# Patient Record
Sex: FEMALE | Race: WHITE | NOT HISPANIC OR LATINO | Employment: OTHER | URBAN - NONMETROPOLITAN AREA
[De-identification: names, ages, dates, MRNs, and addresses within clinical notes are randomized per-mention and may not be internally consistent; named-entity substitution may affect disease eponyms.]

---

## 2017-02-15 ENCOUNTER — HISTORY (OUTPATIENT)
Dept: FAMILY MEDICINE | Facility: OTHER | Age: 67
End: 2017-02-15

## 2017-02-15 ENCOUNTER — HOSPITAL ENCOUNTER (OUTPATIENT)
Dept: RADIOLOGY | Facility: OTHER | Age: 67
End: 2017-02-15
Attending: NURSE PRACTITIONER

## 2017-02-15 ENCOUNTER — OFFICE VISIT - GICH (OUTPATIENT)
Dept: FAMILY MEDICINE | Facility: OTHER | Age: 67
End: 2017-02-15

## 2017-02-15 DIAGNOSIS — R51.9 HEADACHE: ICD-10-CM

## 2017-02-15 DIAGNOSIS — M54.2 CERVICALGIA: ICD-10-CM

## 2017-02-15 DIAGNOSIS — G50.0 TRIGEMINAL NEURALGIA: ICD-10-CM

## 2017-02-15 LAB
A/G RATIO - HISTORICAL: 1.1 (ref 1–2)
ABSOLUTE BASOPHILS - HISTORICAL: 0.1 THOU/CU MM
ABSOLUTE EOSINOPHILS - HISTORICAL: 0.3 THOU/CU MM
ABSOLUTE LYMPHOCYTES - HISTORICAL: 1.8 THOU/CU MM (ref 0.9–2.9)
ABSOLUTE MONOCYTES - HISTORICAL: 0.6 THOU/CU MM
ABSOLUTE NEUTROPHILS - HISTORICAL: 6.7 THOU/CU MM (ref 1.7–7)
ALBUMIN SERPL-MCNC: 4.1 G/DL (ref 3.5–5.7)
ALP SERPL-CCNC: 89 IU/L (ref 34–104)
ALT (SGPT) - HISTORICAL: 17 IU/L (ref 7–52)
ANION GAP - HISTORICAL: 7 (ref 5–18)
AST SERPL-CCNC: 15 IU/L (ref 13–39)
BASOPHILS # BLD AUTO: 0.6 %
BILIRUB SERPL-MCNC: 0.3 MG/DL (ref 0.3–1)
BUN SERPL-MCNC: 9 MG/DL (ref 7–25)
BUN/CREAT RATIO - HISTORICAL: 13
CALCIUM SERPL-MCNC: 10.1 MG/DL (ref 8.6–10.3)
CHLORIDE SERPLBLD-SCNC: 101 MMOL/L (ref 98–107)
CO2 SERPL-SCNC: 31 MMOL/L (ref 21–31)
CREAT SERPL-MCNC: 0.69 MG/DL (ref 0.7–1.3)
EOSINOPHIL NFR BLD AUTO: 3.4 %
ERYTHROCYTE [DISTWIDTH] IN BLOOD BY AUTOMATED COUNT: 12.5 % (ref 11.5–15.5)
ERYTHROCYTE [SEDIMENTATION RATE] IN BLOOD: 25 MM/HR
GFR IF NOT AFRICAN AMERICAN - HISTORICAL: >60 ML/MIN/1.73M2
GLOBULIN - HISTORICAL: 3.9 G/DL (ref 2–3.7)
GLUCOSE SERPL-MCNC: 128 MG/DL (ref 70–105)
HCT VFR BLD AUTO: 47.4 % (ref 33–51)
HEMOGLOBIN: 14.9 G/DL (ref 12–16)
LYMPHOCYTES NFR BLD AUTO: 18.7 % (ref 20–44)
MCH RBC QN AUTO: 26.1 PG (ref 26–34)
MCHC RBC AUTO-ENTMCNC: 31.5 G/DL (ref 32–36)
MCV RBC AUTO: 83 FL (ref 80–100)
MONOCYTES NFR BLD AUTO: 6.1 %
NEUTROPHILS NFR BLD AUTO: 71.2 % (ref 42–72)
PLATELET # BLD AUTO: 353 THOU/CU MM (ref 140–440)
PMV BLD: 5.7 FL (ref 6.5–11)
POTASSIUM SERPL-SCNC: 3.9 MMOL/L (ref 3.5–5.1)
PROT SERPL-MCNC: 8 G/DL (ref 6.4–8.9)
RED BLOOD COUNT - HISTORICAL: 5.72 MIL/CU MM (ref 4–5.2)
SODIUM SERPL-SCNC: 139 MMOL/L (ref 133–143)
WHITE BLOOD COUNT - HISTORICAL: 9.4 THOU/CU MM (ref 4.5–11)

## 2017-02-16 ENCOUNTER — COMMUNICATION - GICH (OUTPATIENT)
Dept: FAMILY MEDICINE | Facility: OTHER | Age: 67
End: 2017-02-16

## 2017-02-16 ENCOUNTER — HISTORY (OUTPATIENT)
Dept: FAMILY MEDICINE | Facility: OTHER | Age: 67
End: 2017-02-16

## 2017-02-16 DIAGNOSIS — F40.240 CLAUSTROPHOBIA: ICD-10-CM

## 2017-02-17 ENCOUNTER — HOSPITAL ENCOUNTER (OUTPATIENT)
Dept: RADIOLOGY | Facility: OTHER | Age: 67
End: 2017-02-17
Attending: NURSE PRACTITIONER

## 2017-02-17 ENCOUNTER — AMBULATORY - GICH (OUTPATIENT)
Dept: FAMILY MEDICINE | Facility: OTHER | Age: 67
End: 2017-02-17

## 2017-02-17 ENCOUNTER — COMMUNICATION - GICH (OUTPATIENT)
Dept: FAMILY MEDICINE | Facility: OTHER | Age: 67
End: 2017-02-17

## 2017-02-17 DIAGNOSIS — R51.9 HEADACHE: ICD-10-CM

## 2017-02-17 DIAGNOSIS — G50.0 TRIGEMINAL NEURALGIA: ICD-10-CM

## 2017-02-17 DIAGNOSIS — J01.00 ACUTE MAXILLARY SINUSITIS: ICD-10-CM

## 2017-02-20 ENCOUNTER — HISTORY (OUTPATIENT)
Dept: EMERGENCY MEDICINE | Facility: OTHER | Age: 67
End: 2017-02-20

## 2017-02-27 ENCOUNTER — HISTORY (OUTPATIENT)
Dept: FAMILY MEDICINE | Facility: OTHER | Age: 67
End: 2017-02-27

## 2017-02-27 ENCOUNTER — OFFICE VISIT - GICH (OUTPATIENT)
Dept: FAMILY MEDICINE | Facility: OTHER | Age: 67
End: 2017-02-27

## 2017-02-27 DIAGNOSIS — G50.0 TRIGEMINAL NEURALGIA: ICD-10-CM

## 2017-02-27 DIAGNOSIS — R51.9 HEADACHE: ICD-10-CM

## 2017-03-06 ENCOUNTER — OFFICE VISIT - GICH (OUTPATIENT)
Dept: FAMILY MEDICINE | Facility: OTHER | Age: 67
End: 2017-03-06

## 2017-03-06 ENCOUNTER — HISTORY (OUTPATIENT)
Dept: FAMILY MEDICINE | Facility: OTHER | Age: 67
End: 2017-03-06

## 2017-03-06 DIAGNOSIS — G50.0 TRIGEMINAL NEURALGIA: ICD-10-CM

## 2017-03-22 ENCOUNTER — HISTORY (OUTPATIENT)
Dept: FAMILY MEDICINE | Facility: OTHER | Age: 67
End: 2017-03-22

## 2017-03-22 ENCOUNTER — OFFICE VISIT - GICH (OUTPATIENT)
Dept: FAMILY MEDICINE | Facility: OTHER | Age: 67
End: 2017-03-22

## 2017-03-22 DIAGNOSIS — G89.29 OTHER CHRONIC PAIN: ICD-10-CM

## 2017-03-22 DIAGNOSIS — N39.3 STRESS INCONTINENCE: ICD-10-CM

## 2017-03-22 DIAGNOSIS — Z74.1 NEED FOR ASSISTANCE WITH PERSONAL CARE: ICD-10-CM

## 2017-03-22 DIAGNOSIS — Z23 ENCOUNTER FOR IMMUNIZATION: ICD-10-CM

## 2017-03-22 DIAGNOSIS — Z12.39 ENCOUNTER FOR OTHER SCREENING FOR MALIGNANT NEOPLASM OF BREAST: ICD-10-CM

## 2017-03-22 DIAGNOSIS — G50.0 TRIGEMINAL NEURALGIA: ICD-10-CM

## 2017-03-22 DIAGNOSIS — Z00.00 ENCOUNTER FOR GENERAL ADULT MEDICAL EXAMINATION WITHOUT ABNORMAL FINDINGS: ICD-10-CM

## 2017-03-22 DIAGNOSIS — M54.2 CERVICALGIA: ICD-10-CM

## 2017-03-22 DIAGNOSIS — M79.7 FIBROMYALGIA: ICD-10-CM

## 2017-03-22 DIAGNOSIS — L60.2 ONYCHOGRYPHOSIS: ICD-10-CM

## 2017-03-22 DIAGNOSIS — Z87.410 HISTORY OF CERVICAL DYSPLASIA: ICD-10-CM

## 2017-03-22 LAB — HEPATITIS C ANTIBODY CATEGORY - HISTORICAL: NORMAL

## 2017-03-23 ENCOUNTER — COMMUNICATION - GICH (OUTPATIENT)
Dept: FAMILY MEDICINE | Facility: OTHER | Age: 67
End: 2017-03-23

## 2017-03-29 ENCOUNTER — HOSPITAL ENCOUNTER (OUTPATIENT)
Dept: PHYSICAL THERAPY | Facility: OTHER | Age: 67
Setting detail: THERAPIES SERIES
End: 2017-03-29
Attending: NURSE PRACTITIONER

## 2017-03-29 ENCOUNTER — COMMUNICATION - GICH (OUTPATIENT)
Dept: PHYSICAL THERAPY | Facility: OTHER | Age: 67
End: 2017-03-29

## 2017-03-29 DIAGNOSIS — R42 DIZZINESS AND GIDDINESS: ICD-10-CM

## 2017-03-29 DIAGNOSIS — G89.29 OTHER CHRONIC PAIN: ICD-10-CM

## 2017-03-29 DIAGNOSIS — R26.89 OTHER ABNORMALITIES OF GAIT AND MOBILITY: ICD-10-CM

## 2017-03-29 DIAGNOSIS — M54.2 CERVICALGIA: ICD-10-CM

## 2017-03-31 ENCOUNTER — HOSPITAL ENCOUNTER (OUTPATIENT)
Dept: PHYSICAL THERAPY | Facility: OTHER | Age: 67
Setting detail: THERAPIES SERIES
End: 2017-03-31
Attending: NURSE PRACTITIONER

## 2017-04-03 ENCOUNTER — HOSPITAL ENCOUNTER (OUTPATIENT)
Dept: PHYSICAL THERAPY | Facility: OTHER | Age: 67
Setting detail: THERAPIES SERIES
End: 2017-04-03
Attending: NURSE PRACTITIONER

## 2017-04-03 DIAGNOSIS — R42 DIZZINESS AND GIDDINESS: ICD-10-CM

## 2017-04-03 DIAGNOSIS — R26.89 OTHER ABNORMALITIES OF GAIT AND MOBILITY: ICD-10-CM

## 2017-04-05 ENCOUNTER — HOSPITAL ENCOUNTER (OUTPATIENT)
Dept: RADIOLOGY | Facility: OTHER | Age: 67
End: 2017-04-05
Attending: NURSE PRACTITIONER

## 2017-04-05 ENCOUNTER — HISTORY (OUTPATIENT)
Dept: RADIOLOGY | Facility: OTHER | Age: 67
End: 2017-04-05

## 2017-04-05 DIAGNOSIS — Z12.39 ENCOUNTER FOR OTHER SCREENING FOR MALIGNANT NEOPLASM OF BREAST: ICD-10-CM

## 2017-04-05 DIAGNOSIS — Z00.00 ENCOUNTER FOR GENERAL ADULT MEDICAL EXAMINATION WITHOUT ABNORMAL FINDINGS: ICD-10-CM

## 2017-04-07 ENCOUNTER — HOSPITAL ENCOUNTER (OUTPATIENT)
Dept: PHYSICAL THERAPY | Facility: OTHER | Age: 67
Setting detail: THERAPIES SERIES
End: 2017-04-07
Attending: NURSE PRACTITIONER

## 2017-04-10 ENCOUNTER — OFFICE VISIT - GICH (OUTPATIENT)
Dept: FAMILY MEDICINE | Facility: OTHER | Age: 67
End: 2017-04-10

## 2017-04-10 ENCOUNTER — HISTORY (OUTPATIENT)
Dept: FAMILY MEDICINE | Facility: OTHER | Age: 67
End: 2017-04-10

## 2017-04-10 DIAGNOSIS — Z78.9 OTHER SPECIFIED HEALTH STATUS (CODE): ICD-10-CM

## 2017-04-10 DIAGNOSIS — G50.0 TRIGEMINAL NEURALGIA: ICD-10-CM

## 2017-04-10 DIAGNOSIS — M54.2 CERVICALGIA: ICD-10-CM

## 2017-04-10 DIAGNOSIS — B35.1 TINEA UNGUIUM: ICD-10-CM

## 2017-04-11 ENCOUNTER — HOSPITAL ENCOUNTER (OUTPATIENT)
Dept: PHYSICAL THERAPY | Facility: OTHER | Age: 67
Setting detail: THERAPIES SERIES
End: 2017-04-11
Attending: NURSE PRACTITIONER

## 2017-04-13 ENCOUNTER — HOSPITAL ENCOUNTER (OUTPATIENT)
Dept: PHYSICAL THERAPY | Facility: OTHER | Age: 67
Setting detail: THERAPIES SERIES
End: 2017-04-13
Attending: NURSE PRACTITIONER

## 2017-04-18 ENCOUNTER — HOSPITAL ENCOUNTER (OUTPATIENT)
Dept: PHYSICAL THERAPY | Facility: OTHER | Age: 67
Setting detail: THERAPIES SERIES
End: 2017-04-18
Attending: NURSE PRACTITIONER

## 2017-04-20 ENCOUNTER — HOSPITAL ENCOUNTER (OUTPATIENT)
Dept: PHYSICAL THERAPY | Facility: OTHER | Age: 67
Setting detail: THERAPIES SERIES
End: 2017-04-20
Attending: NURSE PRACTITIONER

## 2017-04-24 ENCOUNTER — HISTORY (OUTPATIENT)
Dept: FAMILY MEDICINE | Facility: OTHER | Age: 67
End: 2017-04-24

## 2017-04-24 ENCOUNTER — OFFICE VISIT - GICH (OUTPATIENT)
Dept: FAMILY MEDICINE | Facility: OTHER | Age: 67
End: 2017-04-24

## 2017-04-24 DIAGNOSIS — L60.2 ONYCHOGRYPHOSIS: ICD-10-CM

## 2017-04-24 DIAGNOSIS — L85.3 XEROSIS CUTIS: ICD-10-CM

## 2017-04-24 DIAGNOSIS — Z74.1 NEED FOR ASSISTANCE WITH PERSONAL CARE: ICD-10-CM

## 2017-04-25 ENCOUNTER — HOSPITAL ENCOUNTER (OUTPATIENT)
Dept: PHYSICAL THERAPY | Facility: OTHER | Age: 67
Setting detail: THERAPIES SERIES
End: 2017-04-25
Attending: NURSE PRACTITIONER

## 2017-04-27 ENCOUNTER — HOSPITAL ENCOUNTER (OUTPATIENT)
Dept: PHYSICAL THERAPY | Facility: OTHER | Age: 67
Setting detail: THERAPIES SERIES
End: 2017-04-27
Attending: NURSE PRACTITIONER

## 2017-05-01 ENCOUNTER — AMBULATORY - GICH (OUTPATIENT)
Dept: SCHEDULING | Facility: OTHER | Age: 67
End: 2017-05-01

## 2017-05-02 ENCOUNTER — HOSPITAL ENCOUNTER (OUTPATIENT)
Dept: PHYSICAL THERAPY | Facility: OTHER | Age: 67
Setting detail: THERAPIES SERIES
End: 2017-05-02
Attending: NURSE PRACTITIONER

## 2017-05-08 ENCOUNTER — HISTORY (OUTPATIENT)
Dept: FAMILY MEDICINE | Facility: OTHER | Age: 67
End: 2017-05-08

## 2017-05-08 ENCOUNTER — OFFICE VISIT - GICH (OUTPATIENT)
Dept: FAMILY MEDICINE | Facility: OTHER | Age: 67
End: 2017-05-08

## 2017-05-08 DIAGNOSIS — L60.2 ONYCHOGRYPHOSIS: ICD-10-CM

## 2017-05-08 DIAGNOSIS — M54.2 CERVICALGIA: ICD-10-CM

## 2017-05-08 DIAGNOSIS — G50.0 TRIGEMINAL NEURALGIA: ICD-10-CM

## 2017-05-08 DIAGNOSIS — L85.3 XEROSIS CUTIS: ICD-10-CM

## 2017-05-09 ENCOUNTER — COMMUNICATION - GICH (OUTPATIENT)
Dept: FAMILY MEDICINE | Facility: OTHER | Age: 67
End: 2017-05-09

## 2017-05-09 ENCOUNTER — HOSPITAL ENCOUNTER (OUTPATIENT)
Dept: PHYSICAL THERAPY | Facility: OTHER | Age: 67
Setting detail: THERAPIES SERIES
End: 2017-05-09
Attending: NURSE PRACTITIONER

## 2017-05-22 ENCOUNTER — HOSPITAL ENCOUNTER (OUTPATIENT)
Dept: PHYSICAL THERAPY | Facility: OTHER | Age: 67
Setting detail: THERAPIES SERIES
End: 2017-05-22
Attending: NURSE PRACTITIONER

## 2017-05-26 ENCOUNTER — HOSPITAL ENCOUNTER (OUTPATIENT)
Dept: PHYSICAL THERAPY | Facility: OTHER | Age: 67
Setting detail: THERAPIES SERIES
End: 2017-05-26
Attending: NURSE PRACTITIONER

## 2017-06-05 ENCOUNTER — OFFICE VISIT - GICH (OUTPATIENT)
Dept: FAMILY MEDICINE | Facility: OTHER | Age: 67
End: 2017-06-05

## 2017-06-05 ENCOUNTER — HISTORY (OUTPATIENT)
Dept: FAMILY MEDICINE | Facility: OTHER | Age: 67
End: 2017-06-05

## 2017-06-05 ENCOUNTER — AMBULATORY - GICH (OUTPATIENT)
Dept: SCHEDULING | Facility: OTHER | Age: 67
End: 2017-06-05

## 2017-06-05 ENCOUNTER — COMMUNICATION - GICH (OUTPATIENT)
Dept: GERIATRICS | Facility: OTHER | Age: 67
End: 2017-06-05

## 2017-06-05 DIAGNOSIS — G50.0 TRIGEMINAL NEURALGIA: ICD-10-CM

## 2017-06-05 DIAGNOSIS — R51.9 HEADACHE: ICD-10-CM

## 2017-06-05 DIAGNOSIS — L60.2 ONYCHOGRYPHOSIS: ICD-10-CM

## 2017-06-05 DIAGNOSIS — Z23 ENCOUNTER FOR IMMUNIZATION: ICD-10-CM

## 2017-06-05 ASSESSMENT — PATIENT HEALTH QUESTIONNAIRE - PHQ9: SUM OF ALL RESPONSES TO PHQ QUESTIONS 1-9: 0

## 2017-06-06 ENCOUNTER — COMMUNICATION - GICH (OUTPATIENT)
Dept: FAMILY MEDICINE | Facility: OTHER | Age: 67
End: 2017-06-06

## 2017-06-12 ENCOUNTER — AMBULATORY - GICH (OUTPATIENT)
Dept: SCHEDULING | Facility: OTHER | Age: 67
End: 2017-06-12

## 2017-06-12 ENCOUNTER — TRANSFERRED RECORDS (OUTPATIENT)
Dept: MULTI SPECIALTY CLINIC | Facility: CLINIC | Age: 67
End: 2017-06-12

## 2017-06-12 LAB — COLOGUARD-ABSTRACT: POSITIVE

## 2017-06-26 ENCOUNTER — COMMUNICATION - GICH (OUTPATIENT)
Dept: FAMILY MEDICINE | Facility: OTHER | Age: 67
End: 2017-06-26

## 2017-07-05 ENCOUNTER — OFFICE VISIT - GICH (OUTPATIENT)
Dept: FAMILY MEDICINE | Facility: OTHER | Age: 67
End: 2017-07-05

## 2017-07-05 ENCOUNTER — AMBULATORY - GICH (OUTPATIENT)
Dept: SCHEDULING | Facility: OTHER | Age: 67
End: 2017-07-05

## 2017-07-05 ENCOUNTER — HISTORY (OUTPATIENT)
Dept: FAMILY MEDICINE | Facility: OTHER | Age: 67
End: 2017-07-05

## 2017-07-05 DIAGNOSIS — L60.2 ONYCHOGRYPHOSIS: ICD-10-CM

## 2017-07-05 DIAGNOSIS — G50.0 TRIGEMINAL NEURALGIA: ICD-10-CM

## 2017-07-05 DIAGNOSIS — Z91.89 OTHER SPECIFIED PERSONAL RISK FACTORS, NOT ELSEWHERE CLASSIFIED: ICD-10-CM

## 2017-07-05 DIAGNOSIS — Z87.19 PERSONAL HISTORY OF OTHER DISEASES OF THE DIGESTIVE SYSTEM (CODE): ICD-10-CM

## 2017-07-05 DIAGNOSIS — R19.5 OTHER FECAL ABNORMALITIES: ICD-10-CM

## 2017-07-05 ASSESSMENT — PATIENT HEALTH QUESTIONNAIRE - PHQ9: SUM OF ALL RESPONSES TO PHQ QUESTIONS 1-9: 0

## 2017-07-09 ENCOUNTER — HISTORY (OUTPATIENT)
Dept: FAMILY MEDICINE | Facility: OTHER | Age: 67
End: 2017-07-09

## 2017-07-19 ENCOUNTER — HISTORY (OUTPATIENT)
Dept: EMERGENCY MEDICINE | Facility: OTHER | Age: 67
End: 2017-07-19

## 2017-07-19 ENCOUNTER — COMMUNICATION - GICH (OUTPATIENT)
Dept: GERIATRICS | Facility: OTHER | Age: 67
End: 2017-07-19

## 2017-08-04 ENCOUNTER — OFFICE VISIT - GICH (OUTPATIENT)
Dept: FAMILY MEDICINE | Facility: OTHER | Age: 67
End: 2017-08-04

## 2017-08-04 ENCOUNTER — HOSPITAL ENCOUNTER (OUTPATIENT)
Dept: RADIOLOGY | Facility: OTHER | Age: 67
End: 2017-08-04
Attending: NURSE PRACTITIONER

## 2017-08-04 ENCOUNTER — HISTORY (OUTPATIENT)
Dept: FAMILY MEDICINE | Facility: OTHER | Age: 67
End: 2017-08-04

## 2017-08-04 DIAGNOSIS — I83.93 ASYMPTOMATIC VARICOSE VEINS OF BOTH LOWER EXTREMITIES: ICD-10-CM

## 2017-08-04 DIAGNOSIS — Z91.89 OTHER SPECIFIED PERSONAL RISK FACTORS, NOT ELSEWHERE CLASSIFIED: ICD-10-CM

## 2017-08-04 DIAGNOSIS — L60.2 ONYCHOGRYPHOSIS: ICD-10-CM

## 2017-08-14 ENCOUNTER — AMBULATORY - GICH (OUTPATIENT)
Dept: SCHEDULING | Facility: OTHER | Age: 67
End: 2017-08-14

## 2017-10-09 ENCOUNTER — AMBULATORY - GICH (OUTPATIENT)
Dept: SCHEDULING | Facility: OTHER | Age: 67
End: 2017-10-09

## 2017-10-26 ENCOUNTER — COMMUNICATION - GICH (OUTPATIENT)
Dept: FAMILY MEDICINE | Facility: OTHER | Age: 67
End: 2017-10-26

## 2017-11-07 ENCOUNTER — OFFICE VISIT - GICH (OUTPATIENT)
Dept: FAMILY MEDICINE | Facility: OTHER | Age: 67
End: 2017-11-07

## 2017-11-07 ENCOUNTER — HISTORY (OUTPATIENT)
Dept: FAMILY MEDICINE | Facility: OTHER | Age: 67
End: 2017-11-07

## 2017-11-07 DIAGNOSIS — R51.9 HEADACHE: ICD-10-CM

## 2017-11-07 DIAGNOSIS — N39.41 URGE INCONTINENCE: ICD-10-CM

## 2017-11-07 DIAGNOSIS — Z23 ENCOUNTER FOR IMMUNIZATION: ICD-10-CM

## 2017-11-07 DIAGNOSIS — N39.43 POST-VOID DRIBBLING: ICD-10-CM

## 2017-11-07 DIAGNOSIS — L60.2 ONYCHOGRYPHOSIS: ICD-10-CM

## 2017-11-07 DIAGNOSIS — G50.0 TRIGEMINAL NEURALGIA: ICD-10-CM

## 2017-11-07 LAB
BACTERIA URINE: ABNORMAL BACTERIA/HPF
BILIRUB UR QL: NEGATIVE
CLARITY, URINE: CLEAR CLARITY
COLOR UR: YELLOW COLOR
EPITHELIAL CELLS: ABNORMAL EPI/HPF
GLUCOSE URINE: NEGATIVE MG/DL
KETONES UR QL: NEGATIVE MG/DL
LEUKOCYTE ESTERASE URINE: NEGATIVE
NITRITE UR QL STRIP: NEGATIVE
OCCULT BLOOD,URINE - HISTORICAL: ABNORMAL
PH UR: 6.5 [PH]
PROTEIN QUALITATIVE,URINE - HISTORICAL: NEGATIVE MG/DL
RBC - HISTORICAL: ABNORMAL /HPF
SP GR UR STRIP: <=1.005
UROBILINOGEN,QUALITATIVE - HISTORICAL: NORMAL EU/DL
WBC - HISTORICAL: ABNORMAL /HPF

## 2017-11-07 ASSESSMENT — PATIENT HEALTH QUESTIONNAIRE - PHQ9: SUM OF ALL RESPONSES TO PHQ QUESTIONS 1-9: 0

## 2017-11-10 ENCOUNTER — COMMUNICATION - GICH (OUTPATIENT)
Dept: FAMILY MEDICINE | Facility: OTHER | Age: 67
End: 2017-11-10

## 2017-11-29 ENCOUNTER — HISTORY (OUTPATIENT)
Dept: UROLOGY | Facility: OTHER | Age: 67
End: 2017-11-29

## 2017-11-29 ENCOUNTER — OFFICE VISIT - GICH (OUTPATIENT)
Dept: UROLOGY | Facility: OTHER | Age: 67
End: 2017-11-29

## 2017-11-29 DIAGNOSIS — R32 URINARY INCONTINENCE: ICD-10-CM

## 2017-11-29 DIAGNOSIS — R31.21 ASYMPTOMATIC MICROSCOPIC HEMATURIA: ICD-10-CM

## 2017-12-07 ENCOUNTER — HISTORY (OUTPATIENT)
Dept: FAMILY MEDICINE | Facility: OTHER | Age: 67
End: 2017-12-07

## 2017-12-07 ENCOUNTER — HOSPITAL ENCOUNTER (OUTPATIENT)
Dept: RADIOLOGY | Facility: OTHER | Age: 67
End: 2017-12-07
Attending: NURSE PRACTITIONER

## 2017-12-07 ENCOUNTER — OFFICE VISIT - GICH (OUTPATIENT)
Dept: FAMILY MEDICINE | Facility: OTHER | Age: 67
End: 2017-12-07

## 2017-12-07 DIAGNOSIS — M79.644 PAIN OF FINGER OF RIGHT HAND: ICD-10-CM

## 2017-12-07 DIAGNOSIS — R32 URINARY INCONTINENCE: ICD-10-CM

## 2017-12-07 DIAGNOSIS — R31.21 ASYMPTOMATIC MICROSCOPIC HEMATURIA: ICD-10-CM

## 2017-12-07 DIAGNOSIS — L60.2 ONYCHOGRYPHOSIS: ICD-10-CM

## 2017-12-07 LAB
CREAT SERPL-MCNC: 0.69 MG/DL (ref 0.7–1.3)
GFR IF NOT AFRICAN AMERICAN - HISTORICAL: >60 ML/MIN/1.73M2

## 2017-12-07 ASSESSMENT — PATIENT HEALTH QUESTIONNAIRE - PHQ9: SUM OF ALL RESPONSES TO PHQ QUESTIONS 1-9: 0

## 2017-12-08 ENCOUNTER — COMMUNICATION - GICH (OUTPATIENT)
Dept: FAMILY MEDICINE | Facility: OTHER | Age: 67
End: 2017-12-08

## 2017-12-14 ENCOUNTER — AMBULATORY - GICH (OUTPATIENT)
Dept: UROLOGY | Facility: OTHER | Age: 67
End: 2017-12-14

## 2017-12-14 DIAGNOSIS — R31.9 HEMATURIA: ICD-10-CM

## 2017-12-15 ENCOUNTER — HISTORY (OUTPATIENT)
Dept: UROLOGY | Facility: OTHER | Age: 67
End: 2017-12-15

## 2017-12-15 ENCOUNTER — AMBULATORY - GICH (OUTPATIENT)
Dept: UROLOGY | Facility: OTHER | Age: 67
End: 2017-12-15

## 2017-12-15 ENCOUNTER — HOSPITAL ENCOUNTER (OUTPATIENT)
Dept: RADIOLOGY | Facility: OTHER | Age: 67
End: 2017-12-15
Attending: UROLOGY

## 2017-12-15 DIAGNOSIS — R31.21 ASYMPTOMATIC MICROSCOPIC HEMATURIA: ICD-10-CM

## 2017-12-15 DIAGNOSIS — R31.9 HEMATURIA: ICD-10-CM

## 2017-12-21 ENCOUNTER — COMMUNICATION - GICH (OUTPATIENT)
Dept: GERIATRICS | Facility: OTHER | Age: 67
End: 2017-12-21

## 2017-12-27 NOTE — PROGRESS NOTES
Patient Information     Patient Name MRN Sex Pennie Fry [id number] Female 1950      Progress Notes by Cookie Schultz NP at 2017 10:00 AM     Author:  Cookie Schultz NP Service:  (none) Author Type:  PHYS- Nurse Practitioner     Filed:  2017  8:52 PM Encounter Date:  2017 Status:  Signed     :  Cookie Schultz NP (PHYS- Nurse Practitioner)            SUBJECTIVE:    Pennie Nichols is a 66 y.o. female who presents for follow-up on her toenails--history onychogryphosis due to physical limitations to care for lower extremities, feet and toenails. Patient has a PCA who assists her with daily hygiene and foot care. Patient reports her PCA has concerns about purple spots on her ankles and lower legs. Patient reports she is not having any pain or discomfort.   Patient had positive cologuard screening test and declines colonoscopy follow-up. Patient reports overall she has been doing well and PCAs assisting her in her home with ADLs has been helpful and supportive with accomplishing bathing, skin care, and other ADLs.  Patient has no other concerns today    HPI    Allergies      Allergen   Reactions     Contrast Dye [Diatrizoate Meglumine (Iv Contrast Dye)]  Angioedema     As noted 2009 note Dr Stone    ,   Family History       Problem   Relation Age of Onset     Other  Mother      lung cancer       Heart Disease  Father 70     MI       Cancer  Father      Liver cancer       Cancer  Other      Lung cancer       Diabetes  Other      Cancer-colon  No Family History      Cancer-prostate  No Family History      Cancer-ovarian  No Family History      Blood Disease  No Family History      Hypertension  No Family History      Stroke  No Family History      Thyroid Disease  No Family History      Cancer-breast  No Family History    ,   Current Outpatient Prescriptions on File Prior to Visit       Medication  Sig Dispense Refill     carBAMazepine (TEGRETOL) 200 mg tablet Take 1/2 tab twice a day  (Patient taking differently: once daily. Take 1/2 tab twice a day) 60 tablet 3     No current facility-administered medications on file prior to visit.    ,   Current Outpatient Prescriptions:      carBAMazepine (TEGRETOL) 200 mg tablet, Take 1/2 tab twice a day (Patient taking differently: once daily. Take 1/2 tab twice a day), Disp: 60 tablet, Rfl: 3  Medications have been reviewed by me and are current to the best of my knowledge and ability.,   Patient Active Problem List       Diagnosis  Date Noted     Asymptomatic varicose veins of bilateral lower extremities  08/07/2017     History of small bowel obstruction  07/09/2017 8/31/95--Dr Barrow--had incidental Appendectomy--Multiple adhesions released        Abnormal findings in stool  07/05/2017     Onychogryphosis  04/24/2017     Requires assistance with activities of daily living (ADL)  04/24/2017     Trigeminal neuralgia of right side of face  03/06/2017     Fibromyalgia  07/09/1997   ,   Patient Active Problem List     Diagnosis  Code     Fibromyalgia M79.7     Trigeminal neuralgia of right side of face G50.0     Onychogryphosis L60.2     Requires assistance with activities of daily living (ADL) Z74.1     Abnormal findings in stool R19.5     History of small bowel obstruction Z87.19     Asymptomatic varicose veins of bilateral lower extremities I83.93   ,   Social History      Substance Use Topics        Smoking status:  Former Smoker     Packs/day: 1.00     Years: 30.00     Types: Cigarettes     Quit date: 1/21/2001     Smokeless tobacco:  Never Used     Alcohol use  No    and   Social History      Substance Use Topics        Smoking status:  Former Smoker     Packs/day: 1.00     Years: 30.00     Types: Cigarettes     Quit date: 1/21/2001     Smokeless tobacco:  Never Used     Alcohol use  No       REVIEW OF SYSTEMS:  Review of Systems   Constitutional: Negative.    HENT: Negative.    Eyes: Negative.    Respiratory: Negative.    Cardiovascular: Negative.   "  Gastrointestinal: Negative.    Genitourinary: Negative.    Musculoskeletal: Negative.    Skin: Positive for rash.   Neurological: Negative.    Endo/Heme/Allergies: Negative.    Psychiatric/Behavioral: Negative.        OBJECTIVE:  /74  Pulse 84  Ht 1.52 m (4' 11.84\")  Wt 94.8 kg (209 lb)  Breastfeeding? No  BMI 41.04 kg/m2    EXAM:   Physical Exam   Constitutional: She is oriented to person, place, and time and well-developed, well-nourished, and in no distress.   Cardiovascular: Normal rate.    Pulmonary/Chest: Effort normal.   Musculoskeletal: Normal range of motion. She exhibits no edema or tenderness.   Neurological: She is alert and oriented to person, place, and time. Gait normal.   Skin: Skin is warm and dry.   Toenails inspected, thick fungal nails without any erythema or rash    dremel 2 all toenail surfaces to keep nails short to prevent friction on shoes.    Has varicose veins scattered distribution pattern bilateral lower extremities, ankles. No areas of erythema or edema. No tenderness.   Psychiatric: Mood, memory, affect and judgment normal.   Nursing note and vitals reviewed.      ASSESSMENT/PLAN:    ICD-10-CM    1. Asymptomatic varicose veins of bilateral lower extremities I83.93    2. Onychogryphosis L60.2         Plan:  Continue daily skin care--- follow-up in one month for foot care and nail care    We'll contact PCA to discuss questions regarding varicose veins as patient requests            "

## 2017-12-27 NOTE — PROGRESS NOTES
Patient Information     Patient Name MRN Sex Pennie Taylor [id number] Female 1950      Progress Notes by Cookie Schultz NP at 2017 11:00 AM     Author:  Cookie Schultz NP Service:  (none) Author Type:  PHYS- Nurse Practitioner     Filed:  2017  1:08 PM Encounter Date:  2017 Status:  Signed     :  Cookie Schultz NP (PHYS- Nurse Practitioner)            SUBJECTIVE:    Pennie Nichols is a 66 y.o. female who presents for follow-up on her goal toenail infections and overgrown toenails. Saw podiatry a couple of weeks ago and was told that toenails were well trimmed and no further work need to be done, patient does not recall discussion regarding treatment of fungal nail infection. Some felt pads were placed or a podiatrist in both of her shoes and she was told to follow up with podiatry in 6 weeks.    She has been participating in theo chi classes at [facility] for balance--enjoying classes and feels has improved her overall balance and gait    PCA coming to home and assisting with showers and hygiene. Has been soaking her feet wondering if she needs to continue. PCAs help with lotion for hyperkeratotic areas on skin particularly lower legs and dorsum of foot. Feels much improved and grateful for the assistance with activities of daily living.    Follow-up on cold card tests--discussed were positive--- patient declines colonoscopy at this time. Denies any dark or melanotic stools. Denies any abdominal pain or GI symptoms.    Reports trigeminal neuralgia occasionally has intermittent shocklike symptoms only last for seconds and not consistently daily, tolerating medication well.      Send for old paper chart: History of multiple adhesions status post hysterectomy contributing to small bowel obstruction, which were released and incidental appendectomy performed.  She remains uncertain regarding her hysterectomy if she has a cervix or not. Uncertain where this procedure was performed. No  notes available in electronic health record her paper chart regarding pathology report.  History of abnormal Paps, saw Dr. Tietz gynecologist in [facility]--- felt abnormal Paps may be due to warts--- unable to locate any further correspondence after hysterectomy regarding pathology and recommended follow-up  patient is 34 years S/P hysterectomy. Denies any vaginal discharge or bleeding. No pelvic pain or pressure. Has not been sexually active for many years.    Patient denies any concerns and reports overall feeling well.      HPI    Allergies      Allergen   Reactions     Contrast Dye [Diatrizoate Meglumine (Iv Contrast Dye)]  Angioedema     As noted 2009 note Dr Stone    ,   Family History       Problem   Relation Age of Onset     Other  Mother      lung cancer       Heart Disease  Father 70     MI       Cancer  Father      Liver cancer       Cancer  Other      Lung cancer       Diabetes  Other      Cancer-colon  No Family History      Cancer-prostate  No Family History      Cancer-ovarian  No Family History      Blood Disease  No Family History      Hypertension  No Family History      Stroke  No Family History      Thyroid Disease  No Family History      Cancer-breast  No Family History    ,   Current Outpatient Prescriptions on File Prior to Visit       Medication  Sig Dispense Refill     carBAMazepine (TEGRETOL) 200 mg tablet Take 1/2 tab twice a day 60 tablet 3     No current facility-administered medications on file prior to visit.    ,   Current Outpatient Prescriptions:      carBAMazepine (TEGRETOL) 200 mg tablet, Take 1/2 tab twice a day, Disp: 60 tablet, Rfl: 3  Medications have been reviewed by me and are current to the best of my knowledge and ability.,   Past Medical History:     Diagnosis  Date     Back problem     Recurrent back problems , secondary to injury at [facility]      Fibromyalgia 7/9/1997     History of small bowel obstruction 7/9/2017 8/31/95--Dr Barrow--had incidental  "Appendectomy--Multiple adhesions released      Mental health problem     not otherwise specified       Onychogryphosis 4/24/2017   ,   Patient Active Problem List       Diagnosis  Date Noted     History of small bowel obstruction  07/09/2017 8/31/95--Dr Barrow--had incidental Appendectomy--Multiple adhesions released        Abnormal findings in stool  07/05/2017     Onychogryphosis  04/24/2017     Requires assistance with activities of daily living (ADL)  04/24/2017     Trigeminal neuralgia of right side of face  03/06/2017     Fibromyalgia  07/09/1997   ,   Past Surgical History:      Procedure  Laterality Date     APPENDECTOMY  1996    Incidental, Enterolysis--Dr Barrow       CHOLECYSTECTOMY  1996    Laparoscopic       COLONOSCOPY  1990?     CRYOTHERAPY OF CERVIX  1994    Abnormal Pap        HYSTERECTOMY  01/1995    [place]      and   Social History      Substance Use Topics        Smoking status:  Former Smoker     Packs/day: 1.00     Years: 30.00     Types: Cigarettes     Quit date: 1/21/2001     Smokeless tobacco:  Never Used     Alcohol use  No       REVIEW OF SYSTEMS:  Review of Systems   Constitutional: Negative.    HENT: Negative.    Eyes: Negative.    Respiratory: Negative.    Cardiovascular: Negative.    Gastrointestinal: Negative.    Genitourinary: Negative.    Musculoskeletal: Negative.    Skin: Negative.    Neurological: Negative.    Endo/Heme/Allergies: Negative.    Psychiatric/Behavioral: Negative.        OBJECTIVE:  /70  Pulse 84  Ht 1.52 m (4' 11.84\")  Wt 94.5 kg (208 lb 6 oz)  BMI 40.91 kg/m2    EXAM:   Physical Exam   Constitutional: She is oriented to person, place, and time and well-developed, well-nourished, and in no distress.   Cardiovascular: Normal rate.    Pulmonary/Chest: Effort normal.   Musculoskeletal: Normal range of motion.   Neurological: She is alert and oriented to person, place, and time. Gait normal.   Skin: Skin is warm and dry.   Toenails filed and trimmed with " moises. Toenails remain thickened with fungus, no areas of erythema or edema. No tender areas palpated    Good circulation and sensation, range of motion intact and equal    Skin overall lower legs dorsum of feet free of hyperkeratotic areas, much improved with daily moisturization   Psychiatric: Mood, memory, affect and judgment normal.   Nursing note and vitals reviewed.      ASSESSMENT/PLAN:    ICD-10-CM    1. At risk for decreased bone density Z91.89 XR DXA BONE DENSITY 2 SITES AXIAL   2. Abnormal findings in stool R19.5    3. Trigeminal neuralgia of right side of face G50.0    4. Onychogryphosis L60.2    5. History of small bowel obstruction Z87.19     DEXA scan pending  recent mammography April 2017 negative    Plan: Unnecessary to continue soaking feet unless for comfort    Continue daily cleansing with shower assistance with PCA moisturization of skin legs and feet    Only encourage colonoscopy--- patient declines will follow-up if reconsiders does not want to encounter bowel prep and procedure    Patient does not want pelvic exam and denies any pelvic symptoms    Patient may discontinue podiatry follow-up and would like to come in to clinic to maintain toenails    We'll continue current dose of Tegretol--will reevaluate in 2 months and if symptom-free will slowly taper off

## 2017-12-28 NOTE — TELEPHONE ENCOUNTER
"Patient Information     Patient Name MRN Pennie Car [id number] Female 1950      Telephone Encounter by Annie De Souza RN at 2017 11:45 AM     Author:  Annie De Souza RN Service:  (none) Author Type:  NURS- Registered Nurse     Filed:  2017 11:48 AM Encounter Date:  2017 Status:  Signed     :  Annie De Souza RN (NURS- Registered Nurse)            Pharmacy is calling as patient was prescribed Tegretol today in the clinic. The prescription currently has two sets of directions listed. \"Take 0.5 tabs by mouth two times daily\" and \"take 1/2 tab once a day\". Please verify correct prescription directions and re-send to pharmacy.    Annie De Souza RN............. 2017 11:46 AM         "

## 2017-12-28 NOTE — TELEPHONE ENCOUNTER
Patient Information     Patient Name MRN Sex Pennie Fry [id number] Female 1950      Telephone Encounter by Mary Ellen Contreras at 10/26/2017 11:13 AM     Author:  Mary Ellen Contreras Service:  (none) Author Type:  (none)     Filed:  10/26/2017 11:15 AM Encounter Date:  10/26/2017 Status:  Signed     :  Mary Ellen Contreras from [employer] need some further information in regards to pts urinary incontinence. Please advise.   Mary Ellen Contreras

## 2017-12-28 NOTE — TELEPHONE ENCOUNTER
Patient Information     Patient Name MRN Sex Pennie Fry [id number] Female 1950      Telephone Encounter by Mary Ellen Contreras at 10/26/2017 10:53 AM     Author:  Mary Ellen Contreras Service:  (none) Author Type:  (none)     Filed:  10/26/2017 10:59 AM Encounter Date:  10/26/2017 Status:  Signed     :  Mary Ellen Contreras with [employer] was looking for a code franko associate with pts Urinary incontinence.

## 2017-12-28 NOTE — TELEPHONE ENCOUNTER
Patient Information     Patient Name MRN Pennie Car [id number] Female 1950      Telephone Encounter by Cookie Schultz NP at 2017  1:56 PM     Author:  Cookie Schultz NP Service:  (none) Author Type:  PHYS- Nurse Practitioner     Filed:  2017  1:56 PM Encounter Date:  2017 Status:  Signed     :  Cookie Schultz NP (PHYS- Nurse Practitioner)             is emir Schultz NP ....................  2017   1:56 PM

## 2017-12-28 NOTE — PROGRESS NOTES
Patient Information     Patient Name MRN Pennie Car [id number] Female 1950      Progress Notes by Nick Aguilar MD at 2017 11:30 AM     Author:  Nick Aguilar MD Service:  (none) Author Type:  Physician     Filed:  2017  1:18 PM Encounter Date:  2017 Status:  Signed     :  Nick Aguilar MD (Physician)            Type of Visit  NPV    Chief Complaint  Hematuria    HPI  Ms. Nichols is a 67 y.o. female wwho presents with hematuria.  Microhematuria was first noted on UA 9 months ago and recent UA ago confirmed the presence of microhematuria x 2.  Patient denies associated dysuria at the time of onset.  She was a previous smoker but has since discontinued.    Hematuria-related signs/symptoms  History of chemotherapy?   No  History of pelvic radiation?   No  History of kidney or bladder stones?  No  History of frequent urinary tract infections? No      Past Medical History  She  has a past medical history of Back problem; Fibromyalgia (1997); History of small bowel obstruction (2017); Mental health problem; and Onychogryphosis (2017).  Patient Active Problem List     Diagnosis  Code     Fibromyalgia M79.7     Trigeminal neuralgia of right side of face G50.0     Onychogryphosis L60.2     Requires assistance with activities of daily living (ADL) Z74.1     Abnormal findings in stool R19.5     History of small bowel obstruction Z87.19     Asymptomatic varicose veins of bilateral lower extremities I83.93       Past Surgical History  She  has a past surgical history that includes hysterectomy (1995); appendectomy (); cholecystectomy (); colonoscopy (?); and cryotherapy of cervix ().    Medications  She has a current medication list which includes the following prescription(s): carbamazepine.    Allergies  Allergies      Allergen   Reactions     Contrast Dye [Diatrizoate Meglumine (Iv Contrast Dye)]  Angioedema     As noted 2009 note Dr Stone         Social History  She  reports that she quit smoking about 16 years ago. Her smoking use included Cigarettes. She has a 30.00 pack-year smoking history. She has never used smokeless tobacco. She reports that she does not drink alcohol or use illicit drugs.  No drug abuse.    Family History  Family History       Problem   Relation Age of Onset     Other  Mother      lung cancer       Heart Disease  Father 70     MI       Cancer  Father      Liver cancer       Cancer  Other      Lung cancer       Diabetes  Other      Cancer-colon  No Family History      Cancer-prostate  No Family History      Cancer-ovarian  No Family History      Blood Disease  No Family History      Hypertension  No Family History      Stroke  No Family History      Thyroid Disease  No Family History      Cancer-breast  No Family History        Review of Systems  I personally reviewed the ROS with the patient.    Nursing Notes:   Lyssa Peng  11/29/2017 12:14 PM  Signed  Here for Incontinence.  Review of Systems:    Weight loss:    No     Recent fever/chills:  No   Night sweats:   Yes  Current skin rash:  No   Recent hair loss:  Yes  Heat intolerance:  No   Cold intolerance:  No  Chest pain:   No   Palpitations:   No  Shortness of breath:  No   Wheezing:   No  Constipation:    No   Diarrhea:   No   Nausea:   No   Vomiting:   No   Kidney/side pain:  No   Back pain:   No  Frequent headaches:  No   Dizziness:     No  Leg swelling:   No   Calf pain:    No        Parents, brothers or sisters with history of kidney cancer?   No  Parents, brothers or sisters with history of bladder cancer: No    Post-Void Residual  Verbal order read back from Nick Aguilar MD to perform a post-void residual bladder scan.  A post-void residual was measured by ultrasonic bladder scanner.  0 mL      Lyssa Peng LPN  11/29/2017  11:43 AM          Physical Exam  Vitals:     11/29/17 1141   BP: 120/70   Resp: 16   Weight: 96.6 kg (213 lb)     Constitutional: NAD,  WDWN.   Head: NCAT  Eyes: Conjunctivae normal  Cardiovascular: Regular rate.  Pulmonary/Chest: Respirations are even and non-labored bilaterally.  Abdominal: Soft. No distension, tenderness, masses or guarding. No CVA tenderness.  Neurological: A + O x 3.  Cranial Nerves II-XII grossly intact.  Extremities: MONI x 4, Warm. No clubbing.  No cyanosis.    Skin: Pink, warm and dry.  No rashes noted.  Psychiatric:  Normal mood and affect  Genitourinary:  Nonpalpable bladder    Labs  CREATININE (mg/dL)    Date Value   07/19/2017 0.60 (L)     Results for HERNAN MAHONEY (MRN [id number]) as of 11/29/2017 12:14   2/20/2017 19:48 7/19/2017 17:30 11/7/2017 13:39   COLOR                     Yellow Yellow Yellow   CLARITY                   Clear Clear Clear   SPECIFIC GRAVITY,URINE    1.010 <=1.005 (A) <=1.005 (A)   PH,URINE                  5.5 5.5 6.5   UROBILINOGEN,QUALITATIVE  Normal Normal Normal   PROTEIN, URINE Negative Negative Negative   GLUCOSE, URINE Negative Negative Negative   KETONES,URINE             Negative Negative Negative   BILIRUBIN,URINE           Negative Negative Negative   OCCULT BLOOD,URINE        Moderate (A) Small (A) Small (A)   NITRITE                   Negative Negative Negative   LEUKOCYTE ESTERASE        Negative Negative Negative   RBC  (A) 3-5 (A) 3-5 (A)   WBC None Seen 3-5 None Seen   BACTERIA Many (A) Moderate (A) Few   EPITHELIAL CELLS Many (A) Few Few     Assessment  Ms. Mahoney is a 67 y.o. female with microscopic hematuria.    Discussed rationale for work up.  Discussed potential findings of hematuria work up including, but not limited to, kidney stones, bladder tumors and/or kidney tumors.  Also discussed the potential for a normal work up.    Plan  CT Urogram with follow up for cystoscopy

## 2017-12-28 NOTE — PATIENT INSTRUCTIONS
Patient Information     Patient Name MRN Sex Pennie Fry [id number] Female 1950      Patient Instructions by Cookie Schultz NP at 2017 10:15 AM     Author:  Cookie Schultz NP Service:  (none) Author Type:  PHYS- Nurse Practitioner     Filed:  2017 11:08 AM Encounter Date:  2017 Status:  Signed     :  Cookie Schultz NP (PHYS- Nurse Practitioner)            You will get a box with instructions for fecal sample for colono cancer screen    Call Iza Barnes about help with podiatry appointment and transportation    I will see you in 1 month followup on feet and other issues

## 2017-12-28 NOTE — TELEPHONE ENCOUNTER
Patient Information     Patient Name MRN Sex Pennie Fry [id number] Female 1950      Telephone Encounter by Jocelin Coe at 10/30/2017  3:05 PM     Author:  Jocelin Coe Service:  (none) Author Type:  (none)     Filed:  10/30/2017  3:07 PM Encounter Date:  10/26/2017 Status:  Signed     :  Jocelin Coe            Spoke with [facility] and they stated they will not cover briefs for patient with just a diagnosis of incontinence. They have to have a diagnosis of cause of incontinence.    Spoke with patient and discussed history and could not figure out any diagnosis for this. Patient is going to come in and discuss this.     Jocelin Coe LPN...................10/30/2017  3:07 PM

## 2017-12-28 NOTE — PROGRESS NOTES
Patient Information     Patient Name MRN Sex Pennie Fry [id number] Female 1950      Progress Notes by Cookie Schultz NP at 2017 10:15 AM     Author:  Cookie Schultz NP Service:  (none) Author Type:  PHYS- Nurse Practitioner     Filed:  2017  6:36 PM Encounter Date:  2017 Status:  Signed     :  Cookie Schultz NP (PHYS- Nurse Practitioner)            SUBJECTIVE:    Pennie Nichols is a 66 y.o. female who presents for continued follow-up on foot care with bilateral fungal toenails, has been soaking daily with PCA care. Reports PCA is calm 5 days a week and have been assisting with baths, foot care and moisturization.  Has had difficulty scheduling in arranging transportation with podiatry--has not spoken with Iza Barnse .    She would like to try home fecal testing for colon cancer screening.    Patient needs vaccine update, tetanus and shingles vaccines    Reports overall she has been feeling well and has no specific concerns.     HPI    Allergies      Allergen   Reactions     Contrast Dye [Diatrizoate Meglumine (Iv Contrast Dye)]  Angioedema     As noted  note Dr Stone    ,   Family History       Problem   Relation Age of Onset     Other  Mother      lung cancer       Heart Disease  Father 70     MI       Cancer  Father      Liver cancer       Cancer  Other      Lung cancer       Diabetes  Other      Cancer-colon  No Family History      Cancer-prostate  No Family History      Cancer-ovarian  No Family History      Blood Disease  No Family History      Hypertension  No Family History      Stroke  No Family History      Thyroid Disease  No Family History      Cancer-breast  No Family History    ,   No current outpatient prescriptions on file prior to visit.     No current facility-administered medications on file prior to visit.    ,   Current Outpatient Prescriptions:      carBAMazepine (TEGRETOL) 200 mg tablet, Take 1/2 tab twice a day, Disp: 60 tablet, Rfl:  "3  Medications have been reviewed by me and are current to the best of my knowledge and ability.,   Past Medical History:     Diagnosis  Date     Back problem     Recurrent back problems , secondary to injury at [facility]      Mental health problem     not otherwise specified       Onychogryphosis 4/24/2017   ,   Patient Active Problem List      Diagnosis Date Noted     Onychogryphosis 04/24/2017     Requires assistance with activities of daily living (ADL) 04/24/2017     Trigeminal neuralgia of right side of face 03/06/2017     Fibromyalgia    ,   Past Surgical History:      Procedure  Laterality Date     APPENDECTOMY  1980    Incidental, Enterolysis       CHOLECYSTECTOMY  1996    Laparoscopic       COLONOSCOPY  1990?     CRYOTHERAPY OF CERVIX  1994    Abnormal Pap        HYSTERECTOMY  1980    [place]      and   Social History      Substance Use Topics        Smoking status:  Former Smoker     Packs/day: 1.00     Years: 30.00     Types: Cigarettes     Quit date: 1/21/2001     Smokeless tobacco:  Never Used     Alcohol use  No       REVIEW OF SYSTEMS:  Review of Systems   Constitutional: Negative.    HENT: Negative.    Eyes: Negative.    Respiratory: Negative.    Cardiovascular: Negative.    Gastrointestinal: Negative.    Genitourinary: Negative.    Musculoskeletal: Negative.    Skin: Negative.    Neurological: Negative.    Endo/Heme/Allergies: Negative.    Psychiatric/Behavioral: Negative.        OBJECTIVE:  /82  Pulse 80  Ht 1.52 m (4' 11.84\")  Wt 94.5 kg (208 lb 6 oz)  BMI 40.91 kg/m2    EXAM:   Physical Exam   Constitutional: She is oriented to person, place, and time and well-developed, well-nourished, and in no distress.   Cardiovascular: Normal rate.    Pulmonary/Chest: Effort normal.   Musculoskeletal: Normal range of motion.   Neurological: She is alert and oriented to person, place, and time. Gait normal.   Skin: Skin is warm and dry.   Dremel and  to trim thickened fungal toenails and smooth " surface to enable wearing shoes    Skin in much better shape softer without any erythema very few patches of dry skin much improved    Patient tolerated procedure well   Psychiatric:   Flat affect   Nursing note and vitals reviewed.      ASSESSMENT/PLAN:    ICD-10-CM    1. Need for DTaP vaccine Z23 OMNI TDAP VACCINE IM      OMNI ZOSTER VACCINE LIVE SQ      AL ADMIN VACC INITIAL   2. Unilateral headache R51 carBAMazepine (TEGRETOL) 200 mg tablet      DISCONTINUED: carBAMazepine (TEGRETOL) 200 mg tablet   3. Trigeminal neuralgia syndrome G50.0 carBAMazepine (TEGRETOL) 200 mg tablet      DISCONTINUED: carBAMazepine (TEGRETOL) 200 mg tablet   4. Need for Zostavax administration Z23 AL ADMIN EA ADDL VACC   5. Onychogryphosis L60.2     fungal toenails without any evidence of erythema or infection--we'll continue to smooth with Dremel to enable comfortable use of shoes and foot wear  improved hygiene with PCA support--- patient needs assistance with coordination of care IADLs    Plan:  Assisted patient with completing paperwork for fecal colon cancer screening to be done at home and mail to lab for results--patient declines colonoscopy screening    We'll contact her  Iza Barnes--patient is unable to schedule podiatry appointment and arrange transportation independently without assistance  has PCA and home now 5 days a week for a few hours, improved hygiene--- should be able to assist with reminders for transportation and appointments  might be able to accompany uncertain of policy    Trigeminal neuralgia under good control--will continue medication and if continues in remission with taper off in a few months    Vaccines updated as requested    Patient will follow-up in 1-2 months for foot care--- hopefully the dietary will take over when she is able to attend the appointment

## 2017-12-28 NOTE — TELEPHONE ENCOUNTER
Patient Information     Patient Name MRN Pennie Car [id number] Female 1950      Telephone Encounter by Jocelin Coe at 2017 10:47 AM     Author:  Jocelin Coe Service:  (none) Author Type:  (none)     Filed:  2017 10:48 AM Encounter Date:  2017 Status:  Signed     :  Jocelin Coe            Left message to call back  ....................  2017   10:47 AM  [facility]  Jocelin Coe LPN...................2017  10:47 AM

## 2017-12-28 NOTE — TELEPHONE ENCOUNTER
Patient Information     Patient Name MRN Pennie Car [id number] Female 1950      Telephone Encounter by Cookie Schultz NP at 2017  4:00 PM     Author:  Cookie Schultz NP Service:  (none) Author Type:  PHYS- Nurse Practitioner     Filed:  2017  4:01 PM Encounter Date:  2017 Status:  Signed     :  Cookie Schultz NP (PHYS- Nurse Practitioner)            Jocelin    Can you talk to pennie about the above--thanks  Cookie Schultz NP ....................  2017   4:01 PM

## 2017-12-28 NOTE — TELEPHONE ENCOUNTER
Patient Information     Patient Name MRN Pennie Car [id number] Female 1950      Telephone Encounter by Jocelin Coe at 2017 11:29 AM     Author:  Jocelin Coe Service:  (none) Author Type:  (none)     Filed:  2017 11:30 AM Encounter Date:  2017 Status:  Signed     :  Jocelin Coe            Spoke with patient's  and she is assisting patient schedule appointment and transportation.  Jocelin Coe LPN...................2017  11:30 AM

## 2017-12-28 NOTE — TELEPHONE ENCOUNTER
Patient Information     Patient Name MRN Pennie Car [id number] Female 1950      Telephone Encounter by Cookie Schultz NP at 10/27/2017  8:00 PM     Author:  Cookie Schultz NP Service:  (none) Author Type:  PHYS- Nurse Practitioner     Filed:  10/27/2017  8:01 PM Encounter Date:  10/26/2017 Status:  Signed     :  Cookie Schultz NP (PHYS- Nurse Practitioner)            Jocelin    Can you please return this call regarding questions for hygiene products  Thanks  Cookie Schultz NP ....................  10/27/2017   8:00 PM

## 2017-12-28 NOTE — TELEPHONE ENCOUNTER
Patient Information     Patient Name MRN Pennie Car [id number] Female 1950      Telephone Encounter by Nadia Moore RN at 2017  3:38 PM     Author:  Nadia Moore RN Service:  (none) Author Type:  NURS- Registered Nurse     Filed:  2017  3:39 PM Encounter Date:  2017 Status:  Signed     :  Nadia Moore RN (NURS- Registered Nurse)            See triage encounter.    Nadia Moore RN........2017 3:39 PM

## 2017-12-28 NOTE — TELEPHONE ENCOUNTER
Patient Information     Patient Name MRN Pennie Car [id number] Female 1950      Telephone Encounter by Jocelin Coe at 2017  8:40 AM     Author:  Jocelin Coe Service:  (none) Author Type:  (none)     Filed:  2017  8:42 AM Encounter Date:  2017 Status:  Signed     :  Jocelin Coe            Results from cologaurd are scanned into chart. It appears that it returned positive. Is there anything you would like me to relay to her other than that a colonoscopy is recommended?    Jocelin Coe LPN...................2017  8:42 AM

## 2017-12-28 NOTE — PROGRESS NOTES
Patient Information     Patient Name MRN Sex Pennie Fry [id number] Female 1950      Progress Notes by Cookie Schultz NP at 2017 12:45 PM     Author:  Cookie Schultz NP Service:  (none) Author Type:  PHYS- Nurse Practitioner     Filed:  2017  6:33 PM Encounter Date:  2017 Status:  Signed     :  Cookie Schultz NP (PHYS- Nurse Practitioner)            SUBJECTIVE:    Pennie Nichols is a 67 y.o. female who presents for follow-up on foot care and toenail trimming--- needs dremel due to thickness and density of nails. Has PCA and her home which has been helpful for assisting her with bathing and skin care.  Patient would like to discuss her trigeminal neuralgia--- taking her medication only once a day at this time wanted to know if she meets eligibility for medical marijuana. Reports the headache is intermittent and has calmed down considerably since starting Tegretol,  has been tolerating the Tegretol well.    Would also like to discuss urinary incontinence that has been slowly coming on over the past year--notices dribbling with urge and sometimes after emptying bladder. She would like to obtain disposable incontinence appliance. Denies any back pain, flank pain, dysuria, hematuria.  Interested in urology evaluation and treatment of possible.        HPI    Allergies      Allergen   Reactions     Contrast Dye [Diatrizoate Meglumine (Iv Contrast Dye)]  Angioedema     As noted  note Dr Stone    ,   Family History       Problem   Relation Age of Onset     Other  Mother      lung cancer       Heart Disease  Father 70     MI       Cancer  Father      Liver cancer       Cancer  Other      Lung cancer       Diabetes  Other      Cancer-colon  No Family History      Cancer-prostate  No Family History      Cancer-ovarian  No Family History      Blood Disease  No Family History      Hypertension  No Family History      Stroke  No Family History      Thyroid Disease  No Family History       Cancer-breast  No Family History    ,   No current outpatient prescriptions on file prior to visit.     No current facility-administered medications on file prior to visit.    ,   Current Outpatient Prescriptions:      carBAMazepine (TEGRETOL) 200 mg tablet, Take 1/2 tab twice a day, Disp: 60 tablet, Rfl: 3  Medications have been reviewed by me and are current to the best of my knowledge and ability.,   Past Medical History:     Diagnosis  Date     Back problem     Recurrent back problems , secondary to injury at [facility]      Fibromyalgia 7/9/1997     History of small bowel obstruction 7/9/2017 8/31/95--Dr Barrow--had incidental Appendectomy--Multiple adhesions released      Mental health problem     not otherwise specified       Onychogryphosis 4/24/2017   ,   Patient Active Problem List       Diagnosis  Date Noted     Asymptomatic varicose veins of bilateral lower extremities  08/07/2017     History of small bowel obstruction  07/09/2017 8/31/95--Dr Barrow--had incidental Appendectomy--Multiple adhesions released        Abnormal findings in stool  07/05/2017     Onychogryphosis  04/24/2017     Requires assistance with activities of daily living (ADL)  04/24/2017     Trigeminal neuralgia of right side of face  03/06/2017     Fibromyalgia  07/09/1997   ,   Past Surgical History:      Procedure  Laterality Date     APPENDECTOMY  1996    Incidental, Enterolysis--Dr Barrow       CHOLECYSTECTOMY  1996    Laparoscopic       COLONOSCOPY  1990?     CRYOTHERAPY OF CERVIX  1994    Abnormal Pap        HYSTERECTOMY  01/1995    [place]      and   Social History      Substance Use Topics        Smoking status:  Former Smoker     Packs/day: 1.00     Years: 30.00     Types: Cigarettes     Quit date: 1/21/2001     Smokeless tobacco:  Never Used     Alcohol use  No       REVIEW OF SYSTEMS:  Review of Systems   Constitutional: Negative.    HENT: Negative.    Eyes: Negative.    Respiratory: Negative.    Cardiovascular: Negative.   "  Gastrointestinal: Negative.    Genitourinary: Positive for urgency.   Musculoskeletal: Negative.    Skin: Negative.    Neurological: Positive for headaches.   Endo/Heme/Allergies: Negative.    Psychiatric/Behavioral: Negative.        OBJECTIVE:  /70  Pulse 76  Temp 98.2  F (36.8  C) (Oral)  Ht 1.52 m (4' 11.84\")  Wt 95.9 kg (211 lb 6 oz)  BMI 41.5 kg/m2    EXAM:   Physical Exam   Constitutional: She is oriented to person, place, and time and well-developed, well-nourished, and in no distress.   Cardiovascular: Normal rate.    Pulmonary/Chest: Effort normal.   Musculoskeletal: Normal range of motion.   Neurological: She is alert and oriented to person, place, and time. Gait normal.   Skin: Skin is warm and dry.   Toenails thickened and opaque  nails trimmed and filed smooth with dremel  tolerated well    Skin lower legs and feet much improved with moisturization, no dry scaly areas. Scattered varicose veins bilateral lower extremities and ankles  no erythema or tender areas  No pedal edema   Psychiatric: Mood, memory, affect and judgment normal.   Nursing note and vitals reviewed.      ASSESSMENT/PLAN:    ICD-10-CM    1. Urge incontinence of urine N39.41 URINALYSIS W REFLEX MICROSCOPIC IF POSITIVE      AMB CONSULT TO UROLOGY      URINALYSIS W REFLEX MICROSCOPIC IF POSITIVE      URINALYSIS MICROSCOPIC      URINALYSIS MICROSCOPIC   2. Unilateral headache R51 carBAMazepine (TEGRETOL) 200 mg tablet   3. Trigeminal neuralgia syndrome G50.0 carBAMazepine (TEGRETOL) 200 mg tablet   4. Post-void dribbling N39.43 URINALYSIS W REFLEX MICROSCOPIC IF POSITIVE      AMB CONSULT TO UROLOGY      URINALYSIS W REFLEX MICROSCOPIC IF POSITIVE      URINALYSIS MICROSCOPIC      URINALYSIS MICROSCOPIC      BLADDER SCAN   5. Needs flu shot Z23 FLU VACCINE => 3 YRS PF QUADRIVALENT IIV4 IM      [place] ADMIN VACC INITIAL SEASONAL AFFILIATE ONLY   6. Onychogryphosis L60.2     bladder scan 22 mL postvoid residual  lab pending    Plan:  " Urology consult for urinary incontinence and recommended treatment    Flu vaccine updated    Increase Tegretol to one half tab twice daily    Would like to see her in clinic for monthly follow-up and foot care

## 2017-12-28 NOTE — TELEPHONE ENCOUNTER
Patient Information     Patient Name MRN ePnnie Car [id number] Female 1950      Telephone Encounter by Jocelin Coe at 2017  1:40 PM     Author:  Jocelin Coe Service:  (none) Author Type:  (none)     Filed:  2017  1:40 PM Encounter Date:  2017 Status:  Signed     :  Jocelin Coe            Patient has a follow up appointment on 17.  Jocelin Coe LPN...................2017  1:40 PM

## 2017-12-28 NOTE — PATIENT INSTRUCTIONS
Patient Information     Patient Name MRN Sex Pennie Fry [id number] Female 1950      Patient Instructions by Cookie Schultz NP at 2017 10:00 AM     Author:  Cookie Schultz NP  Service:  (none) Author Type:  PHYS- Nurse Practitioner     Filed:  2017 10:58 AM  Encounter Date:  2017 Status:  Addendum     :  Cookie Schultz NP (PHYS- Nurse Practitioner)        Related Notes: Original Note by Cookie Schultz NP (PHYS- Nurse Practitioner) filed at 2017 10:55 AM            I will call tahir about the foot care    followup in 1 month for foot care

## 2017-12-28 NOTE — TELEPHONE ENCOUNTER
Patient Information     Patient Name MRN Pennie Car [id number] Female 1950      Telephone Encounter by Cookie Schultz NP at 2017  8:24 AM     Author:  Cookie Schultz NP Service:  (none) Author Type:  PHYS- Nurse Practitioner     Filed:  2017  4:59 PM Encounter Date:  2017 Status:  Signed     :  Cookie Schultz NP (PHYS- Nurse Practitioner)            Please call pennie--cologuard results positive--would like her to come in to discuss.      Cookie Schultz NP ....................  2017   8:25 AM

## 2017-12-28 NOTE — TELEPHONE ENCOUNTER
Patient Information     Patient Name MRN Sex Pennie Fry [id number] Female 1950      Telephone Encounter by Cookie Schultz NP at 2017  6:19 PM     Author:  Cookie Schultz NP Service:  (none) Author Type:  PHYS- Nurse Practitioner     Filed:  2017  6:21 PM Encounter Date:  2017 Status:  Signed     :  Cookie Schultz NP (PHYS- Nurse Practitioner)            Can you please call Pennie Messer's  at [facility] --Pennie has been unable to schedule her podiatry appointment and arrange transportation independently and will need her assistance to make this happen    Iza--[phone]    Cookie Schultz NP ....................  2017   6:20 PM

## 2017-12-28 NOTE — TELEPHONE ENCOUNTER
"Patient Information     Patient Name MRN Pennie Car [id number] Female 1950      Telephone Encounter by Nadia Moore RN at 2017  2:53 PM     Author:  Nadia Moore RN Service:  (none) Author Type:  NURS- Registered Nurse     Filed:  2017  3:11 PM Encounter Date:  2017 Status:  Signed     :  Nadia Moore RN (NURS- Registered Nurse)            Verbal ok from patient to speak to caregiver.    Diarrhea 8 pm, pale, red rimmed eyes, listless, lower back pain (by hipbone), took tegretol and anti-diarrheal today.     She has had approximately 10 watery/loose stools since 8 pm yesterday, feels faint, dry mouth. Last urinated about 15 minutes ago. Denies abdominal pain and vomiting. No recorded fever, but caregiver states \"her forehead feels warm and she looks clammy.\"    Patient wants to see Cookie Schultz NP, but she was advised that there were no openings today and she should be seen in [facility] or ED within 4 hours.    Patient insisted this message be sent to Cookie Schultz NP anyhow.    Reason for Disposition    [1] SEVERE diarrhea (e.g., 7 or more times / day more than normal) AND [2]  age > 60 years    Answer Assessment - Initial Assessment Questions  1. DIARRHEA SEVERITY: \"How bad is the diarrhea?\" \"How many extra stools have you had in the past 24 hours than normal?\"     - MILD: Few loose or mushy BMs; increase of 1-3 stools over normal daily number of stools; mild increase in ostomy output.    - MODERATE: Increase of 4-6 stools daily over normal; moderate increase in ostomy output.    - SEVERE (or Worst Possible): Increase of 7 or more stools daily over normal; moderate increase in ostomy output; incontinence.      10  2. ONSET: \"When did the diarrhea begin?\"       8 pm yesterday  3. BM CONSISTENCY: \"How loose or watery is the diarrhea?\"       watery  4. VOMITING: \"Are you also vomiting?\" If so, ask: \"How many times in the past 24 hours?\"       no  5. " "ABDOMINAL PAIN: \"Are you having any abdominal pain?\" If yes: \"What does it feel like?\" (e.g., crampy, dull, intermittent, constant)       no  6. ABDOMINAL PAIN SEVERITY: If present, ask: \"How bad is the pain?\"  (e.g., Scale 1-10; mild, moderate, or severe)     - MILD (1-3): doesn't interfere with normal activities, abdomen soft and not tender to touch      - MODERATE (4-7): interferes with normal activities or awakens from sleep, tender to touch      - SEVERE (8-10): excruciating pain, doubled over, unable to do any normal activities        n/a  7. ORAL INTAKE: If vomiting, \"Have you been able to drink liquids?\" \"How much fluids have you had in the past 24 hours?\"      4 cups of decaf tea and 20oz of water  8. HYDRATION: \"Any signs of dehydration?\" (e.g., dry mouth [not just dry lips], too weak to stand, dizziness, new weight loss) \"When did you last urinate?\"      Dry mouth, feels faint, \"not herself\"  9. EXPOSURE: \"Have you traveled to a foreign country recently?\" \"Have you been exposed to anyone with diarrhea?\" \"Could you have eaten any food that was spoiled?\"      no  10. OTHER SYMPTOMS: \"Do you have any other symptoms?\" (e.g., fever, blood in stool)        No recorded fever, but feels warm and \"looks clammy\"  11. PREGNANCY: \"Is there any chance you are pregnant?\" \"When was your last menstrual period?\"        N/a hys    Protocols used: ADULT DIARRHEA-A-AH            "

## 2017-12-29 NOTE — PATIENT INSTRUCTIONS
Patient Information     Patient Name MRN Sex Pennie Fry [id number] Female 1950      Patient Instructions by Cookie Schultz NP at 2017 11:00 AM     Author:  Cookie Schultz NP  Service:  (none) Author Type:  PHYS- Nurse Practitioner     Filed:  2017 12:12 PM  Encounter Date:  2017 Status:  Addendum     :  Cookie Schultz NP (PHYS- Nurse Practitioner)        Related Notes: Original Note by Cookie Schultz NP (PHYS- Nurse Practitioner) filed at 2017 12:11 PM            Continue carbamepezine--1/2 tab daily--well re look at your medication and symptoms in August    No need to soak feet unless you want to    Daily showers and moisturize skin with cocoa butter lotion or any over the counter moisturizer for skin care    Continue [employer] classes for balance    followup in 1 month on foot care    You will get apt for bone density scan

## 2017-12-29 NOTE — PATIENT INSTRUCTIONS
Patient Information     Patient Name MRN Pennie Car [id number] Female 1950      Patient Instructions by Cookie Schultz NP at 2017 12:45 PM     Author:  Cookie Schultz NP Service:  (none) Author Type:  PHYS- Nurse Practitioner     Filed:  2017  1:30 PM Encounter Date:  2017 Status:  Signed     :  Cookie Schultz NP (PHYS- Nurse Practitioner)            You will get apt to see urologist about your incontinence    followup in 1 month with me for foot care--

## 2017-12-29 NOTE — DISCHARGE SUMMARY
Patient Information     Patient Name MRN Sex Pennie Fry [id number] Female 1950      Discharge Summaries by Deana De La Cruz PT at 2017  8:37 AM     Author:  Deana De La Cruz PT Service:  (none) Author Type:  PT- Physical Therapist     Filed:  2017  8:38 AM Date of Service:  2017  8:37 AM Status:  Signed     :  Deana De La Cruz PT (PT- Physical Therapist)             [facility]  Outpatient PT   Discharge Note       Dates of Service: 17 - 2017   Visits #14    Patient Name: Pennie Nichols (Fiordaliza)   YOB: 1950   Referring MD/Provider: Dr. Schultz  Medical and Treatment Diagnosis: Neck pain, vertigo and balance problems  Treatment Diagnosis:  Neck pain and limited UE ROM, vertigo and impaired balance  Insurance: Other: [employer] (MC replacement)  Start of Service: 17  Certification Dates: Start of Service: 17   Medicare/MA Re-Cert Due: 17  Re-certification Dates: 4/3/2017 - 17        Discharge Note:  Patient was doing well with therapy for neck pain and vertigo.  Patient has not come for any further PT due to no further exacerbation.  No G-codes were obtained because of no discharge visit.  Plan to discharge from PT at this time.  See below for any further details in note from last vitis on 17.      Subjective        Current Status:  HEP going good, no pain today in head or neck.  Knee has been bothering this week, maybe some arthritis with rainy weather.  She reports pain number is 1-3/10 mild and at most 4-5/10.  Feeling much better and discussed continuing on her own at this time.  Good ROM improvement overall (see chart at bottom of note). She is independent with HEP.  We'll keep chart open x1 month in case of exacerbation.      No vertigo sx.      LE/vestibular assessment: Patient reports that she's noticed that it is getting harder to get out of chair, has to push more with arms.  No falls.  Legs  doesn't feel like giving out, but feel weak when walking.  Not up for walking outside without support, using hand rail lightly/fingertip support.  Feels balance is not reliable.  Used to touching counter top, chair, person, rail, etc. She feels unsteady with shower, uses grab bars and shower chair.  Pain in back, all the sudden can hit and feel like electricity running through body- stays in back.  Prolonged sitting, standing, walking, laying after 10-15 then starts bother the back, has to position or sit and relax.  It's been better lately but she has avoided much activity.  No spinning or light headed lately.  Last vertigo walking down ervin at doctor's office early March and then doctor's striped shirt really affected her.  Sometime gets a little vertigo with laying down, she sleeps on her back but is propped up because of her back.      History of Injury/reason for PT: Patient comes in with neck pain and stiffness.  Neck has been bothering her for a 2-3 months.  Nothing that she knows of that started it.  Just noticed she couldn't turn head any more.  Notices it all day long.  Can't lift head up to look at the stars.  Pain also with just normal moving around.  Mostly stiffness, little bit of pain.  When look up and as she brings it back down it hurts 6/10. Turning 4/10. Steady around 4/10 with day to day activity.  Getting up out of chair pushing off hurts in neck 4/10.   She's noticed a little bump in muscle on R neck.  Has tried cold and that helps some.  Tylenol, helps with neck pain, on Epitol for nerve pain (trigeminal neurologia) not sure if it's regular or extra strength.  Has tried muscles massager and it did help. No falls.  No AD.  Lives alone in apartment, does own ADL's but looking into getting help with housework.  Dx with trigeminal neuralgia about 1 month ago and still having some of pain R side of head, mostly temporal area, good at times and sometimes feels like a horse is kicking her in the head  at times.  Could resolve, could continue for the rest of her life.   Also reports h/o incontinence and decreased balance for safety in shower.  She reports not walking much, just around in her apartment, encouraged increased walking and we'll add balance and endurance activity if provider feels appropriate.  X-Ray: trace degenerative anterolisthesis C5 on C6 and straightening of the lower cervical lordosis. Diffuse advanced facet degenerative changes are seen. Images of the thoracic spine demonstrate normal thoracic kyphosis. Minimal multilevel disc height loss is seen.  2/15/2017   Symptoms at evaluation:  ?   Decreased Motion  Yes, Weakness - no, Instability- unsteady, Swelling - no, Tingling/Numbness - no, Bowel/bladder changes - incontinence - stress, waiting room, coughing sneezing, Pain Ratin-6/10 Pain Location:  R neck  Aggravation Factors: movement  Easing Factors: cold, Tylenol- take edge off (doesn't have heating pad or microwave for hot pack)      Objective    Today's Intervention:    Standing at counter:   Standing by counter: marching alternating B x15 - with alternating arms  SLS 12 (R)-12 (L) sec   Tandem stance each way x35 sec with no touches  Romberg feet together EC no touches over 30 sec  Romberg feet together with head turns x30     Sitting:   - cervical side bending 2x20 sec - did upper trap stretch today instead  - backwards shoulder rolls x20  - cervical rotation 2x30 sec   - chin tuck 3 x20 sec -  (working up to 30 sec as tolerated)    Manual therapy: STM to B upper traps, cervical paraspinals, SCM and scalenes x25 minutes with moderate pressure using [employer] in sitting with shirt pulled back with towel.    -still knots in upper trap near insertion and upper trap near scap, more tender in scalenes today       Deferred this date:   - shoulder flex wall walk one arm at a time or else pulls into back too much x5-10   - cervical extention stretch 3x20 sec  - cervical isometrics flex 10x5  sec, ext 10 x5 sec    Home Exercise Program:  Access Code: [email or web address] URL: [email or web address] Date: 03/29/2017 Prepared by: Deana De La Cruz   Exercises   Seated Cervical Retraction - 2 reps - 30 second hold - 3x daily   Seated Cervical Sidebending AROM - 30 hold - 2 Reps - 3x daily   Standing Backward Shoulder Rolls - 20 reps - 2 hold - 3x daily   Shoulder Flexion Wall Walk - 5-10 reps - 3x daily   Walking - 1-2 Hallway - 2x daily     Access Code: [email or web address] URL: [email or web address] Date: 04/03/2017 Prepared by: Deana De La Cruz   Exercises   Standing March with Counter Support - 5 reps - 2x daily   Romberg Stance - 2 reps - 30 second hold - 2x daily   Seated Hip Adduction Isometrics with Ball - 10 reps - 5 hold - 2x daily     Access Code: [email or web address] URL: [email or web address] Date: 04/18/2017 Prepared by: Deana De La Cruz   Exercises   Standing Isometric Cervical Flexion with Manual Resistance - 10 reps - 5 hold - 1x daily   Standing Isometric Cervical Extension with Manual Resistance - 10 reps - 5 hold - 1x daily   Seated Cervical Retraction and Extension - 2 reps - 30 second hold - 2x daily         Assessment    Response to Intervention:  Patient was able to demonstrate HEP properly today.  Patient very limited ROM in neck and shoulders.      Therapist Assessment / Clinical Impression: Patient presents with signs and symptoms congruent with tight muscles with poor posture and will benefit from skilled PT to increase ROM, strength, decrease pain, and improve function.      Functional Impairment(s):  See subjective on initial evaluation and Functional Assessment / Summary Report from PSFS.    Physical Impairment(s):       MUSCULOSKELETAL:  Balance Deficits, Loss of Motion, Muscle Tightness and Pain      Patient Goal (time reference required): Patient would like to be able to turn head with no pain in 4 weeks.     Functional therapy goals: updated 4/3/2017   Patient is to be  independent in their Home Exercise Program in 2 weeks.  Patient is to tolerate walking with normal gait without hand rail up to 20 minutes in 4 weeks.  Patient is to have improved cervical mobility to allow for improved dressing and self-cares with 2/10 pain in 4 weeks.  Patient is to tolerate walking with normal gait without hand rail up to 30 minutes in 8 weeks.  Patient is to have improved cervical mobility to allow for improved looking at stars with 2/10 pain in 8 weeks.  Patient is to self-manage symptoms and return to prior function in 8 weeks.      Patient participated in goal selection and understand(s) the plan of care: Yes   Patient Potential for Achieving Desired Outcome:  Good      Plan    Treatment Plan / Targeted Outcomes:     Frequency:   16 visits     Duration of Treatment: 8 weeks    Planned Interventions:  Possible physical therapy interventions include but are not limited to:   Home Exercise Program development  Therapeutic Exercise (ROM & Strengthening)  Manual Therapy  Neuromuscular Re-education  Ultrasound  Electrical Stimulation  Phonophoresis with Ketoprofen  Iontophoresis with Dexamethasone  Therapeutic Activities  Gait Training  Posture and Body Mechanics Education  Mechanical traction if indicated    Plan for next visit:  Advance strength, ROM, manual therapy and modalities as needed for neck, LE strength, balance and vestibular rehab to improve safety and balance.     Student or PTA has been instructed in and demonstrates skills necessary to carry out above stated treatment plan: Yes    Thank you for your referral to [facility].  Please call with any questions, concerns or comments.  [phone]  Deana De La Cruz, ZOE      ROM: Cerv  3/29/17 Cerv. 4/27/17  Cerv 5/26/17    Flexion 38 32 37   Extension 10 22 24   Left Lat. Flex 20 22 23   Right Lat. Flex 20 27 38   Left Rotation 44 30 47   Right Rotation 28 40 43   Sh. flex 90  100 B   Sh abd 75  90 B     LE MMT at  eval:   Hip flex, add: 3/5   Hip ext, abd, knee flex, ext, ankle DF and PF: 4-/5    4/11/17: [place] with Ax2 for head and legs- only able to test L side today which was positive and followed with CRT Epley's with maxA x2 and she had nystagmus in rotational movement and spinning sensation, she was fearful of falling.  She felt off balance after but was steady on her feet after a few minutes.     Patient Specific Functional and Pain Scales (PSFS) completed 5/26/2017  We want to know what 3 activities in your life you are unable to perform, or are having the most difficulty performing, as a result of your chief problem. Please list and score at least 3 activities that you are unable to perform, or having the most difficulty performing, because of your chief problem.   Patient Specific Activity Scoring Scheme (score one number for each activity):   Activity Score (0-10)  0= Unable to perform activity  10= Able to perform activity at same level as before injury or problem   1. Looking up and back down 7.5-8/10   2. Turning head  8/10   3. Pain during the day 1-3 up to 4-5/10 7/10   4.  /10   5.  /10   Totals:  20/30 = 67% Function   and 33% Impairment   PSFS on 3/29/17 and 4/3/17: 16/30 = 53% Function and 47% Impairment  PSFS on 4/27/2017: 20/30 = 67% Function and 33% Impairment    Patient verbally states that they understand that the information they have provided above is current and complete to the best of their knowledge.     Primary G Code and Modifier:    Per the Patient's intake and/or assessment the Primary G Code is: Mobility .   The Patient's Impairment, Limitation or Restriction Modifier would be best described as: CJ - 20% - 40% Impairment.   Goal Primary G Code and Modifier:    The Patient's G Code Goal would be: Mobility    The Patient's Impairment, Limitation or Restriction Modifier goal would be best described as: CI - 1% - 20% Impairment.

## 2017-12-30 NOTE — NURSING NOTE
Patient Information     Patient Name MRN Sex Pennie Fry [id number] Female 1950      Nursing Note by Jocelin Coe at 2017 12:45 PM     Author:  Jocelin Coe Service:  (none) Author Type:  (none)     Filed:  2017  1:56 PM Encounter Date:  2017 Status:  Signed     :  Jocelin Coe            Bladder scan was preformed at 22mL was seen. Patient tolerated this well.   Jocelin Coe LPN...................2017  1:54 PM

## 2017-12-30 NOTE — NURSING NOTE
Patient Information     Patient Name MRN Pennie Car [id number] Female 1950      Nursing Note by Jocelin Coe at 2017 12:45 PM     Author:  Jocelin Coe Service:  (none) Author Type:  (none)     Filed:  2017  1:02 PM Encounter Date:  2017 Status:  Signed     :  Jocelin Coe            Patient presents to clinic today for a follow up on feet and bladder issues. [employer] is denying covering incontinence pads because they need a medication diagnosis.     Jocelin Coe LPN...................2017  12:47 PM

## 2017-12-30 NOTE — NURSING NOTE
Patient Information     Patient Name MRN Pennie Car [id number] Female 1950      Nursing Note by Dolores Woodard at 2017 10:00 AM     Author:  Dolores Woodard Service:  (none) Author Type:  (none)     Filed:  2017 10:51 AM Encounter Date:  2017 Status:  Signed     :  Dolores Woodard            Patient presents to the clinic today for follow up on her toenails.    Dolores Woodard ....................  2017   10:07 AM

## 2017-12-30 NOTE — NURSING NOTE
Patient Information     Patient Name MRN Pennie Car [id number] Female 1950      Nursing Note by Jocelin Coe at 2017 10:15 AM     Author:  Jocelin Coe Service:  (none) Author Type:  (none)     Filed:  2017 10:48 AM Encounter Date:  2017 Status:  Signed     :  Jocelin Coe            Patient presents to clinic today for follow up foot care.     Jocelin Coe LPN...................2017  10:12 AM

## 2017-12-30 NOTE — NURSING NOTE
Patient Information     Patient Name MRN Pennie Car [id number] Female 1950      Nursing Note by Lyssa Peng at 2017 11:30 AM     Author:  Lyssa Peng Service:  (none) Author Type:  (none)     Filed:  2017 12:14 PM Encounter Date:  2017 Status:  Signed     :  Lyssa Peng            Here for Incontinence.  Review of Systems:    Weight loss:    No     Recent fever/chills:  No   Night sweats:   Yes  Current skin rash:  No   Recent hair loss:  Yes  Heat intolerance:  No   Cold intolerance:  No  Chest pain:   No   Palpitations:   No  Shortness of breath:  No   Wheezing:   No  Constipation:    No   Diarrhea:   No   Nausea:   No   Vomiting:   No   Kidney/side pain:  No   Back pain:   No  Frequent headaches:  No   Dizziness:     No  Leg swelling:   No   Calf pain:    No        Parents, brothers or sisters with history of kidney cancer?   No  Parents, brothers or sisters with history of bladder cancer: No    Post-Void Residual  Verbal order read back from Nick Aguilar MD to perform a post-void residual bladder scan.  A post-void residual was measured by ultrasonic bladder scanner.  0 mL      Lyssa Peng LPN  2017  11:43 AM

## 2017-12-30 NOTE — NURSING NOTE
Patient Information     Patient Name MRN Pennie Car [id number] Female 1950      Nursing Note by Jocelin Coe at 2017 11:00 AM     Author:  Jocelin Coe Service:  (none) Author Type:  (none)     Filed:  2017 11:33 AM Encounter Date:  2017 Status:  Signed     :  Jocelin Coe            Patient presents to clinic today for a 1 month follow up. She saw someone for her feet and she wasn't impressed.    Jocelin Coe LPN...................2017  11:11 AM

## 2018-01-03 NOTE — TELEPHONE ENCOUNTER
Patient Information     Patient Name MRN Pennie Car [id number] Female 1950      Telephone Encounter by Teagan Carolina at 2017  3:08 PM     Author:  Teagan Carolina Service:  (none) Author Type:  NURS- Registered Nurse     Filed:  2017  3:10 PM Encounter Date:  2017 Status:  Signed     :  Teagan Carolina (NURS- Registered Nurse)            Spoke with patient and gave report per Cookie Schultz.  Patient states that she is tolerating tegretol well and will increase.     Teagan Carolina RN ....................  2017   3:09 PM

## 2018-01-03 NOTE — PROGRESS NOTES
Patient Information     Patient Name MRN Sex Pennie Fry [id number] Female 1950      Progress Notes by Crystal Hartmann at 2017  9:58 AM     Author:  Crystal Hartmann Service:  (none) Author Type:  Other Clinical Staff     Filed:  2017  9:58 AM Date of Service:  2017  9:58 AM Status:  Signed     :  Crystal Hartmann (Other Clinical Staff)            Falls Risk Criteria:    Age 65 and older or under age 4        Sensory deficits    Poor vision    Use of ambulatory aides    Impaired judgment    Unable to walk independently    Meets High Risk criteria for falls:  Yes             1.  Do you have dizziness or vertigo?    no                    2.  Do you need help standing or walking?   no                 3.  Have you fallen within the last 6 months?    no           4.  Has the patient been fasting?      no       If any risks are marked Yes, the following interventions are utilized:    Do not leave patient unattended     Assist patient in the dressing room and bathroom    Have ambulatory aides available throughout procedure    Involve patient s family if available

## 2018-01-03 NOTE — PROGRESS NOTES
Patient Information     Patient Name MRN Sex Pennie Fry [id number] Female 1950      Progress Notes by Brent Ohara MD at 3/6/2017 12:00 PM     Author:  Brent Ohara MD Service:  (none) Author Type:  Physician     Filed:  3/7/2017  8:15 AM Encounter Date:  3/6/2017 Status:  Signed     :  Brent Ohara MD (Physician)            SUBJECTIVE:    Pennie Nichols is a 66 y.o. female who presents for follow up neck and face pain    HPI    No longer has the intense sharp and shooting face pains.  Still has a dull aching in the right side of her neck, improves with a single tylenol.  No side effects at all now from the tegretol.    Allergies      Allergen   Reactions     Contrast Dye [Diatrizoate Meglumine (Iv Contrast Dye)]  Angioedema     As noted  note Dr Stone    ,   Current Outpatient Prescriptions on File Prior to Visit       Medication  Sig Dispense Refill     carBAMazepine (TEGRETOL) 200 mg tablet Take 0.5 tablets by mouth 2 times daily. Take 1/2 tab once a day 30 tablet 3     LORazepam (ATIVAN) 1 mg tablet Take 1 tab 1 hour before scheduled procedure, can repeat in 4 hours if needed 2 tablet 0     No current facility-administered medications on file prior to visit.    ,   Past Medical History      Diagnosis   Date     Back problem       Recurrent back problems , secondary to injury at [facility]      Mental health problem       not otherwise specified      and   Past Surgical History       Procedure   Laterality Date     Hysterectomy        [place]       Appendectomy        Incidental, Enterolysis       Cholecystectomy        Laparoscopic       Colonoscopy   ?     Cryotherapy of cervix        Abnormal Pap          REVIEW OF SYSTEMS:  Review of Systems   Constitutional: Negative for chills and fever.   Musculoskeletal: Positive for neck pain.   Neurological: Negative for headaches.       OBJECTIVE:  /64  Resp 16  Wt 96.8 kg (213 lb 6.4 oz)  BMI 41.68  kg/m2    EXAM:   Physical Exam   Constitutional: She is oriented to person, place, and time and well-developed, well-nourished, and in no distress. No distress.   Musculoskeletal:   Moderate loss of range of motion to rotation in neck bilateral, unchanged from previous.   Neurological: She is alert and oriented to person, place, and time.   Skin: She is not diaphoretic.           ASSESSMENT/PLAN:    ICD-10-CM    1. Trigeminal neuralgia of right side of face G50.0         Plan:  She is on a low dose of tegretol, but it is working now without side effects.  Given likely side effects from the higher dosing, will hold off on any changes for now.   Will continue on this and follow up in 3 months or so.     Brent Ohara MD ....................  3/7/2017   8:15 AM

## 2018-01-03 NOTE — NURSING NOTE
Patient Information     Patient Name MRN Sex Pennie Fry [id number] Female 1950      Nursing Note by Jocelin Coe at 2/15/2017 10:30 AM     Author:  Jocelin Coe Service:  (none) Author Type:  (none)     Filed:  2/15/2017 10:30 AM Encounter Date:  2/15/2017 Status:  Signed     :  Jocelin Coe            Patient presents to clinic today for neck pain starting two weeks ago without injury.    Jocelin Coe LPN...................2/15/2017  10:20 AM

## 2018-01-03 NOTE — TELEPHONE ENCOUNTER
Patient Information     Patient Name MRN Pennie Car [id number] Female 1950      Telephone Encounter by Cookie Schultz NP at 2017  3:00 PM     Author:  Cookie Schultz NP Service:  (none) Author Type:  PHYS- Nurse Practitioner     Filed:  2017  3:03 PM Encounter Date:  2017 Status:  Signed     :  Cookie Schultz NP (PHYS- Nurse Practitioner)            I will send a prescription for Ativan to be used 1 hour before scheduled procedure--sent to [facility] pharmacy--please call patient medication was sent--  Cookie Schultz NP ....................  2017   3:00 PM

## 2018-01-03 NOTE — TELEPHONE ENCOUNTER
Patient Information     Patient Name MRN Sex Pennie Fry [id number] Female 1950      Telephone Encounter by Cookie Schultz NP at 2017  1:39 PM     Author:  Cookie Schultz NP Service:  (none) Author Type:  PHYS- Nurse Practitioner     Filed:  2017  1:43 PM Encounter Date:  2017 Status:  Signed     :  Cookie Schultz NP (PHYS- Nurse Practitioner)            Attempted to contact report MRI mostly normal--- may have some sinusitis on the left side which is not the side she is having headaches and neck pain. Phone rang and then busy signal--unable to leave message  If she is tolerating the Tegretol--I would have her increase to 1 full tab or 200 mg twice daily until I see her for follow-up in a week.  I would send amoxicillin to the pharmacy twice daily for 10 days for sinusitis    Cookie Schultz NP ....................  2017   1:41 PM

## 2018-01-03 NOTE — TELEPHONE ENCOUNTER
Patient Information     Patient Name MRN Pennie Car [id number] Female 1950      Telephone Encounter by Cookie Schultz NP at 2017  3:58 PM     Author:  Cookie Schultz NP Service:  (none) Author Type:  PHYS- Nurse Practitioner     Filed:  2017  3:58 PM Encounter Date:  2017 Status:  Signed     :  Cookie Schultz NP (PHYS- Nurse Practitioner)            Yes that is ok  Cookie Schultz NP ....................  2017   3:58 PM

## 2018-01-03 NOTE — PROGRESS NOTES
"Patient Information     Patient Name MRN Sex Pennie Fry [id number] Female 1950      Progress Notes by Cookie Schultz NP at 2/15/2017 10:30 AM     Author:  Cookie Schultz NP Service:  (none) Author Type:  PHYS- Nurse Practitioner     Filed:  2017  5:45 PM Encounter Date:  2/15/2017 Status:  Signed     :  Cookie Schultz NP (PHYS- Nurse Practitioner)            SUBJECTIVE:    Pennie Nichols is a 66 y.o. female who presents for reported history of 2 weeks of right sided auricular facial pain that radiates up to parietal area intermittently \"feels like electric shocks\" and lasts for 10-15 minutes at a time.  Patient reports sometimes when she is chewing food this will generate those electric shock attacks the side of her face. Patient denies any dizziness, changes in vision, balance. Denies any recent fevers, cough, congestion.  She has been taking foods and fluids without any difficulty and denies any vomiting or diarrhea.  Denies any history of any fall injuries. Patient has not tried anything at home for pain. Has tried gentle massage and not effective      HPI    Allergies      Allergen   Reactions     Contrast Dye [Diatrizoate Meglumine (Iv Contrast Dye)]  Angioedema     As noted  note Dr Stone    ,   Family History       Problem   Relation Age of Onset     Other  Mother      lung cancer       Heart Disease  Father 70     MI       Cancer  Father      Liver cancer       Cancer  Other      Lung cancer       Diabetes  Other      Cancer-colon  No Family History      Cancer-prostate  No Family History      Cancer-ovarian  No Family History      Blood Disease  No Family History      Hypertension  No Family History      Stroke  No Family History      Thyroid Disease  No Family History    ,   No current outpatient prescriptions on file prior to visit.     No current facility-administered medications on file prior to visit.    ,   Current Outpatient Prescriptions:      carBAMazepine " "(TEGRETOL) 200 mg tablet, Take 0.5 tablets by mouth 2 times daily. Take 1/2 tab twice day, Disp: 30 tablet, Rfl: 0     LORazepam (ATIVAN) 1 mg tablet, Take 1 tab 1 hour before scheduled procedure, can repeat in 4 hours if needed, Disp: 2 tablet, Rfl: 0  Medications have been reviewed by me and are current to the best of my knowledge and ability.,   Past Medical History      Diagnosis   Date     Back problem       Recurrent back problems , secondary to injury at [facility]      Mental health problem       not otherwise specified     ,   Patient Active Problem List      Diagnosis Date Noted     FIBROMYALGIA    ,   Past Surgical History       Procedure   Laterality Date     Hysterectomy   1980     [place]       Appendectomy   1980     Incidental, Enterolysis       Cholecystectomy   1996     Laparoscopic       Colonoscopy   1990?     Cryotherapy of cervix   1994     Abnormal Pap       and   Social History     Substance Use Topics       Smoking status: Never Smoker     Smokeless tobacco: Never Used     Alcohol use No       REVIEW OF SYSTEMS:  Review of Systems   Constitutional: Negative.    HENT: Positive for ear pain.    Eyes: Negative.    Respiratory: Negative.    Cardiovascular: Negative.    Gastrointestinal: Negative.    Genitourinary: Negative.    Musculoskeletal: Negative.    Skin: Negative.    Neurological: Positive for tingling and headaches.   Endo/Heme/Allergies: Negative.    Psychiatric/Behavioral: Negative.        OBJECTIVE:  /86  Pulse (!) 104  Ht 1.54 m (5' 0.63\")  Wt 97.6 kg (215 lb 4 oz)  BMI 41.17 kg/m2    EXAM:   Physical Exam   Constitutional: She is oriented to person, place, and time and well-developed, well-nourished, and in no distress.   HENT:   Head: Normocephalic and atraumatic.   Right Ear: External ear normal.   Left Ear: External ear normal.   Mouth/Throat: Oropharynx is clear and moist. No oropharyngeal exudate.   No reproducible palpable tenderness right parietal a radicular facial and " cervical areas    Limited range of motion left to right and flexion. Able to bring chin to chest without difficulty.   Eyes: Conjunctivae and EOM are normal. Pupils are equal, round, and reactive to light. Right eye exhibits no discharge. Left eye exhibits no discharge. No scleral icterus.   Neck: Normal range of motion. Neck supple. No JVD present.   Cardiovascular: Normal rate and regular rhythm.    Pulmonary/Chest: Effort normal and breath sounds normal.   Musculoskeletal: Normal range of motion.   Lymphadenopathy:     She has no cervical adenopathy.   Neurological: She is alert and oriented to person, place, and time. She has normal reflexes. No cranial nerve deficit. She exhibits normal muscle tone. Gait normal. Coordination normal.   Skin: Skin is warm and dry.   Psychiatric: Mood, memory, affect and judgment normal.   Nursing note and vitals reviewed.      ASSESSMENT/PLAN:    ICD-10-CM    1. Neck pain on right side M54.2 XR SPINE CERVICAL 3 VIEWS      XR SPINE THORACIC 3 VIEWS   2. Unilateral headache R51 SEDIMENTATION RATE      CBC AND DIFFERENTIAL      COMP METABOLIC PANEL      XR SPINE CERVICAL 3 VIEWS      XR SPINE THORACIC 3 VIEWS      SEDIMENTATION RATE      CBC AND DIFFERENTIAL      COMP METABOLIC PANEL      CBC WITH AUTO DIFFERENTIAL      MR HEAD BRAIN WWO      carBAMazepine (TEGRETOL) 200 mg tablet      CANCELED: MR HEAD BRAIN WO   3. Trigeminal neuralgia syndrome G50.0 SEDIMENTATION RATE      CBC AND DIFFERENTIAL      COMP METABOLIC PANEL      XR SPINE CERVICAL 3 VIEWS      SEDIMENTATION RATE      CBC AND DIFFERENTIAL      COMP METABOLIC PANEL      CBC WITH AUTO DIFFERENTIAL      MR HEAD BRAIN WWO      carBAMazepine (TEGRETOL) 200 mg tablet      CANCELED: MR HEAD BRAIN WO    differential diagnosis include severe cervical degenerative disc disease, muscle spasm and trigeminal neuralgia, TMJ, tumor or lesion    suspicious for trigeminal neuralgia with pattern of intermittent shocklike unilateral  headaches radiating trigeminal distribution pattern  triggered with chewing    Results for orders placed or performed in visit on 02/15/17      SEDIMENTATION RATE      Result  Value Ref Range    SEDIMENTATION RATE        25 <31 mm/hr   COMP METABOLIC PANEL      Result  Value Ref Range    SODIUM 139 133 - 143 mmol/L    POTASSIUM 3.9 3.5 - 5.1 mmol/L    CHLORIDE 101 98 - 107 mmol/L    CO2,TOTAL 31 21 - 31 mmol/L    ANION GAP 7 5 - 18                    GLUCOSE 128 (H) 70 - 105 mg/dL    CALCIUM 10.1 8.6 - 10.3 mg/dL    BUN 9 7 - 25 mg/dL    CREATININE 0.69 (L) 0.70 - 1.30 mg/dL    BUN/CREAT RATIO           13                    GFR if African American >60 >60 ml/min/1.73m2    GFR if not African American >60 >60 ml/min/1.73m2    ALBUMIN 4.1 3.5 - 5.7 g/dL    PROTEIN,TOTAL 8.0 6.4 - 8.9 g/dL    GLOBULIN                  3.9 (H) 2.0 - 3.7 g/dL    A/G RATIO 1.1 1.0 - 2.0                    BILIRUBIN,TOTAL 0.3 0.3 - 1.0 mg/dL    ALK PHOSPHATASE 89 34 - 104 IU/L    ALT (SGPT) 17 7 - 52 IU/L    AST (SGOT) 15 13 - 39 IU/L   CBC WITH AUTO DIFFERENTIAL      Result  Value Ref Range    WHITE BLOOD COUNT         9.4 4.5 - 11.0 thou/cu mm    RED BLOOD COUNT           5.72 (H) 4.00 - 5.20 mil/cu mm    HEMOGLOBIN                14.9 12.0 - 16.0 g/dL    HEMATOCRIT                47.4 33.0 - 51.0 %    MCV                       83 80 - 100 fL    MCH                       26.1 26.0 - 34.0 pg    MCHC                      31.5 (L) 32.0 - 36.0 g/dL    RDW                       12.5 11.5 - 15.5 %    PLATELET COUNT            353 140 - 440 thou/cu mm    MPV                       5.7 (L) 6.5 - 11.0 fL    NEUTROPHILS               71.2 42.0 - 72.0 %    LYMPHOCYTES               18.7 (L) 20.0 - 44.0 %    MONOCYTES                 6.1 <12.0 %    EOSINOPHILS               3.4 <8.0 %    BASOPHILS                 0.6 <3.0 %    ABSOLUTE NEUTROPHILS      6.7 1.7 - 7.0 thou/cu mm    ABSOLUTE LYMPHOCYTES      1.8 0.9 - 2.9 thou/cu mm    ABSOLUTE  MONOCYTES        0.6 <0.9 thou/cu mm    ABSOLUTE EOSINOPHILS      0.3 <0.5 thou/cu mm    ABSOLUTE BASOPHILS        0.1 <0.3 thou/cu mm     reviewed negative labs during office visit  Cervical and thoracic x-rays negative for any acute findings--positive for degenerative facet arthrosis    Plan:  We'll start empiric, carbamazepine 100 mg twice a day--if tolerated and somewhat effective would titrate to 200 mg    MRI scheduled and pending    patient will follow-up in one week on progress    discussed if any increased pain that is persisting, or any visual or sensory changes should return to clinic or ED as needed

## 2018-01-03 NOTE — PROGRESS NOTES
Patient Information     Patient Name MRN Sex Pennie Fry [id number] Female 1950      Progress Notes by Brent Ohara MD at 2017 12:00 PM     Author:  Brent Ohara MD Service:  (none) Author Type:  Physician     Filed:  2017 10:00 AM Encounter Date:  2017 Status:  Signed     :  Brent Ohara MD (Physician)            SUBJECTIVE:    Pennie Nichols is a 66 y.o. female who presents for follow up neck and face pain    HPI    Has had this now for over 2 weeks or so.  Was seen here, that note reviewed.  Diagnosis was trigeminal neuralgia.  She had shooting pains with throbbing in the right side of her head, above ear.  Lots of neck stiffness.  Currently she says her pain is improving a little.  No longer getting flares every 10-15 minutes.  Is less intense.  Lots of neck stiffness.  Using ice, which helps.  She was seen in the emergency department a few days ago, and was told to stop her Tegretol and elevate her feet.  Was having some foot cramps, now gone.  She felt unsteady on her feet.  Has since stopped the tegretol and the imbalance is now gone.    Allergies      Allergen   Reactions     Contrast Dye [Diatrizoate Meglumine (Iv Contrast Dye)]  Angioedema     As noted  note Dr Stone    ,   Current Outpatient Prescriptions on File Prior to Visit       Medication  Sig Dispense Refill     amoxicillin (AMOXIL) 875 mg tablet Take 1 tablet by mouth 2 times daily for 10 days. 20 tablet 0     cephalexin (KEFLEX) 500 mg capsule Take 1 capsule by mouth 4 times daily for 10 days. 40 capsule 0     furosemide (LASIX) 20 mg tablet Take 1 tablet by mouth every morning. 7 tablet 0     LORazepam (ATIVAN) 1 mg tablet Take 1 tab 1 hour before scheduled procedure, can repeat in 4 hours if needed 2 tablet 0     No current facility-administered medications on file prior to visit.    ,   Past Medical History      Diagnosis   Date     Back problem       Recurrent back problems , secondary  to injury at [facility]      Mental health problem       not otherwise specified      and   Past Surgical History       Procedure   Laterality Date     Hysterectomy   1980     [place]       Appendectomy   1980     Incidental, Enterolysis       Cholecystectomy   1996     Laparoscopic       Colonoscopy   1990?     Cryotherapy of cervix   1994     Abnormal Pap          REVIEW OF SYSTEMS:  Review of Systems   Constitutional: Negative for chills and fever.   Musculoskeletal: Positive for neck pain.   Neurological: Positive for dizziness and headaches.       OBJECTIVE:  /68  Resp 16  Wt 96.4 kg (212 lb 9.6 oz)  BMI 41.52 kg/m2    EXAM:   Physical Exam   Constitutional: She is oriented to person, place, and time and well-developed, well-nourished, and in no distress. No distress.   HENT:   Right Ear: External ear normal.   Left Ear: External ear normal.   Musculoskeletal:   Neck range of motion greatly reduced.  Extension is 0, rotation is about 20 in both directions.  No posterior neck pain on palpitation.    No lower extremity edema.   Neurological: She is alert and oriented to person, place, and time.   Skin: She is not diaphoretic.       ASSESSMENT/PLAN:    ICD-10-CM    1. Trigeminal neuralgia of right side of face G50.0    2. Unilateral headache R51 carBAMazepine (TEGRETOL) 200 mg tablet   3. Trigeminal neuralgia syndrome G50.0 carBAMazepine (TEGRETOL) 200 mg tablet        Plan:  Will have her use even lower dose tegretol, 100 mg daily, for 1 week, then follow up for likely titration upwards.  Does not have a UTI, culture is negative, so stop antibiotics.  MRI showed left maxillary opacification, but her symptoms are right facial.  Her dizziness could be from the tegretol itself, thus the slower upwards taper.    Brent Ohara MD ....................  2/28/2017   10:00 AM

## 2018-01-03 NOTE — TELEPHONE ENCOUNTER
Patient Information     Patient Name MRN Pennie Car [id number] Female 1950      Telephone Encounter by Loreto Julien at 2017  3:05 PM     Author:  Loreto Julien Service:  (none) Author Type:  (none)     Filed:  2017  3:08 PM Encounter Date:  2017 Status:  Signed     :  Loreto Julien            Patient notified, faxed to [facility].  She wanted me to ask you if it is OK to take tylenol or ibuprofen for pain along with her tegretol.  Yokasta Julien LPN ...... 2017 3:05 PM

## 2018-01-03 NOTE — TELEPHONE ENCOUNTER
Patient Information     Patient Name MRN Pennie Car [id number] Female 1950      Telephone Encounter by Loreto Julien at 2017  4:11 PM     Author:  Loreto Julien Service:  (none) Author Type:  (none)     Filed:  2017  4:11 PM Encounter Date:  2017 Status:  Signed     :  Loreto Julien            Patient notified.  Yokasta Julien LPN ...... 2017 4:11 PM

## 2018-01-03 NOTE — PATIENT INSTRUCTIONS
Patient Information     Patient Name MRN Pennie Car [id number] Female 1950      Patient Instructions by Cookie Schultz NP at 2/15/2017 10:30 AM     Author:  Cookie Schultz NP  Service:  (none) Author Type:  PHYS- Nurse Practitioner     Filed:  2/15/2017 12:11 PM  Encounter Date:  2/15/2017 Status:  Addendum     :  Cookie Schultz NP (PHYS- Nurse Practitioner)        Related Notes: Original Note by Cookie Schultz NP (PHYS- Nurse Practitioner) filed at 2/15/2017 12:11 PM               Index   Trigeminal Neuralgia (Face Pain)   ________________________________________________________________________  KEY POINTS    Trigeminal neuralgia (TN) is a chronic condition that causes nerve pain in your face, teeth, mouth, or nose. You may have attacks of pain on one or rarely both sides of your face.    Treatment may include medicine or surgery. Work with your provider to find the treatment that works best for you.    Treating pain at the very first symptom may keep it from becoming as serious, and the pain may last a shorter time.  ________________________________________________________________________  What is trigeminal neuralgia?  Trigeminal neuralgia (TN) is condition that causes nerve pain in your face, teeth, mouth, or nose. You may have attacks of pain on one or both sides of your face. TN is also called tic douloureux.  Men and women of any age can have TN, but it happens most often in women over age 40.  The problem usually appears suddenly and may get better on its own. It may also disappear for months or years and then come back. The painful attacks may get more frequent as you get older.  What is the cause?  The exact cause is not known. The problem is thought to happen when a blood vessel presses on the trigeminal nerve. The trigeminal nerve has 3 branches that go to the upper, middle, and lower parts of your face. Other possible causes include multiple sclerosis, stroke, tumors, or  injury to the trigeminal nerve from an injury or surgery.  An attack may happen after:    A light touch to the area such as from applying makeup, washing, or shaving    Brushing your teeth    Chewing or swallowing    Exposure to hot or cold air or drinks  What are the symptoms?    You may have intense stabbing or burning pain that comes in sudden jabs that lasts 1 to 15 minutes.    Common areas of pain are the cheeks and jaw.    How often you have symptoms varies from person to person. Between attacks, you may not have pain, or you may have a more constant dull ache.  How is it diagnosed?  Your healthcare provider will ask about your symptoms and medical history and examine you. There are no tests to diagnose TN. You may have tests or scans to check for other possible causes of the symptoms.  How is it treated?  Different types of medicine may be prescribed by your healthcare provider to help relieve pain. It may be hard to find the best treatment if the painful attacks happen weeks or months apart. You may need to use more than 1 medicine to prevent or control your symptoms. Work with your provider to find the medicines that work best for you.  If medicine does not give relief, or side effects from the medicine are a problem, you may consider surgery. There are several kinds of surgery that may help, including removing part of the nerve. After surgery, parts of your face may be numb. The numbness may be temporary or permanent. If you no longer have feeling in that area, you must be very careful to watch for injuries or physical problems in that area such as:    Burns    Dry eyes    Chewing-related problems, including gum and other dental problems  Newer treatments that may help are radiation (radiosurgery), where it is possible to deliver radiation to just a small area of a nerve. Botox injections to the nerve may help in some cases. You can ask your healthcare provider if these treatments would be an option for  you.  Some people may find pain is reduced or relieved by acupuncture, chiropractic adjustment, biofeedback, self-hypnosis, or meditation.  How can I take care of myself?  Keeping a pain diary may help you to see which activities or conditions cause your pain. Then you will know what you need to avoid.  Treating pain at the very first symptom may keep it from becoming as severe, and the pain may last a shorter time.  Ask your provider:    How and when you will get your test results    How long it will take to recover    If there are activities you should avoid and when you can return to your normal activities    If there are foods or drinks you should avoid    How to take care of yourself at home    What symptoms or problems you should watch for and what to do if you have them  Make sure you know when you should come back for a checkup. Keep all appointments for provider visits or tests.  You can get more information from:    The Facial Pain Association  729.653.8136   http://www.fpa-support.org/  Developed by [employer].  Adult Advisor 2016.3 published by [employer].  Last modified: 2016-03-23  Last reviewed: 2016-03-09  This content is reviewed periodically and is subject to change as new health information becomes available. The information is intended to inform and educate and is not a replacement for medical evaluation, advice, diagnosis or treatment by a healthcare professional.  References   Adult Advisor 2016.3 Index    Copyright   2016 [employer], a division of [employer] All rights reserved.            Index   Trigeminal Neuralgia (Face Pain)   ________________________________________________________________________  KEY POINTS    Trigeminal neuralgia (TN) is a chronic condition that causes nerve pain in your face, teeth, mouth, or nose. You may have attacks of pain on one or rarely both sides of your face.    Treatment may include medicine or surgery. Work with your provider to find the  treatment that works best for you.    Treating pain at the very first symptom may keep it from becoming as serious, and the pain may last a shorter time.  ________________________________________________________________________  What is trigeminal neuralgia?  Trigeminal neuralgia (TN) is condition that causes nerve pain in your face, teeth, mouth, or nose. You may have attacks of pain on one or both sides of your face. TN is also called tic douloureux.  Men and women of any age can have TN, but it happens most often in women over age 40.  The problem usually appears suddenly and may get better on its own. It may also disappear for months or years and then come back. The painful attacks may get more frequent as you get older.  What is the cause?  The exact cause is not known. The problem is thought to happen when a blood vessel presses on the trigeminal nerve. The trigeminal nerve has 3 branches that go to the upper, middle, and lower parts of your face. Other possible causes include multiple sclerosis, stroke, tumors, or injury to the trigeminal nerve from an injury or surgery.  An attack may happen after:    A light touch to the area such as from applying makeup, washing, or shaving    Brushing your teeth    Chewing or swallowing    Exposure to hot or cold air or drinks  What are the symptoms?    You may have intense stabbing or burning pain that comes in sudden jabs that lasts 1 to 15 minutes.    Common areas of pain are the cheeks and jaw.    How often you have symptoms varies from person to person. Between attacks, you may not have pain, or you may have a more constant dull ache.  How is it diagnosed?  Your healthcare provider will ask about your symptoms and medical history and examine you. There are no tests to diagnose TN. You may have tests or scans to check for other possible causes of the symptoms.  How is it treated?  Different types of medicine may be prescribed by your healthcare provider to help  relieve pain. It may be hard to find the best treatment if the painful attacks happen weeks or months apart. You may need to use more than 1 medicine to prevent or control your symptoms. Work with your provider to find the medicines that work best for you.  If medicine does not give relief, or side effects from the medicine are a problem, you may consider surgery. There are several kinds of surgery that may help, including removing part of the nerve. After surgery, parts of your face may be numb. The numbness may be temporary or permanent. If you no longer have feeling in that area, you must be very careful to watch for injuries or physical problems in that area such as:    Burns    Dry eyes    Chewing-related problems, including gum and other dental problems  Newer treatments that may help are radiation (radiosurgery), where it is possible to deliver radiation to just a small area of a nerve. Botox injections to the nerve may help in some cases. You can ask your healthcare provider if these treatments would be an option for you.  Some people may find pain is reduced or relieved by acupuncture, chiropractic adjustment, biofeedback, self-hypnosis, or meditation.  How can I take care of myself?  Keeping a pain diary may help you to see which activities or conditions cause your pain. Then you will know what you need to avoid.  Treating pain at the very first symptom may keep it from becoming as severe, and the pain may last a shorter time.  Ask your provider:    How and when you will get your test results    How long it will take to recover    If there are activities you should avoid and when you can return to your normal activities    If there are foods or drinks you should avoid    How to take care of yourself at home    What symptoms or problems you should watch for and what to do if you have them  Make sure you know when you should come back for a checkup. Keep all appointments for provider visits or tests.  You  can get more information from:    The Facial Pain Association  597.738.3586   http://www.fpa-support.org/  Developed by [employer].  Adult Advisor 2016.3 published by [employer].  Last modified: 2016-03-23  Last reviewed: 2016-03-09  This content is reviewed periodically and is subject to change as new health information becomes available. The information is intended to inform and educate and is not a replacement for medical evaluation, advice, diagnosis or treatment by a healthcare professional.  References   Adult Advisor 2016.3 Index    Copyright   2016 [employer], a division of [employer] All rights reserved.

## 2018-01-04 NOTE — NURSING NOTE
Patient Information     Patient Name MRN Sex Pennie Fry [id number] Female 1950      Nursing Note by Cass Kathleen at 4/10/2017 11:00 AM     Author:  Cass Kathleen Service:  (none) Author Type:  (none)     Filed:  4/10/2017  1:50 PM Encounter Date:  4/10/2017 Status:  Signed     :  Cass Kathleen            Patient presents for Toenail/ foot care  Cass Kathleen LPN........................4/10/2017  11:01 AM

## 2018-01-04 NOTE — NURSING NOTE
Patient Information     Patient Name MRN Pennie Car [id number] Female 1950      Nursing Note by Dolores Woodard at 2017 10:30 AM     Author:  Dolores Woodard Service:  (none) Author Type:  (none)     Filed:  2017  1:43 PM Encounter Date:  2017 Status:  Signed     :  Dolores Woodard            Patient presents to the clinic today for a follow up on her toenails.    Dolores Woodard ....................  2017   10:23 AM

## 2018-01-04 NOTE — PROGRESS NOTES
Patient Information     Patient Name MRN Sex Pennie Fry [id number] Female 1950      Progress Notes by Deana De La Cruz PT at 2017 10:27 AM     Author:  Deana De La Cruz PT Service:  (none) Author Type:  PT- Physical Therapist     Filed:  2017  3:33 PM Date of Service:  2017 10:27 AM Status:  Signed     :  Deana De La Cruz PT (PT- Physical Therapist)            [facility]  Outpatient PT    Daily Note       Date of Service: 2017   Visit #5  Patient Name: Pennie Nichols (Fiordaliza)   YOB: 1950   Referring MD/Provider: Dr. Schultz  Medical and Treatment Diagnosis: Neck pain, vertigo and balance problems  Treatment Diagnosis:  Neck pain and limited UE ROM, vertigo and impaired balance  Insurance: Other: [employer] (MC replacement)  Start of Service: 17  Certification Dates: Start of Service: 17   Medicare/MA Re-Cert Due: 17  Re-certification Dates: 4/3/2017 - 17    Subjective      Current Status (neck pain): 5/10 right side of neck and into right side of head, but not all the way to the top.      Avoids laying down flat due to back so hasn't had much vertigo, doesn't usually get much off of floor, too hard on her back.  BPPV test positive to L today, will further assess as able with assistance.         LE/vestibular assessment: Patient reports that she's noticed that it is getting harder to get out of chair, has to push more with arms.  No falls.  Legs doesn't feel like giving out, but feel weak when walking.  Not up for walking outside without support, using hand rail lightly/fingertip support.  Feels balance is not reliable.  Used to touching counter top, chair, person, rail, etc. She feels unsteady with shower, uses grab bars and shower chair.  Pain in back, all the sudden can hit and feel like electricity running through body- stays in back.  Prolonged sitting, standing, walking, laying after 10-15 then starts bother  the back, has to position or sit and relax.  It's been better lately but she has avoided much activity.  No spinning or light headed lately.  Last vertigo walking down ervin at doctor's office early March and then doctor's striped shirt really affected her.  Sometime gets a little vertigo with laying down, she sleeps on her back but is propped up because of her back.        History of Injury/reason for PT: Patient comes in with neck pain and stiffness.  Neck has been bothering her for a 2-3 months.  Nothing that she knows of that started it.  Just noticed she couldn't turn head any more.  Notices it all day long.  Can't lift head up to look at the stars.  Pain also with just normal moving around.  Mostly stiffness, little bit of pain.  When look up and as she brings it back down it hurts 6/10. Turning 4/10. Steady around 4/10 with day to day activity.  Getting up out of chair pushing off hurts in neck 4/10.   She's noticed a little bump in muscle on R neck.  Has tried cold and that helps some.  Tylenol, helps with neck pain, on Epitol for nerve pain (trigeminal neurologia) not sure if it's regular or extra strength.  Has tried muscles massager and it did help. No falls.  No AD.  Lives alone in apartment, does own ADL's but looking into getting help with housework.  Dx with trigeminal neuralgia about 1 month ago and still having some of pain R side of head, mostly temporal area, good at times and sometimes feels like a horse is kicking her in the head at times.  Could resolve, could continue for the rest of her life.   Also reports h/o incontinence and decreased balance for safety in shower.  She reports not walking much, just around in her apartment, encouraged increased walking and we'll add balance and endurance activity if provider feels appropriate.  X-Ray: trace degenerative anterolisthesis C5 on C6 and straightening of the lower cervical lordosis. Diffuse advanced facet degenerative changes are seen. Images of  "the thoracic spine demonstrate normal thoracic kyphosis. Minimal multilevel disc height loss is seen.  2/15/2017   Symptoms at evaluation:  ?   Decreased Motion  Yes, Weakness - no, Instability- unsteady, Swelling - no, Tingling/Numbness - no, Bowel/bladder changes - incontinence - stress, waiting room, coughing sneezing, Pain Ratin-6/10 Pain Location:  R neck  Aggravation Factors: movement  Easing Factors: cold, Tylenol- take edge off (doesn't have heating pad or microwave for hot pack)      Objective    Today's Intervention:      Sitting:   - cervical side bending 2x30 sec, focus more on L side bending to stretch R side    - only able to hold for 15 seconds when stretching L side 2017   - chin tuck 2 x30 sec - only able to hold for 20 seconds on first rep 2017   - backwards shoulder rolls x20  - shoulder flex wall walk one arm at a time or else pulls into back too much x5-10  - cervical rotation 2x30 sec     Standing at counter: feet 2\" apart balance with 3-4 touches over 30 sec 2017 - no touches, 2 reps   Standing at counter with UE support: marching alternating B x5    Manual therapy: STM to B upper traps, cervical paraspinals, SCM and scalenes x15 minutes with moderate pressure using AliDeep in sitting with gown.     Jose Hallpike with Ax2 for head and legs- only able to test L side today which was positive and followed with CRT Epley's with maxA x2 and she had nystagmus in rotational movement and spinning sensation, she was fearful of falling.  She felt off balance after but was steady on her feet after a few minutes.      Deferred due to time:   - Added: seated thoracic rotation with arms crossed over chest B 2 x30 second hold with cues to keep cervical spine in neutral rotation   Sitting pillow squeeze (tried to ball but too slippery) x10      Home Exercise Program:  Access Code: [email or web address] URL: [email or web address] Date: 2017 Prepared by: Deana De La Cruz   Exercises "   Seated Cervical Retraction - 2 reps - 30 second hold - 3x daily   Seated Cervical Sidebending AROM - 30 hold - 2 Reps - 3x daily   Standing Backward Shoulder Rolls - 20 reps - 2 hold - 3x daily   Shoulder Flexion Wall Walk - 5-10 reps - 3x daily   Walking - 1-2 Hallway - 2x daily     Access Code: [email or web address] URL: [email or web address] Date: 04/03/2017 Prepared by: Deana De La Cruz   Exercises   Standing March with Counter Support - 5 reps - 2x daily   Romberg Stance - 2 reps - 30 second hold - 2x daily   Seated Hip Adduction Isometrics with Ball - 10 reps - 5 hold - 2x daily         Assessment    Response to Intervention:  Patient was able to demonstrate HEP properly today.  Patient very limited ROM in neck and shoulders.      Therapist Assessment / Clinical Impression: Patient presents with signs and symptoms congruent with tight muscles with poor posture and will benefit from skilled PT to increase ROM, strength, decrease pain, and improve function.      Functional Impairment(s):  See subjective on initial evaluation and Functional Assessment / Summary Report from PSFS.    Physical Impairment(s):       MUSCULOSKELETAL:  Balance Deficits, Loss of Motion, Muscle Tightness and Pain      Patient Goal (time reference required): Patient would like to be able to turn head with no pain in 4 weeks.     Functional therapy goals: updated 4/3/2017   Patient is to be independent in their Home Exercise Program in 2 weeks.  Patient is to tolerate walking with normal gait without hand rail up to 20 minutes in 4 weeks.  Patient is to have improved cervical mobility to allow for improved dressing and self-cares with 2/10 pain in 4 weeks.  Patient is to tolerate walking with normal gait without hand rail up to 30 minutes in 8 weeks.  Patient is to have improved cervical mobility to allow for improved looking at stars with 2/10 pain in 8 weeks.  Patient is to self-manage symptoms and return to prior function in 8 weeks.       Patient participated in goal selection and understand(s) the plan of care: Yes   Patient Potential for Achieving Desired Outcome:  Good      Plan    Treatment Plan / Targeted Outcomes:     Frequency:   16 visits     Duration of Treatment: 8 weeks    Planned Interventions:  Possible physical therapy interventions include but are not limited to:   Home Exercise Program development  Therapeutic Exercise (ROM & Strengthening)  Manual Therapy  Neuromuscular Re-education  Ultrasound  Electrical Stimulation  Phonophoresis with Ketoprofen  Iontophoresis with Dexamethasone  Therapeutic Activities  Gait Training  Posture and Body Mechanics Education  Mechanical traction if indicated    Plan for next visit:  Advance strength, ROM, manual therapy and modalities as needed for neck, LE strength, balance and vestibular rehab to improve safety and balance.     Student or PTA has been instructed in and demonstrates skills necessary to carry out above stated treatment plan: Yes    Thank you for your referral to [facility].  Please call with any questions, concerns or comments.  [phone]  Deana De La Cruz, ZOE      ROM: Cerv  3/29/17   Flexion 38   Extension 10   Left Lat. Flex 20   Right Lat. Flex 20   Left Rotation 44   Right Rotation 28   Sh. flex 90   Sh abd 75     LE MMT:   Hip flex, add: 3/5   Hip ext, abd, knee flex, ext, ankle DF and PF: 4-/5    Patient Specific Functional and Pain Scales (PSFS) completed 3/29/2017 still current 4/3/2017   Clinician Instructions: Complete after the history and before the exam.   Initial Assessment:   We want to know what 3 activities in your life you are unable to perform, or are having the most difficulty performing, as a result of your chief problem. Please list and score at least 3 activities that you are unable to perform, or having the most difficulty performing, because of your chief problem.   Patient Specific Activity Scoring Scheme (score one number for  each activity):   Activity Score (0-10)  0= Unable to perform activity  10= Able to perform activity at same level as before injury or problem   1. Looking up and back down (pain 6/10) 4/10   2. Turning head (pain 4/10) 6/10   3. Pain during the day (average 4/10) 6/10   4.  /10   5.  /10   Totals:  16/30 = 53% Function   and 47% Impairment   Patient verbally states that they understand that the information they have provided above is current and complete to the best of their knowledge.     Initial Primary G Code and Modifier:    Per the Patient's intake and/or assessment the Primary G Code is: Mobility .   The Patient's Impairment, Limitation or Restriction Modifier would be best described as: CK - 40% - 60% Impairment.   Goal Primary G Code and Modifier:    The Patient's G Code Goal would be: Mobility    The Patient's Impairment, Limitation or Restriction Modifier goal would be best described as: CI - 1% - 20% Impairment.

## 2018-01-04 NOTE — PROGRESS NOTES
Patient Information     Patient Name MRN Sex Pennie Fry [id number] Female 1950      Progress Notes by Cookie Schultz NP at 3/22/2017 12:00 PM     Author:  Cookie Schultz NP Service:  (none) Author Type:  PHYS- Nurse Practitioner     Filed:  3/23/2017  3:22 PM Encounter Date:  3/22/2017 Status:  Signed     :  Cookie Schultz NP (PHYS- Nurse Practitioner)            SUBJECTIVE:    Pennie Nichols is a 66 y.o. female who presents for yearly physical, preventive screenings and review of medications.  Patient is interested in Prevnar 13 however would like to obtain vaccines one at a time as she is concerned about the effects. Denies any past history of adverse effects from vaccines.  Patient was started on Tegretol treatment a month ago for trigeminal neuralgia which she reports is almost completely controlled on current dose. Patient continues to have bilateral neck pain more so on the right.  Patient reports a past history of a hysterectomy in the  however is uncertain if cervix was removed. Patient declines Pap screening. Patient reports has not been sexually active for over 20 years.  Reports hysterectomy was due to menorrhagia.  Mammography screening due--last screening 10 years ago and normal results  patient has had one colonoscopy declines any further. Patient declines DEXA scan screening, last DEXA scan negative.  She reports she has been having some intermittent stress urinary incontinence mostly with coughing or sneezing. She would like her  Iza Barnes [phone] to help facilitate ordering disposable undergarments.    She lives at [facility], reports has no active family members or regular visitors. She is comfortable living at the apartment and has known the other residents.    She reports she takes 1 shower week and is concerned that she may slip in the shower. Patient is unable to reach her feet and needs her toenails trimmed today, may not have been  trimmed for 2-3 years.        HPI    Allergies      Allergen   Reactions     Contrast Dye [Diatrizoate Meglumine (Iv Contrast Dye)]  Angioedema     As noted 2009 note Dr Stone    ,   Family History       Problem   Relation Age of Onset     Other  Mother      lung cancer       Heart Disease  Father 70     MI       Cancer  Father      Liver cancer       Cancer  Other      Lung cancer       Diabetes  Other      Cancer-colon  No Family History      Cancer-prostate  No Family History      Cancer-ovarian  No Family History      Blood Disease  No Family History      Hypertension  No Family History      Stroke  No Family History      Thyroid Disease  No Family History    ,   Current Outpatient Prescriptions on File Prior to Visit       Medication  Sig Dispense Refill     carBAMazepine (TEGRETOL) 200 mg tablet Take 0.5 tablets by mouth 2 times daily. Take 1/2 tab once a day 30 tablet 3     No current facility-administered medications on file prior to visit.    ,   Current Outpatient Prescriptions:      carBAMazepine (TEGRETOL) 200 mg tablet, Take 0.5 tablets by mouth 2 times daily. Take 1/2 tab once a day, Disp: 30 tablet, Rfl: 3  Medications have been reviewed by me and are current to the best of my knowledge and ability.,   Past Medical History      Diagnosis   Date     Back problem       Recurrent back problems , secondary to injury at [facility]      Mental health problem       not otherwise specified     ,   Patient Active Problem List      Diagnosis Date Noted     Trigeminal neuralgia of right side of face 03/06/2017     Fibromyalgia    ,   Past Surgical History       Procedure   Laterality Date     Hysterectomy   1980     [place]       Appendectomy   1980     Incidental, Enterolysis       Cholecystectomy   1996     Laparoscopic       Colonoscopy   1990?     Cryotherapy of cervix   1994     Abnormal Pap       and   Social History      Substance Use Topics        Smoking status:  Former Smoker     Packs/day: 1.00     Years:  30.00     Types: Cigarettes     Quit date: 1/21/2001     Smokeless tobacco:  Never Used     Alcohol use  No       REVIEW OF SYSTEMS:  Review of Systems   Constitutional: Negative.    Eyes: Negative.    Cardiovascular: Negative.    Musculoskeletal: Positive for neck pain.   Skin: Negative.    Neurological: Positive for tingling and headaches.   Endo/Heme/Allergies: Negative.    Psychiatric/Behavioral: Negative.        OBJECTIVE:  /80  Pulse 72  Ht 1.524 m (5')  Wt 96.2 kg (212 lb)  Breastfeeding? No  BMI 41.4 kg/m2    EXAM:   Physical Exam   Constitutional: She is oriented to person, place, and time and well-developed, well-nourished, and in no distress.   HENT:   Head: Normocephalic and atraumatic.   Mouth/Throat: Oropharynx is clear and moist.   Neck: Normal range of motion. Neck supple.   Neck is supple and range of motion is same as previous baseline--limited but not tender     Cardiovascular: Normal rate, regular rhythm and normal heart sounds.  Exam reveals no gallop and no friction rub.    No murmur heard.  Pulmonary/Chest: Effort normal and breath sounds normal.   Declines breast exam   Genitourinary:   Genitourinary Comments: Declines pelvic exam    Musculoskeletal: Normal range of motion.   Lymphadenopathy:     She has no cervical adenopathy.   Neurological: She is alert and oriented to person, place, and time. Gait normal.   Skin: Skin is warm and dry.   Toenails all thickened with fungus and 3-4 cm in length curled sideways and under surface  ankles, heels and ankles covered with patches of thick  crust and scale--able to remove some thick chunks with cream    Toenails were cut with nail cutters and filed with amandamel to  smooth edges--unable to cut all toenails completely to nail bed --patient unable to tolerate trimming on 2 of her nails left foot #2 and 3--     Psychiatric: Memory and affect normal.   Flat affect   Nursing note and vitals reviewed.      ASSESSMENT/PLAN:    ICD-10-CM    1.  Pneumococcal vaccination administered at current visit Z23 PREVNAR 13 (AKA PNEUMOCOCCAL VACCINE 13-VALENT IM)      [place] ADMIN VACC INITIAL   2. Chronic neck pain M54.2 AMB CONSULT TO PHYSICAL THERAPY     G89.29    3. Breast cancer screening Z12.39 XR MAMMO BILAT SCREENING   4. Visit for preventive health examination Z00.00 XR MAMMO BILAT SCREENING      ANTI HCV      ANTI HCV   5. Stress incontinence of urine N39.3    6. Hypertrophic toenail L60.2    7. Assistance needed for personal hygiene Z74.1    8. Fibromyalgia M79.7    9. Trigeminal neuralgia of right side of face G50.0    10. History of cervical dysplasia Z87.410     labs pending    screening mammography scheduled        Plan:  Will contact Iza Barnes [facility]  [phone] arrange for disposable undergarments--- (see phone note)  and discussion regarding home care/PCA  to assist with bathing and hygiene and soaking feet--will need to continue shaving down overgrown fungal toenails    We'll also discuss community foot care services for transportation to podiatrist for further treatment    Patient can follow-up in clinic weekly to continue working on toenails and thickened dry crusted skin around ankles and feet as patient reports she is unable to reach her feet and soak her feet independently at home  limited ability to provide independent ADLs with bathing and hygiene    Prevnar 13 updated    We'll refer to physical therapy for chronic neck pain and stiffness    We'll continue current dose of Tegretol for neuralgia for the next couple of months and will follow-up on progress       declines DEXA, colonoscopy screenings  declines pelvic exam today--will send for old charts to determine if patient has cervix intact--- patient uncertain of details of hysterectomy  history of cervical dysplasia with cryotherapy--- no other information available in the HR or paper chart--no hysterectomy information or pathology report    Will discuss at next office  visit    Spent over 1 hour with physical exam, history, counseling, education and foot care

## 2018-01-04 NOTE — PROGRESS NOTES
Patient Information     Patient Name MRN Sex Pennie Fry [id number] Female 1950      Progress Notes by Deana De La Cruz PT at 3/31/2017  9:30 AM     Author:  Deana De La Cruz PT Service:  (none) Author Type:  PT- Physical Therapist     Filed:  3/31/2017 10:36 AM Date of Service:  3/31/2017  9:30 AM Status:  Signed     :  Deana De La Cruz PT (PT- Physical Therapist)            [facility]  Outpatient PT - Daily Note         Date of Service: 3/31/2017   Visit #2  Patient Name: Pennie Nichols (Fiordaliza)   YOB: 1950   Referring MD/Provider: Dr. Schultz  Medical and Treatment Diagnosis: Neck pain  Treatment Diagnosis:  Neck pain and limited UE ROM, also limited balance  Insurance: Other: [employer] (MC replacement)  Start of Service: 17  Certification Dates: Start of Service: 17   Medicare/MA Re-Cert Due: 17    Subjective      Current Status: Patient reports neck feeling better, didn't do much of HEP, been busy, yesterday had eye doctor visit and and walked in hallway and a little outside.  She felt last treatment helped quite awhile, pain and stiffness back today. Additional orders received, will assess LE strength and balance at next visit        History of Injury/reason for PT: Patient comes in with neck pain and stiffness.  Neck has been bothering her for a 2-3 months.  Nothing that she knows of that started it.  Just noticed she couldn't turn head any more.  Notices it all day long.  Can't lift head up to look at the stars.  Pain also with just normal moving around.  Mostly stiffness, little bit of pain.  When look up and as she brings it back down it hurts 6/10. Turning 4/10. Steady around 4/10 with day to day activity.  Getting up out of chair pushing off hurts in neck 4/10.   She's noticed a little bump in muscle on R neck.  Has tried cold and that helps some.  Tylenol, helps with neck pain, on Epitol for nerve pain (trigeminal neurologia)  not sure if it's regular or extra strength.  Has tried muscles massager and it did help. No falls.  No AD.  Lives alone in apartment, does own ADL's but looking into getting help with housework.  Dx with trigeminal neuralgia about 1 month ago and still having some of pain R side of head, mostly temporal area, good at times and sometimes feels like a horse is kicking her in the head at times.  Could resolve, could continue for the rest of her life.   Also reports h/o incontinence and decreased balance for safety in shower.  She reports not walking much, just around in her apartment, encouraged increased walking and we'll add balance and endurance activity if provider feels appropriate.  X-Ray: trace degenerative anterolisthesis C5 on C6 and straightening of the lower cervical lordosis. Diffuse advanced facet degenerative changes are seen. Images of the thoracic spine demonstrate normal thoracic kyphosis. Minimal multilevel disc height loss is seen.  2/15/2017     Symptoms at evaluation:  ?   Decreased Motion  yesWeakness - noInstability- unsteadySwelling - noTingling/Numbness - noBowel/bladder changes - incontinence - stress, waiting room, coughing sneezing     Pain Ratin-6/10 Pain Location:  R neck    Aggravation Factors: movement    Easing Factors: cold, Tylenol- take edge off       Objective          Today's Intervention:  Evaluation completed, started patient on HEP as following, educated on posture and body mechanics.     Sitting:   - side bending 2x30 sec, focus more on L side bending to stretch R side  - chin tuck 2 x30 sec  - backwards shoulder rolls x20  - shoulder flex wall walk one arm at a time or else pulls into back too much x5-10    Manual therapy: STM to B upper traps, cervical paraspinals, SCM and scalenes x25' with moderate pressure using AliDeep in sitting with gown.     Home Exercise Program:  Access Code: [email or web address] URL: [email or web address] Date: 2017 Prepared by: Deana  Jono   Exercises   Seated Cervical Retraction - 2 reps - 30 second hold - 3x daily   Seated Cervical Sidebending AROM - 30 hold - 2 Reps - 3x daily   Standing Backward Shoulder Rolls - 20 reps - 2 hold - 3x daily   Shoulder Flexion Wall Walk - 5-10 reps - 3x daily   Walking - 1-2 Hallway - 2x daily         Assessment    Response to Intervention:  Patient was able to demonstrate HEP properly today.  Patient very limited ROM in neck and shoulders.      Therapist Assessment / Clinical Impression: Patient presents with signs and symptoms congruent with tight muscles with poor posture and will benefit from skilled PT to increase ROM, strength, decrease pain, and improve function.      Functional Impairment(s):  See subjective on initial evaluation and Functional Assessment / Summary Report from PSFS.    Physical Impairment(s):       MUSCULOSKELETAL:  Balance Deficits, Loss of Motion, Muscle Tightness and Pain      Patient Goal (time reference required): Patient would like to be able to turn head with no pain in 4 weeks.     Functional therapy goals:   Patient is to be independent in their Home Exercise Program in 2 weeks.  Patient is to tolerate walking with normal gait up to 20 minutes in 4 weeks.  Patient is to have improved mobility to allow for improved dressing and self-cares with 2/10 pain in 4 weeks.  Patient is to tolerate walking with normal gait up to 30 minutes in 8 weeks.  Patient is to have improved mobility to allow for improved looking at stars with 2/10 pain in 8 weeks.  Patient is to self-manage symptoms and return to prior function in 8 weeks.      Patient participated in goal selection and understand(s) the plan of care: Yes   Patient Potential for Achieving Desired Outcome:  Good      Plan    Treatment Plan / Targeted Outcomes:     Frequency:   16 visits     Duration of Treatment: 8 weeks    Planned Interventions:  Possible physical therapy interventions include but are not limited to:   Home  Exercise Program development  Therapeutic Exercise (ROM & Strengthening)  Manual Therapy  Neuromuscular Re-education  Ultrasound  Electrical Stimulation  Phonophoresis with Ketoprofen  Iontophoresis with Dexamethasone  Therapeutic Activities  Gait Training  Posture and Body Mechanics Education  Mechanical traction if indicated    Plan for next visit:  Advance strength, ROM, add balance and LE if ok by PCP, manual therapy and modalities as needed.     Student or PTA has been instructed in and demonstrates skills necessary to carry out above stated treatment plan: Yes    Thank you for your referral to [facility].  Please call with any questions, concerns or comments.  [phone]  Deana De La Cruz, ZOE      ROM: Cerv  3/29/17   Flexion 38   Extension 10   Left Lat. Flex 20   Right Lat. Flex 20   Left Rotation 44   Right Rotation 28   Sh. flex 90   Sh abd 75     Patient Specific Functional and Pain Scales (PSFS) completed 3/29/2017  Clinician Instructions: Complete after the history and before the exam.   Initial Assessment:   We want to know what 3 activities in your life you are unable to perform, or are having the most difficulty performing, as a result of your chief problem. Please list and score at least 3 activities that you are unable to perform, or having the most difficulty performing, because of your chief problem.   Patient Specific Activity Scoring Scheme (score one number for each activity):   Activity Score (0-10)  0= Unable to perform activity  10= Able to perform activity at same level as before injury or problem   1. Looking up and back down (pain 6/10) 4/10   2. Turning head (pain 4/10) 6/10   3. Pain during the day (average 4/10) 6/10   4.  /10   5.  /10   Totals:  16/30 = 53% Function   and 47% Impairment   Patient verbally states that they understand that the information they have provided above is current and complete to the best of their knowledge.     Initial Primary G  Code and Modifier:    Per the Patient's intake and/or assessment the Primary G Code is: Mobility .   The Patient's Impairment, Limitation or Restriction Modifier would be best described as: CK - 40% - 60% Impairment.   Goal Primary G Code and Modifier:    The Patient's G Code Goal would be: Mobility    The Patient's Impairment, Limitation or Restriction Modifier goal would be best described as: CI - 1% - 20% Impairment.

## 2018-01-04 NOTE — PROGRESS NOTES
"Patient Information     Patient Name MRN Sex Pennie Fry [id number] Female 1950      Progress Notes by Cookie Schultz NP at 2017 10:30 AM     Author:  Cookie Schultz NP Service:  (none) Author Type:  PHYS- Nurse Practitioner     Filed:  2017  7:56 AM Encounter Date:  2017 Status:  Signed     :  Cookie Schultz NP (PHYS- Nurse Practitioner)            SUBJECTIVE:    Pennie Nichols is a 66 y.o. female who presents for follow-up on toenail and foot care. Seen patient every 2 weeks to continue smoothing down fungal toenails that were initially about 3 cm long and curled. Patient reports she is still unable to reach her feet to apply a moisturizing lotion. Was given a tub to soak her feet daily and asked office visit, reports is soaking her feet for \"10 minutes twice a week\".     She reports she is expecting a public health nurse visit this afternoon at 1:00 to discuss foot care and activities of daily living needs. Patient lives alone in apartment. Does her own laundry however is unable to care for her nails and her feet.  Patient declines podiatry visit at this time unless foot care services can be brought to her apartment, is limited resources for transportation    Reports physical therapy has been effective and she has less neck pain. Reports trigeminal neuralgia pain under control and tolerating current medication dose.    HPI    Allergies      Allergen   Reactions     Contrast Dye [Diatrizoate Meglumine (Iv Contrast Dye)]  Angioedema     As noted  note Dr Stone    ,   Family History       Problem   Relation Age of Onset     Other  Mother      lung cancer       Heart Disease  Father 70     MI       Cancer  Father      Liver cancer       Cancer  Other      Lung cancer       Diabetes  Other      Cancer-colon  No Family History      Cancer-prostate  No Family History      Cancer-ovarian  No Family History      Blood Disease  No Family History      Hypertension  No Family " History      Stroke  No Family History      Thyroid Disease  No Family History      Cancer-breast  No Family History    ,   Current Outpatient Prescriptions on File Prior to Visit       Medication  Sig Dispense Refill     carBAMazepine (TEGRETOL) 200 mg tablet Take 0.5 tablets by mouth 2 times daily. Take 1/2 tab once a day 30 tablet 3     No current facility-administered medications on file prior to visit.    ,   Current Outpatient Prescriptions:      carBAMazepine (TEGRETOL) 200 mg tablet, Take 0.5 tablets by mouth 2 times daily. Take 1/2 tab once a day, Disp: 30 tablet, Rfl: 3  Medications have been reviewed by me and are current to the best of my knowledge and ability.,   Past Medical History:     Diagnosis  Date     Back problem     Recurrent back problems , secondary to injury at [facility]      Mental health problem     not otherwise specified       Onychogryphosis 4/24/2017   ,   Patient Active Problem List      Diagnosis Date Noted     Onychogryphosis 04/24/2017     Requires assistance with activities of daily living (ADL) 04/24/2017     Trigeminal neuralgia of right side of face 03/06/2017     Fibromyalgia    ,   Past Surgical History:      Procedure  Laterality Date     APPENDECTOMY  1980    Incidental, Enterolysis       CHOLECYSTECTOMY  1996    Laparoscopic       COLONOSCOPY  1990?     CRYOTHERAPY OF CERVIX  1994    Abnormal Pap        HYSTERECTOMY  1980    Basin      and   Social History      Substance Use Topics        Smoking status:  Former Smoker     Packs/day: 1.00     Years: 30.00     Types: Cigarettes     Quit date: 1/21/2001     Smokeless tobacco:  Never Used     Alcohol use  No       REVIEW OF SYSTEMS:  Review of Systems   Constitutional: Negative.    HENT: Negative.    Eyes: Negative.    Respiratory: Negative.    Cardiovascular: Negative.    Gastrointestinal: Negative.    Genitourinary: Negative.    Musculoskeletal: Positive for neck pain.   Skin: Negative.    Neurological: Negative.     Endo/Heme/Allergies: Negative.    Psychiatric/Behavioral: Negative.        OBJECTIVE:  /74  Ht 1.524 m (5')  Wt 94.8 kg (209 lb)  Breastfeeding? No  BMI 40.82 kg/m2    EXAM:   Physical Exam   Constitutional: She is oriented to person, place, and time and well-developed, well-nourished, and in no distress.   Cardiovascular: Normal rate.    Pulmonary/Chest: Effort normal.   Musculoskeletal: Normal range of motion.   Neurological: She is alert and oriented to person, place, and time. Gait normal.   Skin: Skin is warm and dry.   All of toenails thickened with fungus, dry no erythema or pain. All of toenails filed with dremel, tolerated well  thickened keratotic skin patches bilateral ankles and dorsum of feet, filed as tolerated    Tolerated procedure well, no pain, open areas or complications   Psychiatric: Mood, memory, affect and judgment normal.   Nursing note and vitals reviewed.      ASSESSMENT/PLAN:    ICD-10-CM    1. Dry skin dermatitis L85.3    2. Onychogryphosis L60.2    3. Requires assistance with activities of daily living (ADL) Z74.1         Plan:  Patient will return to clinic in 2 weeks for regular follow-up if she is not established hygiene and foot care services with public health    Encouraged to soak feet at least daily for 30 minutes with hot soapy water to loosen dry keratotic skin patches and for hygiene purposes     is working on PCA services for assistance with ADLs including skin care, moisturization, nail hygiene and other needed services in home    Has public health nurse appointment this afternoon to assess ADLs needs and services--await fax

## 2018-01-04 NOTE — TELEPHONE ENCOUNTER
Patient Information     Patient Name MRN Sex Pennie Fry [id number] Female 1950      Telephone Encounter by Cookie Schultz NP at 3/23/2017  9:05 AM     Author:  Cookie Schultz NP Service:  (none) Author Type:  PHYS- Nurse Practitioner     Filed:  3/23/2017  9:33 AM Encounter Date:  3/23/2017 Status:  Signed     :  Cookie Schultz NP (PHYS- Nurse Practitioner)            Spoke with Iza Barnes-- care  at patient's request.  will contact provider for disposable undergarments and send to clinic for signatures if indicated.  Discussed patient's needs for hygiene assistance--unable to take more than one shower week and feels unstable and unsafe in the shower concerns for following--and severe issues with overgrown toenails and foot care       will look into resources and assessments for PCA services and other supports needed and josé call if any further information needed    Info sheet on local foot care services faxed to booker Barnes for her information on resources    Patient will return to clinic in a week or 2 and I will continue to work on toenails other services are available and in place    Cookie Schultz NP ....................  3/23/2017   9:33 AM

## 2018-01-04 NOTE — PATIENT INSTRUCTIONS
Patient Information     Patient Name MRN Sex Pennie Fry [id number] Female 1950      Patient Instructions by Cookie Schultz NP at 2017 10:30 AM     Author:  Cookie Schultz NP Service:  (none) Author Type:  PHYS- Nurse Practitioner     Filed:  2017 11:30 AM Encounter Date:  2017 Status:  Signed     :  Cookie Schultz NP (PHYS- Nurse Practitioner)            Continue to soak your feet in hot soapy water at least 20-30 minutes daily to help soften dry skin--- moisturize with your over-the-counter topical cream at least daily    If the public health nurse is not able to provide home services for your feet and some hygiene needs--come back to clinic in 2 weeks to continue foot care and nail care

## 2018-01-04 NOTE — NURSING NOTE
Patient Information     Patient Name MRN Sex Pennie Fry [id number] Female 1950      Nursing Note by Jocelin Coe at 2017 10:15 AM     Author:  Jocelin Coe Service:  (none) Author Type:  (none)     Filed:  2017 10:27 AM Encounter Date:  2017 Status:  Signed     :  Jocelin Coe            Patient presents to clinic today for a follow up on foot care and she would also like to discuss her head pain.     Jocelin Coe LPN...................2017  10:14 AM

## 2018-01-04 NOTE — PROGRESS NOTES
Patient Information     Patient Name MRN Sex Pennie Fry [id number] Female 1950      Progress Notes by Kirsty Hammer R.T. (ARRT) at 2017 11:03 AM     Author:  Kirsty Hammer R.T. (ARRT) Service:  (none) Author Type:  (none)     Filed:  2017 11:03 AM Date of Service:  2017 11:03 AM Status:  Signed     :  Kirsty Hammer R.T. (JADONT) (formerly [facility] - Registered Radiologic Technologist)            Falls Risk Criteria:    Age 65 and older or under age 4        Sensory deficits    Poor vision    Use of ambulatory aides    Impaired judgment    Unable to walk independently    Meets High Risk criteria for falls:  Yes               1.  Do you have dizziness or vertigo?    no                    2.  Do you need help standing or walking?   no                 3.  Have you fallen within the last 6 months?    no           4.  Has the patient been fasting?      no       If any risks are marked Yes, the following interventions are utilized:    Do not leave patient unattended     Assist patient in the dressing room and bathroom    Have ambulatory aides available throughout procedure    Involve patient s family if available

## 2018-01-04 NOTE — TELEPHONE ENCOUNTER
Patient Information     Patient Name MRN Sex Pennie Fry [id number] Female 1950      Telephone Encounter by Cookie Schultz NP at 2017  9:13 AM     Author:  Cookie Schultz NP Service:  (none) Author Type:  PHYS- Nurse Practitioner     Filed:  2017  9:16 AM Encounter Date:  2017 Status:  Signed     :  Cookie Schultz NP (PHYS- Nurse Practitioner)            Spoke with Iza Barnes [facility]  for patient regarding coordination of services. Iza will assist with arranging transportation for podiatry visit. Patient qualifies for PCA services in the home, uncertain of agency at this time.    She will qualify for regular nursing home visits weekly. PCA should be able to provide foot care and follow through on recommended treatments by podiatry.    Iza will contact me with status on progress and services--- PCA seems to be a good place to start with regular nurse visits--- patient may at some point need assessment for elderly waiver services in the future if this is not successful or enough support  to maintain daily function    Cookie Schultz NP ....................  2017   9:15 AM

## 2018-01-04 NOTE — NURSING NOTE
Patient Information     Patient Name MRN Pennie Car [id number] Female 1950      Nursing Note by Cass Kathleen at 3/22/2017 12:00 PM     Author:  Cass Kathleen Service:  (none) Author Type:  (none)     Filed:  3/22/2017 12:14 PM Encounter Date:  3/22/2017 Status:  Signed     :  Cass Kathleen LPN........................3/22/2017  12:00 PM

## 2018-01-04 NOTE — TELEPHONE ENCOUNTER
Patient Information     Patient Name MRN Sex Pennie Fry [id number] Female 1950      Telephone Encounter by Cookie Schultz NP at 3/29/2017  4:48 PM     Author:  Cookie Schultz NP Service:  (none) Author Type:  PHYS- Nurse Practitioner     Filed:  3/29/2017  4:51 PM Encounter Date:  3/29/2017 Status:  Signed     :  Cookie Schultz NP (PHYS- Nurse Practitioner)            For sure--tee Schultz NP ....................  3/29/2017   4:48 PM

## 2018-01-04 NOTE — PATIENT INSTRUCTIONS
Patient Information     Patient Name MRN Sex Pennie Fry [id number] Female 1950      Patient Instructions by Cookie Schultz NP at 4/10/2017 11:00 AM     Author:  Cookie Schultz NP  Service:  (none) Author Type:  PHYS- Nurse Practitioner     Filed:  4/10/2017  6:11 PM  Encounter Date:  4/10/2017 Status:  Addendum     :  Cookie Schultz NP (PHYS- Nurse Practitioner)        Related Notes: Original Note by Cookie Schultz NP (PHYS- Nurse Practitioner) filed at 4/10/2017 11:47 AM            Soak feet daily for 30 minutes soapy water    followup 2 weeks for foot care

## 2018-01-04 NOTE — PROGRESS NOTES
Patient Information     Patient Name MRN Sex Pennie Fry [id number] Female 1950      Progress Notes by Deana De La Cruz PT at 2017  9:58 AM     Author:  Deana De La Cruz PT Service:  (none) Author Type:  PT- Physical Therapist     Filed:  2017  2:22 PM Date of Service:  2017  9:58 AM Status:  Signed     :  Deana De La Cruz PT (PT- Physical Therapist)             [facility]  Outpatient PT   Daily Note       Date of Service: 2017   Visit #12  Patient Name: Pennie Nichols (Fiordaliza)   YOB: 1950   Referring MD/Provider: Dr. Schultz  Medical and Treatment Diagnosis: Neck pain, vertigo and balance problems  Treatment Diagnosis:  Neck pain and limited UE ROM, vertigo and impaired balance  Insurance: Other: IMCARE (MC replacement)  Start of Service: 17  Certification Dates: Start of Service: 17   Medicare/MA Re-Cert Due: 17  Re-certification Dates: 4/3/2017 - 17    Subjective        Current Status:  Neck doing better, 3-4/10, head pain one day since last visit lasted most of the day again, but most days doesn't last that long.  Doing stretches, no vertigo,  even told her she looked more steady.       LE/vestibular assessment: Patient reports that she's noticed that it is getting harder to get out of chair, has to push more with arms.  No falls.  Legs doesn't feel like giving out, but feel weak when walking.  Not up for walking outside without support, using hand rail lightly/fingertip support.  Feels balance is not reliable.  Used to touching counter top, chair, person, rail, etc. She feels unsteady with shower, uses grab bars and shower chair.  Pain in back, all the sudden can hit and feel like electricity running through body- stays in back.  Prolonged sitting, standing, walking, laying after 10-15 then starts bother the back, has to position or sit and relax.  It's been better lately but she has avoided much  activity.  No spinning or light headed lately.  Last vertigo walking down ervin at doctor's office early March and then doctor's striped shirt really affected her.  Sometime gets a little vertigo with laying down, she sleeps on her back but is propped up because of her back.      History of Injury/reason for PT: Patient comes in with neck pain and stiffness.  Neck has been bothering her for a 2-3 months.  Nothing that she knows of that started it.  Just noticed she couldn't turn head any more.  Notices it all day long.  Can't lift head up to look at the stars.  Pain also with just normal moving around.  Mostly stiffness, little bit of pain.  When look up and as she brings it back down it hurts 6/10. Turning 4/10. Steady around 4/10 with day to day activity.  Getting up out of chair pushing off hurts in neck 4/10.   She's noticed a little bump in muscle on R neck.  Has tried cold and that helps some.  Tylenol, helps with neck pain, on Epitol for nerve pain (trigeminal neurologia) not sure if it's regular or extra strength.  Has tried muscles massager and it did help. No falls.  No AD.  Lives alone in apartment, does own ADL's but looking into getting help with housework.  Dx with trigeminal neuralgia about 1 month ago and still having some of pain R side of head, mostly temporal area, good at times and sometimes feels like a horse is kicking her in the head at times.  Could resolve, could continue for the rest of her life.   Also reports h/o incontinence and decreased balance for safety in shower.  She reports not walking much, just around in her apartment, encouraged increased walking and we'll add balance and endurance activity if provider feels appropriate.  X-Ray: trace degenerative anterolisthesis C5 on C6 and straightening of the lower cervical lordosis. Diffuse advanced facet degenerative changes are seen. Images of the thoracic spine demonstrate normal thoracic kyphosis. Minimal multilevel disc height loss is  seen.  2/15/2017   Symptoms at evaluation:  ?   Decreased Motion  Yes, Weakness - no, Instability- unsteady, Swelling - no, Tingling/Numbness - no, Bowel/bladder changes - incontinence - stress, waiting room, coughing sneezing, Pain Ratin-6/10 Pain Location:  R neck  Aggravation Factors: movement  Easing Factors: cold, Tylenol- take edge off (doesn't have heating pad or microwave for hot pack)      Objective    Today's Intervention:      Sitting:   - cervical side bending 2x20 sec - did upper trap stretch today instead  - backwards shoulder rolls x20  - cervical rotation 2x30 sec   - cervical isometrics flex 10x5 sec, ext 10 x5 sec  - chin tuck 3 x20 sec -  (working up to 30 sec as tolerated)  - cervical extention stretch 3x20 sec    Standing at counter:   Romberg feet together EC no touches over 30 sec  Tandem stance each way x30 sec with no touches  SLS 5 (R)-10 (L) sec max before small touch, 30 sec total B  Standing by counter: marching alternating B x15 - able to swing arms      Manual therapy: STM to B upper traps, cervical paraspinals, SCM and scalenes x20 minutes with moderate pressure using AliDeep in sitting with shirt pulled back with towel.    -still one significant knot in upper trap near insertion and one knot in upper trap near scap       Deferred this date:   Sitting pillow squeeze (tried to ball but too slippery) x10  - shoulder flex wall walk one arm at a time or else pulls into back too much x5-10   Wall push ups x5  - seated thoracic rotation with arms crossed over chest B 2 x30 second hold with cues to keep cervical spine in neutral rotation - doesn't feel much pull with this and doesn't want to bother back      Home Exercise Program:  Access Code: [email or web address] URL: [email or web address] Date: 2017 Prepared by: Deana De La Cruz   Exercises   Seated Cervical Retraction - 2 reps - 30 second hold - 3x daily   Seated Cervical Sidebending AROM - 30 hold - 2 Reps - 3x daily    Standing Backward Shoulder Rolls - 20 reps - 2 hold - 3x daily   Shoulder Flexion Wall Walk - 5-10 reps - 3x daily   Walking - 1-2 Hallway - 2x daily     Access Code: [email or web address] URL: [email or web address] Date: 04/03/2017 Prepared by: Deana De La Cruz   Exercises   Standing March with Counter Support - 5 reps - 2x daily   Romberg Stance - 2 reps - 30 second hold - 2x daily   Seated Hip Adduction Isometrics with Ball - 10 reps - 5 hold - 2x daily     Access Code: [email or web address] URL: [email or web address] Date: 04/18/2017 Prepared by: Deana De La Cruz   Exercises   Standing Isometric Cervical Flexion with Manual Resistance - 10 reps - 5 hold - 1x daily   Standing Isometric Cervical Extension with Manual Resistance - 10 reps - 5 hold - 1x daily   Seated Cervical Retraction and Extension - 2 reps - 30 second hold - 2x daily         Assessment    Response to Intervention:  Patient was able to demonstrate HEP properly today.  Patient very limited ROM in neck and shoulders.      Therapist Assessment / Clinical Impression: Patient presents with signs and symptoms congruent with tight muscles with poor posture and will benefit from skilled PT to increase ROM, strength, decrease pain, and improve function.      Functional Impairment(s):  See subjective on initial evaluation and Functional Assessment / Summary Report from PSFS.    Physical Impairment(s):       MUSCULOSKELETAL:  Balance Deficits, Loss of Motion, Muscle Tightness and Pain      Patient Goal (time reference required): Patient would like to be able to turn head with no pain in 4 weeks.     Functional therapy goals: updated 4/3/2017   Patient is to be independent in their Home Exercise Program in 2 weeks.  Patient is to tolerate walking with normal gait without hand rail up to 20 minutes in 4 weeks.  Patient is to have improved cervical mobility to allow for improved dressing and self-cares with 2/10 pain in 4 weeks.  Patient is to tolerate walking  with normal gait without hand rail up to 30 minutes in 8 weeks.  Patient is to have improved cervical mobility to allow for improved looking at stars with 2/10 pain in 8 weeks.  Patient is to self-manage symptoms and return to prior function in 8 weeks.      Patient participated in goal selection and understand(s) the plan of care: Yes   Patient Potential for Achieving Desired Outcome:  Good      Plan    Treatment Plan / Targeted Outcomes:     Frequency:   16 visits     Duration of Treatment: 8 weeks    Planned Interventions:  Possible physical therapy interventions include but are not limited to:   Home Exercise Program development  Therapeutic Exercise (ROM & Strengthening)  Manual Therapy  Neuromuscular Re-education  Ultrasound  Electrical Stimulation  Phonophoresis with Ketoprofen  Iontophoresis with Dexamethasone  Therapeutic Activities  Gait Training  Posture and Body Mechanics Education  Mechanical traction if indicated    Plan for next visit:  Advance strength, ROM, manual therapy and modalities as needed for neck, LE strength, balance and vestibular rehab to improve safety and balance.     Student or PTA has been instructed in and demonstrates skills necessary to carry out above stated treatment plan: Yes    Thank you for your referral to [facility].  Please call with any questions, concerns or comments.  [phone]  Deana De La Cruz, ZOE      ROM: Cerv  3/29/17 Cerv. 4/27/17    Flexion 38 32   Extension 10 22   Left Lat. Flex 20 22   Right Lat. Flex 20 27   Left Rotation 44 30   Right Rotation 28 40   Sh. flex 90    Sh abd 75      LE MMT:   Hip flex, add: 3/5   Hip ext, abd, knee flex, ext, ankle DF and PF: 4-/5    4/11/17: Jose Hallpike with Ax2 for head and legs- only able to test L side today which was positive and followed with CRT Epley's with maxA x2 and she had nystagmus in rotational movement and spinning sensation, she was fearful of falling.  She felt off balance after but  was steady on her feet after a few minutes.     Patient Specific Functional and Pain Scales (PSFS) completed 4/27/2017   We want to know what 3 activities in your life you are unable to perform, or are having the most difficulty performing, as a result of your chief problem. Please list and score at least 3 activities that you are unable to perform, or having the most difficulty performing, because of your chief problem.   Patient Specific Activity Scoring Scheme (score one number for each activity):   Activity Score (0-10)  0= Unable to perform activity  10= Able to perform activity at same level as before injury or problem   1. Looking up and back down (pain 6/10) 6/10   2. Turning head (pain 3/10) 7/10   3. Pain during the day (average 3/10) 7/10   4.  /10   5.  /10   Totals:  20/30 = 67% Function   and 33% Impairment   PSFS on 3/29/17 and 4/3/17: 16/30 = 53% Function and 47% Impairment      Patient verbally states that they understand that the information they have provided above is current and complete to the best of their knowledge.     Primary G Code and Modifier:    Per the Patient's intake and/or assessment the Primary G Code is: Mobility .   The Patient's Impairment, Limitation or Restriction Modifier would be best described as: CJ - 20% - 40% Impairment.   Goal Primary G Code and Modifier:    The Patient's G Code Goal would be: Mobility    The Patient's Impairment, Limitation or Restriction Modifier goal would be best described as: CI - 1% - 20% Impairment.

## 2018-01-04 NOTE — TELEPHONE ENCOUNTER
Patient Information     Patient Name MRN Sex Pennie Fry [id number] Female 1950      Telephone Encounter by Deana De La Cruz PT at 3/29/2017 11:51 AM     Author:  Deana De La Cruz PT Service:  (none) Author Type:  PT- Physical Therapist     Filed:  3/29/2017 11:56 AM Encounter Date:  3/29/2017 Status:  Signed     :  Deana De La Cruz PT (PT- Physical Therapist)            Cookie Mancera is starting PT for neck pain but reports history of vertigo and decreased balance, if you are ok with me addressing these issues also, can you please send orders over and I will add it to my plan of care.    Thanks so much!  Deana

## 2018-01-04 NOTE — PROGRESS NOTES
Patient Information     Patient Name MRN Sex Pennie Fry [id number] Female 1950      Progress Notes by Deana De La Cruz PT at 4/3/2017  9:34 AM     Author:  Deana De La Cruz PT  Service:  (none) Author Type:  PT- Physical Therapist     Filed:  4/3/2017  5:17 PM  Date of Service:  4/3/2017  9:34 AM Status:  Addendum     :  Deana De La Cruz PT (PT- Physical Therapist)        Related Notes: Original Note by Deana De La Cruz PT (PT- Physical Therapist) filed at 4/3/2017  5:09 PM            [facility]  Outpatient PT    Daily Note + Vestibular/LE Assessment  Re-certification      Date of Service: 4/3/2017   Visit #3  Patient Name: Pennie Nichols (Fiordaliza)   YOB: 1950   Referring MD/Provider: Dr. Schultz  Medical and Treatment Diagnosis: Neck pain, vertigo and balance problems  Treatment Diagnosis:  Neck pain and limited UE ROM, vertigo and impaired balance  Insurance: Other: IMCARE (MC replacement)  Start of Service: 17  Certification Dates: Start of Service: 17   Medicare/MA Re-Cert Due: 17  Re-certification Dates: 4/3/2017 - 17    Subjective      Current Status (neck pain): Patient reports neck is still stiff, pain getting better 2/10, head pain bothering more 6/10.  Did some walking in hallway over weekend.      LE/vestibular assessment: Patient reports that she's noticed that it is getting harder to get out of chair, has to push more with arms.  No falls.  Legs doesn't feel like giving out, but feel weak when walking.  Not up for walking outside without support, using hand rail lightly/fingertip support.  Feels balance is not reliable.  Used to touching counter top, chair, person, rail, etc. She feels unsteady with shower, uses grab bars and shower chair.  Pain in back, all the sudden can hit and feel like electricity running through body- stays in back.  Prolonged sitting, standing, walking, laying after 10-15 then starts bother the  back, has to position or sit and relax.  It's been better lately but she has avoided much activity.  No spinning or light headed lately.  Last vertigo walking down ervin at doctor's office early March and then doctor's striped shirt really affected her.  Sometime gets a little vertigo with laying down, she sleeps on her back but is propped up because of her back.        History of Injury/reason for PT: Patient comes in with neck pain and stiffness.  Neck has been bothering her for a 2-3 months.  Nothing that she knows of that started it.  Just noticed she couldn't turn head any more.  Notices it all day long.  Can't lift head up to look at the stars.  Pain also with just normal moving around.  Mostly stiffness, little bit of pain.  When look up and as she brings it back down it hurts 6/10. Turning 4/10. Steady around 4/10 with day to day activity.  Getting up out of chair pushing off hurts in neck 4/10.   She's noticed a little bump in muscle on R neck.  Has tried cold and that helps some.  Tylenol, helps with neck pain, on Epitol for nerve pain (trigeminal neurologia) not sure if it's regular or extra strength.  Has tried muscles massager and it did help. No falls.  No AD.  Lives alone in apartment, does own ADL's but looking into getting help with housework.  Dx with trigeminal neuralgia about 1 month ago and still having some of pain R side of head, mostly temporal area, good at times and sometimes feels like a horse is kicking her in the head at times.  Could resolve, could continue for the rest of her life.   Also reports h/o incontinence and decreased balance for safety in shower.  She reports not walking much, just around in her apartment, encouraged increased walking and we'll add balance and endurance activity if provider feels appropriate.  X-Ray: trace degenerative anterolisthesis C5 on C6 and straightening of the lower cervical lordosis. Diffuse advanced facet degenerative changes are seen. Images of the  "thoracic spine demonstrate normal thoracic kyphosis. Minimal multilevel disc height loss is seen.  2/15/2017   Symptoms at evaluation:  ?   Decreased Motion  Yes, Weakness - no, Instability- unsteady, Swelling - no, Tingling/Numbness - no, Bowel/bladder changes - incontinence - stress, waiting room, coughing sneezing, Pain Ratin-6/10 Pain Location:  R neck  Aggravation Factors: movement  Easing Factors: cold, Tylenol- take edge off (doesn't have heating pad or microwave for hot pack)      Objective    Today's Intervention:  Evaluation of new dx, therapeutic exercises, and manual therapy:    LE MMT:   Hip flex, add: 3/   Hip ext, abd, knee flex, ext, ankle DF and PF: 4-/    Vestibular:   Attempted positional Hallpike [place] and she couldn't lay on hard mat, did not get any sx in partial position but did get light headed and a little shaky when coming back to sitting.     Sitting:   - side bending 2x30 sec, focus more on L side bending to stretch R side  - chin tuck 2 x30 sec  - backwards shoulder rolls x20  - shoulder flex wall walk one arm at a time or else pulls into back too much x5-10  Added rotation 2x30 sec B    NEW:   Standing at counter: feet 2\" apart balance with 3-4 touches over 30 sec  Standing at counter with UE support: marching alternating B x5- started to pull in back  Sitting pillow squeeze (tried to ball but too slippery) x10    Manual therapy: STM to B upper traps, cervical paraspinals, SCM and scalenes x15' with moderate pressure using AliDeep in sitting with gown.     Home Exercise Program:  Access Code: [email or web address] URL: [email or web address] Date: 2017 Prepared by: Deana De La Cruz   Exercises   Seated Cervical Retraction - 2 reps - 30 second hold - 3x daily   Seated Cervical Sidebending AROM - 30 hold - 2 Reps - 3x daily   Standing Backward Shoulder Rolls - 20 reps - 2 hold - 3x daily   Shoulder Flexion Wall Walk - 5-10 reps - 3x daily   Walking - 1-2 Hallway - 2x daily "     Access Code: [email or web address] URL: [email or web address] Date: 04/03/2017 Prepared by: Deana De La Cruz   Exercises   Standing March with Counter Support - 5 reps - 2x daily   Romberg Stance - 2 reps - 30 second hold - 2x daily   Seated Hip Adduction Isometrics with Ball - 10 reps - 5 hold - 2x daily         Assessment    Response to Intervention:  Patient was able to demonstrate HEP properly today.  Patient very limited ROM in neck and shoulders.      Therapist Assessment / Clinical Impression: Patient presents with signs and symptoms congruent with tight muscles with poor posture and will benefit from skilled PT to increase ROM, strength, decrease pain, and improve function.      Functional Impairment(s):  See subjective on initial evaluation and Functional Assessment / Summary Report from PSFS.    Physical Impairment(s):       MUSCULOSKELETAL:  Balance Deficits, Loss of Motion, Muscle Tightness and Pain      Patient Goal (time reference required): Patient would like to be able to turn head with no pain in 4 weeks.     Functional therapy goals: updated 4/3/2017   Patient is to be independent in their Home Exercise Program in 2 weeks.  Patient is to tolerate walking with normal gait without hand rail up to 20 minutes in 4 weeks.  Patient is to have improved cervical mobility to allow for improved dressing and self-cares with 2/10 pain in 4 weeks.  Patient is to tolerate walking with normal gait without hand rail up to 30 minutes in 8 weeks.  Patient is to have improved cervical mobility to allow for improved looking at stars with 2/10 pain in 8 weeks.  Patient is to self-manage symptoms and return to prior function in 8 weeks.      Patient participated in goal selection and understand(s) the plan of care: Yes   Patient Potential for Achieving Desired Outcome:  Good      Plan    Treatment Plan / Targeted Outcomes:     Frequency:   16 visits     Duration of Treatment: 8 weeks    Planned Interventions:   Possible physical therapy interventions include but are not limited to:   Home Exercise Program development  Therapeutic Exercise (ROM & Strengthening)  Manual Therapy  Neuromuscular Re-education  Ultrasound  Electrical Stimulation  Phonophoresis with Ketoprofen  Iontophoresis with Dexamethasone  Therapeutic Activities  Gait Training  Posture and Body Mechanics Education  Mechanical traction if indicated    Plan for next visit:  Advance strength, ROM, manual therapy and modalities as needed for neck, LE strength, balance and vestibular rehab to improve safety and balance.     Student or PTA has been instructed in and demonstrates skills necessary to carry out above stated treatment plan: Yes    Thank you for your referral to [facility].  Please call with any questions, concerns or comments.  [phone]  Deana De La Cruz, ZOE      ROM: Cerv  3/29/17   Flexion 38   Extension 10   Left Lat. Flex 20   Right Lat. Flex 20   Left Rotation 44   Right Rotation 28   Sh. flex 90   Sh abd 75     Patient Specific Functional and Pain Scales (PSFS) completed 3/29/2017 still current 4/3/2017   Clinician Instructions: Complete after the history and before the exam.   Initial Assessment:   We want to know what 3 activities in your life you are unable to perform, or are having the most difficulty performing, as a result of your chief problem. Please list and score at least 3 activities that you are unable to perform, or having the most difficulty performing, because of your chief problem.   Patient Specific Activity Scoring Scheme (score one number for each activity):   Activity Score (0-10)  0= Unable to perform activity  10= Able to perform activity at same level as before injury or problem   1. Looking up and back down (pain 6/10) 4/10   2. Turning head (pain 4/10) 6/10   3. Pain during the day (average 4/10) 6/10   4.  /10   5.  /10   Totals:  16/30 = 53% Function   and 47% Impairment   Patient verbally  states that they understand that the information they have provided above is current and complete to the best of their knowledge.     Initial Primary G Code and Modifier:    Per the Patient's intake and/or assessment the Primary G Code is: Mobility .   The Patient's Impairment, Limitation or Restriction Modifier would be best described as: CK - 40% - 60% Impairment.   Goal Primary G Code and Modifier:    The Patient's G Code Goal would be: Mobility    The Patient's Impairment, Limitation or Restriction Modifier goal would be best described as: CI - 1% - 20% Impairment.

## 2018-01-04 NOTE — PROGRESS NOTES
Patient Information     Patient Name MRN Sex Pennie Fry [id number] Female 1950      Progress Notes by Deana De La Cruz PT at 2017 10:30 AM     Author:  Deana De La Cruz PT Service:  (none) Author Type:  PT- Physical Therapist     Filed:  2017 11:34 AM Date of Service:  2017 10:30 AM Status:  Signed     :  Deana De La Cruz PT (PT- Physical Therapist)            [facility]  Outpatient PT    Daily Note       Date of Service: 2017   Visit #6  Patient Name: Pennie Nichols (Fiordaliza)   YOB: 1950   Referring MD/Provider: Dr. Schultz  Medical and Treatment Diagnosis: Neck pain, vertigo and balance problems  Treatment Diagnosis:  Neck pain and limited UE ROM, vertigo and impaired balance  Insurance: Other: IMC[employer] (MC replacement)  Start of Service: 17  Certification Dates: Start of Service: 17   Medicare/MA Re-Cert Due: 17  Re-certification Dates: 4/3/2017 - 17    Subjective      Current Status (neck pain): 7/10 right side of head, woke with pain, took pain pill about 8:15.  Neck felt really good 1/2 day yesterday without lumpy feeling in it.      Felt woozy the rest of the day of last PT, no problem yesterday.  She felt too woozy to call [employer] but was able to play a little.        LE/vestibular assessment: Patient reports that she's noticed that it is getting harder to get out of chair, has to push more with arms.  No falls.  Legs doesn't feel like giving out, but feel weak when walking.  Not up for walking outside without support, using hand rail lightly/fingertip support.  Feels balance is not reliable.  Used to touching counter top, chair, person, rail, etc. She feels unsteady with shower, uses grab bars and shower chair.  Pain in back, all the sudden can hit and feel like electricity running through body- stays in back.  Prolonged sitting, standing, walking, laying after 10-15 then starts bother the back, has to  position or sit and relax.  It's been better lately but she has avoided much activity.  No spinning or light headed lately.  Last vertigo walking down ervin at doctor's office early March and then doctor's striped shirt really affected her.  Sometime gets a little vertigo with laying down, she sleeps on her back but is propped up because of her back.        History of Injury/reason for PT: Patient comes in with neck pain and stiffness.  Neck has been bothering her for a 2-3 months.  Nothing that she knows of that started it.  Just noticed she couldn't turn head any more.  Notices it all day long.  Can't lift head up to look at the stars.  Pain also with just normal moving around.  Mostly stiffness, little bit of pain.  When look up and as she brings it back down it hurts 6/10. Turning 4/10. Steady around 4/10 with day to day activity.  Getting up out of chair pushing off hurts in neck 4/10.   She's noticed a little bump in muscle on R neck.  Has tried cold and that helps some.  Tylenol, helps with neck pain, on Epitol for nerve pain (trigeminal neurologia) not sure if it's regular or extra strength.  Has tried muscles massager and it did help. No falls.  No AD.  Lives alone in apartment, does own ADL's but looking into getting help with housework.  Dx with trigeminal neuralgia about 1 month ago and still having some of pain R side of head, mostly temporal area, good at times and sometimes feels like a horse is kicking her in the head at times.  Could resolve, could continue for the rest of her life.   Also reports h/o incontinence and decreased balance for safety in shower.  She reports not walking much, just around in her apartment, encouraged increased walking and we'll add balance and endurance activity if provider feels appropriate.  X-Ray: trace degenerative anterolisthesis C5 on C6 and straightening of the lower cervical lordosis. Diffuse advanced facet degenerative changes are seen. Images of the thoracic  spine demonstrate normal thoracic kyphosis. Minimal multilevel disc height loss is seen.  2/15/2017   Symptoms at evaluation:  ?   Decreased Motion  Yes, Weakness - no, Instability- unsteady, Swelling - no, Tingling/Numbness - no, Bowel/bladder changes - incontinence - stress, waiting room, coughing sneezing, Pain Ratin-6/10 Pain Location:  R neck  Aggravation Factors: movement  Easing Factors: cold, Tylenol- take edge off (doesn't have heating pad or microwave for hot pack)      Objective    Today's Intervention:      Sitting:   - cervical side bending 2x20 sec, (working up to 30 sec as tolerated)  - chin tuck 2 x20 sec -  (working up to 30 sec as tolerated)  - backwards shoulder rolls x20  - cervical rotation 2x30 sec     Standing at counter: feet together Romberg balance with 1 touch over 20 sec  Standing at counter with UE support: marching alternating B x15 - tried with only finger tips  Mini squats x3- started to pull in back    Manual therapy: STM to B upper traps, cervical paraspinals, SCM and scalenes x25 minutes with moderate pressure using AliDeep in sitting with shirt pulled back with towel.        Deferred this date:   - Added: seated thoracic rotation with arms crossed over chest B 2 x30 second hold with cues to keep cervical spine in neutral rotation   Sitting pillow squeeze (tried to ball but too slippery) x10  - shoulder flex wall walk one arm at a time or else pulls into back too much x5-10     Home Exercise Program:  Access Code: [email or web address] URL: [email or web address] Date: 2017 Prepared by: Deana De La Cruz   Exercises   Seated Cervical Retraction - 2 reps - 30 second hold - 3x daily   Seated Cervical Sidebending AROM - 30 hold - 2 Reps - 3x daily   Standing Backward Shoulder Rolls - 20 reps - 2 hold - 3x daily   Shoulder Flexion Wall Walk - 5-10 reps - 3x daily   Walking - 1-2 Hallway - 2x daily     Access Code: [email or web address] URL: [email or web address] Date:  04/03/2017 Prepared by: Deana De La Cruz   Exercises   Standing March with Counter Support - 5 reps - 2x daily   Romberg Stance - 2 reps - 30 second hold - 2x daily   Seated Hip Adduction Isometrics with Ball - 10 reps - 5 hold - 2x daily         Assessment    Response to Intervention:  Patient was able to demonstrate HEP properly today.  Patient very limited ROM in neck and shoulders.      Therapist Assessment / Clinical Impression: Patient presents with signs and symptoms congruent with tight muscles with poor posture and will benefit from skilled PT to increase ROM, strength, decrease pain, and improve function.      Functional Impairment(s):  See subjective on initial evaluation and Functional Assessment / Summary Report from PSFS.    Physical Impairment(s):       MUSCULOSKELETAL:  Balance Deficits, Loss of Motion, Muscle Tightness and Pain      Patient Goal (time reference required): Patient would like to be able to turn head with no pain in 4 weeks.     Functional therapy goals: updated 4/3/2017   Patient is to be independent in their Home Exercise Program in 2 weeks.  Patient is to tolerate walking with normal gait without hand rail up to 20 minutes in 4 weeks.  Patient is to have improved cervical mobility to allow for improved dressing and self-cares with 2/10 pain in 4 weeks.  Patient is to tolerate walking with normal gait without hand rail up to 30 minutes in 8 weeks.  Patient is to have improved cervical mobility to allow for improved looking at stars with 2/10 pain in 8 weeks.  Patient is to self-manage symptoms and return to prior function in 8 weeks.      Patient participated in goal selection and understand(s) the plan of care: Yes   Patient Potential for Achieving Desired Outcome:  Good      Plan    Treatment Plan / Targeted Outcomes:     Frequency:   16 visits     Duration of Treatment: 8 weeks    Planned Interventions:  Possible physical therapy interventions include but are not limited to:   Home  Exercise Program development  Therapeutic Exercise (ROM & Strengthening)  Manual Therapy  Neuromuscular Re-education  Ultrasound  Electrical Stimulation  Phonophoresis with Ketoprofen  Iontophoresis with Dexamethasone  Therapeutic Activities  Gait Training  Posture and Body Mechanics Education  Mechanical traction if indicated    Plan for next visit:  Advance strength, ROM, manual therapy and modalities as needed for neck, LE strength, balance and vestibular rehab to improve safety and balance.     Student or PTA has been instructed in and demonstrates skills necessary to carry out above stated treatment plan: Yes    Thank you for your referral to [facility].  Please call with any questions, concerns or comments.  [phone]  Deana De La Cruz, ZOE      ROM: Cerv  3/29/17   Flexion 38   Extension 10   Left Lat. Flex 20   Right Lat. Flex 20   Left Rotation 44   Right Rotation 28   Sh. flex 90   Sh abd 75     LE MMT:   Hip flex, add: 3/5   Hip ext, abd, knee flex, ext, ankle DF and PF: 4-/5    4/11/17: Jose Hallpike with Ax2 for head and legs- only able to test L side today which was positive and followed with CRT Epley's with maxA x2 and she had nystagmus in rotational movement and spinning sensation, she was fearful of falling.  She felt off balance after but was steady on her feet after a few minutes.     Patient Specific Functional and Pain Scales (PSFS) completed 3/29/2017 still current 4/3/2017   Clinician Instructions: Complete after the history and before the exam.   Initial Assessment:   We want to know what 3 activities in your life you are unable to perform, or are having the most difficulty performing, as a result of your chief problem. Please list and score at least 3 activities that you are unable to perform, or having the most difficulty performing, because of your chief problem.   Patient Specific Activity Scoring Scheme (score one number for each activity):   Activity Score  (0-10)  0= Unable to perform activity  10= Able to perform activity at same level as before injury or problem   1. Looking up and back down (pain 6/10) 4/10   2. Turning head (pain 4/10) 6/10   3. Pain during the day (average 4/10) 6/10   4.  /10   5.  /10   Totals:  16/30 = 53% Function   and 47% Impairment   Patient verbally states that they understand that the information they have provided above is current and complete to the best of their knowledge.     Initial Primary G Code and Modifier:    Per the Patient's intake and/or assessment the Primary G Code is: Mobility .   The Patient's Impairment, Limitation or Restriction Modifier would be best described as: CK - 40% - 60% Impairment.   Goal Primary G Code and Modifier:    The Patient's G Code Goal would be: Mobility    The Patient's Impairment, Limitation or Restriction Modifier goal would be best described as: CI - 1% - 20% Impairment.

## 2018-01-04 NOTE — PROGRESS NOTES
Patient Information     Patient Name MRN Sex Pennie Fry [id number] Female 1950      Progress Notes by Deana De La Cruz PT at 2017  9:32 AM     Author:  Deana De La Cruz PT Service:  (none) Author Type:  PT- Physical Therapist     Filed:  2017 10:20 AM Date of Service:  2017  9:32 AM Status:  Signed     :  Deana De La Cruz PT (PT- Physical Therapist)            [facility]  Outpatient PT    Daily Note       Date of Service: 2017   Visit #7  Patient Name: Pennie Nichols (Fiordaliza)   YOB: 1950   Referring MD/Provider: Dr. Schultz  Medical and Treatment Diagnosis: Neck pain, vertigo and balance problems  Treatment Diagnosis:  Neck pain and limited UE ROM, vertigo and impaired balance  Insurance: Other: IMCARE (MC replacement)  Start of Service: 17  Certification Dates: Start of Service: 17   Medicare/MA Re-Cert Due: 17  Re-certification Dates: 4/3/2017 - 17    Subjective      Current Status (neck pain):  Neck stiff today, not pain, pain in head again.  Last visit she did get relief of head pain but it returned yesterday up to 7/10, she really didn't feel like doing anything.  Before the pain meds it felt like horse kicking her head, now it's a little more tolerable, still makes her hurt and not want to do anything, started at 7/10 down to 6/10 with pain med.  No vertigo lately.        LE/vestibular assessment: Patient reports that she's noticed that it is getting harder to get out of chair, has to push more with arms.  No falls.  Legs doesn't feel like giving out, but feel weak when walking.  Not up for walking outside without support, using hand rail lightly/fingertip support.  Feels balance is not reliable.  Used to touching counter top, chair, person, rail, etc. She feels unsteady with shower, uses grab bars and shower chair.  Pain in back, all the sudden can hit and feel like electricity running through body- stays  in back.  Prolonged sitting, standing, walking, laying after 10-15 then starts bother the back, has to position or sit and relax.  It's been better lately but she has avoided much activity.  No spinning or light headed lately.  Last vertigo walking down ervin at doctor's office early March and then doctor's striped shirt really affected her.  Sometime gets a little vertigo with laying down, she sleeps on her back but is propped up because of her back.        History of Injury/reason for PT: Patient comes in with neck pain and stiffness.  Neck has been bothering her for a 2-3 months.  Nothing that she knows of that started it.  Just noticed she couldn't turn head any more.  Notices it all day long.  Can't lift head up to look at the stars.  Pain also with just normal moving around.  Mostly stiffness, little bit of pain.  When look up and as she brings it back down it hurts 6/10. Turning 4/10. Steady around 4/10 with day to day activity.  Getting up out of chair pushing off hurts in neck 4/10.   She's noticed a little bump in muscle on R neck.  Has tried cold and that helps some.  Tylenol, helps with neck pain, on Epitol for nerve pain (trigeminal neurologia) not sure if it's regular or extra strength.  Has tried muscles massager and it did help. No falls.  No AD.  Lives alone in apartment, does own ADL's but looking into getting help with housework.  Dx with trigeminal neuralgia about 1 month ago and still having some of pain R side of head, mostly temporal area, good at times and sometimes feels like a horse is kicking her in the head at times.  Could resolve, could continue for the rest of her life.   Also reports h/o incontinence and decreased balance for safety in shower.  She reports not walking much, just around in her apartment, encouraged increased walking and we'll add balance and endurance activity if provider feels appropriate.  X-Ray: trace degenerative anterolisthesis C5 on C6 and straightening of the  lower cervical lordosis. Diffuse advanced facet degenerative changes are seen. Images of the thoracic spine demonstrate normal thoracic kyphosis. Minimal multilevel disc height loss is seen.  2/15/2017   Symptoms at evaluation:  ?   Decreased Motion  Yes, Weakness - no, Instability- unsteady, Swelling - no, Tingling/Numbness - no, Bowel/bladder changes - incontinence - stress, waiting room, coughing sneezing, Pain Ratin-6/10 Pain Location:  R neck  Aggravation Factors: movement  Easing Factors: cold, Tylenol- take edge off (doesn't have heating pad or microwave for hot pack)      Objective    Today's Intervention:      Sitting:   - cervical side bending 2x20 sec - did upper trap stretch today instead  - chin tuck 2 x20 sec -  (working up to 30 sec as tolerated)  - backwards shoulder rolls x20  - cervical rotation 2x30 sec   - cervical extention stretch 2x30 sec  - cervical isometrics flex 10x5 sec, ext 10 x5 sec    Standing at counter: feet together Romberg balance with 1 touch over 20 sec  Standing at counter with UE support: marching alternating B x15 - tried with only finger tips  Modified tandem stance each way x30 sec with a few touches    Manual therapy: STM to B upper traps, cervical paraspinals, SCM and scalenes x20 minutes with moderate pressure using AliDeep in sitting with shirt pulled back with towel.        Deferred this date:   - Added: seated thoracic rotation with arms crossed over chest B 2 x30 second hold with cues to keep cervical spine in neutral rotation   Sitting pillow squeeze (tried to ball but too slippery) x10  - shoulder flex wall walk one arm at a time or else pulls into back too much x5-10     Home Exercise Program:  Access Code: [email or web address] URL: [email or web address] Date: 2017 Prepared by: Deana De La Cruz   Exercises   Seated Cervical Retraction - 2 reps - 30 second hold - 3x daily   Seated Cervical Sidebending AROM - 30 hold - 2 Reps - 3x daily   Standing  Backward Shoulder Rolls - 20 reps - 2 hold - 3x daily   Shoulder Flexion Wall Walk - 5-10 reps - 3x daily   Walking - 1-2 Hallway - 2x daily     Access Code: [email or web address] URL: [email or web address] Date: 04/03/2017 Prepared by: Deana De La Cruz   Exercises   Standing March with Counter Support - 5 reps - 2x daily   Romberg Stance - 2 reps - 30 second hold - 2x daily   Seated Hip Adduction Isometrics with Ball - 10 reps - 5 hold - 2x daily     Access Code: [email or web address]   URL: [email or web address]   Date: 04/18/2017   Prepared by: Deana De La Cruz     Exercises   Standing Isometric Cervical Flexion with Manual Resistance - 10 reps - 5 hold - 1x daily   Standing Isometric Cervical Extension with Manual Resistance - 10 reps - 5 hold - 1x daily   Seated Cervical Retraction and Extension - 2 reps - 30 second hold - 2x daily         Assessment    Response to Intervention:  Patient was able to demonstrate HEP properly today.  Patient very limited ROM in neck and shoulders.      Therapist Assessment / Clinical Impression: Patient presents with signs and symptoms congruent with tight muscles with poor posture and will benefit from skilled PT to increase ROM, strength, decrease pain, and improve function.      Functional Impairment(s):  See subjective on initial evaluation and Functional Assessment / Summary Report from PSFS.    Physical Impairment(s):       MUSCULOSKELETAL:  Balance Deficits, Loss of Motion, Muscle Tightness and Pain      Patient Goal (time reference required): Patient would like to be able to turn head with no pain in 4 weeks.     Functional therapy goals: updated 4/3/2017   Patient is to be independent in their Home Exercise Program in 2 weeks.  Patient is to tolerate walking with normal gait without hand rail up to 20 minutes in 4 weeks.  Patient is to have improved cervical mobility to allow for improved dressing and self-cares with 2/10 pain in 4 weeks.  Patient is to tolerate walking  with normal gait without hand rail up to 30 minutes in 8 weeks.  Patient is to have improved cervical mobility to allow for improved looking at stars with 2/10 pain in 8 weeks.  Patient is to self-manage symptoms and return to prior function in 8 weeks.      Patient participated in goal selection and understand(s) the plan of care: Yes   Patient Potential for Achieving Desired Outcome:  Good      Plan    Treatment Plan / Targeted Outcomes:     Frequency:   16 visits     Duration of Treatment: 8 weeks    Planned Interventions:  Possible physical therapy interventions include but are not limited to:   Home Exercise Program development  Therapeutic Exercise (ROM & Strengthening)  Manual Therapy  Neuromuscular Re-education  Ultrasound  Electrical Stimulation  Phonophoresis with Ketoprofen  Iontophoresis with Dexamethasone  Therapeutic Activities  Gait Training  Posture and Body Mechanics Education  Mechanical traction if indicated    Plan for next visit:  Advance strength, ROM, manual therapy and modalities as needed for neck, LE strength, balance and vestibular rehab to improve safety and balance.     Student or PTA has been instructed in and demonstrates skills necessary to carry out above stated treatment plan: Yes    Thank you for your referral to [facility].  Please call with any questions, concerns or comments.  [phone]  Deana De La Cruz, ZOE      ROM: Cerv  3/29/17   Flexion 38   Extension 10   Left Lat. Flex 20   Right Lat. Flex 20   Left Rotation 44   Right Rotation 28   Sh. flex 90   Sh abd 75     LE MMT:   Hip flex, add: 3/5   Hip ext, abd, knee flex, ext, ankle DF and PF: 4-/5    4/11/17: Jose Hallpike with Ax2 for head and legs- only able to test L side today which was positive and followed with CRT Epley's with maxA x2 and she had nystagmus in rotational movement and spinning sensation, she was fearful of falling.  She felt off balance after but was steady on her feet after a few  minutes.     Patient Specific Functional and Pain Scales (PSFS) completed 3/29/2017 still current 4/3/2017   Clinician Instructions: Complete after the history and before the exam.   Initial Assessment:   We want to know what 3 activities in your life you are unable to perform, or are having the most difficulty performing, as a result of your chief problem. Please list and score at least 3 activities that you are unable to perform, or having the most difficulty performing, because of your chief problem.   Patient Specific Activity Scoring Scheme (score one number for each activity):   Activity Score (0-10)  0= Unable to perform activity  10= Able to perform activity at same level as before injury or problem   1. Looking up and back down (pain 6/10) 4/10   2. Turning head (pain 4/10) 6/10   3. Pain during the day (average 4/10) 6/10   4.  /10   5.  /10   Totals:  16/30 = 53% Function   and 47% Impairment   Patient verbally states that they understand that the information they have provided above is current and complete to the best of their knowledge.     Initial Primary G Code and Modifier:    Per the Patient's intake and/or assessment the Primary G Code is: Mobility .   The Patient's Impairment, Limitation or Restriction Modifier would be best described as: CK - 40% - 60% Impairment.   Goal Primary G Code and Modifier:    The Patient's G Code Goal would be: Mobility    The Patient's Impairment, Limitation or Restriction Modifier goal would be best described as: CI - 1% - 20% Impairment.

## 2018-01-04 NOTE — PROGRESS NOTES
Patient Information     Patient Name MRN Sex Pennie Fry [id number] Female 1950      Progress Notes by Leana Panchal at 2017  9:50 AM     Author:  Leana Panchal Service:  (none) Author Type:  PT- Physical Therapy Assistant     Filed:  2017 11:03 AM Date of Service:  2017  9:50 AM Status:  Signed     :  Leana Panchal (PT- Physical Therapy Assistant)            [facility]  Outpatient PT    Daily Note       Date of Service: 2017   Visit #4  Patient Name: Pennie Nichols (Fiordaliza)   YOB: 1950   Referring MD/Provider: Dr. Schultz  Medical and Treatment Diagnosis: Neck pain, vertigo and balance problems  Treatment Diagnosis:  Neck pain and limited UE ROM, vertigo and impaired balance  Insurance: Other: IMC[employer] (MC replacement)  Start of Service: 17  Certification Dates: Start of Service: 17   Medicare/MA Re-Cert Due: 17  Re-certification Dates: 4/3/2017 - 17    Subjective      Current Status (neck pain): 510 right side of neck and into right side of head, but not all the way to the top     Fiordaliza reports her neck is stiff today.     LE/vestibular assessment: Patient reports that she's noticed that it is getting harder to get out of chair, has to push more with arms.  No falls.  Legs doesn't feel like giving out, but feel weak when walking.  Not up for walking outside without support, using hand rail lightly/fingertip support.  Feels balance is not reliable.  Used to touching counter top, chair, person, rail, etc. She feels unsteady with shower, uses grab bars and shower chair.  Pain in back, all the sudden can hit and feel like electricity running through body- stays in back.  Prolonged sitting, standing, walking, laying after 10-15 then starts bother the back, has to position or sit and relax.  It's been better lately but she has avoided much activity.  No spinning or light headed lately.  Last vertigo walking down ervin  at doctor's office early March and then doctor's striped shirt really affected her.  Sometime gets a little vertigo with laying down, she sleeps on her back but is propped up because of her back.        History of Injury/reason for PT: Patient comes in with neck pain and stiffness.  Neck has been bothering her for a 2-3 months.  Nothing that she knows of that started it.  Just noticed she couldn't turn head any more.  Notices it all day long.  Can't lift head up to look at the stars.  Pain also with just normal moving around.  Mostly stiffness, little bit of pain.  When look up and as she brings it back down it hurts 6/10. Turning 4/10. Steady around 4/10 with day to day activity.  Getting up out of chair pushing off hurts in neck 4/10.   She's noticed a little bump in muscle on R neck.  Has tried cold and that helps some.  Tylenol, helps with neck pain, on Epitol for nerve pain (trigeminal neurologia) not sure if it's regular or extra strength.  Has tried muscles massager and it did help. No falls.  No AD.  Lives alone in apartment, does own ADL's but looking into getting help with housework.  Dx with trigeminal neuralgia about 1 month ago and still having some of pain R side of head, mostly temporal area, good at times and sometimes feels like a horse is kicking her in the head at times.  Could resolve, could continue for the rest of her life.   Also reports h/o incontinence and decreased balance for safety in shower.  She reports not walking much, just around in her apartment, encouraged increased walking and we'll add balance and endurance activity if provider feels appropriate.  X-Ray: trace degenerative anterolisthesis C5 on C6 and straightening of the lower cervical lordosis. Diffuse advanced facet degenerative changes are seen. Images of the thoracic spine demonstrate normal thoracic kyphosis. Minimal multilevel disc height loss is seen.  2/15/2017   Symptoms at evaluation:  ?   Decreased Motion  Yes,  "Weakness - no, Instability- unsteady, Swelling - no, Tingling/Numbness - no, Bowel/bladder changes - incontinence - stress, waiting room, coughing sneezing, Pain Ratin-6/10 Pain Location:  R neck  Aggravation Factors: movement  Easing Factors: cold, Tylenol- take edge off (doesn't have heating pad or microwave for hot pack)      Objective    Today's Intervention:      LE MMT:   Hip flex, add: 3/   Hip ext, abd, knee flex, ext, ankle DF and PF: 4-/    Sitting:   - cervical side bending 2x30 sec, focus more on L side bending to stretch R side    - only able to hold for 15 seconds when stretching L side 2017   - chin tuck 2 x30 sec - only able to hold for 20 seconds on first rep 2017   - backwards shoulder rolls x20  - shoulder flex wall walk one arm at a time or else pulls into back too much x5-10  - cervical rotation 2x30 sec   - Added: seated thoracic rotation with arms crossed over chest B 2 x30 second hold with cues to keep cervical spine in neutral rotation     Standing at counter: feet 2\" apart balance with 3-4 touches over 30 sec 2017 - no touches, 2 reps   Standing at counter with UE support: marching alternating B x5  Sitting pillow squeeze (tried to ball but too slippery) x10    Manual therapy: STM to B upper traps, cervical paraspinals, SCM and scalenes x15 minutes with moderate pressure using AliDeep in sitting with gown.     Home Exercise Program:  Access Code: [email or web address] URL: [email or web address]/ Date: 2017 Prepared by: Deana De La Cruz   Exercises   Seated Cervical Retraction - 2 reps - 30 second hold - 3x daily   Seated Cervical Sidebending AROM - 30 hold - 2 Reps - 3x daily   Standing Backward Shoulder Rolls - 20 reps - 2 hold - 3x daily   Shoulder Flexion Wall Walk - 5-10 reps - 3x daily   Walking - 1-2 Hallway - 2x daily     Access Code: [email or web address] URL: [email or web address]/ Date: 2017 Prepared by: Deana De La Cruz   Exercises   Standing March " with Counter Support - 5 reps - 2x daily   Romberg Stance - 2 reps - 30 second hold - 2x daily   Seated Hip Adduction Isometrics with Ball - 10 reps - 5 hold - 2x daily         Assessment    Response to Intervention:  Patient was able to demonstrate HEP properly today.  Patient very limited ROM in neck and shoulders.      Therapist Assessment / Clinical Impression: Patient presents with signs and symptoms congruent with tight muscles with poor posture and will benefit from skilled PT to increase ROM, strength, decrease pain, and improve function.      Functional Impairment(s):  See subjective on initial evaluation and Functional Assessment / Summary Report from PSFS.    Physical Impairment(s):       MUSCULOSKELETAL:  Balance Deficits, Loss of Motion, Muscle Tightness and Pain      Patient Goal (time reference required): Patient would like to be able to turn head with no pain in 4 weeks.     Functional therapy goals: updated 4/3/2017   Patient is to be independent in their Home Exercise Program in 2 weeks.  Patient is to tolerate walking with normal gait without hand rail up to 20 minutes in 4 weeks.  Patient is to have improved cervical mobility to allow for improved dressing and self-cares with 2/10 pain in 4 weeks.  Patient is to tolerate walking with normal gait without hand rail up to 30 minutes in 8 weeks.  Patient is to have improved cervical mobility to allow for improved looking at stars with 2/10 pain in 8 weeks.  Patient is to self-manage symptoms and return to prior function in 8 weeks.      Patient participated in goal selection and understand(s) the plan of care: Yes   Patient Potential for Achieving Desired Outcome:  Good      Plan    Treatment Plan / Targeted Outcomes:     Frequency:   16 visits     Duration of Treatment: 8 weeks    Planned Interventions:  Possible physical therapy interventions include but are not limited to:   Home Exercise Program development  Therapeutic Exercise (ROM &  Strengthening)  Manual Therapy  Neuromuscular Re-education  Ultrasound  Electrical Stimulation  Phonophoresis with Ketoprofen  Iontophoresis with Dexamethasone  Therapeutic Activities  Gait Training  Posture and Body Mechanics Education  Mechanical traction if indicated    Plan for next visit:  Advance strength, ROM, manual therapy and modalities as needed for neck, LE strength, balance and vestibular rehab to improve safety and balance.     Student or PTA has been instructed in and demonstrates skills necessary to carry out above stated treatment plan: Yes    Thank you for your referral to [facility].  Please call with any questions, concerns or comments.  [phone]  Deana De La Cruz, ZOE      ROM: Cerv  3/29/17   Flexion 38   Extension 10   Left Lat. Flex 20   Right Lat. Flex 20   Left Rotation 44   Right Rotation 28   Sh. flex 90   Sh abd 75     Patient Specific Functional and Pain Scales (PSFS) completed 3/29/2017 still current 4/3/2017   Clinician Instructions: Complete after the history and before the exam.   Initial Assessment:   We want to know what 3 activities in your life you are unable to perform, or are having the most difficulty performing, as a result of your chief problem. Please list and score at least 3 activities that you are unable to perform, or having the most difficulty performing, because of your chief problem.   Patient Specific Activity Scoring Scheme (score one number for each activity):   Activity Score (0-10)  0= Unable to perform activity  10= Able to perform activity at same level as before injury or problem   1. Looking up and back down (pain 6/10) 4/10   2. Turning head (pain 4/10) 6/10   3. Pain during the day (average 4/10) 6/10   4.  /10   5.  /10   Totals:  16/30 = 53% Function   and 47% Impairment   Patient verbally states that they understand that the information they have provided above is current and complete to the best of their knowledge.      Initial Primary G Code and Modifier:    Per the Patient's intake and/or assessment the Primary G Code is: Mobility .   The Patient's Impairment, Limitation or Restriction Modifier would be best described as: CK - 40% - 60% Impairment.   Goal Primary G Code and Modifier:    The Patient's G Code Goal would be: Mobility    The Patient's Impairment, Limitation or Restriction Modifier goal would be best described as: CI - 1% - 20% Impairment.

## 2018-01-04 NOTE — PATIENT INSTRUCTIONS
Patient Information     Patient Name MRN Sex Pennie Fry [id number] Female 1950      Patient Instructions by Cookie Schultz NP at 2017 10:15 AM     Author:  Cookie Schultz NP  Service:  (none) Author Type:  PHYS- Nurse Practitioner     Filed:  2017 10:50 AM  Encounter Date:  2017 Status:  Addendum     :  Cookie Schultz NP (PHYS- Nurse Practitioner)        Related Notes: Original Note by Cookie Schultz NP (PHYS- Nurse Practitioner) filed at 2017 10:46 AM            You will get apt to see podiatrist for foot care--you will need to arrange transportation    The home care staff can do the daily care as recommended by podiatry    Otherwise continue to daily soak feet and moisturize        Let me know if and when you would like to do colonoscopy or home test    followup with me in 1 month

## 2018-01-04 NOTE — PROGRESS NOTES
Patient Information     Patient Name MRN Sex Pennie Fry [id number] Female 1950      Progress Notes by Deana De La Cruz PT at 2017 10:13 AM     Author:  Deana De La Cruz PT Service:  (none) Author Type:  PT- Physical Therapist     Filed:  2017 12:53 PM Date of Service:  2017 10:13 AM Status:  Signed     :  Deana De La Cruz PT (PT- Physical Therapist)            [facility]  Outpatient PT    Daily Note       Date of Service: 2017   Visit #8  Patient Name: Pennie Nichols (Fiordaliza)   YOB: 1950   Referring MD/Provider: Dr. Schultz  Medical and Treatment Diagnosis: Neck pain, vertigo and balance problems  Treatment Diagnosis:  Neck pain and limited UE ROM, vertigo and impaired balance  Insurance: Other: IMCARE (MC replacement)  Start of Service: 17  Certification Dates: Start of Service: 17   Medicare/MA Re-Cert Due: 17  Re-certification Dates: 4/3/2017 - 17    Subjective      Current Status:  Neck stiff with left rotation, SB B still bother but not actual pain, been starting to watch diet better with nutrition and exercise class 1x/wk at her apartment.  No recent vertigo.         LE/vestibular assessment: Patient reports that she's noticed that it is getting harder to get out of chair, has to push more with arms.  No falls.  Legs doesn't feel like giving out, but feel weak when walking.  Not up for walking outside without support, using hand rail lightly/fingertip support.  Feels balance is not reliable.  Used to touching counter top, chair, person, rail, etc. She feels unsteady with shower, uses grab bars and shower chair.  Pain in back, all the sudden can hit and feel like electricity running through body- stays in back.  Prolonged sitting, standing, walking, laying after 10-15 then starts bother the back, has to position or sit and relax.  It's been better lately but she has avoided much activity.  No spinning or  light headed lately.  Last vertigo walking down ervin at doctor's office early March and then doctor's striped shirt really affected her.  Sometime gets a little vertigo with laying down, she sleeps on her back but is propped up because of her back.      History of Injury/reason for PT: Patient comes in with neck pain and stiffness.  Neck has been bothering her for a 2-3 months.  Nothing that she knows of that started it.  Just noticed she couldn't turn head any more.  Notices it all day long.  Can't lift head up to look at the stars.  Pain also with just normal moving around.  Mostly stiffness, little bit of pain.  When look up and as she brings it back down it hurts 6/10. Turning 4/10. Steady around 4/10 with day to day activity.  Getting up out of chair pushing off hurts in neck 4/10.   She's noticed a little bump in muscle on R neck.  Has tried cold and that helps some.  Tylenol, helps with neck pain, on Epitol for nerve pain (trigeminal neurologia) not sure if it's regular or extra strength.  Has tried muscles massager and it did help. No falls.  No AD.  Lives alone in apartment, does own ADL's but looking into getting help with housework.  Dx with trigeminal neuralgia about 1 month ago and still having some of pain R side of head, mostly temporal area, good at times and sometimes feels like a horse is kicking her in the head at times.  Could resolve, could continue for the rest of her life.   Also reports h/o incontinence and decreased balance for safety in shower.  She reports not walking much, just around in her apartment, encouraged increased walking and we'll add balance and endurance activity if provider feels appropriate.  X-Ray: trace degenerative anterolisthesis C5 on C6 and straightening of the lower cervical lordosis. Diffuse advanced facet degenerative changes are seen. Images of the thoracic spine demonstrate normal thoracic kyphosis. Minimal multilevel disc height loss is seen.  2/15/2017    Symptoms at evaluation:  ?   Decreased Motion  Yes, Weakness - no, Instability- unsteady, Swelling - no, Tingling/Numbness - no, Bowel/bladder changes - incontinence - stress, waiting room, coughing sneezing, Pain Ratin-6/10 Pain Location:  R neck  Aggravation Factors: movement  Easing Factors: cold, Tylenol- take edge off (doesn't have heating pad or microwave for hot pack)      Objective    Today's Intervention:      Sitting:   - cervical side bending 2x20 sec - did upper trap stretch today instead  - chin tuck 2 x20 sec -  (working up to 30 sec as tolerated)  - backwards shoulder rolls x20  - cervical rotation 2x30 sec   - cervical extention stretch 2x30 sec  - cervical isometrics flex 10x5 sec, ext 10 x5 sec    Standing at counter:   Romberg feet together EC 1 touch over 20 sec  Tandem stance each way x30 sec with a few touches    Wall push ups x5    Manual therapy: STM to B upper traps, cervical paraspinals, SCM and scalenes x25 minutes with moderate pressure using AliDeep in sitting with shirt pulled back with towel.    -still one significant knot in upper trap near insertion and one knot in upper trap near scap       Deferred this date:   - Added: seated thoracic rotation with arms crossed over chest B 2 x30 second hold with cues to keep cervical spine in neutral rotation   Sitting pillow squeeze (tried to ball but too slippery) x10  - shoulder flex wall walk one arm at a time or else pulls into back too much x5-10   Standing at counter with UE support: marching alternating B x15 - tried with only finger tips    Home Exercise Program:  Access Code: [email or web address] URL: [email or web address] Date: 2017 Prepared by: Deana De La Cruz   Exercises   Seated Cervical Retraction - 2 reps - 30 second hold - 3x daily   Seated Cervical Sidebending AROM - 30 hold - 2 Reps - 3x daily   Standing Backward Shoulder Rolls - 20 reps - 2 hold - 3x daily   Shoulder Flexion Wall Walk - 5-10 reps - 3x daily    Walking - 1-2 Hallway - 2x daily     Access Code: [email or web address] URL: [email or web address] Date: 04/03/2017 Prepared by: Deana De La Cruz   Exercises   Standing March with Counter Support - 5 reps - 2x daily   Romberg Stance - 2 reps - 30 second hold - 2x daily   Seated Hip Adduction Isometrics with Ball - 10 reps - 5 hold - 2x daily     Access Code: [email or web address]   URL: [email or web address]"/   Date: 04/18/2017   Prepared by: Deana De La Cruz     Exercises   Standing Isometric Cervical Flexion with Manual Resistance - 10 reps - 5 hold - 1x daily   Standing Isometric Cervical Extension with Manual Resistance - 10 reps - 5 hold - 1x daily   Seated Cervical Retraction and Extension - 2 reps - 30 second hold - 2x daily         Assessment    Response to Intervention:  Patient was able to demonstrate HEP properly today.  Patient very limited ROM in neck and shoulders.      Therapist Assessment / Clinical Impression: Patient presents with signs and symptoms congruent with tight muscles with poor posture and will benefit from skilled PT to increase ROM, strength, decrease pain, and improve function.      Functional Impairment(s):  See subjective on initial evaluation and Functional Assessment / Summary Report from PSFS.    Physical Impairment(s):       MUSCULOSKELETAL:  Balance Deficits, Loss of Motion, Muscle Tightness and Pain      Patient Goal (time reference required): Patient would like to be able to turn head with no pain in 4 weeks.     Functional therapy goals: updated 4/3/2017   Patient is to be independent in their Home Exercise Program in 2 weeks.  Patient is to tolerate walking with normal gait without hand rail up to 20 minutes in 4 weeks.  Patient is to have improved cervical mobility to allow for improved dressing and self-cares with 2/10 pain in 4 weeks.  Patient is to tolerate walking with normal gait without hand rail up to 30 minutes in 8 weeks.  Patient is to have improved cervical  mobility to allow for improved looking at stars with 2/10 pain in 8 weeks.  Patient is to self-manage symptoms and return to prior function in 8 weeks.      Patient participated in goal selection and understand(s) the plan of care: Yes   Patient Potential for Achieving Desired Outcome:  Good      Plan    Treatment Plan / Targeted Outcomes:     Frequency:   16 visits     Duration of Treatment: 8 weeks    Planned Interventions:  Possible physical therapy interventions include but are not limited to:   Home Exercise Program development  Therapeutic Exercise (ROM & Strengthening)  Manual Therapy  Neuromuscular Re-education  Ultrasound  Electrical Stimulation  Phonophoresis with Ketoprofen  Iontophoresis with Dexamethasone  Therapeutic Activities  Gait Training  Posture and Body Mechanics Education  Mechanical traction if indicated    Plan for next visit:  Advance strength, ROM, manual therapy and modalities as needed for neck, LE strength, balance and vestibular rehab to improve safety and balance.     Student or PTA has been instructed in and demonstrates skills necessary to carry out above stated treatment plan: Yes    Thank you for your referral to [facility].  Please call with any questions, concerns or comments.  [phone]  Deana De La Cruz, NILSONT      ROM: Cerv  3/29/17   Flexion 38   Extension 10   Left Lat. Flex 20   Right Lat. Flex 20   Left Rotation 44   Right Rotation 28   Sh. flex 90   Sh abd 75     LE MMT:   Hip flex, add: 3/5   Hip ext, abd, knee flex, ext, ankle DF and PF: 4-/5    4/11/17: [place] with Ax2 for head and legs- only able to test L side today which was positive and followed with CRT Epley's with maxA x2 and she had nystagmus in rotational movement and spinning sensation, she was fearful of falling.  She felt off balance after but was steady on her feet after a few minutes.     Patient Specific Functional and Pain Scales (PSFS) completed 3/29/2017 still current  4/3/2017   Clinician Instructions: Complete after the history and before the exam.   Initial Assessment:   We want to know what 3 activities in your life you are unable to perform, or are having the most difficulty performing, as a result of your chief problem. Please list and score at least 3 activities that you are unable to perform, or having the most difficulty performing, because of your chief problem.   Patient Specific Activity Scoring Scheme (score one number for each activity):   Activity Score (0-10)  0= Unable to perform activity  10= Able to perform activity at same level as before injury or problem   1. Looking up and back down (pain 6/10) 4/10   2. Turning head (pain 4/10) 6/10   3. Pain during the day (average 4/10) 6/10   4.  /10   5.  /10   Totals:  16/30 = 53% Function   and 47% Impairment   Patient verbally states that they understand that the information they have provided above is current and complete to the best of their knowledge.     Initial Primary G Code and Modifier:    Per the Patient's intake and/or assessment the Primary G Code is: Mobility .   The Patient's Impairment, Limitation or Restriction Modifier would be best described as: CK - 40% - 60% Impairment.   Goal Primary G Code and Modifier:    The Patient's G Code Goal would be: Mobility    The Patient's Impairment, Limitation or Restriction Modifier goal would be best described as: CI - 1% - 20% Impairment.
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
## 2018-01-04 NOTE — PATIENT INSTRUCTIONS
Patient Information     Patient Name MRN Sex Pennie Fry [id number] Female 1950      Patient Instructions by Cookie Schultz NP at 3/22/2017 12:00 PM     Author:  Cookie Schultz NP  Service:  (none) Author Type:  PHYS- Nurse Practitioner     Filed:  3/22/2017 12:39 PM  Encounter Date:  3/22/2017 Status:  Addendum     :  Cookie Schultz NP (PHYS- Nurse Practitioner)        Related Notes: Original Note by Cookie Schultz NP (PHYS- Nurse Practitioner) filed at 3/22/2017 12:37 PM            Continue daily 1/2 tab --and ok to take twice daily as needed for breakthrough pain    You will get a call mammogram scheduling    Watch salt intake--avoid table salt--and pop and carbonated beverages    You should return to clinic in 3 months folowup on medications and face pain--sooner if needed    You will get a call to schedule PT for neck pain

## 2018-01-04 NOTE — PROGRESS NOTES
Patient Information     Patient Name MRN Sex Pennie Fry [id number] Female 1950      Progress Notes by Deana De La Cruz PT at 2017 10:11 AM     Author:  Deana De La Cruz PT Service:  (none) Author Type:  PT- Physical Therapist     Filed:  2017  1:21 PM Date of Service:  2017 10:11 AM Status:  Signed     :  Deana De La Cruz PT (PT- Physical Therapist)             [facility]  Outpatient PT   Daily Note       Date of Service: 2017   Visit #11  Patient Name: Pennie Nichols (Fiordaliza)   YOB: 1950   Referring MD/Provider: Dr. Schultz  Medical and Treatment Diagnosis: Neck pain, vertigo and balance problems  Treatment Diagnosis:  Neck pain and limited UE ROM, vertigo and impaired balance  Insurance: Other: IMCARE (MC replacement)  Start of Service: 17  Certification Dates: Start of Service: 17   Medicare/MA Re-Cert Due: 17  Re-certification Dates: 4/3/2017 - 17    Subjective        Current Status:  Today can move neck better and head has had some off/on pain.  Overall pain is 4/10 head, neck 3/10 at the worst  No vertigo still. Tenitively will plan next week as final visit.        LE/vestibular assessment: Patient reports that she's noticed that it is getting harder to get out of chair, has to push more with arms.  No falls.  Legs doesn't feel like giving out, but feel weak when walking.  Not up for walking outside without support, using hand rail lightly/fingertip support.  Feels balance is not reliable.  Used to touching counter top, chair, person, rail, etc. She feels unsteady with shower, uses grab bars and shower chair.  Pain in back, all the sudden can hit and feel like electricity running through body- stays in back.  Prolonged sitting, standing, walking, laying after 10-15 then starts bother the back, has to position or sit and relax.  It's been better lately but she has avoided much activity.  No spinning or light  headed lately.  Last vertigo walking down ervin at doctor's office early March and then doctor's striped shirt really affected her.  Sometime gets a little vertigo with laying down, she sleeps on her back but is propped up because of her back.      History of Injury/reason for PT: Patient comes in with neck pain and stiffness.  Neck has been bothering her for a 2-3 months.  Nothing that she knows of that started it.  Just noticed she couldn't turn head any more.  Notices it all day long.  Can't lift head up to look at the stars.  Pain also with just normal moving around.  Mostly stiffness, little bit of pain.  When look up and as she brings it back down it hurts 6/10. Turning 4/10. Steady around 4/10 with day to day activity.  Getting up out of chair pushing off hurts in neck 4/10.   She's noticed a little bump in muscle on R neck.  Has tried cold and that helps some.  Tylenol, helps with neck pain, on Epitol for nerve pain (trigeminal neurologia) not sure if it's regular or extra strength.  Has tried muscles massager and it did help. No falls.  No AD.  Lives alone in apartment, does own ADL's but looking into getting help with housework.  Dx with trigeminal neuralgia about 1 month ago and still having some of pain R side of head, mostly temporal area, good at times and sometimes feels like a horse is kicking her in the head at times.  Could resolve, could continue for the rest of her life.   Also reports h/o incontinence and decreased balance for safety in shower.  She reports not walking much, just around in her apartment, encouraged increased walking and we'll add balance and endurance activity if provider feels appropriate.  X-Ray: trace degenerative anterolisthesis C5 on C6 and straightening of the lower cervical lordosis. Diffuse advanced facet degenerative changes are seen. Images of the thoracic spine demonstrate normal thoracic kyphosis. Minimal multilevel disc height loss is seen.  2/15/2017   Symptoms at  evaluation:  ?   Decreased Motion  Yes, Weakness - no, Instability- unsteady, Swelling - no, Tingling/Numbness - no, Bowel/bladder changes - incontinence - stress, waiting room, coughing sneezing, Pain Ratin-6/10 Pain Location:  R neck  Aggravation Factors: movement  Easing Factors: cold, Tylenol- take edge off (doesn't have heating pad or microwave for hot pack)      Objective    Today's Intervention:      Sitting:   - cervical side bending 2x20 sec - did upper trap stretch today instead  - backwards shoulder rolls x20  - cervical rotation 2x30 sec   - cervical isometrics flex 10x5 sec, ext 10 x5 sec  - seated thoracic rotation with arms crossed over chest B 2 x30 second hold with cues to keep cervical spine in neutral rotation - doesn't feel much pull with this  - chin tuck 2 x20 sec -  (working up to 30 sec as tolerated)    Standing at counter:   Romberg feet together EC no touches over 30 sec  Tandem stance each way x30 sec with no touches  SLS 5-10 sec max before small touch, 30 sec total B      Manual therapy: STM to B upper traps, cervical paraspinals, SCM and scalenes x20 minutes with moderate pressure using AliDeep in sitting with shirt pulled back with towel.    -still one significant knot in upper trap near insertion and one knot in upper trap near scap       Deferred this date:   Sitting pillow squeeze (tried to ball but too slippery) x10  - shoulder flex wall walk one arm at a time or else pulls into back too much x5-10   Standing at counter with UE support: marching alternating B x15 - tried with only finger tips  Wall push ups x5  - cervical extention stretch 2x30 sec    Home Exercise Program:  Access Code: [email or web address] URL: [email or web address] Date: 2017 Prepared by: Deana De La Cruz   Exercises   Seated Cervical Retraction - 2 reps - 30 second hold - 3x daily   Seated Cervical Sidebending AROM - 30 hold - 2 Reps - 3x daily   Standing Backward Shoulder Rolls - 20 reps - 2 hold  - 3x daily   Shoulder Flexion Wall Walk - 5-10 reps - 3x daily   Walking - 1-2 Hallway - 2x daily     Access Code: [email or web address] URL: [email or web address] Date: 04/03/2017 Prepared by: Deana De La Cruz   Exercises   Standing March with Counter Support - 5 reps - 2x daily   Romberg Stance - 2 reps - 30 second hold - 2x daily   Seated Hip Adduction Isometrics with Ball - 10 reps - 5 hold - 2x daily     Access Code: [email or web address] URL: [email or web address] Date: 04/18/2017 Prepared by: Deana De La Cruz   Exercises   Standing Isometric Cervical Flexion with Manual Resistance - 10 reps - 5 hold - 1x daily   Standing Isometric Cervical Extension with Manual Resistance - 10 reps - 5 hold - 1x daily   Seated Cervical Retraction and Extension - 2 reps - 30 second hold - 2x daily         Assessment    Response to Intervention:  Patient was able to demonstrate HEP properly today.  Patient very limited ROM in neck and shoulders.      Therapist Assessment / Clinical Impression: Patient presents with signs and symptoms congruent with tight muscles with poor posture and will benefit from skilled PT to increase ROM, strength, decrease pain, and improve function.      Functional Impairment(s):  See subjective on initial evaluation and Functional Assessment / Summary Report from PSFS.    Physical Impairment(s):       MUSCULOSKELETAL:  Balance Deficits, Loss of Motion, Muscle Tightness and Pain      Patient Goal (time reference required): Patient would like to be able to turn head with no pain in 4 weeks.     Functional therapy goals: updated 4/3/2017   Patient is to be independent in their Home Exercise Program in 2 weeks.  Patient is to tolerate walking with normal gait without hand rail up to 20 minutes in 4 weeks.  Patient is to have improved cervical mobility to allow for improved dressing and self-cares with 2/10 pain in 4 weeks.  Patient is to tolerate walking with normal gait without hand rail up to 30 minutes  in 8 weeks.  Patient is to have improved cervical mobility to allow for improved looking at stars with 2/10 pain in 8 weeks.  Patient is to self-manage symptoms and return to prior function in 8 weeks.      Patient participated in goal selection and understand(s) the plan of care: Yes   Patient Potential for Achieving Desired Outcome:  Good      Plan    Treatment Plan / Targeted Outcomes:     Frequency:   16 visits     Duration of Treatment: 8 weeks    Planned Interventions:  Possible physical therapy interventions include but are not limited to:   Home Exercise Program development  Therapeutic Exercise (ROM & Strengthening)  Manual Therapy  Neuromuscular Re-education  Ultrasound  Electrical Stimulation  Phonophoresis with Ketoprofen  Iontophoresis with Dexamethasone  Therapeutic Activities  Gait Training  Posture and Body Mechanics Education  Mechanical traction if indicated    Plan for next visit:  Advance strength, ROM, manual therapy and modalities as needed for neck, LE strength, balance and vestibular rehab to improve safety and balance.     Student or PTA has been instructed in and demonstrates skills necessary to carry out above stated treatment plan: Yes    Thank you for your referral to [facility].  Please call with any questions, concerns or comments.  [phone]  Deana De La Cruz, ZOE      ROM: Cerv  3/29/17 Cerv. 4/27/17    Flexion 38 32   Extension 10 22   Left Lat. Flex 20 22   Right Lat. Flex 20 27   Left Rotation 44 30   Right Rotation 28 40   Sh. flex 90    Sh abd 75      LE MMT:   Hip flex, add: 3/5   Hip ext, abd, knee flex, ext, ankle DF and PF: 4-/5    4/11/17: Jose Hallpike with Ax2 for head and legs- only able to test L side today which was positive and followed with CRT Epley's with maxA x2 and she had nystagmus in rotational movement and spinning sensation, she was fearful of falling.  She felt off balance after but was steady on her feet after a few minutes.      Patient Specific Functional and Pain Scales (PSFS) completed 4/27/2017   We want to know what 3 activities in your life you are unable to perform, or are having the most difficulty performing, as a result of your chief problem. Please list and score at least 3 activities that you are unable to perform, or having the most difficulty performing, because of your chief problem.   Patient Specific Activity Scoring Scheme (score one number for each activity):   Activity Score (0-10)  0= Unable to perform activity  10= Able to perform activity at same level as before injury or problem   1. Looking up and back down (pain 6/10) 6/10   2. Turning head (pain 3/10) 7/10   3. Pain during the day (average 3/10) 7/10   4.  /10   5.  /10   Totals:  20/30 = 67% Function   and 33% Impairment   PSFS on 3/29/17 and 4/3/17: 16/30 = 53% Function and 47% Impairment      Patient verbally states that they understand that the information they have provided above is current and complete to the best of their knowledge.     Primary G Code and Modifier:    Per the Patient's intake and/or assessment the Primary G Code is: Mobility .   The Patient's Impairment, Limitation or Restriction Modifier would be best described as: CJ - 20% - 40% Impairment.   Goal Primary G Code and Modifier:    The Patient's G Code Goal would be: Mobility    The Patient's Impairment, Limitation or Restriction Modifier goal would be best described as: CI - 1% - 20% Impairment.

## 2018-01-04 NOTE — PROGRESS NOTES
"Patient Information     Patient Name MRN Sex Pennie Fry [id number] Female 1950      Progress Notes by Cookie Schultz NP at 4/10/2017 11:00 AM     Author:  Cookie Schultz NP Service:  (none) Author Type:  PHYS- Nurse Practitioner     Filed:  4/10/2017  6:12 PM Encounter Date:  4/10/2017 Status:  Signed     :  Cookie Schultz NP (PHYS- Nurse Practitioner)            SUBJECTIVE:    Pennie Nichols is a 66 y.o. female who presents for follow-up on foot care and toenail trimming. Patient is working with her  regarding home visit for transportation to podiatry and assistance with ADLs. Patient is unable to soak feet as she\" does not have any thing to use \"and is unable to reach her feet. Patient reports overall feet have been feeling well from last visit and is able to wear shoes comfortably.  Reports physical therapy massage has been helpful for neck pain improved range of motion. Reports trigeminal neuralgia symptoms resolving.      HPI    Allergies      Allergen   Reactions     Contrast Dye [Diatrizoate Meglumine (Iv Contrast Dye)]  Angioedema     As noted  note Dr Stone    ,   Family History       Problem   Relation Age of Onset     Other  Mother      lung cancer       Heart Disease  Father 70     MI       Cancer  Father      Liver cancer       Cancer  Other      Lung cancer       Diabetes  Other      Cancer-colon  No Family History      Cancer-prostate  No Family History      Cancer-ovarian  No Family History      Blood Disease  No Family History      Hypertension  No Family History      Stroke  No Family History      Thyroid Disease  No Family History      Cancer-breast  No Family History    ,   Current Outpatient Prescriptions on File Prior to Visit       Medication  Sig Dispense Refill     carBAMazepine (TEGRETOL) 200 mg tablet Take 0.5 tablets by mouth 2 times daily. Take 1/2 tab once a day 30 tablet 3     No current facility-administered medications on file prior to " visit.    ,   Current Outpatient Prescriptions:      carBAMazepine (TEGRETOL) 200 mg tablet, Take 0.5 tablets by mouth 2 times daily. Take 1/2 tab once a day, Disp: 30 tablet, Rfl: 3  Medications have been reviewed by me and are current to the best of my knowledge and ability.,   Past Medical History:     Diagnosis  Date     Back problem     Recurrent back problems , secondary to injury at [facility]      Mental health problem     not otherwise specified     ,   Patient Active Problem List      Diagnosis Date Noted     Trigeminal neuralgia of right side of face 03/06/2017     Fibromyalgia    ,   Past Surgical History:      Procedure  Laterality Date     APPENDECTOMY  1980    Incidental, Enterolysis       CHOLECYSTECTOMY  1996    Laparoscopic       COLONOSCOPY  1990?     CRYOTHERAPY OF CERVIX  1994    Abnormal Pap        HYSTERECTOMY  1980    [place]      and   Social History      Substance Use Topics        Smoking status:  Former Smoker     Packs/day: 1.00     Years: 30.00     Types: Cigarettes     Quit date: 1/21/2001     Smokeless tobacco:  Never Used     Alcohol use  No       REVIEW OF SYSTEMS:  Review of Systems   Constitutional: Negative.    HENT: Negative.    Eyes: Negative.    Respiratory: Negative.    Cardiovascular: Negative.    Gastrointestinal: Negative.    Genitourinary: Negative.    Musculoskeletal: Negative.    Skin: Negative.    Neurological: Negative.    Endo/Heme/Allergies: Negative.    Psychiatric/Behavioral: Negative.        OBJECTIVE:  /80  Pulse 80  Wt 95.7 kg (211 lb)  Breastfeeding? No  BMI 41.21 kg/m2    EXAM:   Physical Exam   Constitutional: She is oriented to person, place, and time.   Musculoskeletal: Normal range of motion.   Neurological: She is alert and oriented to person, place, and time. Gait normal.   Skin: Skin is warm and dry.   Toenails thickened fungal nails, trimmed all toenails except left great toe very overgrown and tender when trimming close to nailbed--- susie to  smooth surfaces  and smooth thickened nails to prevent friction with shoes    Tolerated well    Skin is generally dry with scale   Nursing note and vitals reviewed.      ASSESSMENT/PLAN:    ICD-10-CM    1. Toenail fungus B35.1    2. Decreased activities of daily living (ADL) Z78.9    3. Trigeminal neuralgia of right side of face G50.0    4. Neck pain M54.2         Plan:  Patient was given plastic soaking tub to use at home for warm soapy water soaks to feet daily to soften and cleanse feet    Patient will follow-up in 2 weeks to continue nail trimming    She will continue to work with her  regarding podiatry follow-up    She will continue to work with physical therapy for neck pain    Patient very agreeable with above plan and reports improved comfort with wearing shoes and walking

## 2018-01-04 NOTE — INITIAL ASSESSMENTS
Patient Information     Patient Name MRN Sex Pennie Fry [id number] Female 1950      Initial Assessments by Deana De La Cruz PT at 3/29/2017 10:35 AM     Author:  Deana De La Cruz PT Service:  (none) Author Type:  PT- Physical Therapist     Filed:  3/29/2017  3:19 PM Date of Service:  3/29/2017 10:35 AM Status:  Signed     :  Deana De La Cruz PT (PT- Physical Therapist)            [facility]  Outpatient PT - Initial Evaluation  Spine Evaluation         Date of Service: 3/29/2017   Visit #1   Patient Name: Pennie Nichols (Fiordaliza)   YOB: 1950   Referring MD/Provider: Dr. Schultz  Medical and Treatment Diagnosis: Neck pain  Treatment Diagnosis:  Neck pain and limited UE ROM, also limited balance  Insurance: Other: [employer]  Start of Service: 17  Certification Dates: Start of Service: 17   Medicare/MA Re-Cert Due: 17    Living Situations:  Independent in Living Situation  Preadmission Functional Mobility: Independent  Precautions:  None  Cognition:  Oriented to Person, Place, and Time.    Were cultural / age or other special adaptations needed? No      Patient is a vulnerable adult: No      Patient is aware of diagnosis: Yes      Risks and benefits explained: Yes    Subjective      History of Injury/reason for PT: Patient comes in with neck pain and stiffness.  Neck has been bothering her for a 2-3 months.  Nothing that she knows of that started it.  Just noticed she couldn't turn head any more.  Notices it all day long.  Can't lift head up to look at the stars.  Pain also with just normal moving around.  Mostly stiffness, little bit of pain.  When look up and as she brings it back down it hurts 6/10. Turning 4/10. Steady around 4/10 with day to day activity.  Getting up out of chair pushing off hurts in neck 4/10.   She's noticed a little bump in muscle on R neck.  Has tried cold and that helps some.  Tylenol, helps with neck pain, on Epitol  for nerve pain (trigeminal neurologia) not sure if it's regular or extra strength.  Has tried muscles massager and it did help. No falls.  No AD.  Lives alone in apartment, does own ADL's but looking into getting help with housework.  Dx with trigeminal neuralgia about 1 month ago and still having some of pain R side of head, mostly temporal area, good at times and sometimes feels like a horse is kicking her in the head at times.  Could resolve, could continue for the rest of her life.   Also reports h/o incontinence and decreased balance for safety in shower.  She reports not walking much, just around in her apartment, encouraged increased walking and we'll add balance and endurance activity if provider feels appropriate.      Symptoms at evaluation:  ?   Decreased Motion  yes  Weakness - no  Instability- unsteady  Swelling - no  Tingling/Numbness - no  Bowel/bladder changes - incontinence - stress, waiting room, coughing sneezing    Pain Ratin-6/10  Pain Location:  R neck    Aggravation Factors: movement    Easing Factors: cold, Tylenol- take edge off     Occupation: not working, does a little house work at a time     Patient Specific Functional and Pain Scales (PSFS) completed 3/29/2017  Clinician Instructions: Complete after the history and before the exam.   Initial Assessment:   We want to know what 3 activities in your life you are unable to perform, or are having the most difficulty performing, as a result of your chief problem. Please list and score at least 3 activities that you are unable to perform, or having the most difficulty performing, because of your chief problem.   Patient Specific Activity Scoring Scheme (score one number for each activity):   Activity Score (0-10)  0= Unable to perform activity  10= Able to perform activity at same level as before injury or problem   1. Looking up and back down (pain 6/10) 4/10   2. Turning head (pain 4/10) 6/10   3. Pain during the day (average 4/10)  6/10   4.  /10   5.  /10   Totals:  16/30 = 53% Function   and 47% Impairment   Patient verbally states that they understand that the information they have provided above is current and complete to the best of their knowledge.     Initial Primary G Code and Modifier:    Per the Patient's intake and/or assessment the Primary G Code is: Mobility .   The Patient's Impairment, Limitation or Restriction Modifier would be best described as: CK - 40% - 60% Impairment.   Goal Primary G Code and Modifier:    The Patient's G Code Goal would be: Mobility    The Patient's Impairment, Limitation or Restriction Modifier goal would be best described as: CI - 1% - 20% Impairment.       Prior Level of Function: No difficulty completing the above functional activities prior to onset.      Previous Injury / Surgery: H/O LBP    Previous Treatment:    Pain Meds - Tylenol     Diagnostics:       X-Ray- yes      MRI- no        Results:   FINDINGS: Images of the cervical spine demonstrate preserved vertebral body heights. There is trace degenerative anterolisthesis C5 on C6 and straightening of the lower cervical lordosis. The atlantoaxial dens interval is normal. The base the dens is intact. Diffuse advanced facet degenerative changes are seen.   Images of the thoracic spine demonstrate normal thoracic kyphosis. Minimal multilevel disc height loss is seen.    IMPRESSION: Diffuse advanced cervical facet degenerative changes.    Essentially negative appearance of the thoracic spine.   Electronically Signed By: Satish Lilly on 2/15/2017 11:41 AM    Current Medications:   ? See chart of medications    Drug Allergies:  ? See chart for allergies  ?   Latex Allergy: See chart for allergies     Past Medical History      Diagnosis   Date     Back problem       Recurrent back problems , secondary to injury at [facility]      Mental health problem       not otherwise specified       Past Surgical History       Procedure   Laterality Date      Hysterectomy   1980     Bowen       Appendectomy   1980     Incidental, Enterolysis       Cholecystectomy   1996     Laparoscopic       Colonoscopy   1990?     Cryotherapy of cervix   1994     Abnormal Pap          Additional Significant PMHX: Pacemaker no, DM no    Other:     Objective    Items left blank indicate that the test was inappropriate or not meaningful at the time of evaluation and therefore not performed.    Fall Risk Screening:  No risk factors identified      Posture/Structural:   Head and Neck Position: forward with extension    Shoulders/scapulae: forward   Thoracic spine: increased kyphosis    Scoliosis: no   Lumbar lordosis: decreased    Gait: no AD, slowed gait    ROM:   Cerv   Flexion 38   Extension 10   Left Lat. Flex 20   Right Lat. Flex 20   Left Rotation 44   Right Rotation 28   Shoulder flex B 90, abd B 75 deg- not pain just can't go any further    Palpation: very tight in upper traps and scalenes    Today's Intervention:  Evaluation completed, started patient on HEP as following, educated on posture and body mechanics.     Sitting:   - side bending 2x30 sec, focus more on L side bending to stretch R side  - chin tuck 2 x30 sec  - backwards shoulder rolls x20  - shoulder flex wall walk one arm at a time or else pulls into back too much x5-10    Manual therapy: STM to B upper traps, cervical paraspinals, SCM and scalenes x15' with moderate pressure using [employer] in sitting with gown.     Home Exercise Program:  Access Code: [email or web address] URL: [email or web address] Date: 03/29/2017 Prepared by: Deana De La Cruz   Exercises   Seated Cervical Retraction - 2 reps - 30 second hold - 3x daily   Seated Cervical Sidebending AROM - 30 hold - 2 Reps - 3x daily   Standing Backward Shoulder Rolls - 20 reps - 2 hold - 3x daily   Shoulder Flexion Wall Walk - 5-10 reps - 3x daily   Walking - 1-2 Hallway - 2x daily         Assessment    Response to Intervention:  Patient was able to demonstrate HEP  properly today.  Patient very limited ROM in neck and shoulders.      Therapist Assessment / Clinical Impression: Patient presents with signs and symptoms congruent with tight muscles with poor posture and will benefit from skilled PT to increase ROM, strength, decrease pain, and improve function.      Functional Impairment(s):  See subjective on initial evaluation and Functional Assessment / Summary Report from PSFS.    Physical Impairment(s):       MUSCULOSKELETAL:  Balance Deficits, Loss of Motion, Muscle Tightness and Pain      Patient Goal (time reference required): Patient would like to be able to turn head with no pain in 4 weeks.     Functional therapy goals:   Patient is to be independent in their Home Exercise Program in 2 weeks.  Patient is to tolerate walking with normal gait up to 20 minutes in 4 weeks.  Patient is to have improved mobility to allow for improved dressing and self-cares with 2/10 pain in 4 weeks.  Patient is to tolerate walking with normal gait up to 30 minutes in 8 weeks.  Patient is to have improved mobility to allow for improved looking at stars with 2/10 pain in 8 weeks.  Patient is to self-manage symptoms and return to prior function in 8 weeks.      Patient participated in goal selection and understand(s) the plan of care: Yes   Patient Potential for Achieving Desired Outcome:  Good      Plan    Treatment Plan / Targeted Outcomes:     Frequency:   16 visits     Duration of Treatment: 8 weeks    Planned Interventions:  Possible physical therapy interventions include but are not limited to:   Home Exercise Program development  Therapeutic Exercise (ROM & Strengthening)  Manual Therapy  Neuromuscular Re-education  Ultrasound  Electrical Stimulation  Phonophoresis with Ketoprofen  Iontophoresis with Dexamethasone  Therapeutic Activities  Gait Training  Posture and Body Mechanics Education  Mechanical traction if indicated    Plan for next visit:  Advance strength, ROM, add balance and  LE if ok by PCP, manual therapy and modalities as needed.     Student or PTA has been instructed in and demonstrates skills necessary to carry out above stated treatment plan: Yes    Thank you for your referral to [facility].  Please call with any questions, concerns or comments.  [phone]  Deana De La Cruz DPT    The signature, of the referring medical provider, on this document indicates certification of the above prescribed plan of care and is medically necessary.

## 2018-01-04 NOTE — PROGRESS NOTES
Patient Information     Patient Name MRN Sex Pennie Fry [id number] Female 1950      Progress Notes by Deana De La Cruz PT at 2017 10:26 AM     Author:  Deana De La Cruz PT Service:  (none) Author Type:  PT- Physical Therapist     Filed:  2017 12:41 PM Date of Service:  2017 10:26 AM Status:  Signed     :  Deana De La Cruz PT (PT- Physical Therapist)            [facility]  Outpatient PT   Progress note/ Daily Note       Date of Service: 2017   Visit #10  Patient Name: Pennie Nichols (Fiordaliza)   YOB: 1950   Referring MD/Provider: Dr. Schultz  Medical and Treatment Diagnosis: Neck pain, vertigo and balance problems  Treatment Diagnosis:  Neck pain and limited UE ROM, vertigo and impaired balance  Insurance: Other: [employer]MC[employer] (MC replacement)  Start of Service: 17  Certification Dates: Start of Service: 17   Medicare/MA Re-Cert Due: 17  Re-certification Dates: 4/3/2017 - 17    Subjective      Progress report: patient reports good gains overall with PT and as on the PSFS but minimal improvements noted when completing ROM as below.     Current Status:  Yesterday was miserable with head pain, increased neck stiffness too but not not pain.  Today can move better and head is feeling better.  Overall pain is 3/10. No vertigo still.       LE/vestibular assessment: Patient reports that she's noticed that it is getting harder to get out of chair, has to push more with arms.  No falls.  Legs doesn't feel like giving out, but feel weak when walking.  Not up for walking outside without support, using hand rail lightly/fingertip support.  Feels balance is not reliable.  Used to touching counter top, chair, person, rail, etc. She feels unsteady with shower, uses grab bars and shower chair.  Pain in back, all the sudden can hit and feel like electricity running through body- stays in back.  Prolonged sitting, standing, walking, laying  after 10-15 then starts bother the back, has to position or sit and relax.  It's been better lately but she has avoided much activity.  No spinning or light headed lately.  Last vertigo walking down ervin at doctor's office early March and then doctor's striped shirt really affected her.  Sometime gets a little vertigo with laying down, she sleeps on her back but is propped up because of her back.      History of Injury/reason for PT: Patient comes in with neck pain and stiffness.  Neck has been bothering her for a 2-3 months.  Nothing that she knows of that started it.  Just noticed she couldn't turn head any more.  Notices it all day long.  Can't lift head up to look at the stars.  Pain also with just normal moving around.  Mostly stiffness, little bit of pain.  When look up and as she brings it back down it hurts 6/10. Turning 4/10. Steady around 4/10 with day to day activity.  Getting up out of chair pushing off hurts in neck 4/10.   She's noticed a little bump in muscle on R neck.  Has tried cold and that helps some.  Tylenol, helps with neck pain, on Epitol for nerve pain (trigeminal neurologia) not sure if it's regular or extra strength.  Has tried muscles massager and it did help. No falls.  No AD.  Lives alone in apartment, does own ADL's but looking into getting help with housework.  Dx with trigeminal neuralgia about 1 month ago and still having some of pain R side of head, mostly temporal area, good at times and sometimes feels like a horse is kicking her in the head at times.  Could resolve, could continue for the rest of her life.   Also reports h/o incontinence and decreased balance for safety in shower.  She reports not walking much, just around in her apartment, encouraged increased walking and we'll add balance and endurance activity if provider feels appropriate.  X-Ray: trace degenerative anterolisthesis C5 on C6 and straightening of the lower cervical lordosis. Diffuse advanced facet degenerative  changes are seen. Images of the thoracic spine demonstrate normal thoracic kyphosis. Minimal multilevel disc height loss is seen.  2/15/2017   Symptoms at evaluation:  ?   Decreased Motion  Yes, Weakness - no, Instability- unsteady, Swelling - no, Tingling/Numbness - no, Bowel/bladder changes - incontinence - stress, waiting room, coughing sneezing, Pain Ratin-6/10 Pain Location:  R neck  Aggravation Factors: movement  Easing Factors: cold, Tylenol- take edge off (doesn't have heating pad or microwave for hot pack)      Objective    Today's Intervention:      Sitting:   - cervical side bending 2x20 sec - did upper trap stretch today instead  - backwards shoulder rolls x20  - cervical rotation 2x30 sec   - cervical isometrics flex 10x5 sec, ext 10 x5 sec  - seated thoracic rotation with arms crossed over chest B 2 x30 second hold with cues to keep cervical spine in neutral rotation   - chin tuck 2 x20 sec -  (working up to 30 sec as tolerated)    Standing at counter:   Romberg feet together EC no touches over 30 sec  Tandem stance each way x30 sec with no touches    Manual therapy: STM to B upper traps, cervical paraspinals, SCM and scalenes x20 minutes with moderate pressure using AliDeep in sitting with shirt pulled back with towel.    -still one significant knot in upper trap near insertion and one knot in upper trap near scap       Deferred this date:   Sitting pillow squeeze (tried to ball but too slippery) x10  - shoulder flex wall walk one arm at a time or else pulls into back too much x5-10   Standing at counter with UE support: marching alternating B x15 - tried with only finger tips  Wall push ups x5  - cervical extention stretch 2x30 sec    Home Exercise Program:  Access Code: [email or web address] URL: [email or web address] Date: 2017 Prepared by: Deana De La Cruz   Exercises   Seated Cervical Retraction - 2 reps - 30 second hold - 3x daily   Seated Cervical Sidebending AROM - 30 hold - 2 Reps  - 3x daily   Standing Backward Shoulder Rolls - 20 reps - 2 hold - 3x daily   Shoulder Flexion Wall Walk - 5-10 reps - 3x daily   Walking - 1-2 Hallway - 2x daily     Access Code: [email or web address] URL: [email or web address] Date: 04/03/2017 Prepared by: Deana De La Cruz   Exercises   Standing March with Counter Support - 5 reps - 2x daily   Romberg Stance - 2 reps - 30 second hold - 2x daily   Seated Hip Adduction Isometrics with Ball - 10 reps - 5 hold - 2x daily     Access Code: [email or web address] URL: [email or web address] Date: 04/18/2017 Prepared by: Deana De La Cruz   Exercises   Standing Isometric Cervical Flexion with Manual Resistance - 10 reps - 5 hold - 1x daily   Standing Isometric Cervical Extension with Manual Resistance - 10 reps - 5 hold - 1x daily   Seated Cervical Retraction and Extension - 2 reps - 30 second hold - 2x daily         Assessment    Response to Intervention:  Patient was able to demonstrate HEP properly today.  Patient very limited ROM in neck and shoulders.      Therapist Assessment / Clinical Impression: Patient presents with signs and symptoms congruent with tight muscles with poor posture and will benefit from skilled PT to increase ROM, strength, decrease pain, and improve function.      Functional Impairment(s):  See subjective on initial evaluation and Functional Assessment / Summary Report from PSFS.    Physical Impairment(s):       MUSCULOSKELETAL:  Balance Deficits, Loss of Motion, Muscle Tightness and Pain      Patient Goal (time reference required): Patient would like to be able to turn head with no pain in 4 weeks.     Functional therapy goals: updated 4/3/2017   Patient is to be independent in their Home Exercise Program in 2 weeks.  Patient is to tolerate walking with normal gait without hand rail up to 20 minutes in 4 weeks.  Patient is to have improved cervical mobility to allow for improved dressing and self-cares with 2/10 pain in 4 weeks.  Patient is to  tolerate walking with normal gait without hand rail up to 30 minutes in 8 weeks.  Patient is to have improved cervical mobility to allow for improved looking at stars with 2/10 pain in 8 weeks.  Patient is to self-manage symptoms and return to prior function in 8 weeks.      Patient participated in goal selection and understand(s) the plan of care: Yes   Patient Potential for Achieving Desired Outcome:  Good      Plan    Treatment Plan / Targeted Outcomes:     Frequency:   16 visits     Duration of Treatment: 8 weeks    Planned Interventions:  Possible physical therapy interventions include but are not limited to:   Home Exercise Program development  Therapeutic Exercise (ROM & Strengthening)  Manual Therapy  Neuromuscular Re-education  Ultrasound  Electrical Stimulation  Phonophoresis with Ketoprofen  Iontophoresis with Dexamethasone  Therapeutic Activities  Gait Training  Posture and Body Mechanics Education  Mechanical traction if indicated    Plan for next visit:  Advance strength, ROM, manual therapy and modalities as needed for neck, LE strength, balance and vestibular rehab to improve safety and balance.     Student or PTA has been instructed in and demonstrates skills necessary to carry out above stated treatment plan: Yes    Thank you for your referral to [facility]  Please call with any questions, concerns or comments.  [phone]  Deana De La Cruz, ZOE      ROM: Cerv  3/29/17 Cerv. 4/27/17    Flexion 38 32   Extension 10 22   Left Lat. Flex 20 22   Right Lat. Flex 20 27   Left Rotation 44 30   Right Rotation 28 40   Sh. flex 90    Sh abd 75      LE MMT:   Hip flex, add: 3/5   Hip ext, abd, knee flex, ext, ankle DF and PF: 4-/5    4/11/17: [place] Hallpike with Ax2 for head and legs- only able to test L side today which was positive and followed with CRT Epley's with maxA x2 and she had nystagmus in rotational movement and spinning sensation, she was fearful of falling.  She felt off  balance after but was steady on her feet after a few minutes.     Patient Specific Functional and Pain Scales (PSFS) completed 4/27/2017   We want to know what 3 activities in your life you are unable to perform, or are having the most difficulty performing, as a result of your chief problem. Please list and score at least 3 activities that you are unable to perform, or having the most difficulty performing, because of your chief problem.   Patient Specific Activity Scoring Scheme (score one number for each activity):   Activity Score (0-10)  0= Unable to perform activity  10= Able to perform activity at same level as before injury or problem   1. Looking up and back down (pain 6/10) 6/10   2. Turning head (pain 3/10) 7/10   3. Pain during the day (average 3/10) 7/10   4.  /10   5.  /10   Totals:  20/30 = 67% Function   and 33% Impairment   PSFS on 3/29/17 and 4/3/17: 16/30 = 53% Function and 47% Impairment      Patient verbally states that they understand that the information they have provided above is current and complete to the best of their knowledge.     Primary G Code and Modifier:    Per the Patient's intake and/or assessment the Primary G Code is: Mobility .   The Patient's Impairment, Limitation or Restriction Modifier would be best described as: CJ - 20% - 40% Impairment.   Goal Primary G Code and Modifier:    The Patient's G Code Goal would be: Mobility    The Patient's Impairment, Limitation or Restriction Modifier goal would be best described as: CI - 1% - 20% Impairment.

## 2018-01-05 ENCOUNTER — OFFICE VISIT - GICH (OUTPATIENT)
Dept: FAMILY MEDICINE | Facility: OTHER | Age: 68
End: 2018-01-05

## 2018-01-05 ENCOUNTER — HISTORY (OUTPATIENT)
Dept: FAMILY MEDICINE | Facility: OTHER | Age: 68
End: 2018-01-05

## 2018-01-05 DIAGNOSIS — R60.0 LOCALIZED EDEMA: ICD-10-CM

## 2018-01-05 DIAGNOSIS — I83.93 ASYMPTOMATIC VARICOSE VEINS OF BOTH LOWER EXTREMITIES: ICD-10-CM

## 2018-01-05 DIAGNOSIS — R19.5 OTHER FECAL ABNORMALITIES: ICD-10-CM

## 2018-01-05 DIAGNOSIS — M79.644 PAIN OF FINGER OF RIGHT HAND: ICD-10-CM

## 2018-01-05 DIAGNOSIS — Z87.891 PERSONAL HISTORY OF NICOTINE DEPENDENCE: ICD-10-CM

## 2018-01-05 DIAGNOSIS — L85.3 XEROSIS CUTIS: ICD-10-CM

## 2018-01-05 DIAGNOSIS — L60.2 ONYCHOGRYPHOSIS: ICD-10-CM

## 2018-01-05 LAB
ABSOLUTE BASOPHILS - HISTORICAL: 0 THOU/CU MM
ABSOLUTE EOSINOPHILS - HISTORICAL: 0.2 THOU/CU MM
ABSOLUTE IMMATURE GRANULOCYTES(METAS,MYELOS,PROS) - HISTORICAL: 0 THOU/CU MM
ABSOLUTE LYMPHOCYTES - HISTORICAL: 1.8 THOU/CU MM (ref 0.9–2.9)
ABSOLUTE MONOCYTES - HISTORICAL: 0.6 THOU/CU MM
ABSOLUTE NEUTROPHILS - HISTORICAL: 4.9 THOU/CU MM (ref 1.7–7)
ANION GAP - HISTORICAL: 10 (ref 5–18)
BASOPHILS # BLD AUTO: 0.5 %
BUN SERPL-MCNC: 11 MG/DL (ref 7–25)
BUN/CREAT RATIO - HISTORICAL: 18
CALCIUM SERPL-MCNC: 8.7 MG/DL (ref 8.6–10.3)
CHLORIDE SERPLBLD-SCNC: 102 MMOL/L (ref 98–107)
CO2 SERPL-SCNC: 29 MMOL/L (ref 21–31)
CREAT SERPL-MCNC: 0.62 MG/DL (ref 0.7–1.3)
EOSINOPHIL NFR BLD AUTO: 3.2 %
ERYTHROCYTE [DISTWIDTH] IN BLOOD BY AUTOMATED COUNT: 12.3 % (ref 11.5–15.5)
ERYTHROCYTE [SEDIMENTATION RATE] IN BLOOD: 28 MM/HR
GFR IF NOT AFRICAN AMERICAN - HISTORICAL: >60 ML/MIN/1.73M2
GLUCOSE SERPL-MCNC: 109 MG/DL (ref 70–105)
HCT VFR BLD AUTO: 44.6 % (ref 33–51)
HEMOGLOBIN: 14.2 G/DL (ref 12–16)
IMMATURE GRANULOCYTES(METAS,MYELOS,PROS) - HISTORICAL: 0.4 %
LYMPHOCYTES NFR BLD AUTO: 23.4 % (ref 20–44)
MCH RBC QN AUTO: 27.2 PG (ref 26–34)
MCHC RBC AUTO-ENTMCNC: 31.8 G/DL (ref 32–36)
MCV RBC AUTO: 85 FL (ref 80–100)
MONOCYTES NFR BLD AUTO: 7.4 %
NEUTROPHILS NFR BLD AUTO: 65.1 % (ref 42–72)
PLATELET # BLD AUTO: 278 THOU/CU MM (ref 140–440)
PMV BLD: 8.8 FL (ref 6.5–11)
POTASSIUM SERPL-SCNC: 4.2 MMOL/L (ref 3.5–5.1)
RED BLOOD COUNT - HISTORICAL: 5.22 MIL/CU MM (ref 4–5.2)
SODIUM SERPL-SCNC: 141 MMOL/L (ref 133–143)
WHITE BLOOD COUNT - HISTORICAL: 7.6 THOU/CU MM (ref 4.5–11)

## 2018-01-05 ASSESSMENT — PATIENT HEALTH QUESTIONNAIRE - PHQ9: SUM OF ALL RESPONSES TO PHQ QUESTIONS 1-9: 0

## 2018-01-05 NOTE — PROGRESS NOTES
Patient Information     Patient Name MRN Sex Pennie Fry [id number] Female 1950      Progress Notes by Deana De La Cruz PT at 2017  9:28 AM     Author:  Deana De La Cruz PT Service:  (none) Author Type:  PT- Physical Therapist     Filed:  2017 10:43 AM Date of Service:  2017  9:28 AM Status:  Signed     :  Deana De La Cruz PT (PT- Physical Therapist)             [facility]  Outpatient PT   Daily Note       Date of Service: 2017   Visit #14  Patient Name: Pennie Nichols (Fiordaliza)   YOB: 1950   Referring MD/Provider: Dr. Schultz  Medical and Treatment Diagnosis: Neck pain, vertigo and balance problems  Treatment Diagnosis:  Neck pain and limited UE ROM, vertigo and impaired balance  Insurance: Other: IMCARE (MC replacement)  Start of Service: 17  Certification Dates: Start of Service: 17   Medicare/MA Re-Cert Due: 17  Re-certification Dates: 4/3/2017 - 17    Subjective        Current Status:  HEP going good, no pain today in head or neck.  Knee has been bothering this week, maybe some arthritis with rainy weather.  She reports pain number is 1-3/10 mild and at most 4-5/10.  Feeling much better and discussed continuing on her own at this time.  Good ROM improvement overall (see chart at bottom of note). She is independent with HEP.  We'll keep chart open x1 month in case of exacerbation.      No vertigo sx.      LE/vestibular assessment: Patient reports that she's noticed that it is getting harder to get out of chair, has to push more with arms.  No falls.  Legs doesn't feel like giving out, but feel weak when walking.  Not up for walking outside without support, using hand rail lightly/fingertip support.  Feels balance is not reliable.  Used to touching counter top, chair, person, rail, etc. She feels unsteady with shower, uses grab bars and shower chair.  Pain in back, all the sudden can hit and feel like  electricity running through body- stays in back.  Prolonged sitting, standing, walking, laying after 10-15 then starts bother the back, has to position or sit and relax.  It's been better lately but she has avoided much activity.  No spinning or light headed lately.  Last vertigo walking down ervin at doctor's office early March and then doctor's striped shirt really affected her.  Sometime gets a little vertigo with laying down, she sleeps on her back but is propped up because of her back.      History of Injury/reason for PT: Patient comes in with neck pain and stiffness.  Neck has been bothering her for a 2-3 months.  Nothing that she knows of that started it.  Just noticed she couldn't turn head any more.  Notices it all day long.  Can't lift head up to look at the stars.  Pain also with just normal moving around.  Mostly stiffness, little bit of pain.  When look up and as she brings it back down it hurts 6/10. Turning 4/10. Steady around 4/10 with day to day activity.  Getting up out of chair pushing off hurts in neck 4/10.   She's noticed a little bump in muscle on R neck.  Has tried cold and that helps some.  Tylenol, helps with neck pain, on Epitol for nerve pain (trigeminal neurologia) not sure if it's regular or extra strength.  Has tried muscles massager and it did help. No falls.  No AD.  Lives alone in apartment, does own ADL's but looking into getting help with housework.  Dx with trigeminal neuralgia about 1 month ago and still having some of pain R side of head, mostly temporal area, good at times and sometimes feels like a horse is kicking her in the head at times.  Could resolve, could continue for the rest of her life.   Also reports h/o incontinence and decreased balance for safety in shower.  She reports not walking much, just around in her apartment, encouraged increased walking and we'll add balance and endurance activity if provider feels appropriate.  X-Ray: trace degenerative anterolisthesis  C5 on C6 and straightening of the lower cervical lordosis. Diffuse advanced facet degenerative changes are seen. Images of the thoracic spine demonstrate normal thoracic kyphosis. Minimal multilevel disc height loss is seen.  2/15/2017   Symptoms at evaluation:  ?   Decreased Motion  Yes, Weakness - no, Instability- unsteady, Swelling - no, Tingling/Numbness - no, Bowel/bladder changes - incontinence - stress, waiting room, coughing sneezing, Pain Ratin-6/10 Pain Location:  R neck  Aggravation Factors: movement  Easing Factors: cold, Tylenol- take edge off (doesn't have heating pad or microwave for hot pack)      Objective    Today's Intervention:    Standing at counter:   Standing by counter: marching alternating B x15 - with alternating arms  SLS 12 (R)-12 (L) sec   Tandem stance each way x35 sec with no touches  Romberg feet together EC no touches over 30 sec  Romberg feet together with head turns x30     Sitting:   - cervical side bending 2x20 sec - did upper trap stretch today instead  - backwards shoulder rolls x20  - cervical rotation 2x30 sec   - chin tuck 3 x20 sec -  (working up to 30 sec as tolerated)    Manual therapy: STM to B upper traps, cervical paraspinals, SCM and scalenes x25 minutes with moderate pressure using AliDeep in sitting with shirt pulled back with towel.    -still knots in upper trap near insertion and upper trap near scap, more tender in scalenes today       Deferred this date:   - shoulder flex wall walk one arm at a time or else pulls into back too much x5-10   - cervical extention stretch 3x20 sec  - cervical isometrics flex 10x5 sec, ext 10 x5 sec    Home Exercise Program:  Access Code: [email or web address] URL: [email or web address] Date: 2017 Prepared by: Deana De La Cruz   Exercises   Seated Cervical Retraction - 2 reps - 30 second hold - 3x daily   Seated Cervical Sidebending AROM - 30 hold - 2 Reps - 3x daily   Standing Backward Shoulder Rolls - 20 reps - 2 hold -  3x daily   Shoulder Flexion Wall Walk - 5-10 reps - 3x daily   Walking - 1-2 Hallway - 2x daily     Access Code: [email or web address] URL: [email or web address] Date: 04/03/2017 Prepared by: Deana De La Cruz   Exercises   Standing March with Counter Support - 5 reps - 2x daily   Romberg Stance - 2 reps - 30 second hold - 2x daily   Seated Hip Adduction Isometrics with Ball - 10 reps - 5 hold - 2x daily     Access Code: [email or web address] URL: [email or web address] Date: 04/18/2017 Prepared by: Deana De La Cruz   Exercises   Standing Isometric Cervical Flexion with Manual Resistance - 10 reps - 5 hold - 1x daily   Standing Isometric Cervical Extension with Manual Resistance - 10 reps - 5 hold - 1x daily   Seated Cervical Retraction and Extension - 2 reps - 30 second hold - 2x daily         Assessment    Response to Intervention:  Patient was able to demonstrate HEP properly today.  Patient very limited ROM in neck and shoulders.      Therapist Assessment / Clinical Impression: Patient presents with signs and symptoms congruent with tight muscles with poor posture and will benefit from skilled PT to increase ROM, strength, decrease pain, and improve function.      Functional Impairment(s):  See subjective on initial evaluation and Functional Assessment / Summary Report from PSFS.    Physical Impairment(s):       MUSCULOSKELETAL:  Balance Deficits, Loss of Motion, Muscle Tightness and Pain      Patient Goal (time reference required): Patient would like to be able to turn head with no pain in 4 weeks.     Functional therapy goals: updated 4/3/2017   Patient is to be independent in their Home Exercise Program in 2 weeks.  Patient is to tolerate walking with normal gait without hand rail up to 20 minutes in 4 weeks.  Patient is to have improved cervical mobility to allow for improved dressing and self-cares with 2/10 pain in 4 weeks.  Patient is to tolerate walking with normal gait without hand rail up to 30 minutes in  8 weeks.  Patient is to have improved cervical mobility to allow for improved looking at stars with 2/10 pain in 8 weeks.  Patient is to self-manage symptoms and return to prior function in 8 weeks.      Patient participated in goal selection and understand(s) the plan of care: Yes   Patient Potential for Achieving Desired Outcome:  Good      Plan    Treatment Plan / Targeted Outcomes:     Frequency:   16 visits     Duration of Treatment: 8 weeks    Planned Interventions:  Possible physical therapy interventions include but are not limited to:   Home Exercise Program development  Therapeutic Exercise (ROM & Strengthening)  Manual Therapy  Neuromuscular Re-education  Ultrasound  Electrical Stimulation  Phonophoresis with Ketoprofen  Iontophoresis with Dexamethasone  Therapeutic Activities  Gait Training  Posture and Body Mechanics Education  Mechanical traction if indicated    Plan for next visit:  Advance strength, ROM, manual therapy and modalities as needed for neck, LE strength, balance and vestibular rehab to improve safety and balance.     Student or PTA has been instructed in and demonstrates skills necessary to carry out above stated treatment plan: Yes    Thank you for your referral to [facility].  Please call with any questions, concerns or comments.  [phone]  Deana De La Cruz, ZOE      ROM: Cerv  3/29/17 Cerv. 4/27/17  Cerv 5/26/17    Flexion 38 32 37   Extension 10 22 24   Left Lat. Flex 20 22 23   Right Lat. Flex 20 27 38   Left Rotation 44 30 47   Right Rotation 28 40 43   Sh. flex 90  100 B   Sh abd 75  90 B     LE MMT at eval:   Hip flex, add: 3/5   Hip ext, abd, knee flex, ext, ankle DF and PF: 4-/5    4/11/17: [place] Hallpike with Ax2 for head and legs- only able to test L side today which was positive and followed with CRT Epley's with maxA x2 and she had nystagmus in rotational movement and spinning sensation, she was fearful of falling.  She felt off balance after but was  steady on her feet after a few minutes.     Patient Specific Functional and Pain Scales (PSFS) completed 5/26/2017  We want to know what 3 activities in your life you are unable to perform, or are having the most difficulty performing, as a result of your chief problem. Please list and score at least 3 activities that you are unable to perform, or having the most difficulty performing, because of your chief problem.   Patient Specific Activity Scoring Scheme (score one number for each activity):   Activity Score (0-10)  0= Unable to perform activity  10= Able to perform activity at same level as before injury or problem   1. Looking up and back down 7.5-8/10   2. Turning head  8/10   3. Pain during the day 1-3 up to 4-5/10 7/10   4.  /10   5.  /10   Totals:  20/30 = 67% Function   and 33% Impairment   PSFS on 3/29/17 and 4/3/17: 16/30 = 53% Function and 47% Impairment  PSFS on 4/27/2017: 20/30 = 67% Function and 33% Impairment    Patient verbally states that they understand that the information they have provided above is current and complete to the best of their knowledge.     Primary G Code and Modifier:    Per the Patient's intake and/or assessment the Primary G Code is: Mobility .   The Patient's Impairment, Limitation or Restriction Modifier would be best described as: CJ - 20% - 40% Impairment.   Goal Primary G Code and Modifier:    The Patient's G Code Goal would be: Mobility    The Patient's Impairment, Limitation or Restriction Modifier goal would be best described as: CI - 1% - 20% Impairment.

## 2018-01-05 NOTE — PROGRESS NOTES
Patient Information     Patient Name MRN Sex Pennie Fry [id number] Female 1950      Progress Notes by Cookie Schultz NP at 2017 10:15 AM     Author:  Cookie Schultz NP Service:  (none) Author Type:  PHYS- Nurse Practitioner     Filed:  2017  4:24 PM Encounter Date:  2017 Status:  Signed     :  Cookie Schultz NP (PHYS- Nurse Practitioner)            SUBJECTIVE:    Pennie Nichols is a 66 y.o. female who presents for 2 week follow-up on foot care for overgrown toenails, dry callused feet that she has been unable to attend to due to the fact that she is unable to lift or reach her feet during cares at home. Has not used Kerasol cream that she was given after 1 st visit for foot care.   Unable to soak feet independently due to physical ROM and lifting limits.  Reports trigeminal neuralgia pain resolved it under control on current medication    Feels PT has been beneficial for improving neck ROM. Would like to continue and needs PT for chronic back pain.     Reports that a public health nurse evaluated her and she qualifies for home care services, who can do foot cares at home. Uncertain of name of home care agency that did intake. She believes she is eligible for many hours.      Her  Iza Barnes will help her set up podiatry appointment.    Has questions about colorectal cancer screening--does not want colonoscopy.Question about home lab test.      HPI    Allergies      Allergen   Reactions     Contrast Dye [Diatrizoate Meglumine (Iv Contrast Dye)]  Angioedema     As noted  note Dr Stone    ,   Family History       Problem   Relation Age of Onset     Other  Mother      lung cancer       Heart Disease  Father 70     MI       Cancer  Father      Liver cancer       Cancer  Other      Lung cancer       Diabetes  Other      Cancer-colon  No Family History      Cancer-prostate  No Family History      Cancer-ovarian  No Family History      Blood Disease  No Family  "History      Hypertension  No Family History      Stroke  No Family History      Thyroid Disease  No Family History      Cancer-breast  No Family History    ,   Current Outpatient Prescriptions on File Prior to Visit       Medication  Sig Dispense Refill     carBAMazepine (TEGRETOL) 200 mg tablet Take 0.5 tablets by mouth 2 times daily. Take 1/2 tab once a day 30 tablet 3     No current facility-administered medications on file prior to visit.     and   Current Outpatient Prescriptions:      carBAMazepine (TEGRETOL) 200 mg tablet, Take 0.5 tablets by mouth 2 times daily. Take 1/2 tab once a day, Disp: 30 tablet, Rfl: 3  Medications have been reviewed by me and are current to the best of my knowledge and ability.    REVIEW OF SYSTEMS:  Review of Systems   Constitutional: Negative.    HENT: Negative.    Eyes: Negative.    Respiratory: Negative.    Cardiovascular: Negative.    Gastrointestinal: Negative.    Musculoskeletal: Positive for back pain and neck pain.   Skin: Negative.    Neurological: Negative.    Endo/Heme/Allergies: Negative.    Psychiatric/Behavioral: Negative.        OBJECTIVE:  /68  Pulse 98  Ht 1.52 m (4' 11.84\")  Wt 95.5 kg (210 lb 8 oz)  BMI 41.33 kg/m2    EXAM:   Physical Exam   Constitutional: She is oriented to person, place, and time and well-developed, well-nourished, and in no distress.   Cardiovascular: Normal rate.    Pulmonary/Chest: Effort normal.   Musculoskeletal: Normal range of motion.   Neurological: She is alert and oriented to person, place, and time. Gait normal.   Skin: Skin is warm and dry.   Trimmed and used file to smooth down overgrown toenails thickened with fungus--tolerated well    Severely dry scale patches--crust appearance on bilateral feet dorsum and malleolar areas       Psychiatric: Memory, affect and judgment normal.   Mood is flat   Nursing note and vitals reviewed.      ASSESSMENT/PLAN:    ICD-10-CM    1. Neck pain M54.2 AMB CONSULT TO PHYSICAL THERAPY "   2. 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 Dry skin dermatitis L85.3 AMB CONSULT TO PODIATRY/ANKLE AND FOOT SURGERY   4. Trigeminal neuralgia G50.0 AMB CONSULT TO PHYSICAL THERAPY        Plan:  Encouraged patient to utilize home care staff to assist with soaking feet and applying moisturizing emollient    We'll referred to podiatry--patient would like to see [facility] provider---  will help facilitate transportation and home care staff  can continue care as directed by podiatrist    Referral to physical therapy for chronic neck and upper back pain--would like to work with Deana perez therapist    Discussed colorectal cancer screening at home process--- if abnormal recommend colonoscopy  patient is uncertain at this time and will consider and discuss at next follow-up    contacted Iza Barnes --see phone note--to facilitate case management, care coordination

## 2018-01-05 NOTE — PROGRESS NOTES
Patient Information     Patient Name MRN Sex Pennie Fry [id number] Female 1950      Progress Notes by Deana De La Cruz PT at 2017 11:23 AM     Author:  Deana De La Cruz PT Service:  (none) Author Type:  PT- Physical Therapist     Filed:  2017  3:36 PM Date of Service:  2017 11:23 AM Status:  Signed     :  Deana De La Cruz PT (PT- Physical Therapist)             [facility]  Outpatient PT   Daily Note       Date of Service: 2017   Visit #13  Patient Name: Pennie Nichols (Fiordaliza)   YOB: 1950   Referring MD/Provider: Dr. Schultz  Medical and Treatment Diagnosis: Neck pain, vertigo and balance problems  Treatment Diagnosis:  Neck pain and limited UE ROM, vertigo and impaired balance  Insurance: Other: [employer] (MC replacement)  Start of Service: 17  Certification Dates: Start of Service: 17   Medicare/MA Re-Cert Due: 17  Re-certification Dates: 4/3/2017 - 17    Subjective        Current Status:  Did shopping 3 hours Monday and 2 hours Tuesday and neck more stiff and head started pounding again.  Pain in 5-6/10 in back of head, neck 4-5/10.  No PT last week with scheduling conflicts and more tight today.  We'll do twice this week then hold until MD appointment and see how she does without PT that week.  Encouraged her to use heat when starting to feel stiff and not letting it get to where it leads to headache that she had to just hold her head.  Still no vertigo sx.      LE/vestibular assessment: Patient reports that she's noticed that it is getting harder to get out of chair, has to push more with arms.  No falls.  Legs doesn't feel like giving out, but feel weak when walking.  Not up for walking outside without support, using hand rail lightly/fingertip support.  Feels balance is not reliable.  Used to touching counter top, chair, person, rail, etc. She feels unsteady with shower, uses grab bars and shower chair.   Pain in back, all the sudden can hit and feel like electricity running through body- stays in back.  Prolonged sitting, standing, walking, laying after 10-15 then starts bother the back, has to position or sit and relax.  It's been better lately but she has avoided much activity.  No spinning or light headed lately.  Last vertigo walking down ervin at doctor's office early March and then doctor's striped shirt really affected her.  Sometime gets a little vertigo with laying down, she sleeps on her back but is propped up because of her back.      History of Injury/reason for PT: Patient comes in with neck pain and stiffness.  Neck has been bothering her for a 2-3 months.  Nothing that she knows of that started it.  Just noticed she couldn't turn head any more.  Notices it all day long.  Can't lift head up to look at the stars.  Pain also with just normal moving around.  Mostly stiffness, little bit of pain.  When look up and as she brings it back down it hurts 6/10. Turning 4/10. Steady around 4/10 with day to day activity.  Getting up out of chair pushing off hurts in neck 4/10.   She's noticed a little bump in muscle on R neck.  Has tried cold and that helps some.  Tylenol, helps with neck pain, on Epitol for nerve pain (trigeminal neurologia) not sure if it's regular or extra strength.  Has tried muscles massager and it did help. No falls.  No AD.  Lives alone in apartment, does own ADL's but looking into getting help with housework.  Dx with trigeminal neuralgia about 1 month ago and still having some of pain R side of head, mostly temporal area, good at times and sometimes feels like a horse is kicking her in the head at times.  Could resolve, could continue for the rest of her life.   Also reports h/o incontinence and decreased balance for safety in shower.  She reports not walking much, just around in her apartment, encouraged increased walking and we'll add balance and endurance activity if provider feels  appropriate.  X-Ray: trace degenerative anterolisthesis C5 on C6 and straightening of the lower cervical lordosis. Diffuse advanced facet degenerative changes are seen. Images of the thoracic spine demonstrate normal thoracic kyphosis. Minimal multilevel disc height loss is seen.  2/15/2017   Symptoms at evaluation:  ?   Decreased Motion  Yes, Weakness - no, Instability- unsteady, Swelling - no, Tingling/Numbness - no, Bowel/bladder changes - incontinence - stress, waiting room, coughing sneezing, Pain Ratin-6/10 Pain Location:  R neck  Aggravation Factors: movement  Easing Factors: cold, Tylenol- take edge off (doesn't have heating pad or microwave for hot pack)      Objective    Today's Intervention:      Sitting:   - cervical side bending 2x20 sec - did upper trap stretch today instead  - backwards shoulder rolls x20  - cervical rotation 2x30 sec   - chin tuck 3 x20 sec -  (working up to 30 sec as tolerated)    Standing at counter:   Romberg feet together EC no touches over 30 sec  Tandem stance each way x30 sec with no touches  SLS 18 (R)-18 (L) sec   Standing by counter: marching alternating B x15 - with alternating arms    Manual therapy: STM to B upper traps, cervical paraspinals, SCM and scalenes x25 minutes with moderate pressure using [employer] in sitting with shirt pulled back with towel.    -still knots in upper trap near insertion and upper trap near scap, more tender in scalenes today       Deferred this date:   Sitting pillow squeeze (tried to ball but too slippery) x10  - shoulder flex wall walk one arm at a time or else pulls into back too much x5-10   Wall push ups x5  - seated thoracic rotation with arms crossed over chest B 2 x30 second hold with cues to keep cervical spine in neutral rotation - doesn't feel much pull with this and doesn't want to bother back  - cervical extention stretch 3x20 sec  - cervical isometrics flex 10x5 sec, ext 10 x5 sec    Home Exercise Program:  Access Code:  [email or web address] URL: [email or web address] Date: 03/29/2017 Prepared by: Deana De La Cruz   Exercises   Seated Cervical Retraction - 2 reps - 30 second hold - 3x daily   Seated Cervical Sidebending AROM - 30 hold - 2 Reps - 3x daily   Standing Backward Shoulder Rolls - 20 reps - 2 hold - 3x daily   Shoulder Flexion Wall Walk - 5-10 reps - 3x daily   Walking - 1-2 Hallway - 2x daily     Access Code: [email or web address] URL: [email or web address] Date: 04/03/2017 Prepared by: Deana De La Cruz   Exercises   Standing March with Counter Support - 5 reps - 2x daily   Romberg Stance - 2 reps - 30 second hold - 2x daily   Seated Hip Adduction Isometrics with Ball - 10 reps - 5 hold - 2x daily     Access Code: [email or web address] URL: [email or web address] Date: 04/18/2017 Prepared by: Deana De La Cruz   Exercises   Standing Isometric Cervical Flexion with Manual Resistance - 10 reps - 5 hold - 1x daily   Standing Isometric Cervical Extension with Manual Resistance - 10 reps - 5 hold - 1x daily   Seated Cervical Retraction and Extension - 2 reps - 30 second hold - 2x daily         Assessment    Response to Intervention:  Patient was able to demonstrate HEP properly today.  Patient very limited ROM in neck and shoulders.      Therapist Assessment / Clinical Impression: Patient presents with signs and symptoms congruent with tight muscles with poor posture and will benefit from skilled PT to increase ROM, strength, decrease pain, and improve function.      Functional Impairment(s):  See subjective on initial evaluation and Functional Assessment / Summary Report from PSFS.    Physical Impairment(s):       MUSCULOSKELETAL:  Balance Deficits, Loss of Motion, Muscle Tightness and Pain      Patient Goal (time reference required): Patient would like to be able to turn head with no pain in 4 weeks.     Functional therapy goals: updated 4/3/2017   Patient is to be independent in their Home Exercise Program in 2 weeks.  Patient  is to tolerate walking with normal gait without hand rail up to 20 minutes in 4 weeks.  Patient is to have improved cervical mobility to allow for improved dressing and self-cares with 2/10 pain in 4 weeks.  Patient is to tolerate walking with normal gait without hand rail up to 30 minutes in 8 weeks.  Patient is to have improved cervical mobility to allow for improved looking at stars with 2/10 pain in 8 weeks.  Patient is to self-manage symptoms and return to prior function in 8 weeks.      Patient participated in goal selection and understand(s) the plan of care: Yes   Patient Potential for Achieving Desired Outcome:  Good      Plan    Treatment Plan / Targeted Outcomes:     Frequency:   16 visits     Duration of Treatment: 8 weeks    Planned Interventions:  Possible physical therapy interventions include but are not limited to:   Home Exercise Program development  Therapeutic Exercise (ROM & Strengthening)  Manual Therapy  Neuromuscular Re-education  Ultrasound  Electrical Stimulation  Phonophoresis with Ketoprofen  Iontophoresis with Dexamethasone  Therapeutic Activities  Gait Training  Posture and Body Mechanics Education  Mechanical traction if indicated    Plan for next visit:  Advance strength, ROM, manual therapy and modalities as needed for neck, LE strength, balance and vestibular rehab to improve safety and balance.     Student or PTA has been instructed in and demonstrates skills necessary to carry out above stated treatment plan: Yes    Thank you for your referral to [facility].  Please call with any questions, concerns or comments.  [phone]  Deana De La Cruz DPT      ROM: Cerv  3/29/17 Cerv. 4/27/17    Flexion 38 32   Extension 10 22   Left Lat. Flex 20 22   Right Lat. Flex 20 27   Left Rotation 44 30   Right Rotation 28 40   Sh. flex 90    Sh abd 75      LE MMT:   Hip flex, add: 3/5   Hip ext, abd, knee flex, ext, ankle DF and PF: 4-/5    4/11/17: [place] Hallpike with Ax2  for head and legs- only able to test L side today which was positive and followed with CRT Epley's with maxA x2 and she had nystagmus in rotational movement and spinning sensation, she was fearful of falling.  She felt off balance after but was steady on her feet after a few minutes.     Patient Specific Functional and Pain Scales (PSFS) completed 4/27/2017   We want to know what 3 activities in your life you are unable to perform, or are having the most difficulty performing, as a result of your chief problem. Please list and score at least 3 activities that you are unable to perform, or having the most difficulty performing, because of your chief problem.   Patient Specific Activity Scoring Scheme (score one number for each activity):   Activity Score (0-10)  0= Unable to perform activity  10= Able to perform activity at same level as before injury or problem   1. Looking up and back down (pain 6/10) 6/10   2. Turning head (pain 3/10) 7/10   3. Pain during the day (average 3/10) 7/10   4.  /10   5.  /10   Totals:  20/30 = 67% Function   and 33% Impairment   PSFS on 3/29/17 and 4/3/17: 16/30 = 53% Function and 47% Impairment      Patient verbally states that they understand that the information they have provided above is current and complete to the best of their knowledge.     Primary G Code and Modifier:    Per the Patient's intake and/or assessment the Primary G Code is: Mobility .   The Patient's Impairment, Limitation or Restriction Modifier would be best described as: CJ - 20% - 40% Impairment.   Goal Primary G Code and Modifier:    The Patient's G Code Goal would be: Mobility    The Patient's Impairment, Limitation or Restriction Modifier goal would be best described as: CI - 1% - 20% Impairment.

## 2018-01-06 ENCOUNTER — COMMUNICATION - GICH (OUTPATIENT)
Dept: FAMILY MEDICINE | Facility: OTHER | Age: 68
End: 2018-01-06

## 2018-01-08 ENCOUNTER — HISTORY (OUTPATIENT)
Dept: ORTHOPEDICS | Facility: OTHER | Age: 68
End: 2018-01-08

## 2018-01-08 ENCOUNTER — OFFICE VISIT - GICH (OUTPATIENT)
Dept: ORTHOPEDICS | Facility: OTHER | Age: 68
End: 2018-01-08

## 2018-01-08 DIAGNOSIS — M79.644 PAIN OF FINGER OF RIGHT HAND: ICD-10-CM

## 2018-01-08 DIAGNOSIS — M19.049 PRIMARY OSTEOARTHRITIS OF HAND: ICD-10-CM

## 2018-01-22 ENCOUNTER — HOSPITAL ENCOUNTER (OUTPATIENT)
Dept: RADIOLOGY | Facility: OTHER | Age: 68
End: 2018-01-22
Attending: NURSE PRACTITIONER

## 2018-01-22 ENCOUNTER — AMBULATORY - GICH (OUTPATIENT)
Dept: FAMILY MEDICINE | Facility: OTHER | Age: 68
End: 2018-01-22

## 2018-01-22 DIAGNOSIS — I83.93 ASYMPTOMATIC VARICOSE VEINS OF BOTH LOWER EXTREMITIES: ICD-10-CM

## 2018-01-22 DIAGNOSIS — Z87.891 PERSONAL HISTORY OF NICOTINE DEPENDENCE: ICD-10-CM

## 2018-01-22 DIAGNOSIS — R60.0 LOCALIZED EDEMA: ICD-10-CM

## 2018-01-24 ENCOUNTER — COMMUNICATION - GICH (OUTPATIENT)
Dept: ORTHOPEDICS | Facility: OTHER | Age: 68
End: 2018-01-24

## 2018-01-24 DIAGNOSIS — M19.049 PRIMARY OSTEOARTHRITIS OF HAND: ICD-10-CM

## 2018-01-24 DIAGNOSIS — M79.644 PAIN OF FINGER OF RIGHT HAND: ICD-10-CM

## 2018-01-26 ENCOUNTER — DOCUMENTATION ONLY (OUTPATIENT)
Dept: FAMILY MEDICINE | Facility: OTHER | Age: 68
End: 2018-01-26

## 2018-01-26 VITALS
DIASTOLIC BLOOD PRESSURE: 86 MMHG | SYSTOLIC BLOOD PRESSURE: 134 MMHG | HEIGHT: 61 IN | WEIGHT: 215.25 LBS | HEART RATE: 104 BPM | BODY MASS INDEX: 40.64 KG/M2

## 2018-01-26 VITALS
BODY MASS INDEX: 41.03 KG/M2 | HEIGHT: 60 IN | BODY MASS INDEX: 41.5 KG/M2 | WEIGHT: 211.38 LBS | DIASTOLIC BLOOD PRESSURE: 70 MMHG | SYSTOLIC BLOOD PRESSURE: 118 MMHG | HEART RATE: 76 BPM | DIASTOLIC BLOOD PRESSURE: 74 MMHG | HEART RATE: 84 BPM | TEMPERATURE: 98.2 F | HEIGHT: 60 IN | WEIGHT: 209 LBS | SYSTOLIC BLOOD PRESSURE: 122 MMHG

## 2018-01-26 VITALS — HEART RATE: 80 BPM | DIASTOLIC BLOOD PRESSURE: 80 MMHG | WEIGHT: 211 LBS | SYSTOLIC BLOOD PRESSURE: 128 MMHG

## 2018-01-26 VITALS
BODY MASS INDEX: 41.03 KG/M2 | HEIGHT: 60 IN | HEIGHT: 60 IN | DIASTOLIC BLOOD PRESSURE: 80 MMHG | BODY MASS INDEX: 40.91 KG/M2 | WEIGHT: 208.38 LBS | HEIGHT: 60 IN | DIASTOLIC BLOOD PRESSURE: 74 MMHG | WEIGHT: 209 LBS | WEIGHT: 212 LBS | HEART RATE: 72 BPM | DIASTOLIC BLOOD PRESSURE: 70 MMHG | SYSTOLIC BLOOD PRESSURE: 122 MMHG | SYSTOLIC BLOOD PRESSURE: 134 MMHG | HEART RATE: 84 BPM | SYSTOLIC BLOOD PRESSURE: 126 MMHG | BODY MASS INDEX: 41.62 KG/M2

## 2018-01-26 VITALS
WEIGHT: 210.5 LBS | HEIGHT: 60 IN | SYSTOLIC BLOOD PRESSURE: 128 MMHG | WEIGHT: 208.38 LBS | DIASTOLIC BLOOD PRESSURE: 68 MMHG | DIASTOLIC BLOOD PRESSURE: 82 MMHG | SYSTOLIC BLOOD PRESSURE: 128 MMHG | HEIGHT: 60 IN | HEART RATE: 98 BPM | BODY MASS INDEX: 41.33 KG/M2 | HEART RATE: 80 BPM | BODY MASS INDEX: 40.91 KG/M2

## 2018-01-26 VITALS — DIASTOLIC BLOOD PRESSURE: 64 MMHG | RESPIRATION RATE: 16 BRPM | WEIGHT: 213.4 LBS | SYSTOLIC BLOOD PRESSURE: 126 MMHG

## 2018-01-26 VITALS — SYSTOLIC BLOOD PRESSURE: 120 MMHG | WEIGHT: 213 LBS | RESPIRATION RATE: 16 BRPM | DIASTOLIC BLOOD PRESSURE: 70 MMHG

## 2018-01-26 VITALS — DIASTOLIC BLOOD PRESSURE: 68 MMHG | WEIGHT: 212.6 LBS | SYSTOLIC BLOOD PRESSURE: 128 MMHG | RESPIRATION RATE: 16 BRPM

## 2018-01-26 PROBLEM — Z87.19 HISTORY OF SMALL BOWEL OBSTRUCTION: Status: ACTIVE | Noted: 2017-07-09

## 2018-01-26 PROBLEM — Z74.1 REQUIRES ASSISTANCE WITH ACTIVITIES OF DAILY LIVING (ADL): Status: ACTIVE | Noted: 2017-04-24

## 2018-01-26 PROBLEM — L60.2 ONYCHOGRYPHOSIS: Status: ACTIVE | Noted: 2017-04-24

## 2018-01-26 PROBLEM — Z87.891 HISTORY OF TOBACCO USE: Status: ACTIVE | Noted: 2018-01-05

## 2018-01-26 PROBLEM — G50.0 TRIGEMINAL NEURALGIA OF RIGHT SIDE OF FACE: Status: ACTIVE | Noted: 2017-03-06

## 2018-01-26 PROBLEM — R19.5 POSITIVE COLORECTAL CANCER SCREENING USING DNA-BASED STOOL TEST: Status: ACTIVE | Noted: 2018-01-07

## 2018-01-26 PROBLEM — R60.0 BILATERAL LOWER EXTREMITY EDEMA: Status: ACTIVE | Noted: 2018-01-05

## 2018-01-26 PROBLEM — R19.5 ABNORMAL FINDINGS IN STOOL: Status: ACTIVE | Noted: 2017-07-05

## 2018-01-26 PROBLEM — I83.93 ASYMPTOMATIC VARICOSE VEINS OF BILATERAL LOWER EXTREMITIES: Status: ACTIVE | Noted: 2017-08-07

## 2018-01-26 PROBLEM — M19.049 CMC ARTHRITIS: Status: ACTIVE | Noted: 2018-01-08

## 2018-01-26 RX ORDER — CARBAMAZEPINE 200 MG/1
0.5 TABLET ORAL 2 TIMES DAILY
COMMUNITY
Start: 2017-11-07 | End: 2018-11-15

## 2018-01-27 ENCOUNTER — COMMUNICATION - GICH (OUTPATIENT)
Dept: FAMILY MEDICINE | Facility: OTHER | Age: 68
End: 2018-01-27

## 2018-01-30 ENCOUNTER — HOSPITAL ENCOUNTER (OUTPATIENT)
Dept: PHYSICAL THERAPY | Facility: OTHER | Age: 68
Setting detail: THERAPIES SERIES
End: 2018-01-30
Attending: ORTHOPAEDIC SURGERY

## 2018-01-30 DIAGNOSIS — M19.049 PRIMARY OSTEOARTHRITIS OF HAND: ICD-10-CM

## 2018-01-30 DIAGNOSIS — M79.644 PAIN OF FINGER OF RIGHT HAND: ICD-10-CM

## 2018-02-03 ASSESSMENT — PATIENT HEALTH QUESTIONNAIRE - PHQ9
SUM OF ALL RESPONSES TO PHQ QUESTIONS 1-9: 0

## 2018-02-05 ENCOUNTER — HOSPITAL ENCOUNTER (OUTPATIENT)
Dept: PHYSICAL THERAPY | Facility: OTHER | Age: 68
Setting detail: THERAPIES SERIES
End: 2018-02-05
Attending: ORTHOPAEDIC SURGERY

## 2018-02-05 PROCEDURE — 97140 MANUAL THERAPY 1/> REGIONS: CPT | Mod: GO

## 2018-02-05 PROCEDURE — 97035 APP MDLTY 1+ULTRASOUND EA 15: CPT | Mod: GO

## 2018-02-05 PROCEDURE — 97033 APP MDLTY 1+IONTPHRSIS EA 15: CPT | Mod: GO

## 2018-02-06 ENCOUNTER — OFFICE VISIT - GICH (OUTPATIENT)
Dept: FAMILY MEDICINE | Facility: OTHER | Age: 68
End: 2018-02-06

## 2018-02-06 ENCOUNTER — HISTORY (OUTPATIENT)
Dept: FAMILY MEDICINE | Facility: OTHER | Age: 68
End: 2018-02-06

## 2018-02-06 DIAGNOSIS — R32 URINARY INCONTINENCE: ICD-10-CM

## 2018-02-06 DIAGNOSIS — R60.0 LOCALIZED EDEMA: ICD-10-CM

## 2018-02-06 DIAGNOSIS — R68.89 OTHER GENERAL SYMPTOMS AND SIGNS: ICD-10-CM

## 2018-02-06 DIAGNOSIS — L60.2 ONYCHOGRYPHOSIS: ICD-10-CM

## 2018-02-06 DIAGNOSIS — Z87.891 PERSONAL HISTORY OF NICOTINE DEPENDENCE: ICD-10-CM

## 2018-02-06 ASSESSMENT — PATIENT HEALTH QUESTIONNAIRE - PHQ9: SUM OF ALL RESPONSES TO PHQ QUESTIONS 1-9: 0

## 2018-02-07 ENCOUNTER — DOCUMENTATION ONLY (OUTPATIENT)
Dept: FAMILY MEDICINE | Facility: OTHER | Age: 68
End: 2018-02-07

## 2018-02-09 ENCOUNTER — DOCUMENTATION ONLY (OUTPATIENT)
Dept: FAMILY MEDICINE | Facility: OTHER | Age: 68
End: 2018-02-09

## 2018-02-09 VITALS
HEIGHT: 59 IN | HEART RATE: 80 BPM | DIASTOLIC BLOOD PRESSURE: 72 MMHG | SYSTOLIC BLOOD PRESSURE: 118 MMHG | WEIGHT: 212.25 LBS | BODY MASS INDEX: 42.79 KG/M2

## 2018-02-09 VITALS
SYSTOLIC BLOOD PRESSURE: 124 MMHG | BODY MASS INDEX: 42.74 KG/M2 | DIASTOLIC BLOOD PRESSURE: 74 MMHG | HEART RATE: 88 BPM | HEIGHT: 59 IN | WEIGHT: 212 LBS

## 2018-02-09 VITALS
BODY MASS INDEX: 42.01 KG/M2 | DIASTOLIC BLOOD PRESSURE: 74 MMHG | SYSTOLIC BLOOD PRESSURE: 122 MMHG | HEIGHT: 60 IN | HEART RATE: 100 BPM | WEIGHT: 214 LBS

## 2018-02-09 VITALS — RESPIRATION RATE: 12 BRPM | HEIGHT: 59 IN | HEART RATE: 100 BPM | BODY MASS INDEX: 42.33 KG/M2 | WEIGHT: 210 LBS

## 2018-02-09 VITALS
SYSTOLIC BLOOD PRESSURE: 126 MMHG | BODY MASS INDEX: 42.84 KG/M2 | WEIGHT: 212.5 LBS | HEART RATE: 84 BPM | DIASTOLIC BLOOD PRESSURE: 72 MMHG | HEIGHT: 59 IN

## 2018-02-10 ASSESSMENT — PATIENT HEALTH QUESTIONNAIRE - PHQ9
SUM OF ALL RESPONSES TO PHQ QUESTIONS 1-9: 0
SUM OF ALL RESPONSES TO PHQ QUESTIONS 1-9: 0

## 2018-02-11 ASSESSMENT — PATIENT HEALTH QUESTIONNAIRE - PHQ9: SUM OF ALL RESPONSES TO PHQ QUESTIONS 1-9: 0

## 2018-02-12 ENCOUNTER — HOSPITAL ENCOUNTER (OUTPATIENT)
Dept: OCCUPATIONAL THERAPY | Facility: OTHER | Age: 68
Setting detail: THERAPIES SERIES
End: 2018-02-12
Attending: ORTHOPAEDIC SURGERY
Payer: COMMERCIAL

## 2018-02-12 PROCEDURE — 97035 APP MDLTY 1+ULTRASOUND EA 15: CPT | Mod: GO

## 2018-02-12 PROCEDURE — 97033 APP MDLTY 1+IONTPHRSIS EA 15: CPT | Mod: GO

## 2018-02-12 PROCEDURE — 40000839 ZZH STATISTIC HAND THERAPY VISIT

## 2018-02-12 PROCEDURE — 97110 THERAPEUTIC EXERCISES: CPT | Mod: GO

## 2018-02-12 NOTE — TELEPHONE ENCOUNTER
Patient Information     Patient Name MRN Pennie Car [id number] Female 1950      Telephone Encounter by Jocelin Coe at 2017  4:13 PM     Author:  Jocelin Coe Service:  (none) Author Type:  (none)     Filed:  2017  4:15 PM Encounter Date:  2017 Status:  Signed     :  Jocelin Coe            Spoke with Urology nurse and discussed that nothing was noted for cause on incontinence.     Spoke with patient and let her know we don't have another diagnosis to place on form to get incontinence pads covered for her. Patient has an upcoming appointment with Cookie Schultz CNP and will discuss options at that time.  Jocelin Coe LPN...................2017  4:15 PM

## 2018-02-12 NOTE — PROGRESS NOTES
Patient Information     Patient Name MRN Sex Pennie Fry [id number] Female 1950      Progress Notes by Radha Serrano at 12/15/2017  8:48 AM     Author:  Radha Serrano Service:  (none) Author Type:  Other Clinical Staff     Filed:  12/15/2017  8:48 AM Date of Service:  12/15/2017  8:48 AM Status:  Signed     :  Radha Serrano (Other Clinical Staff)            Falls Risk Criteria:    Age 65 and older or under age 4        Sensory deficits    Poor vision    Use of ambulatory aides    Impaired judgment    Unable to walk independently    Meets High Risk criteria for falls:  Yes             1.  Do you have dizziness or vertigo?    no                    2.  Do you need help standing or walking?   no                 3.  Have you fallen within the last 6 months?    no           4.  Has the patient been fasting?      yes       If any risks are marked Yes, the following interventions are utilized:    Do not leave patient unattended     Assist patient in the dressing room and bathroom    Have ambulatory aides available throughout procedure    Involve patient s family if available

## 2018-02-12 NOTE — NURSING NOTE
Patient Information     Patient Name MRN Sex Pennie Fry [id number] Female 1950      Nursing Note by Mary Ellen Marcelo RN at 12/15/2017  9:15 AM     Author:  Mary Ellen Marcelo RN Service:  (none) Author Type:  NURS- Registered Nurse     Filed:  12/15/2017  9:39 AM Encounter Date:  12/15/2017 Status:  Signed     :  Mary Ellen Marcelo RN (NURS- Registered Nurse)            Patient positioned in frog leg position prior to Nick Aguilar MD prepping patient with chlorhexidine gluconate cleanser.    [place] Protocol    A. Pre-procedure verification complete yes  1-relevant information / documentation available, reviewed and properly matched to the patient; 2-consent accurate and complete, 3-equipment and supplies available    B. Site marking complete N/A  Site marked if not in continuous attendance with patient    C. TIME OUT completed yes  Time Out was conducted just prior to starting procedure to verify the eight required elements: 1-patient identity, 2-consent accurate and complete, 3-position, 4-correct side/site marked (if applicable), 5-procedure, 6-relevant images / results properly labeled and displayed (if applicable), 7-antibiotics / irrigation fluids (if applicable), 8-safety precautions.    After procedure perineum area rinsed. Discharge instructions reviewed with patient. Patient verbalized understanding of discharge instructions and discharged ambulatory.

## 2018-02-12 NOTE — PROGRESS NOTES
Patient Information     Patient Name MRN Sex Pennie Fry [id number] Female 1950      Progress Notes by Cookie Schultz NP at 2017 10:30 AM     Author:  Cookie Schultz NP Service:  (none) Author Type:  PHYS- Nurse Practitioner     Filed:  2017  2:44 PM Encounter Date:  2017 Status:  Signed     :  Cookie Schultz NP (PHYS- Nurse Practitioner)            SUBJECTIVE:    Pennie Nichols is a 67 y.o. female who presents for follow-up on toenail fungus, nail trimming requires Dremel due to onychogryphosis.  Has a PCA in the home a few days a week who brings her to appointments, helps with foot care and skin care  patient is unable to perform foot care and skin care independently.  We'll be having cystoscopy and urology diagnostics for hematuria and incontinence    . Also reports her right thumb MIP joint has become red and the redness is spreading covering the entire joint. Denies any injury. No fever.      HPI    Allergies      Allergen   Reactions     Contrast Dye [Diatrizoate Meglumine (Iv Contrast Dye)]  Angioedema     As noted  note Dr Stone    ,   Family History       Problem   Relation Age of Onset     Other  Mother      lung cancer       Heart Disease  Father 70     MI       Cancer  Father      Liver cancer       Cancer  Other      Lung cancer       Diabetes  Other      Cancer-colon  No Family History      Cancer-prostate  No Family History      Cancer-ovarian  No Family History      Blood Disease  No Family History      Hypertension  No Family History      Stroke  No Family History      Thyroid Disease  No Family History      Cancer-breast  No Family History    ,   Current Outpatient Prescriptions on File Prior to Visit       Medication  Sig Dispense Refill     ALPRAZolam (XANAX) 0.5 mg tablet Take one tablet 1 hour prior to procedure.  (must have a  if you take the medication) 1 tablet 0     carBAMazepine (TEGRETOL) 200 mg tablet Take 1/2 tab twice a day 60 tablet  3     No current facility-administered medications on file prior to visit.    ,   Current Outpatient Prescriptions:      ALPRAZolam (XANAX) 0.5 mg tablet, Take one tablet 1 hour prior to procedure.  (must have a  if you take the medication), Disp: 1 tablet, Rfl: 0     carBAMazepine (TEGRETOL) 200 mg tablet, Take 1/2 tab twice a day, Disp: 60 tablet, Rfl: 3     cephalexin (KEFLEX) 500 mg capsule, Take 1 capsule by mouth 3 times daily for 7 days., Disp: 21 capsule, Rfl: 0  Medications have been reviewed by me and are current to the best of my knowledge and ability.,   Past Medical History:     Diagnosis  Date     Back problem     Recurrent back problems , secondary to injury at [facility]      Fibromyalgia 7/9/1997     History of small bowel obstruction 7/9/2017 8/31/95--Dr Barrow--had incidental Appendectomy--Multiple adhesions released      Mental health problem     not otherwise specified       Onychogryphosis 4/24/2017   ,   Patient Active Problem List       Diagnosis  Date Noted     Asymptomatic varicose veins of bilateral lower extremities  08/07/2017     History of small bowel obstruction  07/09/2017 8/31/95--Dr Barrow--had incidental Appendectomy--Multiple adhesions released        Abnormal findings in stool  07/05/2017     Onychogryphosis  04/24/2017     Requires assistance with activities of daily living (ADL)  04/24/2017     Trigeminal neuralgia of right side of face  03/06/2017     Fibromyalgia  07/09/1997   ,   Past Surgical History:      Procedure  Laterality Date     APPENDECTOMY  1996    Incidental, Enterolysis--Dr Barrow       CHOLECYSTECTOMY  1996    Laparoscopic       COLONOSCOPY  1990?     CRYOTHERAPY OF CERVIX  1994    Abnormal Pap        HYSTERECTOMY  01/1995    [place]      and   Social History      Substance Use Topics        Smoking status:  Former Smoker     Packs/day: 1.00     Years: 30.00     Types: Cigarettes     Quit date: 1/21/2001     Smokeless tobacco:  Never Used     Alcohol use  No  "      REVIEW OF SYSTEMS:  Review of Systems   Constitutional: Negative.    HENT: Negative.    Eyes: Negative.    Respiratory: Negative.    Cardiovascular: Negative.    Gastrointestinal: Negative.    Genitourinary: Negative.    Musculoskeletal: Positive for joint pain.   Skin: Positive for rash.   Neurological: Negative.    Endo/Heme/Allergies: Negative.    Psychiatric/Behavioral: Negative.        OBJECTIVE:  /74  Pulse 100  Ht 1.52 m (4' 11.84\")  Wt 97.1 kg (214 lb)  Breastfeeding? No  BMI 42.01 kg/m2    EXAM:   Physical Exam   Constitutional: She is well-developed, well-nourished, and in no distress.   Cardiovascular: Normal rate.    Pulmonary/Chest: Effort normal.   Musculoskeletal: Normal range of motion.   Requires assistance getting up and down exam table    Right thumb MIP joint erythema with defined border macular, suspicious for cellulitis  has normal range of motion, does not feel warm.   Skin: Skin is warm and dry.   Dremel nail trimming to all toenails--very thickened with fungus--tolerated well  no erythema or tinea pedis     Psychiatric: Mood, memory, affect and judgment normal.   Nursing note and vitals reviewed.      ASSESSMENT/PLAN:    ICD-10-CM    1. Thumb pain, right M79.644 XR FINGER 3 VIEWS RIGHT      cephalexin (KEFLEX) 500 mg capsule   2. Onychogryphosis L60.2    3. Incontinence in female R32 CREATININE   4. Asymptomatic microscopic hematuria R31.21 CREATININE    x-rays show degenerative arthritis no acute changes    Plan:  We'll treat for cellulitis--recommend soaking hand in warm soapy water or Epsom salts twice daily until erythema resolves    Patient will obtain labs for urology diagnostics    She will return in one month for footcare and follow up on other issues        "

## 2018-02-12 NOTE — TELEPHONE ENCOUNTER
Patient Information     Patient Name MRN Pennie Car [id number] Female 1950      Telephone Encounter by Radha Pedersen at 2017  4:33 PM     Author:  Radha Pedersen Service:  (none) Author Type:  (none)     Filed:  2017  4:33 PM Encounter Date:  2017 Status:  Signed     :  Radha Pedersen            Left message to call back.  Radha Pedersen LPN ....................  2017   4:33 PM

## 2018-02-12 NOTE — PATIENT INSTRUCTIONS
Patient Information     Patient Name MRN Sex Pennie Fry [id number] Female 1950      Patient Instructions by Cookie Schultz NP at 2017 10:30 AM     Author:  Cookie Schultz NP  Service:  (none) Author Type:  PHYS- Nurse Practitioner     Filed:  2017 11:13 AM  Encounter Date:  2017 Status:  Addendum     :  Cookie Schultz NP (PHYS- Nurse Practitioner)        Related Notes: Original Note by Cookie Schultz NP (PHYS- Nurse Practitioner) filed at 2017 11:13 AM            Start keflex 3 x day for 7 days--soak right fingers in warm epsom salt or soapy water 20 min 2 x day until redness goes away    Let me know if the thumb redness increases or does not get better

## 2018-02-12 NOTE — NURSING NOTE
Patient Information     Patient Name MRN Pennie Car [id number] Female 1950      Nursing Note by Jocelin Coe at 2018 10:30 AM     Author:  Jocelin Coe Service:  (none) Author Type:  (none)     Filed:  2018 10:53 AM Encounter Date:  2018 Status:  Signed     :  Jocelin Coe            Patient presents to clinic today for toenail care and to discuss diagnosis for getting incontinence pads.    Jocelin Coe LPN...................2018  10:36 AM

## 2018-02-12 NOTE — PROGRESS NOTES
Patient Information     Patient Name MRN Sex Pennie Fry [id number] Female 1950      Progress Notes by Nick Aguilar MD at 12/15/2017  9:15 AM     Author:  Nick Aguilar MD Service:  (none) Author Type:  Physician     Filed:  12/15/2017  9:45 AM Encounter Date:  12/15/2017 Status:  Signed     :  Nick Aguilar MD (Physician)            Preprocedure diagnosis  Hematuria    Postprocedure diagnosis  Hematuria    Procedure  Flexible Cystourethroscopy    Surgeon  Nick Aguilar MD    Anesthesia  2% lidocaine jelly intraurethrally    Complications  None    Indications  67 y.o. female undergoing a flexible cystoscopy for the above mentioned indications.    Findings  Cystoscopic findings included a normal urethra.    The bladder appeared to be normal capacity.    There were no tumors, stones or foreign bodies.    The orifices were slit-shaped and in their normal location.    Procedure  The patient was placed in supine position and prepped and draped in sterile fashion with lidocaine jelly per urethra for anesthesia.    I passed a lubricated 14F flexible cystoscope through the urethra and into the bladder and the bladder was completely visualized.  The cystoscope was retroflexed and the bladder neck visualized.    The cystoscope was slowly withdrawn while visualizing the urethra and the procedure terminated.    The patient tolerated the procedure well.      Assessment  limited hematuria workup is negative - CT stone study and cystoscopy  patient was unable to undergo IV contrast and menstruation due to previous allergy  because of this I recommended follow-up in 3 months with urinalysis    Plan  follow-up in 3 months with UA with micro  if negative no additional testing  if positive for blood will consider MRI        I spent 10 minutes on this patient's visit (exclusive of separately billed services/procedures) and over half of this time was spent in face-to-face counseling regarding the above-described  planned in the assessment and plan regarding hematuria : diagnosis, treatment options with emphasis on  risks and benefits of each, prognosis and importance of compliance.

## 2018-02-12 NOTE — PATIENT INSTRUCTIONS
Patient Information     Patient Name MRN Pennie Car [id number] Female 1950      Patient Instructions by Mary Ellen Marcelo RN at 12/15/2017  9:15 AM     Author:  Mary Ellen Marcelo RN Service:  (none) Author Type:  NURS- Registered Nurse     Filed:  12/15/2017  9:13 AM Encounter Date:  12/15/2017 Status:  Signed     :  Mary Ellen Marcelo RN (NURS- Registered Nurse)            Home Care after Cystoscopy  Follow these guidelines for your care after your procedure.    Activity  No limitations    Bathing or showering  No limitations    Symptoms  You may notice some burning with urination but this usually resolves after 1-2 days.  You may also notice small amounts of blood in your urine.  Please increase water intake for the next few days to help with these symptoms.    Contacts  General Questions: [phone]  Appointments:  [phone]  Emergencies:  [phone]    When to call the clinic  If you develop any of the following symptoms please call the clinic immediately.  If the clinic is closed please be seen at an urgent care clinic or the Emergency Department.  - Burning with urination that worsens after 2 days  - Unable to urinate causing severe pelvic pain  - Fevers of greater than 101 degrees F  - Flank pain that is not responding to pain medication    Follow up  Please follow up as discussed at the appointment.

## 2018-02-12 NOTE — PATIENT INSTRUCTIONS
Patient Information     Patient Name MRN Sex Pennie Fry [id number] Female 1950      Patient Instructions by Cookie Schultz NP at 2018 10:30 AM     Author:  Cookie Schultz NP Service:  (none) Author Type:  PHYS- Nurse Practitioner     Filed:  2018 11:19 AM Encounter Date:  2018 Status:  Signed     :  Cookie Schultz NP (PHYS- Nurse Practitioner)            You will get appointment for ultrasound to check the circulation in your legs--if ok then would order graduated compression stockings    You will get apt to see orthopedics about your chronic right thumb pain    Followup with me in 1 month for foot care    We will try calling about getting disposible briefs and call you if they will approve    eucerin cream was sent to pharmacy for skin care to your entire body 2 x daily and as needed    Elevate your legs 3 x day at least and watch salt for leg swelling

## 2018-02-12 NOTE — NURSING NOTE
Patient Information     Patient Name MRN Pennie Car [id number] Female 1950      Nursing Note by Jocelin Coe at 2017 10:30 AM     Author:  Jocelin Coe Service:  (none) Author Type:  (none)     Filed:  2017 10:52 AM Encounter Date:  2017 Status:  Signed     :  Jocelin Coe            Patient presents to clinic today for a follow up on feet and toenail care.    Jocelin Coe LPN...................2017  10:40 AM

## 2018-02-13 NOTE — PROGRESS NOTES
Patient Information     Patient Name MRN Sex Pennie Fry [id number] Female 1950      Progress Notes by Ulysses Moore DO at 2018  3:45 PM     Author:  Ulysses Moore DO Service:  (none) Author Type:  Physician     Filed:  2018  4:25 PM Encounter Date:  2018 Status:  Signed     :  Ulysses Moore DO (Physician)            Pennie Nichols was seen in consultation for Cookie Schultz NP for a chief complaint of pain into the right thumb.      CHIEF COMPLAINT: Pennie Nichols is a 67 y.o.  female  Chief Complaint     Patient presents with       Consult      Right thumb pain          HISTORY OF PRESENTING INJURY   History of presenting injury, patient has had ongoing discomfort about the interphalangeal joint of her right. It has been ongoing now for greater than a month. She denies any trauma to that area. She was placed on an antibiotic when she saw her primary. She has not been on that for multiple weeks. No other treatment.  Description of pain:  mild aching   Radiation of pain: no  Pain course: gradually improving  Worse with: bending or flexing affected area  Improved by: rest  History of injection: No  Any PT: No    PAST MEDICAL HISTORY:  Past Medical History:     Diagnosis  Date     Back problem     Recurrent back problems , secondary to injury at [facility]      [facility] arthritis 2018     Fibromyalgia 1997     History of small bowel obstruction 2017--Dr Barrow--had incidental Appendectomy--Multiple adhesions released      Mental health problem     not otherwise specified       Onychogryphosis 2017       PAST SURGICAL HISTORY:  Past Surgical History:      Procedure  Laterality Date     APPENDECTOMY      Incidental, Enterolysis--Dr Barrow       CHOLECYSTECTOMY      Laparoscopic       COLONOSCOPY  ?     CRYOTHERAPY OF CERVIX      Abnormal Pap        HYSTERECTOMY  1995    [place]         ORTHOPEDIC FRACTURES AND BROKEN  "BONES:  As above.    ALLERGIES:  Allergies      Allergen   Reactions     Contrast Dye [Diatrizoate Meglumine (Iv Contrast Dye)]  Angioedema     As noted 2009 note Dr Stone        CURRENT MEDICATIONS:  Current Outpatient Prescriptions       Medication  Sig Dispense Refill     carBAMazepine (TEGRETOL) 200 mg tablet Take 1/2 tab twice a day 60 tablet 3     white petrolatum-mineral oil (EUCERIN) cream Eucerin or insurance covered moisturizer cream to body 2 x daily and as needed 1 jar 5     No current facility-administered medications for this visit.      Medications have been reviewed by me and are current to the best of my knowledge and ability.      SOCIAL HISTORY:  Marital Status:   Children: No  Occupation: Retired  Alcohol use:  No  Tobacco use: Smoker: yes  Are you or have you used illicit drugs:  no    FAMILY HISTORY:  Family History       Problem   Relation Age of Onset     Other  Mother      lung cancer       Heart Disease  Father 70     MI       Cancer  Father      Liver cancer       Cancer  Other      Lung cancer       Diabetes  Other      Cancer-colon  No Family History      Cancer-prostate  No Family History      Cancer-ovarian  No Family History      Blood Disease  No Family History      Hypertension  No Family History      Stroke  No Family History      Thyroid Disease  No Family History      Cancer-breast  No Family History        REVIEW OF SYSTEMS:  The review of systems as documented in the HPI and on the intake questionnaire, completed by the patient on 1/8/2018 have been reviewed by myself and the pertinent positives and negatives addressed.  The remainder of the complete review of systems was non-contributory.      PHYSICAL EXAM:   /74 (Cuff Site: Right Arm, Position: Sitting, Cuff Size: Adult Large)  Pulse 88  Ht 1.5 m (4' 11.06\")  Wt 96.2 kg (212 lb)  BMI 42.73 kg/m2 Body mass index is 42.73 kg/(m^2).    General Appearance: Pleasant female in good appearance, mood and " affect.  Alert and orientated times three ( time, date and location).    Skin: Abnormal, there is slight increased swelling about the right interphalangeal joint. A little bit of redness noted. No drainage and no fluid collections.    Hand:  Thenar wasting: no  Hypothenar wasting: no  Sensation: Normal  Radial and ulnar blood flow:  Normal  Tinel's test negative  Phalen's test negative  Compression test negative    Shoulder:  Motion: full    Elbow:  Flexion: Normal  Extension: Normal    Eyes: Pupils are round.    Ears: Hearing: Intact    Heart: He has good capillary refill into her hands. Pulses are regular.    Lungs: Coarse breath sounds.     Radiographic images from 12/7 where independently reviewed and discussed with the patient.      Xray:    X-rays do demonstrate some increased arthritic changes about the interphalangeal joint and the first CMC joint. This is of her right thumb.    IMPRESSION:  Osteoarthritis interphalangeal joint right thumb and small amount of arthritis first CMC joint.    PLAN:  Risks, benefits, conservative, surgical and alternatives to treatment where discussed and the patient would like to proceed with conservative measures.  Patient was instructed on utilization of over-the-counter medications.  She is going to soak her hands in Epson salts and warm water at least twice a day and do her exercises.  She was given a thumb spica splint to decrease some of the motion but help her with activities.  If she is still having difficulties weeks from now she will call the office and she can have occupational therapy set up. She does not need to come in to have that order placed.  Questions and concerns answered to patient's satisfaction.  No orthopedic surgeries necessary at present time.    Ulysses Moore D.O.  Orthopaedic Surgeon    [facility]  [address] [place] [address]  Phone [phone] (KNEE)  Fax [phone]    This document was created using  computer generated templates and voice activated software.    4:25 PM 1/8/2018

## 2018-02-13 NOTE — INITIAL ASSESSMENTS
"Patient Information     Patient Name MRN Pennie Car [id number] Female 1950      Initial Assessments by Elizabeth Gordon OT at 2018  2:35 PM     Author:  Elizabeth Gordon OT Service:  (none) Author Type:  OT- Occupational Therapist     Filed:  2018  1:58 PM Date of Service:  2018  2:35 PM Status:  Signed     :  Elizabeth Gordon OT (OT- Occupational Therapist)            [facility]  Outpatient OT   Upper Extremity Initial Evaluation      Date of Service: 18 Visit #: 1    Patient Name: Pennie Nichols   Referring MD/Provider:  Dr. Moore   Diagnosis: R thumb PIP pain   Treatment Diagnosis:  Increased pain   Insurance: [employer] classic   Onset date: November   Start of Care Date: 18  Certification Dates: From:  18 To:                     Re-Cert Due:    Next MD visit: Dr. Huertas 18    Subjective:   Patient is a 67 year old female who reports to OT with R thumb pain.  She states \" I don't now how it happened, it just started burning.\" She also states \"It started out looking like a red bump, and then a few days later it spread to the other side of my thumb.\"  \"I went in to see Dr. Moore and he gave me a thumb brace and told me to come to therapy if the swelling didn't go down.\"  Pennie reports that she tried wearing the thumb spica brace for about a week, but reports that it was \"cutting off her circulation\" so she quit wearing it.  She also reports that she has been soaking it in hot water, which has seemed to help a little bit with the pain.     Summary of injury/illness/exacerbation:     Pain Ratin = Moderate Pain, (Aggravating, Grueling, Upsetting, Frustrating) / Location:  R thumb IP joint     At worst : /10    Prior to injury/illness/exacerbation: Patient was able to ambulate independently without an assistive device, drive an automobile, and perform all ADL/IADL and household chores with no limitations.      Since " injury/illness/exacerbation, patient has the following functional limitations:       Precautions:  N/A  Cognition:  Oriented to Person, Place, and Time. Yes     Were cultural / age or other special adaptations needed? No      Patient is a vulnerable adult: No      Patient is aware of diagnosis: Yes      Risks and benefits explained: Yes  PMH:  Past Medical History:     Diagnosis  Date     Back problem     Recurrent back problems , secondary to injury at [facility] arthritis 1/8/2018     Fibromyalgia 7/9/1997     History of small bowel obstruction 7/9/2017 8/31/95--Dr Barrow--had incidental Appendectomy--Multiple adhesions released      Mental health problem     not otherwise specified       Onychogryphosis 4/24/2017         Fall Risk Screening:  Have you fallen 2 more more times in the past year? No  Have you fallen and had an injury in the past year? No    If the patient answers yes to either of the above questions, a Timed Up and Go (TUG) test will be performed. A referral to physical therapy will be initiated if: 1) TUG test of greater than 13.5 seconds, or 2) inability to participate in the TUG test due to needing assistance by another person for ambulation.               Objective:    Hand Dominance:   Right     Strength Right Norms Left Norms    35 (without using thumb)    30 with thumb 49.6 30 41.0   Lateral pinch 9P 15 14 14.1   3 point pinch  5P 14.2 10 13.9   2 point pinch 5P 10.6 8 10.5   P = pain with testing  (Therapist use only: dynamometer # 8  , pinch gauge # 1   )  **measurements in degrees                       ROM- Digits MCP PIP DIP    R  /  L R  /  L R  /  L   1 (thumb) 54/56 60/64    2 (index)      3 (middle)      4 (ring)      5 (pinky)      **measurements in degrees      Interpretation of results:    Coordination testing:     Right Standard Score Left Standard Score   9 Hole Peg Test: (time to place/remove 9 pegs) 47 seconds 19.5 seconds 32 seconds 21.4 seconds   PCE Nuts and Bolts:  (time to place/remove 10 nuts)           PCE 3 Piece Peg (# pieces placed in 1 minute)       PCE round blocks (# pieces turned over in 1 minute)         Standard Scores:  0-70: Low  : Average  100-130: Above Average    Circumferential measurements of edema:  Description of location measured: IP PIP   Results of measurements: Right: 7cm 7cm Left: 6.5cm 6cm     Other findings:  Thumb IP is red and swollen and tender to touch.         Assessment:    Signs and symptoms are consistent with: R thumb IP pain     Problem list includes:  Pain, trouble putting weight on it, swelling, loss of movement, stiffness, loss of strength, decreased ability to performance ADL/IADL tasks     Patient s goal: to be able to use hand without pain again     Functional short term goals (established in collaboration with the patient, to be met in 8 weeks):  1.) Patient will subjectively report  improved ability to open a tight new jar without pain from a current rating of 5 (unable)  to a rating of 1-2 (mild to no difficulty) as reported by the DASH.   2.) Patient will subjectively report  improved ability to do heavy household  chores  from a current rating of 5 (unable)  to a rating of 1-2 (mild to no difficulty) as reported by the DASH.   3.) Patient will subjectively report  improved ability to use a knife to cut food from a current rating of 4 (severe difficulty)  to a rating of 1-2 (mild to no difficulty) as reported by the DASH.   4.) Patient will report ability to use R dominant hand for ADL activities with <3/10 pain consistently.      Long term goal (to be met in 12 weeks):  1.) Patient will rate no greater than 20% impairment rating according to the DASH in order to improve independence with all UE activities.   2.) Patient will demonstrate objective improvements in strength/ROM in order to facilitate functional goals listed above and return to PLOF.        Rehab potential: Good for stated goals.    Risk, benefits, and  alternatives were discussed with patient. Patient agrees with the plan of care.    Today s treatment included: Evaluation     Completed paraffin dip (heat) to R hand/wrist, 5 layers, 3 minute application, to prepare soft tissue for stretch, followed by PROM with prolonged stretch as follows: R thumb IP flexion/extension    STM/IASTM/edema management massage to R thumb     Iontophoresis 3.0 mA dose via Dupel delivery method, using dexamethasone and medium butterfly electrode with 2.5 mL. Medication applied to area of pain R dorsal thumb, to decrease inflammation and/or pain. Patient was informed of normal reactions to the iontophoresis treatment. Total iontophoresis treatment time = 10 minutes .       Fit patient with CMC comfort cool splint in order to support thumb IP     Response to intervention: Patient demonstrated knowledge and acceptance to current treatment plan.  She is an active participant in the therapeutic process.        Per the Patient's intake assessment (Disabilities of the Arm, Shoulder, and Hand- DASH) the Primary G Code is Handling Objects .    The Patient's Impairment, Limitation or Restriction Modifier would be best described as CL - 60% - 80% Impairment.     The Patient's G Code Goal would be Self Care     The Patient's Impairment, Limitation or Restriction Modifier would be best described as CI - 1% - 20% Impairment.           Plan:    Treatment Plan (may include any combination of the following):    Home programming  Therapeutic exercise   Self care/ home management   Manual therapy   Therapeutic activities   NMR   Ultrasound   Phonophoresis (10% Ketoprofen)   Iontophoresis (.2% Dexamethasone)   Superficial modalities prn   Electrical stimulation, including NMES   Orthotic management   Standardized testing   ROM hand measurements  Other    Frequency/Duration: up to 16 visits within 8 weeks     Plan for next visit: modalities for pain control, edema management,     Student has been  instructed in and demonstrates skills necessary to carry out above stated treatment plan.     Thank you for your referral to [facility]. Please call with any questions, concerns or comments [phone].    Elizabeth Gordon OTR/L  Occupational Therapist

## 2018-02-13 NOTE — PROGRESS NOTES
Patient Information     Patient Name MRN Sex Pennie Fry [id number] Female 1950      Progress Notes by Cookie Schultz NP at 2018 10:30 AM     Author:  Cookie Schultz NP Service:  (none) Author Type:  PHYS- Nurse Practitioner     Filed:  2018 11:00 AM Encounter Date:  2018 Status:  Signed     :  Cookie Schultz NP (PHYS- Nurse Practitioner)            SUBJECTIVE:    Pennie Nichols is a 67 y.o. female who presents for multiple issues. Have been continuing to trim toenails as patient has limited transportation and unable to do self-cares. Does have a PCA that will help with skin care, soaking feet, transporting to appointments.  Patient would like to get disposable under garments--for her chronic incontinence. Saw Dr. Aguilar urology for workup, had cystoscopy exam which was negative however unable to complete imaging due to allergy to contrast.  Denies any alex hematuria, dysuria.  Had positive Cologuard screening--declines colonoscopy.  Reports skin is very itchy and dry--PCA will assist with skin care.  Would like to discuss graduated compression stockings--does have a past history of significant tobacco use.--Has multiple varicose veins and ankle edema with persistent standing--resolves after bedtime and when elevating legs.  Continues to have right thumb pain--had some mild erythema at the distal joint last office visit had recommended soaking in Epsom salts and completed a course of Keflex which she felt improved however is not completely gone and painful with flexion. Denies any known history of injury. X-rays that show degenerative arthritis.      HPI    Allergies      Allergen   Reactions     Contrast Dye [Diatrizoate Meglumine (Iv Contrast Dye)]  Angioedema     As noted  note Dr Stone    ,   Family History       Problem   Relation Age of Onset     Other  Mother      lung cancer       Heart Disease  Father 70     MI       Cancer  Father      Liver cancer       Cancer   Other      Lung cancer       Diabetes  Other      Cancer-colon  No Family History      Cancer-prostate  No Family History      Cancer-ovarian  No Family History      Blood Disease  No Family History      Hypertension  No Family History      Stroke  No Family History      Thyroid Disease  No Family History      Cancer-breast  No Family History    ,   Current Outpatient Prescriptions on File Prior to Visit       Medication  Sig Dispense Refill     carBAMazepine (TEGRETOL) 200 mg tablet Take 1/2 tab twice a day 60 tablet 3     No current facility-administered medications on file prior to visit.    ,   Current Outpatient Prescriptions:      carBAMazepine (TEGRETOL) 200 mg tablet, Take 1/2 tab twice a day, Disp: 60 tablet, Rfl: 3     white petrolatum-mineral oil (EUCERIN) cream, Eucerin or insurance covered moisturizer cream to body 2 x daily and as needed, Disp: 1 jar, Rfl: 5  Medications have been reviewed by me and are current to the best of my knowledge and ability.,   Past Medical History:     Diagnosis  Date     Back problem     Recurrent back problems , secondary to injury at [facility]      Fibromyalgia 7/9/1997     History of small bowel obstruction 7/9/2017 8/31/95--Dr Barrow--had incidental Appendectomy--Multiple adhesions released      Mental health problem     not otherwise specified       Onychogryphosis 4/24/2017   ,   Patient Active Problem List       Diagnosis  Date Noted     Positive colorectal cancer screening using DNA-based stool test  01/07/2018     History of tobacco use  01/05/2018     Bilateral lower extremity edema  01/05/2018     Asymptomatic varicose veins of bilateral lower extremities  08/07/2017     History of small bowel obstruction  07/09/2017 8/31/95--Dr Barrow--had incidental Appendectomy--Multiple adhesions released        Abnormal findings in stool  07/05/2017     Onychogryphosis  04/24/2017     Requires assistance with activities of daily living (ADL)  04/24/2017     Trigeminal neuralgia  "of right side of face  03/06/2017     Fibromyalgia  07/09/1997   ,   Past Surgical History:      Procedure  Laterality Date     APPENDECTOMY  1996    Incidental, Enterolysis--Dr Barrow       CHOLECYSTECTOMY  1996    Laparoscopic       COLONOSCOPY  1990?     CRYOTHERAPY OF CERVIX  1994    Abnormal Pap        HYSTERECTOMY  01/1995    [facility]aus      and   Social History      Substance Use Topics        Smoking status:  Former Smoker     Packs/day: 1.00     Years: 30.00     Types: Cigarettes     Quit date: 1/21/2001     Smokeless tobacco:  Never Used     Alcohol use  No       REVIEW OF SYSTEMS:  Review of Systems   Constitutional: Negative.    HENT: Negative.    Eyes: Negative.    Respiratory: Negative.    Cardiovascular: Negative.    Gastrointestinal: Negative.    Genitourinary: Negative.    Musculoskeletal: Positive for joint pain.   Skin: Positive for itching and rash.   Neurological: Negative.    Endo/Heme/Allergies: Negative.    Psychiatric/Behavioral: Negative.        OBJECTIVE:  /72 (Cuff Site: Right Arm, Position: Sitting, Cuff Size: Adult Large)  Pulse 80  Ht 1.5 m (4' 11.06\")  Wt 96.3 kg (212 lb 4 oz)  BMI 42.79 kg/m2    EXAM:   Physical Exam   Constitutional: She is oriented to person, place, and time and well-developed, well-nourished, and in no distress.   Cardiovascular: Normal rate.    Pulmonary/Chest: Effort normal.   Musculoskeletal: Normal range of motion.   Neurological: She is alert and oriented to person, place, and time. She exhibits normal muscle tone. Gait normal.   Skin: Skin is warm and dry. Rash noted.   Right thumb has some mild erythema at DIP joint--no edema or fluctuance--improved from last office visit    Skin is generally dry and scaly--no patches of focal erythema or pustules    Bilateral foot care for thickened fungal nails--trimmed with dremel tolerated well  scattered varicose veins bilaterally lower extremities nontender   Psychiatric: Mood, memory, affect and judgment normal. "   Nursing note and vitals reviewed.      ASSESSMENT/PLAN:    ICD-10-CM    1. Thumb pain, right M79.644 CBC WITH DIFFERENTIAL      SEDIMENTATION RATE      BASIC METABOLIC PANEL      AMB CONSULT TO ORTHOPEDICS - AFFILIATE ONLY      CBC WITH DIFFERENTIAL      SEDIMENTATION RATE      BASIC METABOLIC PANEL      CBC WITH AUTO DIFFERENTIAL   2. Asymptomatic varicose veins of bilateral lower extremities I83.93 US CURT W TOE PRESSURES BILATERAL   3. History of tobacco use Z87.891 US CURT W TOE PRESSURES BILATERAL   4. Bilateral lower extremity edema R60.0 US CURT W TOE PRESSURES BILATERAL   5. Dry skin dermatitis L85.3 white petrolatum-mineral oil (EUCERIN) cream   6. Positive colorectal cancer screening using DNA-based stool test R19.5    7. Onychogryphosis L60.2     labs pending    CURT pending    considered herpetic ej to right thumb--was treated with one course of Keflex mild improvement  with rest and ibuprofen--has not resolved--will refer to orthopedics    Plan:  Orthopedic referral-for continued erythema pain right thenar distal interphalangeal joint  continue rest, Epsom soaks for comfort and ibuprofen    Strongly encourage colonoscopy--declines    We'll obtain CURT studies before prescribing graduated compression stockings due to significant tobacco history  if negative will send prescription to [facility]    We'll continue to see monthly for fungal nail and foot care--- she is unable to be transported to podiatry  and unable to do self-care    Generic Eucerin for daily skin care--PCA can assist    Follow-up in one month

## 2018-02-13 NOTE — NURSING NOTE
Patient Information     Patient Name MRN Sex Pennie Fry [id number] Female 1950      Nursing Note by Jocelin Coe at 2018 10:30 AM     Author:  Jocelin Coe Service:  (none) Author Type:  (none)     Filed:  2018 11:07 AM Encounter Date:  2018 Status:  Signed     :  Jocelin Coe            Patient presents to clinic today for follow up foot care. I also spoke with [place] and they will cover incontinence liner for patient through insurance and she is interested in this.    Jocelin Coe LPN...................2018  10:33 AM

## 2018-02-13 NOTE — PROGRESS NOTES
Patient Information     Patient Name MRN Sex Pennie Fry [id number] Female 1950      Progress Notes by Bina Gan R.T. (ARRT) at 2018 11:38 AM     Author:  Bina Gan R.T. (ARRT) Service:  (none) Author Type:  [employer] - Registered Radiologic Technologist     Filed:  2018 11:39 AM Date of Service:  2018 11:38 AM Status:  Signed     :  Bina Gan R.T. (ARRT) ([facility] - Registered Radiologic Technologist)            Falls Risk Criteria:    Age 65 and older or under age 4        Sensory deficits    Poor vision    Use of ambulatory aides    Impaired judgment    Unable to walk independently    Meets High Risk criteria for falls:  Yes               1.  Do you have dizziness or vertigo?    no                    2.  Do you need help standing or walking?   no                 3.  Have you fallen within the last 6 months?    no           4.  Has the patient been fasting?      no       If any risks are marked Yes, the following interventions are utilized:    Do not leave patient unattended     Assist patient in the dressing room and bathroom    Have ambulatory aides available throughout procedure    Involve patient s family if available

## 2018-02-13 NOTE — PATIENT INSTRUCTIONS
Patient Information     Patient Name MRN Sex Pennie Fry [id number] Female 1950      Patient Instructions by Cookie Schultz NP at 2018 10:30 AM     Author:  Cookie Schultz NP  Service:  (none) Author Type:  PHYS- Nurse Practitioner     Filed:  2018 11:20 AM  Encounter Date:  2018 Status:  Addendum     :  Cookie Schultz NP (PHYS- Nurse Practitioner)        Related Notes: Original Note by Cookie Schultz NP (PHYS- Nurse Practitioner) filed at 2018 11:18 AM            Come back with your PCA to discuss medications and other carers related to your leg swelling    Come fasting for labs--you can have coffee, tea or water--no breakfast until drawn    I did send prescription to [facility] for incontinence disposable undergarments

## 2018-02-13 NOTE — TELEPHONE ENCOUNTER
Patient Information     Patient Name MRN Sex Pennie Fry [id number] Female 1950      Telephone Encounter by Gabriela Valentin at 2018  2:07 PM     Author:  Gabriela Valentin Service:  (none) Author Type:  (none)     Filed:  2018  2:11 PM Encounter Date:  2018 Status:  Signed     :  Gabriela Valentin            Called patient and let her know that I would place the order for OT for her thumb per Dr. Moore's last visit note.  Gabriela Valentin LPN .......2018 2:09 PM

## 2018-02-13 NOTE — PROGRESS NOTES
"Patient Information     Patient Name MRN Pennie Car [id number] Female 1950      Progress Notes by Elizabeth Gordon OT at 2018 11:12 AM     Author:  Elizabeth Gordon OT Service:  (none) Author Type:  OT- Occupational Therapist     Filed:  2018 11:50 AM Date of Service:  2018 11:12 AM Status:  Signed     :  Elizabeth Gordon OT (OT- Occupational Therapist)            [facility]  Outpatient OT - Daily Note    Date of Service: 18                 Visit #: 2     Patient Name: Pennie Nichols   Referring MD/Provider:  Dr. Moore   Diagnosis: R thumb PIP pain   Treatment Diagnosis:  Increased pain   Insurance: [employer] classic   Onset date: November   Start of Care Date: 18  Certification Dates: From:              18                      To:   3/31/18                               Next MD visit: Dr. Huertas 18      Subjective  Patient reported that last session was helpful.     Pain Ratin = Moderate Pain, (Aggravating, Grueling, Upsetting, Frustrating) / Location:  R IP thumb   \"off and on\"       Objective    Today's Intervention:   Completed paraffin dip (heat) to R hand/wrist, 5 layers, 3 minute application, to prepare soft tissue for stretch, followed by PROM with prolonged stretch as follows: R thumb IP flexion/extension     STM/IASTM/edema management massage to R thumb  Pulsed ultrasound 20%, using 2 cm sound head, 3.3 Mhz, 1.0 iw/cm2  to R dorsal thumb IP to decrease pain, edema, and enhance soft tissue repair. 8 minutes application, 5 minutes prep/clean-up/rationale.       Iontophoresis 3.0 mA dose via Dupel delivery method, using dexamethasone and medium butterfly electrode with 2.0 mL. Medication applied to area of pain R dorsal thumb, to decrease inflammation and/or pain. Patient was informed of normal reactions to the iontophoresis treatment. Total iontophoresis treatment time = 13 minutes .           Home Exercise Program: IP " blocking exercises, CMC splint     Assessment    Therapist Assessment/Response to Intervention:  Swelling and redness noted at IP joint. Patient tolerated stretch and ionto well.        Goals:    Functional short term goals (established in collaboration with the patient, to be met in 8 weeks):  1.) Patient will subjectively report  improved ability to open a tight new jar without pain from a current rating of 5 (unable)  to a rating of 1-2 (mild to no difficulty) as reported by the DASH.   2.) Patient will subjectively report  improved ability to do heavy household  chores  from a current rating of 5 (unable)  to a rating of 1-2 (mild to no difficulty) as reported by the DASH.   3.) Patient will subjectively report  improved ability to use a knife to cut food from a current rating of 4 (severe difficulty)  to a rating of 1-2 (mild to no difficulty) as reported by the DASH.   4.) Patient will report ability to use R dominant hand for ADL activities with <3/10 pain consistently.       Long term goal (to be met in 12 weeks):  1.) Patient will rate no greater than 20% impairment rating according to the DASH in order to improve independence with all UE activities.   2.) Patient will demonstrate objective improvements in strength/ROM in order to facilitate functional goals listed above and return to PLOF.         Plan  Plan:  Continue with interventions above for edema management.  Strengthen once swelling is under control.     Student has been instructed in and demonstrates skills necessary to carry out above stated treatment plan: Yes    [facility].  Please call with any questions, concerns or comments.  [phone]    Elizabeth Gordon, BRENDANR/L  Occupational Therapist

## 2018-02-13 NOTE — PROGRESS NOTES
Patient Information     Patient Name MRN Sex Pennie Fry [id number] Female 1950      Progress Notes by Cookie Schultz NP at 2018 10:30 AM     Author:  Cookie Schultz NP Service:  (none) Author Type:  PHYS- Nurse Practitioner     Filed:  2018  5:58 PM Encounter Date:  2018 Status:  Signed     :  Cookie Schultz NP (PHYS- Nurse Practitioner)            SUBJECTIVE:    Pennie Nichols is a 67 y.o. female who presents for toenail trimming and other issues. Need Rx for disposable undergarments for chronic urinary incontinence.  Has Adrienne a PCA 5 days week to help with personal cares. Recently had abnormal CURT's, history of tobacco use. Still has issues with dependent lower extremity edema  Reports has had stuffy nose, no fever, cough or sorethroat.            HPI    Allergies      Allergen   Reactions     Contrast Dye [Diatrizoate Meglumine (Iv Contrast Dye)]  Angioedema     As noted  note Dr Stone    ,   Family History       Problem   Relation Age of Onset     Other  Mother      lung cancer       Heart Disease  Father 70     MI       Cancer  Father      Liver cancer       Cancer  Other      Lung cancer       Diabetes  Other      Cancer-colon  No Family History      Cancer-prostate  No Family History      Cancer-ovarian  No Family History      Blood Disease  No Family History      Hypertension  No Family History      Stroke  No Family History      Thyroid Disease  No Family History      Cancer-breast  No Family History    ,   Current Outpatient Prescriptions on File Prior to Visit       Medication  Sig Dispense Refill     carBAMazepine (TEGRETOL) 200 mg tablet Take 1/2 tab twice a day 60 tablet 3     white petrolatum-mineral oil (EUCERIN) cream Eucerin or insurance covered moisturizer cream to body 2 x daily and as needed 1 jar 5     No current facility-administered medications on file prior to visit.    ,   Current Outpatient Prescriptions:      carBAMazepine (TEGRETOL) 200 mg  tablet, Take 1/2 tab twice a day, Disp: 60 tablet, Rfl: 3     Incontinence Pad, Liner, Disp pads, Patient changes liner 2-3 time daily, Disp: 1 Package, Rfl: PRN     white petrolatum-mineral oil (EUCERIN) cream, Eucerin or insurance covered moisturizer cream to body 2 x daily and as needed, Disp: 1 jar, Rfl: 5  Medications have been reviewed by me and are current to the best of my knowledge and ability.,   Past Medical History:     Diagnosis  Date     Back problem     Recurrent back problems , [facility]      [facility] [place]     Fibromyalgia 7/9/1997     History of small bowel obstruction 7/9/2017 8/31/95--Dr Barrow--had incidental Appendectomy--Multiple adhesions released      Mental health problem     not otherwise specified       Onychogryphosis 4/24/2017   ,   Patient Active Problem List       Diagnosis  Date Noted     Abnormal ankle brachial index (CURT)  02/06/2018     [facility] [place]  01/08/2018     Positive colorectal cancer screening using DNA-based stool test  01/07/2018     History of tobacco use  01/05/2018     Bilateral lower extremity edema  01/05/2018     Asymptomatic varicose veins of bilateral lower extremities  08/07/2017     History of small bowel obstruction  07/09/2017 8/31/95--Dr Barrow--had incidental Appendectomy--Multiple adhesions released        Abnormal findings in stool  07/05/2017     Onychogryphosis  04/24/2017     Requires assistance with activities of daily living (ADL)  04/24/2017     Trigeminal neuralgia of right side of face  03/06/2017     Fibromyalgia  07/09/1997   ,   Past Surgical History:      Procedure  Laterality Date     APPENDECTOMY  1996    Incidental, Enterolysis--Dr Barrow       CHOLECYSTECTOMY  1996    Laparoscopic       COLONOSCOPY  1990?     CRYOTHERAPY OF CERVIX  1994    Abnormal Pap        HYSTERECTOMY  01/1995    [place]      and   Social History      Substance Use Topics        Smoking status:  Former Smoker     Packs/day: 1.00     Years:  "30.00     Types: Cigarettes     Quit date: 1/21/2001     Smokeless tobacco:  Never Used     Alcohol use  No       REVIEW OF SYSTEMS:  Review of Systems   Constitutional: Negative.    HENT: Negative.    Eyes: Negative.    Respiratory: Negative.    Cardiovascular: Negative.    Gastrointestinal: Negative.    Genitourinary: Negative.    Musculoskeletal: Negative.    Skin: Negative.    Neurological: Negative.    Endo/Heme/Allergies: Negative.    Psychiatric/Behavioral: Negative.        OBJECTIVE:  /72 (Cuff Site: Right Arm, Position: Sitting, Cuff Size: Adult Regular)  Pulse 84  Ht 1.5 m (4' 11.06\")  Wt 96.4 kg (212 lb 8 oz)  BMI 42.84 kg/m2    EXAM:   Physical Exam   Constitutional: She is oriented to person, place, and time and well-developed, well-nourished, and in no distress.   Cardiovascular: Normal rate.    Pulmonary/Chest: Effort normal.   Musculoskeletal: Normal range of motion.   Neurological: She is alert and oriented to person, place, and time. Gait normal.   Skin: Skin is warm and dry.   Toenails trimmed and smoothed with dremel, tolerated well. Toenails generally thickened with opaque dry fungal nails no surrounding erythema or open areas   Psychiatric: Mood, memory, affect and judgment normal.   Nursing note and vitals reviewed.      ASSESSMENT/PLAN:    ICD-10-CM    1. Urinary incontinence, unspecified type R32 Incontinence Pad, Liner, Disp pads   2. History of tobacco use Z87.891    3. Bilateral lower extremity edema R60.0    4. Abnormal ankle brachial index (CURT) R68.89    5. Onychogryphosis L60.2     we discussed results of recent ultrasound, vascular disease from history of tobacco--- nevertheless  like to discuss risk reduction    Plan:  Prescription sent to [facility] for disposable incontinence garments    Recommend follow-up with PCA to discuss risk reduction with daily low-dose aspirin, statin, physical therapy graded exercise  daily walking exercise, positioning, soft stockings and " positioning    Would like her to come fasting to obtain lipids and other labs at next office visit

## 2018-02-13 NOTE — NURSING NOTE
Patient Information     Patient Name MRN Pennie Car [id number] Female 1950      Nursing Note by Gosselin, Norma J at 2018  3:45 PM     Author:  Gosselin, Norma J Service:  (none) Author Type:  (none)     Filed:  2018  3:52 PM Encounter Date:  2018 Status:  Signed     :  Gosselin, Norma J            Consult right thumb pain for 2 months. Referred by Jodie K Carey, NP Norma J Gosselin LPN....................  2018   3:51 PM

## 2018-02-15 ENCOUNTER — HOSPITAL ENCOUNTER (OUTPATIENT)
Dept: OCCUPATIONAL THERAPY | Facility: OTHER | Age: 68
Setting detail: THERAPIES SERIES
End: 2018-02-15
Attending: ORTHOPAEDIC SURGERY
Payer: COMMERCIAL

## 2018-02-15 PROCEDURE — 97140 MANUAL THERAPY 1/> REGIONS: CPT | Mod: GO,XU,KX

## 2018-02-15 PROCEDURE — 40000839 ZZH STATISTIC HAND THERAPY VISIT

## 2018-02-15 PROCEDURE — 97018 PARAFFIN BATH THERAPY: CPT | Mod: GO

## 2018-02-15 PROCEDURE — 97035 APP MDLTY 1+ULTRASOUND EA 15: CPT | Mod: GO

## 2018-02-15 PROCEDURE — 97033 APP MDLTY 1+IONTPHRSIS EA 15: CPT | Mod: GO

## 2018-02-19 ENCOUNTER — DOCUMENTATION ONLY (OUTPATIENT)
Dept: FAMILY MEDICINE | Facility: OTHER | Age: 68
End: 2018-02-19

## 2018-02-19 PROBLEM — R68.89 ABNORMAL ANKLE BRACHIAL INDEX (ABI): Status: ACTIVE | Noted: 2018-02-06

## 2018-02-19 RX ORDER — INCONTINENCE PAD,LINER,DISP
EACH MISCELLANEOUS
COMMUNITY
Start: 2018-02-06 | End: 2019-08-12

## 2018-02-21 ENCOUNTER — DOCUMENTATION ONLY (OUTPATIENT)
Dept: FAMILY MEDICINE | Facility: OTHER | Age: 68
End: 2018-02-21

## 2018-02-22 ENCOUNTER — HOSPITAL ENCOUNTER (OUTPATIENT)
Dept: OCCUPATIONAL THERAPY | Facility: OTHER | Age: 68
Setting detail: THERAPIES SERIES
End: 2018-02-22
Attending: ORTHOPAEDIC SURGERY
Payer: COMMERCIAL

## 2018-02-22 PROCEDURE — 97035 APP MDLTY 1+ULTRASOUND EA 15: CPT | Mod: GO

## 2018-02-22 PROCEDURE — 97110 THERAPEUTIC EXERCISES: CPT | Mod: GO

## 2018-02-22 PROCEDURE — 97140 MANUAL THERAPY 1/> REGIONS: CPT | Mod: GO

## 2018-02-22 PROCEDURE — 40000839 ZZH STATISTIC HAND THERAPY VISIT

## 2018-03-05 ENCOUNTER — HOSPITAL ENCOUNTER (OUTPATIENT)
Dept: OCCUPATIONAL THERAPY | Facility: OTHER | Age: 68
Setting detail: THERAPIES SERIES
End: 2018-03-05
Attending: ORTHOPAEDIC SURGERY
Payer: COMMERCIAL

## 2018-03-05 PROCEDURE — 97033 APP MDLTY 1+IONTPHRSIS EA 15: CPT | Mod: GO,KX

## 2018-03-05 PROCEDURE — 97035 APP MDLTY 1+ULTRASOUND EA 15: CPT | Mod: GO,KX

## 2018-03-05 PROCEDURE — 97140 MANUAL THERAPY 1/> REGIONS: CPT | Mod: GO,KX

## 2018-03-05 PROCEDURE — 40000839 ZZH STATISTIC HAND THERAPY VISIT

## 2018-03-07 ENCOUNTER — OFFICE VISIT (OUTPATIENT)
Dept: FAMILY MEDICINE | Facility: OTHER | Age: 68
End: 2018-03-07
Attending: NURSE PRACTITIONER
Payer: COMMERCIAL

## 2018-03-07 VITALS
TEMPERATURE: 98 F | BODY MASS INDEX: 42.94 KG/M2 | HEART RATE: 88 BPM | DIASTOLIC BLOOD PRESSURE: 78 MMHG | WEIGHT: 213 LBS | SYSTOLIC BLOOD PRESSURE: 120 MMHG

## 2018-03-07 DIAGNOSIS — Z87.891 PERSONAL HISTORY OF TOBACCO USE, PRESENTING HAZARDS TO HEALTH: ICD-10-CM

## 2018-03-07 DIAGNOSIS — M79.644 CHRONIC THUMB PAIN, RIGHT: Primary | ICD-10-CM

## 2018-03-07 DIAGNOSIS — I73.9 PVD (PERIPHERAL VASCULAR DISEASE) (H): ICD-10-CM

## 2018-03-07 DIAGNOSIS — G89.29 CHRONIC THUMB PAIN, RIGHT: Primary | ICD-10-CM

## 2018-03-07 DIAGNOSIS — Z87.891 HISTORY OF TOBACCO USE: ICD-10-CM

## 2018-03-07 LAB
ALBUMIN SERPL-MCNC: 4.3 G/DL (ref 3.5–5.7)
ALP SERPL-CCNC: 106 U/L (ref 34–104)
ALT SERPL W P-5'-P-CCNC: 28 U/L (ref 7–52)
ANION GAP SERPL CALCULATED.3IONS-SCNC: 4 MMOL/L (ref 3–14)
AST SERPL W P-5'-P-CCNC: 23 U/L (ref 13–39)
BILIRUB SERPL-MCNC: 0.3 MG/DL (ref 0.3–1)
BUN SERPL-MCNC: 10 MG/DL (ref 7–25)
CALCIUM SERPL-MCNC: 9.3 MG/DL (ref 8.6–10.3)
CHLORIDE SERPL-SCNC: 103 MMOL/L (ref 98–107)
CHOLEST SERPL-MCNC: 194 MG/DL
CO2 SERPL-SCNC: 34 MMOL/L (ref 21–31)
CREAT SERPL-MCNC: 0.66 MG/DL (ref 0.6–1.2)
ERYTHROCYTE [SEDIMENTATION RATE] IN BLOOD BY WESTERGREN METHOD: 25 MM/H (ref 1–15)
GFR SERPL CREATININE-BSD FRML MDRD: 90 ML/MIN/1.7M2
GLUCOSE SERPL-MCNC: 88 MG/DL (ref 70–105)
HDLC SERPL-MCNC: 57 MG/DL (ref 23–92)
LDLC SERPL CALC-MCNC: 118 MG/DL
NONHDLC SERPL-MCNC: 137 MG/DL
POTASSIUM SERPL-SCNC: 4.3 MMOL/L (ref 3.5–5.1)
PROT SERPL-MCNC: 7.9 G/DL (ref 6.4–8.9)
SODIUM SERPL-SCNC: 141 MMOL/L (ref 134–144)
TRIGL SERPL-MCNC: 95 MG/DL

## 2018-03-07 PROCEDURE — 85652 RBC SED RATE AUTOMATED: CPT | Performed by: NURSE PRACTITIONER

## 2018-03-07 PROCEDURE — 36415 COLL VENOUS BLD VENIPUNCTURE: CPT | Performed by: NURSE PRACTITIONER

## 2018-03-07 PROCEDURE — 80061 LIPID PANEL: CPT | Performed by: NURSE PRACTITIONER

## 2018-03-07 PROCEDURE — G0463 HOSPITAL OUTPT CLINIC VISIT: HCPCS

## 2018-03-07 PROCEDURE — 80053 COMPREHEN METABOLIC PANEL: CPT | Performed by: NURSE PRACTITIONER

## 2018-03-07 PROCEDURE — 99214 OFFICE O/P EST MOD 30 MIN: CPT | Performed by: NURSE PRACTITIONER

## 2018-03-07 ASSESSMENT — PAIN SCALES - GENERAL: PAINLEVEL: MODERATE PAIN (4)

## 2018-03-07 NOTE — NURSING NOTE
Patient is here today for a F/U feet. She also would like to discuss her right thumb pain and swelling. Triny Henao LPN......................3/7/2018 10:46 AM

## 2018-03-07 NOTE — PROGRESS NOTES
SUBJECTIVE:   Pennie Nichols is a 67 year old female presenting with a chief complaint of   Chief Complaint   Patient presents with     RECHECK     F/U feet   Follow-up on foot care for history of overgrown toenails and fungal disease--is unable to soak in care of her feet independently due to physical limitations unable to reach  the shower and feel steady.  Has had a PCA  However has not seen her PCA for a month as the agency has been unable to find staff--reports she has not done any care for her feet with soaking or washing and moisturizing callouses    Had an abnormal CURT study-- chronic issues with stasis edema ankles and feet, past history of tobacco use.  Does not get much exercise--rarely leaves apartment--is not really able to do foot care independently or take a shower  We have discussed starting daily low-dose aspirin and a statin--- will be due for labs        Review of Systems   Constitutional: Negative.    HENT: Negative.    Musculoskeletal: Positive for arthralgias.   Allergic/Immunologic: Negative.    Neurological: Negative.    Hematological: Negative.    Psychiatric/Behavioral: Negative.    All other systems reviewed and are negative.      Past Medical History:   Diagnosis Date     Dorsopathy     Recurrent back problems , secondary to injury at [facility]     Fibromyalgia     7/9/1997     Nonpsychotic mental disorder     not otherwise specified     Onychogryphosis     4/24/2017     Personal history of other diseases of the digestive system (CODE)     7/9/2017,8/31/95--Dr Barrow--had incidental Appendectomy--Multiple adhesions released     Primary osteoarthritis of hand     1/8/2018     Family History   Problem Relation Age of Onset     Other - See Comments Mother      lung cancer     HEART DISEASE Father 70     Heart Disease,MI     CANCER Father      Cancer,Liver cancer     CANCER Other      Cancer,Lung cancer     DIABETES Other      Diabetes     Colon Cancer No family hx of      Cancer-colon      Prostate Cancer No family hx of      Cancer-prostate     Ovarian Cancer No family hx of      Cancer-ovarian     Blood Disease No family hx of      Blood Disease     Hypertension No family hx of      Hypertension     Other - See Comments No family hx of      Stroke     Thyroid Disease No family hx of      Thyroid Disease     Breast Cancer No family hx of      Cancer-breast     Current Outpatient Prescriptions   Medication Sig Dispense Refill     aspirin 81 MG tablet Take 1 tablet (81 mg) by mouth daily 90 tablet 1     Incontinence Supply Disposable (SM INCONTINENT LINER DISP) MISC Patient changes liner 2-3 time daily       carBAMazepine (TEGRETOL) 200 MG tablet Take 0.5 tablets by mouth 2 times daily       Skin Protectants, Misc. (EUCERIN) cream Eucerin or insurance covered moisturizer cream to body 2 x daily and as needed       Social History   Substance Use Topics     Smoking status: Former Smoker     Packs/day: 1.00     Years: 30.00     Types: Cigarettes     Quit date: 1/21/2001     Smokeless tobacco: Never Used     Alcohol use No       OBJECTIVE  /78 (BP Location: Right arm, Patient Position: Sitting, Cuff Size: Adult Large)  Pulse 88  Temp 98  F (36.7  C) (Temporal)  Wt 213 lb (96.6 kg)  Breastfeeding? No  BMI 42.94 kg/m2    Physical Exam   Constitutional: She is oriented to person, place, and time. She appears well-developed and well-nourished.   Cardiovascular: Normal rate.    Pulmonary/Chest: Effort normal.   Musculoskeletal: Normal range of motion.   Right thumb range of motion, sensation, circulation intact slightly edematous larger than other thumb, no focal areas of erythema or warmth   Neurological: She is alert and oriented to person, place, and time. She has normal reflexes.   Skin: Skin is warm and dry.   Fungal toenails trimmed with dremel--tolerated well, no areas of erythema noted.  No open areas inspected on both feet.  Calluses plantar surfaces of both feet and dry keratotic skin  malleolar areas of both feet.  Mild pedal edema about 1+ mostly diffuse no focal areas of stasis dermatitis erythema  Neurovascular checks intact  Applied moisturizer to both feet and covered with stockings   Psychiatric: She has a normal mood and affect. Her behavior is normal. Judgment and thought content normal.   Nursing note and vitals reviewed.      Results for orders placed or performed in visit on 03/07/18   Lipid panel   Result Value Ref Range    Cholesterol 194 <200 mg/dL    Triglycerides 95 <150 mg/dL    HDL Cholesterol 57 23 - 92 mg/dL    LDL Cholesterol Calculated 118 (H) <100 mg/dL    Non HDL Cholesterol 137 (H) <130 mg/dL   Comprehensive metabolic panel (BMP + Alb, Alk Phos, ALT, AST, Total. Bili, TP)   Result Value Ref Range    Sodium 141 134 - 144 mmol/L    Potassium 4.3 3.5 - 5.1 mmol/L    Chloride 103 98 - 107 mmol/L    Carbon Dioxide 34 (H) 21 - 31 mmol/L    Anion Gap 4 3 - 14 mmol/L    Glucose 88 70 - 105 mg/dL    Urea Nitrogen 10 7 - 25 mg/dL    Creatinine 0.66 0.60 - 1.20 mg/dL    GFR Estimate 90 >60 mL/min/1.7m2    GFR Estimate If Black >90 >60 mL/min/1.7m2    Calcium 9.3 8.6 - 10.3 mg/dL    Bilirubin Total 0.3 0.3 - 1.0 mg/dL    Albumin 4.3 3.5 - 5.7 g/dL    Protein Total 7.9 6.4 - 8.9 g/dL    Alkaline Phosphatase 106 (H) 34 - 104 U/L    ALT 28 7 - 52 U/L    AST 23 13 - 39 U/L   ESR: Erythrocyte sedimentation rate   Result Value Ref Range    Sed Rate 25 (H) 1 - 15 mm/h           ASSESSMENT:      ICD-10-CM    1. Chronic thumb pain, right M79.644 ORTHOPEDICS ADULT REFERRAL    G89.29    2. PVD (peripheral vascular disease) (H) I73.9 Lipid panel     Comprehensive metabolic panel (BMP + Alb, Alk Phos, ALT, AST, Total. Bili, TP)     ESR: Erythrocyte sedimentation rate     aspirin 81 MG tablet   3. Personal history of tobacco use, presenting hazards to health Z87.891 Lipid panel     Comprehensive metabolic panel (BMP + Alb, Alk Phos, ALT, AST, Total. Bili, TP)     ESR: Erythrocyte sedimentation  rate   4. History of tobacco use Z87.891         I did attempt to contact Iza Barnes RN,  through the county regarding  No PCA services provided in the past month--left a message for return call    This is important to her activities of daily living including skin care with bathing and foot care  Would be ideal to have the PCA attend her appointment if they are driving her to appointments to help reinforce treatment plan    Patient reports minimal effectiveness OT sessions--- would like to see Dr. Maurer for another opinion      PLAN: Patient will take daily low-dose aspirin    Referral for orthopedic consultation regarding chronic right thumb pain for conservative recommendations    Follow-up in 1 month for foot care, follow-up on PCA support and right thumb pain  We discussed labs and add statin therapy for peripheral vascular disease    Encouraged importance of getting some daily walking exercise--even walking in her hallway of her apartment complex with rests as needed    She would not be a candidate for surgery or any aggressive intervention--- and declines aggressive interventions anyway          Followup:    If not improving or if condition worsens, follow up with your Primary Care Provider    Patient Instructions   Call [facility] before picking up baby aspirin to see if this is covered other wise you can buy it anywhere    Next month we will go over labs and medication for cholesterol    It is important to get some daily walking exercise--its ok to rest as needed    You will get [facility] for apointment

## 2018-03-07 NOTE — LETTER
March 12, 2018      Pennie Nichols  [address]  [place] [facility] [address]        Dear ,    Your recent lab results show normal indicators of kidney and liver function, your sed rate was mildly elevated showing some active inflammation probably due to your osteoarthritis  And overall your cholesterol is acceptable.  I would like to discuss these with you next month at your office visit--would like to discuss with you a medication to reduce your cholesterol  That will help your vascular disease meeting the blood vessels in your legs and the rest your body.  Discussed this more face-to-face.    Please let me know if you have any questions or concerns.    Results for orders placed or performed in visit on 03/07/18   Lipid panel   Result Value Ref Range    Cholesterol 194 <200 mg/dL    Triglycerides 95 <150 mg/dL    HDL Cholesterol 57 23 - 92 mg/dL    LDL Cholesterol Calculated 118 (H) <100 mg/dL    Non HDL Cholesterol 137 (H) <130 mg/dL   Comprehensive metabolic panel (BMP + Alb, Alk Phos, ALT, AST, Total. Bili, TP)   Result Value Ref Range    Sodium 141 134 - 144 mmol/L    Potassium 4.3 3.5 - 5.1 mmol/L    Chloride 103 98 - 107 mmol/L    Carbon Dioxide 34 (H) 21 - 31 mmol/L    Anion Gap 4 3 - 14 mmol/L    Glucose 88 70 - 105 mg/dL    Urea Nitrogen 10 7 - 25 mg/dL    Creatinine 0.66 0.60 - 1.20 mg/dL    GFR Estimate 90 >60 mL/min/1.7m2    GFR Estimate If Black >90 >60 mL/min/1.7m2    Calcium 9.3 8.6 - 10.3 mg/dL    Bilirubin Total 0.3 0.3 - 1.0 mg/dL    Albumin 4.3 3.5 - 5.7 g/dL    Protein Total 7.9 6.4 - 8.9 g/dL    Alkaline Phosphatase 106 (H) 34 - 104 U/L    ALT 28 7 - 52 U/L    AST 23 13 - 39 U/L   ESR: Erythrocyte sedimentation rate   Result Value Ref Range    Sed Rate 25 (H) 1 - 15 mm/h             If you have any questions or concerns, please call the clinic at the number listed above.       Sincerely,        YANN Melissa CNP

## 2018-03-07 NOTE — MR AVS SNAPSHOT
After Visit Summary   3/7/2018    Pennie Nichols    MRN: [id number]           Patient Information     Date Of Birth          1950        Visit Information        Provider Department      3/7/2018 10:30 AM Cookie Schultz APRN [facility]        Today's Diagnoses     Chronic thumb pain, right    -  1    PVD (peripheral vascular disease) (H)        Personal history of tobacco use, presenting hazards to health        History of tobacco use          Care Instructions    [facility] before picking up baby aspirin to see if this is covered other wise you can buy it anywhere    Next month we will go over labs and medication for cholesterol    It is important to get some daily walking exercise--its ok to rest as needed    You will get call from [facility] for apointment              Follow-ups after your visit        Additional Services     ORTHOPEDICS ADULT REFERRAL       [facility]    Please send office notes related to right thumb pain, xray and OT notes                  Follow-up notes from your care team     Return in about 4 weeks (around 4/4/2018) for Lab Work, medication.      Your next 10 appointments already scheduled     Mar 08, 2018  1:00 PM CST   Treatment with Elizabeth Gordon OT   [facility] ([facility])    [address] [facility] [address]   [phone]            Mar 12, 2018  1:00 PM CDT   Treatment with Elizabeth Gordon OT   [facility] ([facility])    [address] [facility] [address]   [phone]            Mar 15, 2018 10:30 AM CDT   [facility] with [facility]   [facility]    [address] [facility] [id number] [phone]           Please do not eat 10-12 hours before your appointment if you are coming in fasting for labs on lipids, cholesterol, or glucose (sugar).  "This does not apply to pregnant women. Water, hot tea and black coffee (with nothing added) are okay. Do not drink other fluids, diet soda or chew gum.            Mar 15, 2018 10:45 AM CDT   Return Visit with Nick Aguilar MD   [facility] ([facility])    [address]  [place] [address] [phone]              Who to contact     If you have questions or need follow up information about today's clinic visit or your schedule please contact [facility] directly at [phone].  Normal or non-critical lab and imaging results will be communicated to you by [facility], letter or phone within 4 business days after the clinic has received the results. If you do not hear from us within 7 days, please contact the clinic through [facility] or phone. If you have a critical or abnormal lab result, we will notify you by phone as soon as possible.  Submit refill requests through [facility] or call your pharmacy and they will forward the refill request to us. Please allow 3 business days for your refill to be completed.          Additional Information About Your Visit        [facility] Information     [facility] lets you send messages to your doctor, view your test results, renew your prescriptions, schedule appointments and more. To sign up, go to [email or web address] . Click on \"Log in\" on the left side of the screen, which will take you to the Welcome page. Then click on \"Sign up Now\" on the right side of the page.     You will be asked to enter the access code listed below, as well as some personal information. Please follow the directions to create your username and password.     Your access code is: [email or web address]  Expires: 2018 11:31 AM     Your access code will  in 90 days. If you need help or a new code, please call your [facility] clinic or [phone].        Care EveryWhere ID     This is your Care EveryWhere ID. This could be used by other " organizations to access your [facility] medical records  [id number]        Your Vitals Were     Pulse Temperature Breastfeeding? BMI (Body Mass Index)          88 98  F (36.7  C) (Temporal) No 42.94 kg/m2         Blood Pressure from Last 3 Encounters:   03/07/18 120/78   02/06/18 126/72   01/08/18 124/74    Weight from Last 3 Encounters:   03/07/18 213 lb (96.6 kg)   02/06/18 212 lb 8 oz (96.4 kg)   01/08/18 212 lb (96.2 kg)              We Performed the Following     Comprehensive metabolic panel (BMP + Alb, Alk Phos, ALT, AST, Total. Bili, TP)     ESR: Erythrocyte sedimentation rate     Lipid panel     ORTHOPEDICS ADULT REFERRAL          Today's Medication Changes          These changes are accurate as of 3/7/18 11:31 AM.  If you have any questions, ask your nurse or doctor.               Start taking these medicines.        Dose/Directions    aspirin 81 MG tablet   Used for:  PVD (peripheral vascular disease) (H)   Started by:  Cookie Schultz APRN CNP        Dose:  81 mg   Take 1 tablet (81 mg) by mouth daily   Quantity:  90 tablet   Refills:  1            Where to get your medicines      These medications were sent to [facility] [address] [place], [place] [id number]     Phone:  [phone]     aspirin 81 MG tablet                Primary Care Provider Office Phone # Fax #    YANN Melissa [phone]       [address] [facility] [address]        Equal Access to Services     [facility] BERT AH: Hadii aad ku hadasho Soomaali, waaxda luqadaha, qaybta kaalmada adeegyada, waxay nimco nunez. So [facility] [phone].    ATENCIÓN: Si habla español, tiene a lobato disposición servicios gratuitos de asistencia lingüística. Llame al [phone].    We comply with applicable federal civil rights laws and [place] laws. We do not discriminate on the basis of race, color, national origin, age, disability, sex,  sexual orientation, or gender identity.            Thank you!     Thank you for choosing [facility] AND [facility]  for your care. Our goal is always to provide you with excellent care. Hearing back from our patients is one way we can continue to improve our services. Please take a few minutes to complete the written survey that you may receive in the mail after your visit with us. Thank you!             Your Updated Medication List - Protect others around you: Learn how to safely use, store and throw away your medicines at www.disposemymeds.org.          This list is accurate as of 3/7/18 11:31 AM.  Always use your most recent med list.                   Brand Name Dispense Instructions for use Diagnosis    aspirin 81 MG tablet     90 tablet    Take 1 tablet (81 mg) by mouth daily    PVD (peripheral vascular disease) (H)       carBAMazepine 200 MG tablet    TEGretol     Take 0.5 tablets by mouth 2 times daily        eucerin cream      Eucerin or insurance covered moisturizer cream to body 2 x daily and as needed        SM INCONTINENT LINER DISP Misc      Patient changes liner 2-3 time daily

## 2018-03-07 NOTE — PATIENT INSTRUCTIONS
Call [facility] before picking up baby aspirin to see if this is covered other wise you can buy it anywhere    Next month we will go over labs and medication for cholesterol    It is important to get some daily walking exercise--its ok to rest as needed    You will get [facility] for ap[facility]ment

## 2018-03-08 ENCOUNTER — HOSPITAL ENCOUNTER (OUTPATIENT)
Dept: OCCUPATIONAL THERAPY | Facility: OTHER | Age: 68
Setting detail: THERAPIES SERIES
End: 2018-03-08
Attending: ORTHOPAEDIC SURGERY
Payer: COMMERCIAL

## 2018-03-08 PROCEDURE — 97110 THERAPEUTIC EXERCISES: CPT | Mod: GO,KX

## 2018-03-08 PROCEDURE — 97140 MANUAL THERAPY 1/> REGIONS: CPT | Mod: GO,KX

## 2018-03-08 PROCEDURE — 97033 APP MDLTY 1+IONTPHRSIS EA 15: CPT | Mod: GO,KX

## 2018-03-08 PROCEDURE — 40000839 ZZH STATISTIC HAND THERAPY VISIT

## 2018-03-08 ASSESSMENT — PATIENT HEALTH QUESTIONNAIRE - PHQ9: SUM OF ALL RESPONSES TO PHQ QUESTIONS 1-9: 0

## 2018-03-12 ENCOUNTER — HOSPITAL ENCOUNTER (OUTPATIENT)
Dept: OCCUPATIONAL THERAPY | Facility: OTHER | Age: 68
Setting detail: THERAPIES SERIES
End: 2018-03-12
Attending: ORTHOPAEDIC SURGERY
Payer: COMMERCIAL

## 2018-03-12 ENCOUNTER — TELEPHONE (OUTPATIENT)
Dept: LAB | Facility: OTHER | Age: 68
End: 2018-03-12

## 2018-03-12 PROCEDURE — 40000839 ZZH STATISTIC HAND THERAPY VISIT

## 2018-03-12 PROCEDURE — 97033 APP MDLTY 1+IONTPHRSIS EA 15: CPT | Mod: GO,KX

## 2018-03-12 PROCEDURE — 97140 MANUAL THERAPY 1/> REGIONS: CPT | Mod: GO,KX

## 2018-03-12 PROCEDURE — 97032 APPL MODALITY 1+ESTIM EA 15: CPT | Mod: GO,KX

## 2018-03-12 ASSESSMENT — ENCOUNTER SYMPTOMS
ARTHRALGIAS: 1
ALLERGIC/IMMUNOLOGIC NEGATIVE: 1
CONSTITUTIONAL NEGATIVE: 1
PSYCHIATRIC NEGATIVE: 1
HEMATOLOGIC/LYMPHATIC NEGATIVE: 1
NEUROLOGICAL NEGATIVE: 1

## 2018-03-12 NOTE — TELEPHONE ENCOUNTER
Patient is coming 3-15-18 for lab appointment. Appointment notes state urinalysis. Please place orders

## 2018-03-13 DIAGNOSIS — R31.29 MICROSCOPIC HEMATURIA: Primary | ICD-10-CM

## 2018-03-13 NOTE — TELEPHONE ENCOUNTER
Spoke with patient she is seeing Nick Aguilar MD on Thursday and needs lab orders placed.Triny Henao LPN......................3/13/2018 3:10 PM

## 2018-03-13 NOTE — TELEPHONE ENCOUNTER
Unless she called for lab for possible UTI--this will be canceled--han can you look into this please  Cookie Schultz, APRN CNP   March 13, 2018

## 2018-03-14 ENCOUNTER — HOSPITAL ENCOUNTER (OUTPATIENT)
Dept: OCCUPATIONAL THERAPY | Facility: OTHER | Age: 68
Setting detail: THERAPIES SERIES
End: 2018-03-14
Attending: ORTHOPAEDIC SURGERY
Payer: COMMERCIAL

## 2018-03-14 PROCEDURE — 97110 THERAPEUTIC EXERCISES: CPT | Mod: GO,KX

## 2018-03-14 PROCEDURE — 97035 APP MDLTY 1+ULTRASOUND EA 15: CPT | Mod: GO,KX

## 2018-03-14 PROCEDURE — 97033 APP MDLTY 1+IONTPHRSIS EA 15: CPT | Mod: GO,KX

## 2018-03-14 PROCEDURE — 40000839 ZZH STATISTIC HAND THERAPY VISIT

## 2018-03-14 PROCEDURE — 97140 MANUAL THERAPY 1/> REGIONS: CPT | Mod: GO,KX

## 2018-03-15 ENCOUNTER — OFFICE VISIT (OUTPATIENT)
Dept: UROLOGY | Facility: OTHER | Age: 68
End: 2018-03-15
Attending: UROLOGY
Payer: COMMERCIAL

## 2018-03-15 VITALS
HEIGHT: 59 IN | BODY MASS INDEX: 42.74 KG/M2 | RESPIRATION RATE: 16 BRPM | SYSTOLIC BLOOD PRESSURE: 120 MMHG | DIASTOLIC BLOOD PRESSURE: 70 MMHG | WEIGHT: 212 LBS

## 2018-03-15 DIAGNOSIS — R31.29 MICROSCOPIC HEMATURIA: Primary | ICD-10-CM

## 2018-03-15 DIAGNOSIS — R31.29 MICROSCOPIC HEMATURIA: ICD-10-CM

## 2018-03-15 LAB
ALBUMIN UR-MCNC: NEGATIVE MG/DL
APPEARANCE UR: CLEAR
BACTERIA #/AREA URNS HPF: ABNORMAL /HPF
BILIRUB UR QL STRIP: NEGATIVE
COLOR UR AUTO: YELLOW
GLUCOSE UR STRIP-MCNC: NEGATIVE MG/DL
HGB UR QL STRIP: ABNORMAL
KETONES UR STRIP-MCNC: NEGATIVE MG/DL
LEUKOCYTE ESTERASE UR QL STRIP: NEGATIVE
NITRATE UR QL: NEGATIVE
PH UR STRIP: 6.5 PH (ref 5–7)
RBC #/AREA URNS AUTO: ABNORMAL /HPF
SOURCE: ABNORMAL
SP GR UR STRIP: <1.005 (ref 1–1.03)
UROBILINOGEN UR STRIP-ACNC: 0.2 EU/DL (ref 0.2–1)
WBC #/AREA URNS AUTO: ABNORMAL /HPF

## 2018-03-15 PROCEDURE — G0463 HOSPITAL OUTPT CLINIC VISIT: HCPCS

## 2018-03-15 PROCEDURE — 99213 OFFICE O/P EST LOW 20 MIN: CPT | Performed by: UROLOGY

## 2018-03-15 PROCEDURE — 81001 URINALYSIS AUTO W/SCOPE: CPT | Performed by: UROLOGY

## 2018-03-15 ASSESSMENT — PAIN SCALES - GENERAL: PAINLEVEL: NO PAIN (0)

## 2018-03-15 NOTE — PROGRESS NOTES
Type of Visit  EST    Chief Complaint  Microscopic hematuria    HPI  Ms. Nichols is a 67 y.o. female who follows up with hematuria.  Microhematuria was first noted on UA over one year ago and recent UA ago confirmed the presence of microhematuria.  She underwent a workup 3 months ago which was negative.  She is unable to receive IV contrast due to allergies so the upper tract imaging with CT noncontrast only.  She denies gross hematuria in the interim.  I asked her to follow up today for urinalysis to assess for resolution of hematuria.    Patient denies associated dysuria at the time of onset.  She was a previous smoker but has since discontinued.    Patient presented today for repeat urinalysis.      Family History  Family History   Problem Relation Age of Onset     Other Mother      lung cancer     Heart Disease Father 70     MI     Cancer Father      Liver cancer     Cancer Other      Lung cancer     Diabetes Other      Cancer-colon No Family History      Cancer-prostate No Family History      Cancer-ovarian No Family History      Blood Disease No Family History      Hypertension No Family History      Stroke No Family History      Thyroid Disease No Family History      Cancer-breast No Family History        Review of Systems  I personally reviewed the ROS with the patient.    Nursing Notes:   Lyssa Peng LPN  3/15/2018 10:52 AM  Signed  Here for follow up on U/A.  Review of Systems:    Weight loss:    No     Recent fever/chills:  No   Night sweats:   No  Current skin rash:  No   Recent hair loss:  No  Heat intolerance:  No   Cold intolerance:  No  Chest pain:   No   Palpitations:   No  Shortness of breath:  No   Wheezing:   No  Constipation:    No   Diarrhea:   No   Nausea:   No   Vomiting:   No   Kidney/side pain:  No   Back pain:   No  Frequent headaches:  No   Dizziness:     No  Leg swelling:   No   Calf pain:    No    Lyssa Peng LPN on 3/15/2018 at 10:49 AM          Physical Exam  BP  "120/70 (BP Location: Left arm, Patient Position: Sitting, Cuff Size: Adult Large)  Resp 16  Ht 1.499 m (4' 11\")  Wt 96.2 kg (212 lb)  BMI 42.82 kg/m2    Constitutional: NAD, WDWN.   Head: NCAT  Eyes: Conjunctivae normal  Cardiovascular: Regular rate.  Pulmonary/Chest: Respirations are even and non-labored bilaterally.  Abdominal: Soft. No distension, tenderness, masses or guarding. No CVA tenderness.  Neurological: A + O x 3.  Cranial Nerves II-XII grossly intact.  Extremities: MONI x 4, Warm. No clubbing.  No cyanosis.    Skin: Pink, warm and dry.  No rashes noted.  Psychiatric:  Normal mood and affect  Genitourinary:  Nonpalpable bladder    Labs  CREATININE (mg/dL)   Date Value   07/19/2017 0.60 (L)     Results for HERNAN MAHONEY (MRN [id number]) as of 11/29/2017 12:14   2/20/2017 19:48 7/19/2017 17:30 11/7/2017 13:39   COLOR                     Yellow Yellow Yellow   CLARITY                   Clear Clear Clear   SPECIFIC GRAVITY,URINE    1.010 <=1.005 (A) <=1.005 (A)   PH,URINE                  5.5 5.5 6.5   UROBILINOGEN,QUALITATIVE  Normal Normal Normal   PROTEIN, URINE Negative Negative Negative   GLUCOSE, URINE Negative Negative Negative   KETONES,URINE             Negative Negative Negative   BILIRUBIN,URINE           Negative Negative Negative   OCCULT BLOOD,URINE        Moderate (A) Small (A) Small (A)   NITRITE                   Negative Negative Negative   LEUKOCYTE ESTERASE        Negative Negative Negative   RBC  (A) 3-5 (A) 3-5 (A)   WBC None Seen 3-5 None Seen   BACTERIA Many (A) Moderate (A) Few   EPITHELIAL CELLS Many (A) Few Few     Results for HERNAN MAHONEY (MRN [id number]) as of 3/15/2018 10:52   Ref. Range 3/15/2018 10:25   Color Urine Unknown Yellow   Appearance Urine Unknown Clear   Glucose Urine Latest Ref Range: NEG^Negative mg/dL Negative   Bilirubin Urine Latest Ref Range: NEG^Negative  Negative   Ketones Urine Latest Ref Range: NEG^Negative mg/dL Negative   Specific Gravity " Urine Latest Ref Range: 1.003 - 1.035  <1.005   pH Urine Latest Ref Range: 5.0 - 7.0 pH 6.5   Protein Albumin Urine Latest Ref Range: NEG^Negative mg/dL Negative   Urobilinogen Urine Latest Ref Range: 0.2 - 1.0 EU/dL 0.2   Nitrite Urine Latest Ref Range: NEG^Negative  Negative   Blood Urine Latest Ref Range: NEG^Negative  Small (A)   Leukocyte Esterase Urine Latest Ref Range: NEG^Negative  Negative   Source Unknown Midstream Urine   WBC Urine Latest Ref Range: OTO5^0 - 5 /HPF 0 - 5   RBC Urine Latest Ref Range: OTO2^O - 2 /HPF 2-5 (A)   Bacteria Urine Latest Ref Range: NEG^Negative /HPF Few (A)     Imaging  CT Stone  12/15/2017  IMPRESSION:  1. No renal calculi or hydronephrosis.  2. Asymmetric bladder wall thickening along the dome of the bladder may be due to incomplete distention. Suggest correlation with cystoscopy findings.  3. Note that contrast was not given due to the history of severe contrast allergy. If further evaluation of the kidneys is required, MRI could be considered.    Assessment  Ms. Nichols is a 67 y.o. female with previous microscopic hematuria and negative work up which included CT Stone Study (unable to receive IV contrast) and cystoscopy.  Discussed her results today of 2-5 RBCs and no symptoms.  We elected to repeat UA in 1 year as she has a significant smoking history.    Plan  UA in 1 year

## 2018-03-15 NOTE — NURSING NOTE
Here for follow up on U/A.  Review of Systems:    Weight loss:    No     Recent fever/chills:  No   Night sweats:   No  Current skin rash:  No   Recent hair loss:  No  Heat intolerance:  No   Cold intolerance:  No  Chest pain:   No   Palpitations:   No  Shortness of breath:  No   Wheezing:   No  Constipation:    No   Diarrhea:   No   Nausea:   No   Vomiting:   No   Kidney/side pain:  No   Back pain:   No  Frequent headaches:  No   Dizziness:     No  Leg swelling:   No   Calf pain:    No    Lyssa Peng LPN on 3/15/2018 at 10:49 AM

## 2018-03-15 NOTE — MR AVS SNAPSHOT
After Visit Summary   3/15/2018    Pennie Nichols    MRN: [id number]           Patient Information     Date Of Birth          1950        Visit Information        Provider Department      3/15/2018 10:45 AM Nick Aguilar MD [facility]         Follow-ups after your visit        Your next 10 appointments already scheduled     Mar 19, 2018 10:30 AM CDT   Treatment with Elizabeth Hamptonler, OT   [facility] ([facility])    [address] [facility] [address]   [phone]            Mar 21, 2018 10:45 AM CDT   Treatment with Elizabeth Hamptonler, OT   [facility] ([facility])    [address] [facility] [address]   [phone]            Apr 06, 2018 10:30 AM CDT   Office Visit with YANN Melissa CNP   [facility] ([facility])    [address]  [facility] [address]   [phone]           Bring a current list of meds and any records pertaining to this visit. For Physicals, please bring immunization records and any forms needing to be filled out. Please arrive 10 minutes early to complete paperwork.              Who to contact     If you have questions or need follow up information about today's clinic visit or your schedule please contact [facility] directly at [phone].  Normal or non-critical lab and imaging results will be communicated to you by MyChart, letter or phone within 4 business days after the clinic has received the results. If you do not hear from us within 7 days, please contact the clinic through MyChart or phone. If you have a critical or abnormal lab result, we will notify you by phone as soon as possible.  Submit refill requests through [employer] or call your pharmacy and they will forward the refill request to us. Please allow 3 business days for your refill to be completed.           "Additional Information About Your Visit        MyChart Information     [facility] lets you send messages to your doctor, view your test results, renew your prescriptions, schedule appointments and more. To sign up, go to [email or web address] . Click on \"Log in\" on the left side of the screen, which will take you to the Welcome page. Then click on \"Sign up Now\" on the right side of the page.     You will be asked to enter the access code listed below, as well as some personal information. Please follow the directions to create your username and password.     Your access code is: [email or web address]  Expires: 2018 12:31 PM     Your access code will  in 90 days. If you need help or a new code, please call your [place] clinic or [phone].        Care EveryWhere ID     This is your Care EveryWhere ID. This could be used by other organizations to access your [place] medical records  [id number]        Your Vitals Were     Respirations Height BMI (Body Mass Index)             16 1.499 m (4' 11\") 42.82 kg/m2          Blood Pressure from Last 3 Encounters:   03/15/18 120/70   18 120/78   18 126/72    Weight from Last 3 Encounters:   03/15/18 96.2 kg (212 lb)   18 96.6 kg (213 lb)   18 96.4 kg (212 lb 8 oz)              Today, you had the following     No orders found for display       Primary Care Provider Office Phone # Fax #    Cookie Schultz, YANN [phone]       [email or web address] [facility] [address]        Equal Access to Services     [facility] BERT : Hadii anne marie De Los Santos, richie guillaume, juanjo taveras. So [facility] [phone].    ATENCIÓN: Si habla español, tiene a lobato disposición servicios gratuitos de asistencia lingüística. Llame al [phone].    We comply with applicable federal civil rights laws and [place] laws. We do not discriminate on the basis of race, color, national origin, age, " disability, sex, sexual orientation, or gender identity.            Thank you!     Thank you for choosing [facility] AND [facility]  for your care. Our goal is always to provide you with excellent care. Hearing back from our patients is one way we can continue to improve our services. Please take a few minutes to complete the written survey that you may receive in the mail after your visit with us. Thank you!             Your Updated Medication List - Protect others around you: Learn how to safely use, store and throw away your medicines at www.disposemymeds.org.          This list is accurate as of 3/15/18 12:17 PM.  Always use your most recent med list.                   Brand Name Dispense Instructions for use Diagnosis    aspirin 81 MG tablet     90 tablet    Take 1 tablet (81 mg) by mouth daily    PVD (peripheral vascular disease) (H)       carBAMazepine 200 MG tablet    TEGretol     Take 0.5 tablets by mouth 2 times daily        eucerin cream      Eucerin or insurance covered moisturizer cream to body 2 x daily and as needed        SM INCONTINENT LINER DISP Misc      Patient changes liner 2-3 time daily

## 2018-03-19 ENCOUNTER — HOSPITAL ENCOUNTER (OUTPATIENT)
Dept: OCCUPATIONAL THERAPY | Facility: OTHER | Age: 68
Setting detail: THERAPIES SERIES
End: 2018-03-19
Attending: ORTHOPAEDIC SURGERY
Payer: COMMERCIAL

## 2018-03-19 PROCEDURE — 40000839 ZZH STATISTIC HAND THERAPY VISIT

## 2018-03-19 PROCEDURE — 97110 THERAPEUTIC EXERCISES: CPT | Mod: GO,KX

## 2018-03-19 PROCEDURE — 97140 MANUAL THERAPY 1/> REGIONS: CPT | Mod: GO,KX

## 2018-03-19 PROCEDURE — 97032 APPL MODALITY 1+ESTIM EA 15: CPT | Mod: GO,KX

## 2018-03-19 NOTE — PROGRESS NOTES
"Outpatient Occupational Therapy Progress Note     Patient: Pennie Nichols  : 1950    Beginning/End Dates of Reporting Period:  18 to 3/19/2018    Referring Provider: Dr. Moore     Therapy Diagnosis: increased pain/swelling, decreased strength/ROM, impaired ADL/IADL performance      Client Self Report:   Patient reports that they changed the date of her appointment at [facility] to this Friday (3/23) instead of tomorrow (Tues 3/20).  She states, \"It's a little better, but I still have a lot of trouble with writing and doing dishes and stuff.\"     Objective Measurements: strength (measured in lbs)  R: : 39P (improved from 30P initial) 3 point pinch: 9P (improved from 5P), lateral (key) pinch: 10P (improved from 9P) 2 point pinch: 10P (improved from 9P)  ROM: (measured in degrees) R thumb IP flexion 60 (improved from 54)   Coordination: 9 hole peg test: R 39.5 seconds (improved from 47 seconds initially)   Edema: R 6.5cm IP cirumference measurement L 5.5cm        Goals:     Goal Identifier  STG 1    Goal Description  Patient will subjectively report improved ability to cut food with a knife from a current rating of 5(unable) to a rating of 1-2(mild to no difficulty) as measured by the DASH   Target Date  3/31/18   Date Met      Progress: Currently rates as 4(severe difficulty)      Goal Identifier  STG 2   Goal Description  Patient will subjectively report improved ability to open a tight new jar from a current rating of 5(unable) to a rating of 1-2(mild to no difficulty) as measured by the DASH   Target Date  3/31/18   Date Met      Progress:  Currently rates as 4(severe difficulty)      Goal Identifier  STG 3   Goal Description  Patient will subjectively report improved ability to write  from a current rating of  4 (severe difficulty) to a rating of 1-2(mild to no difficulty) as measured by the DASH   Target Date     Date Met      Progress: Currently rates as 3 (moderate difficulty)     Goal " Identifier  STG 4   Goal Description  Patient will report improved ability to complete heavy household chores from a current rating of 5 (unable) to a rating of 1-2 (mild to no difficulty) as measured by the DASH   Target Date  3/31/18   Date Met      Progress: Currently rates as 4 (severe difficulty)     Goal Identifier  LTG 1   Goal Description  Patient will rate no greater than 20% impairment rating in order to improve performance with ADL/IADL tasks     Target Date  4/16/18   Date Met      Progress: improving      Goal Identifier  LTG 2   Goal Description  Patient will demonstrate objective improvements in strength and ROM in order to facilitate functional goals listed and return to PLOF   Target Date  4/16/18   Date Met      Progress: improving          Plan:  Continue therapy per current plan of care.    Discharge:  No

## 2018-03-21 ENCOUNTER — HOSPITAL ENCOUNTER (OUTPATIENT)
Dept: OCCUPATIONAL THERAPY | Facility: OTHER | Age: 68
Setting detail: THERAPIES SERIES
End: 2018-03-21
Attending: ORTHOPAEDIC SURGERY
Payer: COMMERCIAL

## 2018-03-21 PROCEDURE — 97140 MANUAL THERAPY 1/> REGIONS: CPT | Mod: GO,KX

## 2018-03-21 PROCEDURE — 40000839 ZZH STATISTIC HAND THERAPY VISIT

## 2018-03-21 PROCEDURE — 97032 APPL MODALITY 1+ESTIM EA 15: CPT | Mod: GO,KX

## 2018-03-21 PROCEDURE — 97018 PARAFFIN BATH THERAPY: CPT | Mod: GO,KX,XU

## 2018-03-21 PROCEDURE — 97110 THERAPEUTIC EXERCISES: CPT | Mod: GO,KX

## 2018-03-21 PROCEDURE — 97035 APP MDLTY 1+ULTRASOUND EA 15: CPT | Mod: GO,KX

## 2018-03-23 ENCOUNTER — TRANSFERRED RECORDS (OUTPATIENT)
Dept: HEALTH INFORMATION MANAGEMENT | Facility: OTHER | Age: 68
End: 2018-03-23

## 2018-04-05 ENCOUNTER — OFFICE VISIT (OUTPATIENT)
Dept: FAMILY MEDICINE | Facility: OTHER | Age: 68
End: 2018-04-05
Attending: NURSE PRACTITIONER
Payer: COMMERCIAL

## 2018-04-05 VITALS
TEMPERATURE: 97.8 F | HEART RATE: 84 BPM | SYSTOLIC BLOOD PRESSURE: 120 MMHG | DIASTOLIC BLOOD PRESSURE: 70 MMHG | WEIGHT: 213.8 LBS | BODY MASS INDEX: 43.18 KG/M2

## 2018-04-05 DIAGNOSIS — Z87.891 HISTORY OF TOBACCO USE DISORDER: ICD-10-CM

## 2018-04-05 DIAGNOSIS — L60.2 ONYCHOGRYPHOSIS: ICD-10-CM

## 2018-04-05 DIAGNOSIS — I73.9 PVD (PERIPHERAL VASCULAR DISEASE) (H): Primary | ICD-10-CM

## 2018-04-05 DIAGNOSIS — Z79.899 ENCOUNTER FOR MEDICATION REVIEW: ICD-10-CM

## 2018-04-05 DIAGNOSIS — Z78.9 DECREASED ACTIVITIES OF DAILY LIVING (ADL): ICD-10-CM

## 2018-04-05 PROCEDURE — G0463 HOSPITAL OUTPT CLINIC VISIT: HCPCS

## 2018-04-05 PROCEDURE — 99213 OFFICE O/P EST LOW 20 MIN: CPT | Performed by: NURSE PRACTITIONER

## 2018-04-05 RX ORDER — ROSUVASTATIN CALCIUM 20 MG/1
20 TABLET, COATED ORAL DAILY
Qty: 90 TABLET | Refills: 0 | Status: SHIPPED | OUTPATIENT
Start: 2018-04-05 | End: 2018-07-05

## 2018-04-05 ASSESSMENT — PAIN SCALES - GENERAL: PAINLEVEL: MODERATE PAIN (4)

## 2018-04-05 NOTE — PATIENT INSTRUCTIONS
Start the rosuvastatin after dinner daily to reduce cholesterol and plaque for 3 months    Call me if you are experiencing any muscle pain or joint pain    I would recheck your lab preferabley fasting in 3 months which means water coffee tea is ok but hold breakfast until drawn    followup in 1 month for foot care    Iza is working on helping with PCA

## 2018-04-05 NOTE — MR AVS SNAPSHOT
After Visit Summary   4/5/2018    Pennie Nichols    MRN: [id number]           Patient Information     Date Of Birth          1950        Visit Information        Provider Department      4/5/2018 10:30 AM Cookie Schultz APRN [facility]        Today's Diagnoses     PVD (peripheral vascular disease) (H)    -  1    History of tobacco use disorder          Care Instructions    Start the rosuvastatin after dinner daily to reduce cholesterol and plaque for 3 months    Call me if you are experiencing any muscle pain or joint pain    I would recheck your lab preferabley fasting in 3 months which means water coffee tea is ok but hold breakfast until drawn    followup in 1 month for foot care    Iza is working on helping with PCA              Follow-ups after your visit        Follow-up notes from your care team     Return in about 4 weeks (around 5/3/2018) for foot care.      Your next 10 appointments already scheduled     Apr 06, 2018 11:00 AM CDT   Treatment with Elizabeth Gordon, [facility] ([facility])    [address] [address]   [phone]            Apr 11, 2018 10:00 AM CDT   Treatment with Sofia Ritter, [facility])    [address] [address]   [phone]            Apr 13, 2018  9:45 AM CDT   Treatment with Elizabeth Gordon, [facility])    [address] [address]   [phone]            Apr 17, 2018  9:45 AM CDT   Treatment with Sofia Ritter, [facility])    [address] [address]   [phone]              Who to contact     If you have questions or need follow up information about today's clinic visit or your schedule please contact [facility] directly at [phone].  Normal or non-critical lab and imaging results will be communicated to you by MyChart, letter or phone within 4 business days after the clinic has received the results. If you do not hear from us within 7 days, please contact the clinic through [employer] or phone. If you have a critical or abnormal lab result, we will notify you by phone as soon as possible.  Submit refill requests through [employer] or call your pharmacy and they will forward the refill request to us. Please allow 3 business days for your refill to be completed.          Additional Information About Your Visit        [facility] Information     [employer] lets you send messages to your doctor, view your test results, renew your prescriptions, schedule appointments and more. To sign up, go to [email or web address] . Click on \"Log in\" on the left side of the screen, which will take you to the Welcome page. Then click on \"Sign up Now\" on the right side of the page.     You will be asked to enter the access code listed below, as well as some personal information. Please follow the directions to create your username and password.     Your access code is: [email or web address]  Expires: 2018 12:31 PM     Your access code will  in 90 days. If you need help or a new code, please call your [place] clinic or [phone].        Care EveryWhere ID     This is your Care EveryWhere ID. This could be used by other organizations to access your [place] medical records  [id number]        Your Vitals Were     Pulse Temperature Breastfeeding? BMI (Body Mass Index)          84 97.8  F (36.6  C) (Tympanic) No 43.18 kg/m2         Blood Pressure from Last 3 Encounters:   18 120/70   03/15/18 120/70   18 120/78    Weight from Last 3 Encounters:   18 213 lb 12.8 oz (97 kg)   03/15/18 212 lb (96.2 kg)   18 213 lb (96.6 kg)              Today, you had the following     No orders found for display       "   Today's Medication Changes          These changes are accurate as of 4/5/18 11:22 AM.  If you have any questions, ask your nurse or doctor.               Start taking these medicines.        Dose/Directions    rosuvastatin 20 MG tablet   Commonly known as:  CRESTOR   Used for:  PVD (peripheral vascular disease) (H), History of tobacco use disorder   Started by:  Cookie Schultz APRN CNP        Dose:  20 mg   Take 1 tablet (20 mg) by mouth daily   Quantity:  90 tablet   Refills:  0            Where to get your medicines      These medications were sent to [facility] [address] [facility] [address] [address]     Phone:  [phone]     rosuvastatin 20 MG tablet                Primary Care Provider Office Phone # Fax #    YANN Melissa [phone]       [address] [facility] [address]        Equal Access to Services     [facility] : Hadii aad ku hadasho Soomaali, waaxda luqadaha, qaybta kaalmada adeegyada, waxay elisein hayyenifer amaya . So [facility] [phone].    ATENCIÓN: Si habla español, tiene a lobato disposición servicios gratuitos de asistencia lingüística. Boy al [phone].    We comply with applicable federal civil rights laws and [place] laws. We do not discriminate on the basis of race, color, national origin, age, disability, sex, sexual orientation, or gender identity.            Thank you!     Thank you for choosing [facility] AND [facility]  for your care. Our goal is always to provide you with excellent care. Hearing back from our patients is one way we can continue to improve our services. Please take a few minutes to complete the written survey that you may receive in the mail after your visit with us. Thank you!             Your Updated Medication List - Protect others around you: Learn how to safely use, store and throw away your medicines at www.disposemymeds.org.          This  list is accurate as of 4/5/18 11:22 AM.  Always use your most recent med list.                   Brand Name Dispense Instructions for use Diagnosis    aspirin 81 MG tablet     90 tablet    Take 1 tablet (81 mg) by mouth daily    PVD (peripheral vascular disease) (H)       carBAMazepine 200 MG tablet    TEGretol     Take 0.5 tablets by mouth 2 times daily        eucerin cream      Eucerin or insurance covered moisturizer cream to body 2 x daily and as needed        rosuvastatin 20 MG tablet    CRESTOR    90 tablet    Take 1 tablet (20 mg) by mouth daily    PVD (peripheral vascular disease) (H), History of tobacco use disorder       SM INCONTINENT LINER DISP Misc      Patient changes liner 2-3 time daily

## 2018-04-05 NOTE — NURSING NOTE
Patient is here today for a F/U for her feet and would also like to discuss lab work. Triny Henao LPN......................4/5/2018 10:34 AM

## 2018-04-06 ENCOUNTER — HOSPITAL ENCOUNTER (OUTPATIENT)
Dept: OCCUPATIONAL THERAPY | Facility: OTHER | Age: 68
Setting detail: THERAPIES SERIES
End: 2018-04-06
Attending: ORTHOPAEDIC SURGERY
Payer: COMMERCIAL

## 2018-04-06 PROBLEM — Z78.9 DECREASED ACTIVITIES OF DAILY LIVING (ADL): Status: ACTIVE | Noted: 2018-04-06

## 2018-04-06 PROCEDURE — 97035 APP MDLTY 1+ULTRASOUND EA 15: CPT | Mod: GO

## 2018-04-06 PROCEDURE — 97032 APPL MODALITY 1+ESTIM EA 15: CPT | Mod: GO

## 2018-04-06 PROCEDURE — 97110 THERAPEUTIC EXERCISES: CPT | Mod: GO

## 2018-04-06 PROCEDURE — 40000125 ZZHC STATISTIC OT OUTPT VISIT

## 2018-04-06 ASSESSMENT — PATIENT HEALTH QUESTIONNAIRE - PHQ9: SUM OF ALL RESPONSES TO PHQ QUESTIONS 1-9: 0

## 2018-04-06 NOTE — PROGRESS NOTES
Outpatient Occupational Therapy Progress Note      Patient: Pennie Nichols  : 1950     Beginning/End Dates of Reporting Period:  3/19/18 to 2018     Referring Provider: Dr. Moore      Therapy Diagnosis: increased pain/swelling, decreased strength/ROM, impaired ADL/IADL performance        Client Self Report:   Patient reports that she has seen Jordon Merlos and he recommended to come back to therapy and continue with current POC.      Objective Measurements: strength (measured in lbs)  R: : 39P (improved from 30P initial) 3 point pinch: 9P (improved from 9 with P), lateral (key) pinch: 10 (improved from 10 with P) 2 point pinch: 8P     ROM: (measured in degrees) R thumb IP flexion 64 (improved from 60)     Coordination: 9 hole peg test: R 39.5 seconds (improved from 47 seconds initially)   Edema: R 6.5cm IP cirumference measurement L 5.5cm         Goals:                Goal Identifier  STG 1    Goal Description  Patient will subjectively report improved ability to cut food with a knife from a current rating of 5(unable) to a rating of 1-2(mild to no difficulty) as measured by the DASH   Target Date  18   Date Met       Progress: Currently rates as 4(severe difficulty)       Goal Identifier  STG 2   Goal Description  Patient will subjectively report improved ability to open a tight new jar from a current rating of 5(unable) to a rating of 1-2(mild to no difficulty) as measured by the DASH   Target Date  18   Date Met       Progress:  Currently rates as 3 (moderate difficulty)       Goal Identifier  STG 3   Goal Description  Patient will subjectively report improved ability to write  from a current rating of  4 (severe difficulty) to a rating of 1-2(mild to no difficulty) as measured by the DASH   Target Date      Date Met       Progress: Currently rates as 3 (moderate difficulty)      Goal Identifier  STG 4   Goal Description  Patient will report improved ability to complete heavy  household chores from a current rating of 5 (unable) to a rating of 1-2 (mild to no difficulty) as measured by the DASH   Target Date  5/30/18   Date Met       Progress: Currently rates as 4 (severe difficulty)      Goal Identifier  LTG 1   Goal Description  Patient will rate no greater than 20% impairment rating in order to improve performance with ADL/IADL tasks     Target Date  6/16/18   Date Met       Progress: improving       Goal Identifier  LTG 2   Goal Description  Patient will demonstrate objective improvements in strength and ROM in order to facilitate functional goals listed and return to PLOF   Target Date  6/16/18   Date Met       Progress: improving             Plan:  Continue therapy per current plan of care.     Discharge:  No

## 2018-04-06 NOTE — PROGRESS NOTES
SUBJECTIVE:   Pennie Nichols is a 67 year old female who presents to clinic today for the following health issues:  Reason for follow-up on foot care and other issues.  Had CURT studies done has peripheral vascular disease, remote past history of tobacco use  Unable to take a shower safely or take care of her feet without the assistance of a PCA  Her PCA quit about a month ago and the agency has had difficulty finding PCA    I did speak with Iza Barnes RN case manager [facility] and she is helping coordinate care and looking for a PCA agency for Pennie Casper gets limited walking exercise, difficult for her to reach and take care of her feet and toenails  When I first met her her toenails were long growing underneath her feet    Would like to discuss starting a statin for risk reduction with her peripheral vascular disease    HPI    Patient Active Problem List   Diagnosis     Abnormal findings in stool     Asymptomatic varicose veins of bilateral lower extremities     Bilateral lower extremity edema     Fibromyalgia     CMC arthritis     History of tobacco use     Onychogryphosis     History of small bowel obstruction     Positive colorectal cancer screening using DNA-based stool test     Requires assistance with activities of daily living (ADL)     Trigeminal neuralgia of right side of face     Abnormal ankle brachial index (CURT)     Decreased activities of daily living (ADL)     Past Surgical History:   Procedure Laterality Date     APPENDECTOMY OPEN      1996,Incidental, Enterolysis--Dr Barrow     CHOLECYSTECTOMY      1996,Laparoscopic     COLONOSCOPY      1990?     HYSTERECTOMY TOTAL ABDOMINAL      01/1995,Azucena     OTHER SURGICAL HISTORY      1994,208526,CRYOTHERAPY OF CERVIX,Abnormal Pap       Social History   Substance Use Topics     Smoking status: Former Smoker     Packs/day: 1.00     Years: 30.00     Types: Cigarettes     Quit date: 1/21/2001     Smokeless tobacco: Never Used     Alcohol use No      Family History   Problem Relation Age of Onset     Other - See Comments Mother      lung cancer     HEART DISEASE Father 70     Heart Disease,MI     CANCER Father      Cancer,Liver cancer     CANCER Other      Cancer,Lung cancer     DIABETES Other      Diabetes     Colon Cancer No family hx of      Cancer-colon     Prostate Cancer No family hx of      Cancer-prostate     Ovarian Cancer No family hx of      Cancer-ovarian     Blood Disease No family hx of      Blood Disease     Hypertension No family hx of      Hypertension     Other - See Comments No family hx of      Stroke     Thyroid Disease No family hx of      Thyroid Disease     Breast Cancer No family hx of      Cancer-breast         Current Outpatient Prescriptions   Medication Sig Dispense Refill     rosuvastatin (CRESTOR) 20 MG tablet Take 1 tablet (20 mg) by mouth daily 90 tablet 0     aspirin 81 MG tablet Take 1 tablet (81 mg) by mouth daily 90 tablet 1     Incontinence Supply Disposable (SM INCONTINENT LINER DISP) MISC Patient changes liner 2-3 time daily       carBAMazepine (TEGRETOL) 200 MG tablet Take 0.5 tablets by mouth 2 times daily       Skin Protectants, Misc. (EUCERIN) cream Eucerin or insurance covered moisturizer cream to body 2 x daily and as needed       Allergies   Allergen Reactions     Diatrizoate      Other reaction(s): Angioedema  As noted 2009 note Dr Stone     BP Readings from Last 3 Encounters:   04/05/18 120/70   03/15/18 120/70   03/07/18 120/78    Wt Readings from Last 3 Encounters:   04/05/18 213 lb 12.8 oz (97 kg)   03/15/18 212 lb (96.2 kg)   03/07/18 213 lb (96.6 kg)                  Labs reviewed in EPIC    Review of Systems     OBJECTIVE:     /70 (BP Location: Right arm, Patient Position: Sitting, Cuff Size: Adult Large)  Pulse 84  Temp 97.8  F (36.6  C) (Tympanic)  Wt 213 lb 12.8 oz (97 kg)  Breastfeeding? No  BMI 43.18 kg/m2  Body mass index is 43.18 kg/(m^2).  Physical Exam   Constitutional: She is oriented  to person, place, and time. She appears well-developed and well-nourished.   Cardiovascular: Normal rate.    Pulmonary/Chest: Effort normal.   Musculoskeletal: Normal range of motion.   Neurological: She is alert and oriented to person, place, and time.   Skin: Skin is warm and dry.   Toenails trimmed with dremel  Thickened fingal nails white/yellow opaque    No focal erythema    Ankle edema mild--many varicose veins scattered both legs   Psychiatric: She has a normal mood and affect. Her behavior is normal. Judgment and thought content normal.   Nursing note and vitals reviewed.          ASSESSMENT/PLAN:       1. PVD (peripheral vascular disease) (H)  Discussed risks and benefits patient wishes to proceed will start medication daily recommend follow-up fasting lab in 2-3 months    - rosuvastatin (CRESTOR) 20 MG tablet; Take 1 tablet (20 mg) by mouth daily  Dispense: 90 tablet; Refill: 0    2. History of tobacco use disorder    - rosuvastatin (CRESTOR) 20 MG tablet; Take 1 tablet (20 mg) by mouth daily  Dispense: 90 tablet; Refill: 0    3. Decreased activities of daily living (ADL)  [facility]  is working on PCA assistance she is unable to accomplish shower and foot care      4. Onychogryphosis--she would like to come in monthly for foot care and nail trimming      5. Encounter for medication review    She continues to decline preventive colonoscopy, PAP services  followup 1 month for foot care and nail trim    Would like to obtain fasting lipids in 2-3 months    I did discuss with Iza referral for waiver assessment eligability              YANN Melissa CNP  [facility] AND [facility]

## 2018-04-11 ENCOUNTER — HOSPITAL ENCOUNTER (OUTPATIENT)
Dept: OCCUPATIONAL THERAPY | Facility: OTHER | Age: 68
Setting detail: THERAPIES SERIES
End: 2018-04-11
Attending: ORTHOPAEDIC SURGERY
Payer: COMMERCIAL

## 2018-04-11 PROCEDURE — 97035 APP MDLTY 1+ULTRASOUND EA 15: CPT | Mod: GO | Performed by: OCCUPATIONAL THERAPIST

## 2018-04-11 PROCEDURE — 97032 APPL MODALITY 1+ESTIM EA 15: CPT | Mod: GO | Performed by: OCCUPATIONAL THERAPIST

## 2018-04-11 PROCEDURE — 97110 THERAPEUTIC EXERCISES: CPT | Mod: GO | Performed by: OCCUPATIONAL THERAPIST

## 2018-04-11 PROCEDURE — 97140 MANUAL THERAPY 1/> REGIONS: CPT | Mod: GO | Performed by: OCCUPATIONAL THERAPIST

## 2018-04-13 ENCOUNTER — HOSPITAL ENCOUNTER (OUTPATIENT)
Dept: OCCUPATIONAL THERAPY | Facility: OTHER | Age: 68
Setting detail: THERAPIES SERIES
End: 2018-04-13
Attending: ORTHOPAEDIC SURGERY
Payer: COMMERCIAL

## 2018-04-13 PROCEDURE — 97014 ELECTRIC STIMULATION THERAPY: CPT | Mod: GO

## 2018-04-13 PROCEDURE — 97140 MANUAL THERAPY 1/> REGIONS: CPT | Mod: GO

## 2018-04-13 PROCEDURE — 40000125 ZZHC STATISTIC OT OUTPT VISIT

## 2018-04-13 PROCEDURE — 97035 APP MDLTY 1+ULTRASOUND EA 15: CPT | Mod: GO

## 2018-04-17 ENCOUNTER — HOSPITAL ENCOUNTER (OUTPATIENT)
Dept: OCCUPATIONAL THERAPY | Facility: OTHER | Age: 68
Setting detail: THERAPIES SERIES
End: 2018-04-17
Attending: ORTHOPAEDIC SURGERY
Payer: COMMERCIAL

## 2018-04-17 PROCEDURE — 97014 ELECTRIC STIMULATION THERAPY: CPT | Mod: GO | Performed by: OCCUPATIONAL THERAPIST

## 2018-04-17 PROCEDURE — 97110 THERAPEUTIC EXERCISES: CPT | Mod: GO | Performed by: OCCUPATIONAL THERAPIST

## 2018-04-17 PROCEDURE — 97035 APP MDLTY 1+ULTRASOUND EA 15: CPT | Mod: GO | Performed by: OCCUPATIONAL THERAPIST

## 2018-04-17 PROCEDURE — 97018 PARAFFIN BATH THERAPY: CPT | Mod: GO,XU | Performed by: OCCUPATIONAL THERAPIST

## 2018-04-17 PROCEDURE — 97140 MANUAL THERAPY 1/> REGIONS: CPT | Mod: GO | Performed by: OCCUPATIONAL THERAPIST

## 2018-04-30 ENCOUNTER — HOSPITAL ENCOUNTER (OUTPATIENT)
Dept: OCCUPATIONAL THERAPY | Facility: OTHER | Age: 68
Setting detail: THERAPIES SERIES
End: 2018-04-30
Attending: ORTHOPAEDIC SURGERY
Payer: COMMERCIAL

## 2018-04-30 PROCEDURE — 97014 ELECTRIC STIMULATION THERAPY: CPT | Mod: GO

## 2018-04-30 PROCEDURE — 97035 APP MDLTY 1+ULTRASOUND EA 15: CPT | Mod: GO

## 2018-04-30 PROCEDURE — 40000125 ZZHC STATISTIC OT OUTPT VISIT

## 2018-04-30 PROCEDURE — 97140 MANUAL THERAPY 1/> REGIONS: CPT | Mod: GO

## 2018-04-30 NOTE — PROGRESS NOTES
Outpatient Occupational Therapy Discharge Note     Patient: Pennie Nichols  : 1950    Beginning/End Dates of Reporting Period:  18 to 2018    Referring Provider: Dr. Moore/YANN Guzman    Therapy Diagnosis: Decreased strength/ROM, increased pain/inflammation of R thumb IP     Client Self Report:   Patient reports that the pain has been consistently low/tolerable, and is ready to be done with therapy and continue home program.     Objective Measurements: See  Progress note       Goals:   Goal Identifier  STG 1    Goal Description  Patient will subjectively report improved ability to cut food with a knife from a current rating of 5(unable) to a rating of 1-2(mild to no difficulty) as measured by the DASH   Target Date  18   Date Met       Progress: Currently rates as 2 (mild difficulty) goal met         Goal Identifier  STG 2   Goal Description  Patient will subjectively report improved ability to open a tight new jar from a current rating of 5(unable) to a rating of 1-2(mild to no difficulty) as measured by the DASH   Target Date  18   Date Met       Progress:  Currently rates as 3 (moderate difficulty)        Goal Identifier  STG 3   Goal Description  Patient will subjectively report improved ability to write  from a current rating of  4 (severe difficulty) to a rating of 1-2(mild to no difficulty) as measured by the DASH   Target Date   18   Date Met    18   Progress: Currently rates as 2 (mild difficulty)       Goal Identifier  STG 4   Goal Description  Patient will report improved ability to complete heavy household chores from a current rating of 5 (unable) to a rating of 1-2 (mild to no difficulty) as measured by the DASH   Target Date  18   Date Met       Progress: Currently rates as 4 (severe difficulty)       Goal Identifier  LTG 1   Goal Description  Patient will rate no greater than 20% impairment rating in order to improve performance with  ADL/IADL tasks     Target Date  6/16/18   Date Met       Progress: Rates at 20.45 %        Goal Identifier  LTG 2   Goal Description  Patient will demonstrate objective improvements in strength and ROM in order to facilitate functional goals listed and return to PLOF   Target Date  6/16/18   Date Met       Progress: improving              Plan:  Discharge from therapy.    Discharge:    Reason for Discharge: Patient chooses to discontinue therapy with goals nearly met     Equipment Issued: Theraputty, Dycem     Discharge Plan: Patient to continue home program.

## 2018-05-08 ENCOUNTER — OFFICE VISIT (OUTPATIENT)
Dept: FAMILY MEDICINE | Facility: OTHER | Age: 68
End: 2018-05-08
Attending: NURSE PRACTITIONER
Payer: COMMERCIAL

## 2018-05-08 VITALS
SYSTOLIC BLOOD PRESSURE: 132 MMHG | BODY MASS INDEX: 43.1 KG/M2 | DIASTOLIC BLOOD PRESSURE: 82 MMHG | TEMPERATURE: 98.2 F | WEIGHT: 213.4 LBS

## 2018-05-08 DIAGNOSIS — Z87.891 HISTORY OF TOBACCO USE: ICD-10-CM

## 2018-05-08 DIAGNOSIS — L60.2 ONYCHOGRYPHOSIS: Primary | ICD-10-CM

## 2018-05-08 PROCEDURE — 11719 TRIM NAIL(S) ANY NUMBER: CPT | Performed by: NURSE PRACTITIONER

## 2018-05-08 PROCEDURE — G0463 HOSPITAL OUTPT CLINIC VISIT: HCPCS | Mod: 25

## 2018-05-08 PROCEDURE — 99214 OFFICE O/P EST MOD 30 MIN: CPT | Mod: 25 | Performed by: NURSE PRACTITIONER

## 2018-05-08 PROCEDURE — G0463 HOSPITAL OUTPT CLINIC VISIT: HCPCS

## 2018-05-08 NOTE — PATIENT INSTRUCTIONS
Have your PCA help with bath and soaking and moisturizing feet at least 3 x week    Next office visit in 1 month come fasting--no food from the previous night after 7 pm---you can drink water coffee and tea --you will get your labs drawn right away    I want to see how the medication is working for your cholesterol

## 2018-05-08 NOTE — PROGRESS NOTES
SUBJECTIVE:   Pennie Nichols is a 67 year old female who presents to clinic today for the following health issues:    Here for foot care she has been getting monthly as she is unable to reach her feet, soak her feet, clipped her nails or moisturize her feet  Has been without a PCA for 2 months--ports PCA services will restart again this week  Has been taking a bath with a washcloth that she feels unsteady in the shower    I had asked her if [facility] had come to do a assessment for services--she reports that there was an assessment and meeting however she does not know the results    She reports she has been taking her low-dose aspirin and her statin medication daily--- she will be due for fasting lab next month  Has no concerns reports overall has been feeling well    HPI    Patient Active Problem List   Diagnosis     Abnormal findings in stool     Asymptomatic varicose veins of bilateral lower extremities     Bilateral lower extremity edema     Fibromyalgia     CMC arthritis     History of tobacco use     Onychogryphosis     History of small bowel obstruction     Positive colorectal cancer screening using DNA-based stool test     Requires assistance with activities of daily living (ADL)     Trigeminal neuralgia of right side of face     Abnormal ankle brachial index (CURT)     Decreased activities of daily living (ADL)     Past Surgical History:   Procedure Laterality Date     APPENDECTOMY OPEN      1996,Incidental, Enterolysis--Dr Barrow     CHOLECYSTECTOMY      1996,Laparoscopic     COLONOSCOPY      1990?     HYSTERECTOMY TOTAL ABDOMINAL      01/1995,Azucena     OTHER SURGICAL HISTORY      1994,208526,CRYOTHERAPY OF CERVIX,Abnormal Pap       Social History   Substance Use Topics     Smoking status: Former Smoker     Packs/day: 1.00     Years: 30.00     Types: Cigarettes     Quit date: 1/21/2001     Smokeless tobacco: Never Used     Alcohol use No     Family History   Problem Relation Age of Onset     Other -  See Comments Mother      lung cancer     HEART DISEASE Father 70     Heart Disease,MI     CANCER Father      Cancer,Liver cancer     CANCER Other      Cancer,Lung cancer     DIABETES Other      Diabetes     Colon Cancer No family hx of      Cancer-colon     Prostate Cancer No family hx of      Cancer-prostate     Ovarian Cancer No family hx of      Cancer-ovarian     Blood Disease No family hx of      Blood Disease     Hypertension No family hx of      Hypertension     Other - See Comments No family hx of      Stroke     Thyroid Disease No family hx of      Thyroid Disease     Breast Cancer No family hx of      Cancer-breast         Current Outpatient Prescriptions   Medication Sig Dispense Refill     aspirin 81 MG tablet Take 1 tablet (81 mg) by mouth daily 90 tablet 1     carBAMazepine (TEGRETOL) 200 MG tablet Take 0.5 tablets by mouth 2 times daily       Incontinence Supply Disposable (SM INCONTINENT LINER DISP) MISC Patient changes liner 2-3 time daily       rosuvastatin (CRESTOR) 20 MG tablet Take 1 tablet (20 mg) by mouth daily 90 tablet 0     Skin Protectants, Misc. (EUCERIN) cream Eucerin or insurance covered moisturizer cream to body 2 x daily and as needed       Allergies   Allergen Reactions     Diatrizoate      Other reaction(s): Angioedema  As noted 2009 note Dr Stone     BP Readings from Last 3 Encounters:   05/08/18 132/82   04/05/18 120/70   03/15/18 120/70    Wt Readings from Last 3 Encounters:   05/08/18 213 lb 6.4 oz (96.8 kg)   04/05/18 213 lb 12.8 oz (97 kg)   03/15/18 212 lb (96.2 kg)                  Labs reviewed in EPIC    Review of Systems   Musculoskeletal: Positive for gait problem.   All other systems reviewed and are negative.       OBJECTIVE:     /82 (BP Location: Right arm, Patient Position: Chair, Cuff Size: Adult Large)  Temp 98.2  F (36.8  C) (Tympanic)  Wt 213 lb 6.4 oz (96.8 kg)  BMI 43.1 kg/m2  Body mass index is 43.1 kg/(m^2).  Physical Exam   Constitutional: She is  oriented to person, place, and time. She appears well-developed and well-nourished.   Cardiovascular: Normal rate.    Pulmonary/Chest: Effort normal.   Musculoskeletal: Normal range of motion.   Neurological: She is alert and oriented to person, place, and time.   Skin: Skin is warm and dry.   Thick fungal toenails trimmed with dremel--trimmed all nails  Tolerated well  Applied ointment to moisturize feet and stockings reapplied       Psychiatric: She has a normal mood and affect. Her behavior is normal. Judgment and thought content normal.   Nursing note and vitals reviewed.          ASSESSMENT/PLAN:       1. Onychogryphosis    Patient will return to clinic monthly for foot and nail care--- will evaluate PRN if PCA services return  And can provide some maintenance consistently      2. History of tobacco use    Given specific directions on fasting labs see instruction sheet  Okay to drink water coffee tea until labs are drawn at appointment--I will give her a snack    She will continue to take her daily statin medication and low-dose aspirin  She will try to watch her diet--- finances are limited and preparation is limited  She feels she may be getting Meals on Wheels            YANN Melissa CNP  [facility] AND [facility] [place]

## 2018-05-08 NOTE — NURSING NOTE
Pt presents to clinic today for 1 month follow up bilateral toenail maintenance.  Mary Ellen Contreras

## 2018-05-15 ENCOUNTER — TRANSFERRED RECORDS (OUTPATIENT)
Dept: HEALTH INFORMATION MANAGEMENT | Facility: OTHER | Age: 68
End: 2018-05-15

## 2018-06-04 ENCOUNTER — TELEPHONE (OUTPATIENT)
Dept: LAB | Facility: OTHER | Age: 68
End: 2018-06-04

## 2018-06-04 DIAGNOSIS — E78.2 MIXED HYPERLIPIDEMIA: Primary | ICD-10-CM

## 2018-06-04 NOTE — TELEPHONE ENCOUNTER
Looks like you might be wanting to do a lipid.  Chrissy Hitchcock LPN..................6/4/2018   4:07 PM

## 2018-06-08 ENCOUNTER — OFFICE VISIT (OUTPATIENT)
Dept: FAMILY MEDICINE | Facility: OTHER | Age: 68
End: 2018-06-08
Attending: NURSE PRACTITIONER
Payer: COMMERCIAL

## 2018-06-08 VITALS
SYSTOLIC BLOOD PRESSURE: 134 MMHG | DIASTOLIC BLOOD PRESSURE: 80 MMHG | WEIGHT: 214 LBS | HEART RATE: 80 BPM | BODY MASS INDEX: 43.22 KG/M2 | TEMPERATURE: 97.1 F

## 2018-06-08 DIAGNOSIS — Z87.891 HISTORY OF TOBACCO USE: ICD-10-CM

## 2018-06-08 DIAGNOSIS — R68.89 ABNORMAL ANKLE BRACHIAL INDEX (ABI): ICD-10-CM

## 2018-06-08 DIAGNOSIS — L60.2 ONYCHOGRYPHOSIS: Primary | ICD-10-CM

## 2018-06-08 DIAGNOSIS — E78.2 MIXED HYPERLIPIDEMIA: ICD-10-CM

## 2018-06-08 LAB
ALBUMIN SERPL-MCNC: 4.3 G/DL (ref 3.5–5.7)
ALP SERPL-CCNC: 91 U/L (ref 34–104)
ALT SERPL W P-5'-P-CCNC: 16 U/L (ref 7–52)
AST SERPL W P-5'-P-CCNC: 16 U/L (ref 13–39)
BILIRUB DIRECT SERPL-MCNC: 0.1 MG/DL (ref 0–0.2)
BILIRUB SERPL-MCNC: 0.3 MG/DL (ref 0.3–1)
CHOLEST SERPL-MCNC: 127 MG/DL
HDLC SERPL-MCNC: 57 MG/DL (ref 23–92)
LDLC SERPL CALC-MCNC: 50 MG/DL
NONHDLC SERPL-MCNC: 70 MG/DL
PROT SERPL-MCNC: 7.4 G/DL (ref 6.4–8.9)
TRIGL SERPL-MCNC: 100 MG/DL

## 2018-06-08 PROCEDURE — 99213 OFFICE O/P EST LOW 20 MIN: CPT | Mod: 25 | Performed by: NURSE PRACTITIONER

## 2018-06-08 PROCEDURE — 11719 TRIM NAIL(S) ANY NUMBER: CPT | Performed by: NURSE PRACTITIONER

## 2018-06-08 PROCEDURE — G0463 HOSPITAL OUTPT CLINIC VISIT: HCPCS | Mod: 25

## 2018-06-08 PROCEDURE — 80076 HEPATIC FUNCTION PANEL: CPT | Performed by: NURSE PRACTITIONER

## 2018-06-08 PROCEDURE — 80061 LIPID PANEL: CPT | Performed by: NURSE PRACTITIONER

## 2018-06-08 PROCEDURE — G0463 HOSPITAL OUTPT CLINIC VISIT: HCPCS

## 2018-06-08 PROCEDURE — 36415 COLL VENOUS BLD VENIPUNCTURE: CPT | Performed by: NURSE PRACTITIONER

## 2018-06-08 ASSESSMENT — PAIN SCALES - GENERAL: PAINLEVEL: MODERATE PAIN (5)

## 2018-06-08 NOTE — LETTER
Gabi 10, 2018      Pennie Nichols  [address]  [place] [address]        Dear ,    We are writing to inform you of your test results.  Your cholesterol and lipids range and there is no evidence of any abnormalities in liver function.  Recommend continuing at same dose as long as you are tolerating this well    Results for orders placed or performed in visit on 06/08/18   **Lipid Profile FUTURE anytime   Result Value Ref Range    Cholesterol 127 <200 mg/dL    Triglycerides 100 <150 mg/dL    HDL Cholesterol 57 23 - 92 mg/dL    LDL Cholesterol Calculated 50 <100 mg/dL    Non HDL Cholesterol 70 <130 mg/dL   Hepatic Function Panel   Result Value Ref Range    Bilirubin Direct 0.1 0.0 - 0.2 mg/dL    Bilirubin Total 0.3 0.3 - 1.0 mg/dL    Albumin 4.3 3.5 - 5.7 g/dL    Protein Total 7.4 6.4 - 8.9 g/dL    Alkaline Phosphatase 91 34 - 104 U/L    ALT 16 7 - 52 U/L    AST 16 13 - 39 U/L             If you have any questions or concerns, please call the clinic at the number listed above.       Sincerely,        YANN Melissa CNP

## 2018-06-08 NOTE — MR AVS SNAPSHOT
"              After Visit Summary   2018    Pennie Nichols    MRN: [id number]           Patient Information     Date Of Birth          1950        Visit Information        Provider Department      2018 10:30 AM Cookie Schultz APRN CNP [facility]        Care Instructions    Your labs look good    Continue on your medications    followup in month for foot care          Follow-ups after your visit        Follow-up notes from your care team     Return in about 4 weeks (around 2018).      Who to contact     If you have questions or need follow up information about today's clinic visit or your schedule please contact [facility] directly at [phone].  Normal or non-critical lab and imaging results will be communicated to you by [employer], letter or phone within 4 business days after the clinic has received the results. If you do not hear from us within 7 days, please contact the clinic through [employer] or phone. If you have a critical or abnormal lab result, we will notify you by phone as soon as possible.  Submit refill requests through [employer] or call your pharmacy and they will forward the refill request to us. Please allow 3 business days for your refill to be completed.          Additional Information About Your Visit        MyChart Information     [employer] lets you send messages to your doctor, view your test results, renew your prescriptions, schedule appointments and more. To sign up, go to [email or web address] . Click on \"Log in\" on the left side of the screen, which will take you to the Welcome page. Then click on \"Sign up Now\" on the right side of the page.     You will be asked to enter the access code listed below, as well as some personal information. Please follow the directions to create your username and password.     Your access code is: [email or web address]  Expires: 2018 11:21 AM     Your access code will  in 90 days. If you need help " or a new code, please call your [facility] or [phone].        Care EveryWhere ID     This is your Care EveryWhere ID. This could be used by other organizations to access your [place] medical records  [id number]        Your Vitals Were     Pulse Temperature BMI (Body Mass Index)             80 97.1  F (36.2  C) (Tympanic) 43.22 kg/m2          Blood Pressure from Last 3 Encounters:   06/08/18 134/80   05/08/18 132/82   04/05/18 120/70    Weight from Last 3 Encounters:   06/08/18 214 lb (97.1 kg)   05/08/18 213 lb 6.4 oz (96.8 kg)   04/05/18 213 lb 12.8 oz (97 kg)              Today, you had the following     No orders found for display       Primary Care Provider Office Phone # Fax #    Cookie Schultz YANN [phone]       [address] [facility] [address]        Equal Access to Services     [facility] : Hadii aad ku hadasho Soomaali, waaxda luqadaha, qaybta kaalmada adeegyada, waxay idiin hayaan duane chavezaratenisha amaya . So [facility] [phone].    ATENCIÓN: Si habla español, tiene a lobato disposición servicios gratuitos de asistencia lingüística. Llame al [phone].    We comply with applicable federal civil rights laws and [place] laws. We do not discriminate on the basis of race, color, national origin, age, disability, sex, sexual orientation, or gender identity.            Thank you!     Thank you for choosing [facility] AND [facility]  for your care. Our goal is always to provide you with excellent care. Hearing back from our patients is one way we can continue to improve our services. Please take a few minutes to complete the written survey that you may receive in the mail after your visit with us. Thank you!             Your Updated Medication List - Protect others around you: Learn how to safely use, store and throw away your medicines at www.disposemymeds.org.          This list is accurate as of 6/8/18 11:21 AM.  Always use your most recent med list.                    Brand Name Dispense Instructions for use Diagnosis    aspirin 81 MG tablet     90 tablet    Take 1 tablet (81 mg) by mouth daily    PVD (peripheral vascular disease) (H)       carBAMazepine 200 MG tablet    TEGretol     Take 0.5 tablets by mouth 2 times daily        eucerin cream      Eucerin or insurance covered moisturizer cream to body 2 x daily and as needed        rosuvastatin 20 MG tablet    CRESTOR    90 tablet    Take 1 tablet (20 mg) by mouth daily    PVD (peripheral vascular disease) (H), History of tobacco use disorder       SM INCONTINENT LINER DISP Misc      Patient changes liner 2-3 time daily

## 2018-06-08 NOTE — NURSING NOTE
Patient presents in the clinic to have her toenails checked and to follow up on her blood work from this morning.  Negar Urrutia LPN 6/8/2018 10:33 AM

## 2018-06-10 NOTE — PROGRESS NOTES
SUBJECTIVE:   Pennie Nichols is a 67 year old female who presents to clinic today for the following health issues:    Presents for toenail and foot care, she is physically unable to provide her own care--recently started another PCA who will help with foot soaks, moisturization cleansing  Does not trim nails    Had fasting labs completed this morning--reports overall she is tolerating her medications well not having any adverse effects  Has no other concerns today    HPI    Patient Active Problem List   Diagnosis     Abnormal findings in stool     Asymptomatic varicose veins of bilateral lower extremities     Bilateral lower extremity edema     Fibromyalgia     CMC arthritis     History of tobacco use     Onychogryphosis     History of small bowel obstruction     Positive colorectal cancer screening using DNA-based stool test     Requires assistance with activities of daily living (ADL)     Trigeminal neuralgia of right side of face     Abnormal ankle brachial index (CURT)     Decreased activities of daily living (ADL)     Past Surgical History:   Procedure Laterality Date     APPENDECTOMY OPEN      1996,Incidental, Enterolysis--Dr Barrow     CHOLECYSTECTOMY      1996,Laparoscopic     COLONOSCOPY      1990?     HYSTERECTOMY TOTAL ABDOMINAL      01/1995,Azucena     OTHER SURGICAL HISTORY      1994,208526,CRYOTHERAPY OF CERVIX,Abnormal Pap       Social History   Substance Use Topics     Smoking status: Former Smoker     Packs/day: 1.00     Years: 30.00     Types: Cigarettes     Quit date: 1/21/2001     Smokeless tobacco: Never Used     Alcohol use No     Family History   Problem Relation Age of Onset     Other - See Comments Mother      lung cancer     HEART DISEASE Father 70     Heart Disease,MI     CANCER Father      Cancer,Liver cancer     CANCER Other      Cancer,Lung cancer     DIABETES Other      Diabetes     Colon Cancer No family hx of      Cancer-colon     Prostate Cancer No family hx of      Cancer-prostate      Ovarian Cancer No family hx of      Cancer-ovarian     Blood Disease No family hx of      Blood Disease     Hypertension No family hx of      Hypertension     Other - See Comments No family hx of      Stroke     Thyroid Disease No family hx of      Thyroid Disease     Breast Cancer No family hx of      Cancer-breast         Current Outpatient Prescriptions   Medication Sig Dispense Refill     aspirin 81 MG tablet Take 1 tablet (81 mg) by mouth daily 90 tablet 1     carBAMazepine (TEGRETOL) 200 MG tablet Take 0.5 tablets by mouth 2 times daily       Incontinence Supply Disposable (SM INCONTINENT LINER DISP) MISC Patient changes liner 2-3 time daily       rosuvastatin (CRESTOR) 20 MG tablet Take 1 tablet (20 mg) by mouth daily 90 tablet 0     Skin Protectants, Misc. (EUCERIN) cream Eucerin or insurance covered moisturizer cream to body 2 x daily and as needed       Allergies   Allergen Reactions     Diatrizoate      Other reaction(s): Angioedema  As noted 2009 note Dr Stone     Recent Labs   Lab Test  06/08/18   1001  03/07/18   1136  01/05/18   1207   LDL  50  118*   --    HDL  57  57   --    TRIG  100  95   --    ALT  16  28   --    CR   --   0.66  0.62*   GFRESTIMATED   --   90   --    GFRESTBLACK   --   >90  >60   POTASSIUM   --   4.3  4.2      BP Readings from Last 3 Encounters:   06/08/18 134/80   05/08/18 132/82   04/05/18 120/70    Wt Readings from Last 3 Encounters:   06/08/18 214 lb (97.1 kg)   05/08/18 213 lb 6.4 oz (96.8 kg)   04/05/18 213 lb 12.8 oz (97 kg)                  Labs reviewed in EPIC    Review of Systems   All other systems reviewed and are negative.       OBJECTIVE:     /80 (BP Location: Right arm, Patient Position: Sitting, Cuff Size: Adult Large)  Pulse 80  Temp 97.1  F (36.2  C) (Tympanic)  Wt 214 lb (97.1 kg)  BMI 43.22 kg/m2  Body mass index is 43.22 kg/(m^2).  Physical Exam   Constitutional: She is oriented to person, place, and time. She appears well-nourished.    Cardiovascular: Normal rate.    Pulmonary/Chest: Effort normal.   Musculoskeletal: Normal range of motion.   Mild bilateral ankle edema 1+ diffuse, no erythema or tenderness  Range of motion, sensation, circulation intact    Toenails trimmed with dremel--thickened fungal nails  Tolerated well  Applied triple antibiotic ointment around nailbeds    Directed to continue PCA foot care with soaking, moisturization   Neurological: She is alert and oriented to person, place, and time.   Skin: Skin is warm and dry.   Psychiatric: She has a normal mood and affect. Her behavior is normal. Judgment and thought content normal.   Nursing note and vitals reviewed.          ASSESSMENT/PLAN:     No additional concerns at this time    Labs at target range, letter sent  We will continue current medication at dose    She would like to continue monthly toenail care to prevent overgrowth    1. Onychogryphosis      2. Abnormal ankle brachial index (CURT)      3. History of tobacco use          YANN Melissa CNP  [facility] [place]

## 2018-07-05 DIAGNOSIS — Z87.891 HISTORY OF TOBACCO USE DISORDER: ICD-10-CM

## 2018-07-05 DIAGNOSIS — I73.9 PVD (PERIPHERAL VASCULAR DISEASE) (H): ICD-10-CM

## 2018-07-09 RX ORDER — ROSUVASTATIN CALCIUM 20 MG/1
TABLET, COATED ORAL
Qty: 90 TABLET | Refills: 0 | Status: SHIPPED | OUTPATIENT
Start: 2018-07-09 | End: 2018-07-10

## 2018-07-09 NOTE — TELEPHONE ENCOUNTER
"Refill request from [facility] for:  rosuvastatin (CRESTOR) 20 MG tablet    LOV 6/8/2018 with Cookie Schultz APRN, NP    Lipids in range; letter sent 6/10/2018-instructed to continue meds at current dose    Last refill 4/5/2018 for 90 tablets X 0 refills     Requested Prescriptions   Pending Prescriptions Disp Refills     rosuvastatin (CRESTOR) 20 MG tablet [Pharmacy Med Name: ROSUVASTATIN 20MG TAB] 90 tablet 0     Sig: TAKE 1 TABLET BY MOUTH ONCE DAILY    Statins Protocol Passed    7/5/2018  9:41 AM       Passed - LDL on file in past 12 months    Recent Labs   Lab Test  06/08/18   1001   LDL  50          Passed - No abnormal creatine kinase in past 12 months    Recent Labs   Lab Test  07/26/13   1302   CKT  53           Passed - Recent (12 mo) or future (30 days) visit within the authorizing provider's specialty    Patient had office visit in the last 12 months or has a visit in the next 30 days with authorizing provider or within the authorizing provider's specialty.  See \"Patient Info\" tab in inbasket, or \"Choose Columns\" in Meds & Orders section of the refill encounter.           Passed - Patient is age 18 or older       Passed - No active pregnancy on record       Passed - No positive pregnancy test in past 12 months      Prescription refilled per RN Medication Refill Policy.................... Danna Mays ....................  7/9/2018   9:52 AM          "

## 2018-07-10 ENCOUNTER — OFFICE VISIT (OUTPATIENT)
Dept: FAMILY MEDICINE | Facility: OTHER | Age: 68
End: 2018-07-10
Attending: NURSE PRACTITIONER
Payer: COMMERCIAL

## 2018-07-10 VITALS
SYSTOLIC BLOOD PRESSURE: 110 MMHG | WEIGHT: 215 LBS | HEART RATE: 80 BPM | BODY MASS INDEX: 43.42 KG/M2 | DIASTOLIC BLOOD PRESSURE: 60 MMHG

## 2018-07-10 DIAGNOSIS — L85.3 DRY SKIN DERMATITIS: ICD-10-CM

## 2018-07-10 DIAGNOSIS — L60.2 ONYCHOGRYPHOSIS: ICD-10-CM

## 2018-07-10 DIAGNOSIS — L30.9 DERMATITIS: Primary | ICD-10-CM

## 2018-07-10 DIAGNOSIS — Z87.891 HISTORY OF TOBACCO USE DISORDER: ICD-10-CM

## 2018-07-10 DIAGNOSIS — I73.9 PVD (PERIPHERAL VASCULAR DISEASE) (H): ICD-10-CM

## 2018-07-10 PROCEDURE — G0463 HOSPITAL OUTPT CLINIC VISIT: HCPCS | Mod: 25

## 2018-07-10 PROCEDURE — 99213 OFFICE O/P EST LOW 20 MIN: CPT | Mod: 25 | Performed by: NURSE PRACTITIONER

## 2018-07-10 PROCEDURE — G0463 HOSPITAL OUTPT CLINIC VISIT: HCPCS

## 2018-07-10 PROCEDURE — 11719 TRIM NAIL(S) ANY NUMBER: CPT | Performed by: NURSE PRACTITIONER

## 2018-07-10 RX ORDER — AMMONIUM LACTATE 12 G/100G
CREAM TOPICAL 2 TIMES DAILY
Qty: 140 G | Refills: 3 | Status: SHIPPED | OUTPATIENT
Start: 2018-07-10 | End: 2020-06-01

## 2018-07-10 RX ORDER — ROSUVASTATIN CALCIUM 20 MG/1
20 TABLET, COATED ORAL DAILY
Qty: 90 TABLET | Refills: 3 | Status: SHIPPED | OUTPATIENT
Start: 2018-07-10 | End: 2018-10-09

## 2018-07-10 ASSESSMENT — PAIN SCALES - GENERAL: PAINLEVEL: NO PAIN (0)

## 2018-07-10 NOTE — PATIENT INSTRUCTIONS
It would be ok to use nail polish on your toenails if it the polish is yours and only used for you    Soak feet in warm soapy water--rinse--dry and moisture heels and feet    Come back in 1 month for toenail trimming and foot care    Call your casemanager about eligibility for meals on wheels    Try lac hydran moisturizing cream to feet and other dry skin affected areas--if it bothers stop using

## 2018-07-10 NOTE — MR AVS SNAPSHOT
After Visit Summary   7/10/2018    Pennie Nichols    MRN: [id number]           Patient Information     Date Of Birth          1950        Visit Information        Provider Department      7/10/2018 10:30 AM Cookie Schultz APRN CNP [facility]        Today's Diagnoses     Dermatitis    -  1    PVD (peripheral vascular disease) (H)        History of tobacco use disorder        Dry skin dermatitis          Care Instructions    It would be ok to use nail polish on your toenails if it the polish is yours and only used for you    Soak feet in warm soapy water--rinse--dry and moisture heels and feet    Come back in 1 month for toenail trimming and foot care    Call your casemanager about eligibility for meals on wheels    Try lac hydran moisturizing cream to feet and other dry skin affected areas--if it bothers stop using          Follow-ups after your visit        Follow-up notes from your care team     Return in about 4 weeks (around 8/7/2018), or if symptoms worsen or fail to improve.      Who to contact     If you have questions or need follow up information about today's clinic visit or your schedule please contact [facility] directly at [phone].  Normal or non-critical lab and imaging results will be communicated to you by MyChart, letter or phone within 4 business days after the clinic has received the results. If you do not hear from us within 7 days, please contact the clinic through MyChart or phone. If you have a critical or abnormal lab result, we will notify you by phone as soon as possible.  Submit refill requests through [employer] or call your pharmacy and they will forward the refill request to us. Please allow 3 business days for your refill to be completed.          Additional Information About Your Visit        Care EveryWhere ID     This is your Care EveryWhere ID. This could be used by other organizations to access your [facility]  records  [id number]        Your Vitals Were     Pulse Breastfeeding? BMI (Body Mass Index)             80 No 43.42 kg/m2          Blood Pressure from Last 3 Encounters:   07/10/18 110/60   06/08/18 134/80   05/08/18 132/82    Weight from Last 3 Encounters:   07/10/18 215 lb (97.5 kg)   06/08/18 214 lb (97.1 kg)   05/08/18 213 lb 6.4 oz (96.8 kg)              Today, you had the following     No orders found for display         Today's Medication Changes          These changes are accurate as of 7/10/18 11:32 AM.  If you have any questions, ask your nurse or doctor.               Start taking these medicines.        Dose/Directions    ammonium lactate 12 % cream   Commonly known as:  AMLACTIN   Used for:  Dermatitis, Dry skin dermatitis, PVD (peripheral vascular disease) (H)   Started by:  Cookie Schultz APRN CNP        Apply topically 2 times daily   Quantity:  140 g   Refills:  3         These medicines have changed or have updated prescriptions.        Dose/Directions    rosuvastatin 20 MG tablet   Commonly known as:  CRESTOR   This may have changed:  See the new instructions.   Used for:  PVD (peripheral vascular disease) (H), History of tobacco use disorder   Changed by:  Cookie Schultz APRN CNP        Dose:  20 mg   Take 1 tablet (20 mg) by mouth daily   Quantity:  90 tablet   Refills:  3            Where to get your medicines      These medications were sent to [facility] [address] [address]     Phone:  [phone]     ammonium lactate 12 % cream    aspirin 81 MG tablet    rosuvastatin 20 MG tablet                Primary Care Provider Office Phone # Fax #    YANN Melissa [phone]       [address] [facility] [address]        Equal Access to Services     DANIEL NOEL AH: Marina yeno Sowaqarali, waaxda luqadaha, qaybta kaalmada mimi, juanjo nunez.  So [facility] [phone].    ATENCIÓN: Si lacho palomo, tiene a lobato disposición servicios gratuitos de asistencia lingüística. Boy lynch [phone].    We comply with applicable federal civil rights laws and [place] laws. We do not discriminate on the basis of race, color, national origin, age, disability, sex, sexual orientation, or gender identity.            Thank you!     Thank you for choosing [facility]  for your care. Our goal is always to provide you with excellent care. Hearing back from our patients is one way we can continue to improve our services. Please take a few minutes to complete the written survey that you may receive in the mail after your visit with us. Thank you!             Your Updated Medication List - Protect others around you: Learn how to safely use, store and throw away your medicines at www.disposemymeds.org.          This list is accurate as of 7/10/18 11:32 AM.  Always use your most recent med list.                   Brand Name Dispense Instructions for use Diagnosis    ammonium lactate 12 % cream    AMLACTIN    140 g    Apply topically 2 times daily    Dermatitis, Dry skin dermatitis, PVD (peripheral vascular disease) (H)       aspirin 81 MG tablet     90 tablet    Take 1 tablet (81 mg) by mouth daily    PVD (peripheral vascular disease) (H)       carBAMazepine 200 MG tablet    TEGretol     Take 0.5 tablets by mouth 2 times daily        eucerin cream      Eucerin or insurance covered moisturizer cream to body 2 x daily and as needed        rosuvastatin 20 MG tablet    CRESTOR    90 tablet    Take 1 tablet (20 mg) by mouth daily    PVD (peripheral vascular disease) (H), History of tobacco use disorder       SM INCONTINENT LINER DISP Misc      Patient changes liner 2-3 time daily

## 2018-07-10 NOTE — NURSING NOTE
Patient present for medication refill and to have her toenail care.   Cass Kathleen LPN........................7/10/2018  10:44 AM

## 2018-07-16 NOTE — PROGRESS NOTES
SUBJECTIVE:   Pennie Nichols is a 67 year old female who presents to clinic today for the following health issues:    Since for monthly foot care for history of severely overgrown toenails--- with mild fungal nail disease  She is unable to do her own foot care--she does have another PCA who is coming 4 days a week-  She does not do toenail care  Patient also reports skin is severely dry particularly feet and legs--Eucerin cream mildly helpful  Otherwise is feeling good has no concerns  Reports she did not meet eligibility for waiver  Would like Meals on Wheels--uncertain if she is eligible for this      [facility]    Patient Active Problem List   Diagnosis     Abnormal findings in stool     Asymptomatic varicose veins of bilateral lower extremities     Bilateral lower extremity edema     Fibromyalgia     CMC arthritis     History of tobacco use     Onychogryphosis     History of small bowel obstruction     Positive colorectal cancer screening using DNA-based stool test     Requires assistance with activities of daily living (ADL)     Trigeminal neuralgia of right side of face     Abnormal ankle brachial index (CURT)     Decreased activities of daily living (ADL)     Past Surgical History:   Procedure Laterality Date     APPENDECTOMY OPEN      1996,Incidental, Enterolysis--Dr Barrow     CHOLECYSTECTOMY      1996,Laparoscopic     COLONOSCOPY      1990?     HYSTERECTOMY TOTAL ABDOMINAL      01/1995,Azucena     OTHER SURGICAL HISTORY      1994,208526,CRYOTHERAPY OF CERVIX,Abnormal Pap       Social History   Substance Use Topics     Smoking status: Former Smoker     Packs/day: 1.00     Years: 30.00     Types: Cigarettes     Quit date: 1/21/2001     Smokeless tobacco: Never Used     Alcohol use No     Family History   Problem Relation Age of Onset     Other - See Comments Mother      lung cancer     HEART DISEASE Father 70     Heart Disease,MI     Cancer Father      Cancer,Liver cancer     Cancer Other      Cancer,Lung cancer      Diabetes Other      Diabetes     Colon Cancer No family hx of      Cancer-colon     Prostate Cancer No family hx of      Cancer-prostate     Ovarian Cancer No family hx of      Cancer-ovarian     Blood Disease No family hx of      Blood Disease     Hypertension No family hx of      Hypertension     Other - See Comments No family hx of      Stroke     Thyroid Disease No family hx of      Thyroid Disease     Breast Cancer No family hx of      Cancer-breast         Current Outpatient Prescriptions   Medication Sig Dispense Refill     ammonium lactate (AMLACTIN) 12 % cream Apply topically 2 times daily 140 g 3     aspirin 81 MG tablet Take 1 tablet (81 mg) by mouth daily 90 tablet 1     rosuvastatin (CRESTOR) 20 MG tablet Take 1 tablet (20 mg) by mouth daily 90 tablet 3     carBAMazepine (TEGRETOL) 200 MG tablet Take 0.5 tablets by mouth 2 times daily       Incontinence Supply Disposable (SM INCONTINENT LINER DISP) MISC Patient changes liner 2-3 time daily       Skin Protectants, Misc. (EUCERIN) cream Eucerin or insurance covered moisturizer cream to body 2 x daily and as needed       Allergies   Allergen Reactions     Diatrizoate      Other reaction(s): Angioedema  As noted 2009 note Dr Stone     BP Readings from Last 3 Encounters:   07/10/18 110/60   06/08/18 134/80   05/08/18 132/82    Wt Readings from Last 3 Encounters:   07/10/18 215 lb (97.5 kg)   06/08/18 214 lb (97.1 kg)   05/08/18 213 lb 6.4 oz (96.8 kg)                  Labs reviewed in EPIC    Review of Systems   Skin: Positive for rash.   All other systems reviewed and are negative.       OBJECTIVE:     /60 (BP Location: Right arm, Patient Position: Sitting, Cuff Size: Adult Large)  Pulse 80  Wt 215 lb (97.5 kg)  Breastfeeding? No  BMI 43.42 kg/m2  Body mass index is 43.42 kg/(m^2).  Physical Exam   Constitutional: She is oriented to person, place, and time. She appears well-developed and well-nourished.   Cardiovascular: Normal rate.     Pulmonary/Chest: Effort normal.   Musculoskeletal: Normal range of motion.   Neurological: She is alert and oriented to person, place, and time.   Skin: Skin is warm and dry. Rash noted.   Used dremel to trim all of her toenails smooth down dorsum surface she can wear her shoes comfortably--tolerated well no complications  Nails are generally thickened with fungus yellow to white opaque  No erythema or signs of infection  Skin is generally dry and scaly particularly heels and malleolar  area   Psychiatric: She has a normal mood and affect. Her behavior is normal. Judgment and thought content normal.   Nursing note and vitals reviewed.          ASSESSMENT/PLAN:     Viewed most recent labs, tolerating statin and low-dose aspirin prophylaxis therapy for history of vascular disease    Recommend calling her county  to review eligibility for Meals on Wheels and other community supports    We will try AmLactin cream for intensive moisturization care for her feet and legs      1. PVD (peripheral vascular disease) (H)    - rosuvastatin (CRESTOR) 20 MG tablet; Take 1 tablet (20 mg) by mouth daily  Dispense: 90 tablet; Refill: 3  - aspirin 81 MG tablet; Take 1 tablet (81 mg) by mouth daily  Dispense: 90 tablet; Refill: 1  - ammonium lactate (AMLACTIN) 12 % cream; Apply topically 2 times daily  Dispense: 140 g; Refill: 3    2. History of tobacco use disorder    - rosuvastatin (CRESTOR) 20 MG tablet; Take 1 tablet (20 mg) by mouth daily  Dispense: 90 tablet; Refill: 3    3. Dermatitis    - ammonium lactate (AMLACTIN) 12 % cream; Apply topically 2 times daily  Dispense: 140 g; Refill: 3    4. Dry skin dermatitis    - ammonium lactate (AMLACTIN) 12 % cream; Apply topically 2 times daily  Dispense: 140 g; Refill: 3    5. Onychogryphosis  Currently kept under control with monthly foot care and nail care        YANN Melissa [facility]

## 2018-07-23 NOTE — PROGRESS NOTES
Patient Information     Patient Name  Pennie Nichols MRN  [id number] Sex  Female   1950      Letter by Cookie Schultz NP at      Author:  Cookie Schultz NP Service:  (none) Author Type:  (none)    Filed:   Encounter Date:  11/10/2017 Status:  (Other)           Pennie Nichols  [address]  [address] [facility] [address]          November 10, 2017    Dear Ms. Nichols:    Following are the tests completed during your last clinic visit.  The results of your lab  shows no evidence of urinary infection, a few red blood cells.  The urologist will review this during your office consult.      Results for orders placed or performed in visit on 17      URINALYSIS W REFLEX MICROSCOPIC IF POSITIVE      Result  Value Ref Range    COLOR                     Yellow Yellow Color    CLARITY                   Clear Clear Clarity    SPECIFIC GRAVITY,URINE    <=1.005 (A) 1.010, 1.015, 1.020, 1.025                    PH,URINE                  6.5 6.0, 7.0, 8.0, 5.5, 6.5, 7.5, 8.5                    UROBILINOGEN,QUALITATIVE  Normal Normal EU/dl    PROTEIN, URINE Negative Negative mg/dL    GLUCOSE, URINE Negative Negative mg/dL    KETONES,URINE             Negative Negative mg/dL    BILIRUBIN,URINE           Negative Negative                    OCCULT BLOOD,URINE        Small (A) Negative                    NITRITE                   Negative Negative                    LEUKOCYTE ESTERASE        Negative Negative                   URINALYSIS MICROSCOPIC      Result  Value Ref Range    RBC 3-5 (A) 0-2, None Seen /HPF    WBC None Seen 0-2, 3-5, None Seen /HPF    BACTERIA                  Few None Seen, Rare, Occasional, Few Bacteria/HPF    EPITHELIAL CELLS          Few None Seen, Few Epi/HPF         If you have any further questions or problems contact my office at  [phone].    Thank you,    Cookie Schultz NP ....................  11/10/2017   5:45 PM

## 2018-07-23 NOTE — PROGRESS NOTES
Patient Information     Patient Name  Pennie Nichols MRN  [id number] Sex  Female   1950      Letter by Cookie Schultz NP at      Author:  Cookie Schultz NP Service:  (none) Author Type:  (none)    Filed:   Encounter Date:  3/23/2017 Status:  (Other)           Pennie Nichols  [address]  [address] [facility] [address]          2017    Dear Ms. Nichols:    Following are the tests completed during your last clinic visit.  The results of these tests are normal and require no further attention unless otherwise noted.    Results for orders placed or performed in visit on 17      ANTI HCV      Result  Value Ref Range    HEPATITIS C ANTIBODY Non-Reactive Non-Reactive         If you have any further questions or problems contact my office at  [phone].    Thank you,    Cookie Schultz NP ....................  3/23/2017   5:33 PM

## 2018-07-24 NOTE — PROGRESS NOTES
Patient Information     Patient Name  Pennie Nichols MRN  [id number] Sex  Female   1950      Letter by Cookie Schultz NP at      Author:  Cookie Schultz NP Service:  (none) Author Type:  (none)    Filed:   Encounter Date:  2018 Status:  (Other)           Pennie Nichols  [address]  [address] [place] [id number]          2018    Dear Ms. Nichols:    Your recent ultrasound test of both of your legs shows that you do have some changes to the blood vessels in your legs. I would like to discuss this with you the next time you come into the clinic and some interventions or things we can do to improve the circulation--this should be an extended appointment.        If you have any further questions or problems contact my office at  [phone]    Thank you,    Cookie Schultz NP ....................  2018   4:04 PM

## 2018-07-24 NOTE — PROGRESS NOTES
Patient Information     Patient Name  Pennie Nichols MRN  [id number] Sex  Female   1950      Letter by Cookie Schultz NP at      Author:  Cookie cShultz NP Service:  (none) Author Type:  (none)    Filed:   Encounter Date:  2018 Status:  (Other)           Pennie Nichols  [address]  [address] [facility] [address]          2018    Dear Ms. Nichols:    I am happy to report your lab tests show no evidence of active inflammation, infection and normal indicators of kidney function.      Results for orders placed or performed in visit on 18      SEDIMENTATION RATE      Result  Value Ref Range    SEDIMENTATION RATE        28 <31 mm/hr   BASIC METABOLIC PANEL      Result  Value Ref Range    SODIUM 141 133 - 143 mmol/L    POTASSIUM 4.2 3.5 - 5.1 mmol/L    CHLORIDE 102 98 - 107 mmol/L    CO2,TOTAL 29 21 - 31 mmol/L    ANION GAP 10 5 - 18                    GLUCOSE 109 (H) 70 - 105 mg/dL    CALCIUM 8.7 8.6 - 10.3 mg/dL    BUN 11 7 - 25 mg/dL    CREATININE 0.62 (L) 0.70 - 1.30 mg/dL    BUN/CREAT RATIO           18                    GFR if African American >60 >60 ml/min/1.73m2    GFR if not African American >60 >60 ml/min/1.73m2   CBC WITH AUTO DIFFERENTIAL      Result  Value Ref Range    WHITE BLOOD COUNT         7.6 4.5 - 11.0 thou/cu mm    RED BLOOD COUNT           5.22 (H) 4.00 - 5.20 mil/cu mm    HEMOGLOBIN                14.2 12.0 - 16.0 g/dL    HEMATOCRIT                44.6 33.0 - 51.0 %    MCV                       85 80 - 100 fL    MCH                       27.2 26.0 - 34.0 pg    MCHC                      31.8 (L) 32.0 - 36.0 g/dL    RDW                       12.3 11.5 - 15.5 %    PLATELET COUNT            278 140 - 440 thou/cu mm    MPV                       8.8 6.5 - 11.0 fL    NEUTROPHILS               65.1 42.0 - 72.0 %    LYMPHOCYTES               23.4 20.0 - 44.0 %    MONOCYTES                 7.4 <12.0 %    EOSINOPHILS               3.2 <8.0 %    BASOPHILS                 0.5  <3.0 %    IMMATURE GRANULOCYTES(METAS,MYELOS,PROS) 0.4 %    ABSOLUTE NEUTROPHILS      4.9 1.7 - 7.0 thou/cu mm    ABSOLUTE LYMPHOCYTES      1.8 0.9 - 2.9 thou/cu mm    ABSOLUTE MONOCYTES        0.6 <0.9 thou/cu mm    ABSOLUTE EOSINOPHILS      0.2 <0.5 thou/cu mm    ABSOLUTE BASOPHILS        0.0 <0.3 thou/cu mm    ABSOLUTE IMMATURE GRANULOCYTES(METAS,MYELOS,PROS) 0.0 <=0.3 thou/cu mm           If you have any further questions or problems contact my office at  [phone]    Thank you,    Cookie Schultz NP ....................  1/6/2018   11:08 AM

## 2018-08-10 ENCOUNTER — OFFICE VISIT (OUTPATIENT)
Dept: FAMILY MEDICINE | Facility: OTHER | Age: 68
End: 2018-08-10
Attending: NURSE PRACTITIONER
Payer: COMMERCIAL

## 2018-08-10 VITALS
WEIGHT: 214.6 LBS | DIASTOLIC BLOOD PRESSURE: 64 MMHG | BODY MASS INDEX: 43.34 KG/M2 | HEART RATE: 88 BPM | SYSTOLIC BLOOD PRESSURE: 108 MMHG

## 2018-08-10 DIAGNOSIS — L60.2 ONYCHOGRYPHOSIS: Primary | ICD-10-CM

## 2018-08-10 PROCEDURE — 11719 TRIM NAIL(S) ANY NUMBER: CPT | Performed by: NURSE PRACTITIONER

## 2018-08-10 PROCEDURE — 99213 OFFICE O/P EST LOW 20 MIN: CPT | Mod: 25 | Performed by: NURSE PRACTITIONER

## 2018-08-10 PROCEDURE — G0463 HOSPITAL OUTPT CLINIC VISIT: HCPCS

## 2018-08-10 PROCEDURE — G0463 HOSPITAL OUTPT CLINIC VISIT: HCPCS | Mod: 25

## 2018-08-10 ASSESSMENT — ENCOUNTER SYMPTOMS: ARTHRALGIAS: 1

## 2018-08-10 ASSESSMENT — PAIN SCALES - GENERAL: PAINLEVEL: MODERATE PAIN (5)

## 2018-08-10 NOTE — PROGRESS NOTES
SUBJECTIVE:   Pennie Nichols is a 67 year old female who presents to clinic today for the following health issues:    Since for monthly foot care, history of barely overgrown toenails as she was unable to do her own foot care--does have a PCA come in 3-4 times a week who assists with foot care and assistance with showering and ADLs  PCA cannot do nail trimming or foot care other than moisturization  Has no concerns today except that her arches bother her at times she is wondering what she could do    HPI    Patient Active Problem List   Diagnosis     Abnormal findings in stool     Asymptomatic varicose veins of bilateral lower extremities     Bilateral lower extremity edema     Fibromyalgia     CMC arthritis     History of tobacco use     Onychogryphosis     History of small bowel obstruction     Positive colorectal cancer screening using DNA-based stool test     Requires assistance with activities of daily living (ADL)     Trigeminal neuralgia of right side of face     Abnormal ankle brachial index (CURT)     Decreased activities of daily living (ADL)     Past Surgical History:   Procedure Laterality Date     APPENDECTOMY OPEN      1996,Incidental, Enterolysis--Dr Barrow     CHOLECYSTECTOMY      1996,Laparoscopic     COLONOSCOPY      1990?     HYSTERECTOMY TOTAL ABDOMINAL      01/1995,Azucena     OTHER SURGICAL HISTORY      1994,208526,CRYOTHERAPY OF CERVIX,Abnormal Pap       Social History   Substance Use Topics     Smoking status: Former Smoker     Packs/day: 1.00     Years: 30.00     Types: Cigarettes     Quit date: 1/21/2001     Smokeless tobacco: Never Used     Alcohol use No     Family History   Problem Relation Age of Onset     Other - See Comments Mother      lung cancer     HEART DISEASE Father 70     Heart Disease,MI     Cancer Father      Cancer,Liver cancer     Cancer Other      Cancer,Lung cancer     Diabetes Other      Diabetes     Colon Cancer No family hx of      Cancer-colon     Prostate Cancer No  family hx of      Cancer-prostate     Ovarian Cancer No family hx of      Cancer-ovarian     Blood Disease No family hx of      Blood Disease     Hypertension No family hx of      Hypertension     Other - See Comments No family hx of      Stroke     Thyroid Disease No family hx of      Thyroid Disease     Breast Cancer No family hx of      Cancer-breast         Current Outpatient Prescriptions   Medication Sig Dispense Refill     ammonium lactate (AMLACTIN) 12 % cream Apply topically 2 times daily 140 g 3     aspirin 81 MG tablet Take 1 tablet (81 mg) by mouth daily 90 tablet 1     carBAMazepine (TEGRETOL) 200 MG tablet Take 0.5 tablets by mouth 2 times daily       Incontinence Supply Disposable (SM INCONTINENT LINER DISP) MISC Patient changes liner 2-3 time daily       rosuvastatin (CRESTOR) 20 MG tablet Take 1 tablet (20 mg) by mouth daily 90 tablet 3     Skin Protectants, Misc. (EUCERIN) cream Eucerin or insurance covered moisturizer cream to body 2 x daily and as needed       Allergies   Allergen Reactions     Diatrizoate      Other reaction(s): Angioedema  As noted 2009 note Dr Stone     BP Readings from Last 3 Encounters:   08/10/18 108/64   07/10/18 110/60   06/08/18 134/80    Wt Readings from Last 3 Encounters:   08/10/18 214 lb 9.6 oz (97.3 kg)   07/10/18 215 lb (97.5 kg)   06/08/18 214 lb (97.1 kg)                  Labs reviewed in EPIC    Review of Systems   Musculoskeletal: Positive for arthralgias.   All other systems reviewed and are negative.       OBJECTIVE:     /64 (BP Location: Right arm, Patient Position: Sitting, Cuff Size: Adult Regular)  Pulse 88  Wt 214 lb 9.6 oz (97.3 kg)  LMP  (LMP Unknown)  Breastfeeding? No  BMI 43.34 kg/m2  Body mass index is 43.34 kg/(m^2).  Physical Exam   Constitutional: She is oriented to person, place, and time. She appears well-developed and well-nourished.   Cardiovascular: Normal rate.    Pulmonary/Chest: Effort normal.   Musculoskeletal: Normal range  of motion.   Neurological: She is alert and oriented to person, place, and time.   Skin: Skin is warm and dry.   Toenails trimmed and smoothed with dremel--tolerated well no complications  Feet moisturized with Kerasal  No open areas or callused areas, good circulation and sensation, normal range of motion  Nails thickened with fungus evidence of erythema or infection   Psychiatric: She has a normal mood and affect. Her behavior is normal. Judgment and thought content normal.   Nursing note and vitals reviewed.        ASSESSMENT/PLAN:     Recommend trying an over-the-counter soft arch supports discussed there will be a break in.  For at least several weeks  If no improvement would recommend seeing podiatrist at [facility]--may need custom orthotics    Patient will return to clinic next month for monthly foot care and nail trimming    1. Onychogryphosis          YANN Melissa CNP  [facility]

## 2018-08-10 NOTE — MR AVS SNAPSHOT
After Visit Summary   8/10/2018    Pennie Nichols    MRN: [id number]           Patient Information     Date Of Birth          1950        Visit Information        Provider Department      8/10/2018 10:30 AM Cookie Schultz APRN CNP [facility]        Care Instructions    Try over the counter arch supports--preferable soft ones    If not helping you could see the podiatrist that comes here              Follow-ups after your visit        Follow-up notes from your care team     Return in about 4 weeks (around 9/7/2018), or if symptoms worsen or fail to improve, for foot care.      Who to contact     If you have questions or need follow up information about today's clinic visit or your schedule please contact [facility] directly at [phone].  Normal or non-critical lab and imaging results will be communicated to you by MyChart, letter or phone within 4 business days after the clinic has received the results. If you do not hear from us within 7 days, please contact the clinic through MyChart or phone. If you have a critical or abnormal lab result, we will notify you by phone as soon as possible.  Submit refill requests through [employer] or call your pharmacy and they will forward the refill request to us. Please allow 3 business days for your refill to be completed.          Additional Information About Your Visit        Care EveryWhere ID     This is your Care EveryWhere ID. This could be used by other organizations to access your [place] medical records  [id number]        Your Vitals Were     Pulse Last Period Breastfeeding? BMI (Body Mass Index)          88 (LMP Unknown) No 43.34 kg/m2         Blood Pressure from Last 3 Encounters:   08/10/18 108/64   07/10/18 110/60   06/08/18 134/80    Weight from Last 3 Encounters:   08/10/18 214 lb 9.6 oz (97.3 kg)   07/10/18 215 lb (97.5 kg)   06/08/18 214 lb (97.1 kg)              Today, you had the following      No orders found for display       Primary Care Provider Office Phone # Fax #    Cookie Schultz, YANN [phone] [address]   [facility] [address]        Equal Access to Services     DANIEL NOEL : Hadii aad ku hadzanao Sowaqarali, waaxda luqadaha, qaybta kaalmada adeegyada, waxchente idiin hayaan cuatejose l hugo jose luis nunez. So [facility] [phone].    ATENCIÓN: Si habla español, tiene a lobato disposición servicios gratuitos de asistencia lingüística. Llame al [phone].    We comply with applicable federal civil rights laws and [place] laws. We do not discriminate on the basis of race, color, national origin, age, disability, sex, sexual orientation, or gender identity.            Thank you!     Thank you for choosing [facility]  for your care. Our goal is always to provide you with excellent care. Hearing back from our patients is one way we can continue to improve our services. Please take a few minutes to complete the written survey that you may receive in the mail after your visit with us. Thank you!             Your Updated Medication List - Protect others around you: Learn how to safely use, store and throw away your medicines at www.disposemymeds.org.          This list is accurate as of 8/10/18 10:37 AM.  Always use your most recent med list.                   Brand Name Dispense Instructions for use Diagnosis    ammonium lactate 12 % cream    AMLACTIN    140 g    Apply topically 2 times daily    Dermatitis, Dry skin dermatitis, PVD (peripheral vascular disease) (H)       aspirin 81 MG tablet     90 tablet    Take 1 tablet (81 mg) by mouth daily    PVD (peripheral vascular disease) (H)       carBAMazepine 200 MG tablet    TEGretol     Take 0.5 tablets by mouth 2 times daily        eucerin cream      Eucerin or insurance covered moisturizer cream to body 2 x daily and as needed        rosuvastatin 20 MG tablet    CRESTOR    90 tablet    Take 1 tablet (20 mg) by  mouth daily    PVD (peripheral vascular disease) (H), History of tobacco use disorder       SM INCONTINENT LINER DISP Misc      Patient changes liner 2-3 time daily

## 2018-08-10 NOTE — NURSING NOTE
Pennie presents to the clinic today for a follow up of her feet.        Ross Contreras, BENJAMIN 08/10/18 10:02 AM

## 2018-08-10 NOTE — PATIENT INSTRUCTIONS
Try over the counter arch supports--preferable soft ones    If not helping you could see the podiatrist that comes here

## 2018-09-11 ENCOUNTER — OFFICE VISIT (OUTPATIENT)
Dept: FAMILY MEDICINE | Facility: OTHER | Age: 68
End: 2018-09-11
Attending: NURSE PRACTITIONER
Payer: COMMERCIAL

## 2018-09-11 VITALS
TEMPERATURE: 97.5 F | DIASTOLIC BLOOD PRESSURE: 72 MMHG | SYSTOLIC BLOOD PRESSURE: 118 MMHG | HEART RATE: 80 BPM | BODY MASS INDEX: 43.71 KG/M2 | WEIGHT: 216.4 LBS

## 2018-09-11 DIAGNOSIS — R07.0 THROAT PAIN: Primary | ICD-10-CM

## 2018-09-11 DIAGNOSIS — Z78.9 DECREASED ACTIVITIES OF DAILY LIVING (ADL): ICD-10-CM

## 2018-09-11 DIAGNOSIS — L60.2 ONYCHOGRYPHOSIS: ICD-10-CM

## 2018-09-11 DIAGNOSIS — L60.8 NAIL DEFORMITY: ICD-10-CM

## 2018-09-11 LAB
DEPRECATED S PYO AG THROAT QL EIA: NORMAL
SPECIMEN SOURCE: NORMAL

## 2018-09-11 PROCEDURE — 11719 TRIM NAIL(S) ANY NUMBER: CPT | Performed by: NURSE PRACTITIONER

## 2018-09-11 PROCEDURE — G0463 HOSPITAL OUTPT CLINIC VISIT: HCPCS

## 2018-09-11 PROCEDURE — G0463 HOSPITAL OUTPT CLINIC VISIT: HCPCS | Mod: 25

## 2018-09-11 PROCEDURE — 99214 OFFICE O/P EST MOD 30 MIN: CPT | Mod: 25 | Performed by: NURSE PRACTITIONER

## 2018-09-11 PROCEDURE — 87880 STREP A ASSAY W/OPTIC: CPT | Performed by: NURSE PRACTITIONER

## 2018-09-11 ASSESSMENT — ENCOUNTER SYMPTOMS: SORE THROAT: 1

## 2018-09-11 ASSESSMENT — PAIN SCALES - GENERAL: PAINLEVEL: MODERATE PAIN (5)

## 2018-09-11 NOTE — NURSING NOTE
Patient presents to clinic for toenail care and patient also woke up with a sore throat and difficulty swallowing.  Helen Lu ....................  9/11/2018   10:01 AM

## 2018-09-11 NOTE — PATIENT INSTRUCTIONS
Viral Pharyngitis (Sore Throat)    You or your child have pharyngitis (sore throat). This infection is caused by a virus. It can cause throat pain that is worse when swallowing, aching all over, headache, and fever. The infection may be spread by coughing, kissing, or touching others after touching your mouth or nose. Antibiotic medicines do not work against viruses. They are not used for treating this illness.  Home care    If symptoms are severe, you or your child should rest at home. Return to work or school when you or your child feel well enough.     You or your child should drink plenty of fluids to prevent dehydration.    Use throat lozenges or numbing throat sprays to help reduce pain. Gargling with warm salt water will also help reduce throat pain. Dissolve 1/2 teaspoon of salt in 1 glass of warm water. Children can sip on juice or a popsicle. Children 5 years and older can also suck on a lollipop or hard candy.    Don t eat salty or spicy foods or give them to your child. These can be irritating to the throat.  Medicines for a child: You can give your child acetaminophen for fever, fussiness, or discomfort. In babies over 6 months of age, you may use ibuprofen instead of acetaminophen. If your child has chronic liver or kidney disease or ever had a stomach ulcer or GI bleeding, talk with your child s healthcare provider before giving these medicines. Aspirin should never be used by any child under 18 years of age who has a fever. It may cause severe liver damage.  Medicines for an adult: You may use acetaminophen or ibuprofen to control pain or fever, unless another medicine was prescribed for this. If you have chronic liver or kidney disease or ever had a stomach ulcer or GI bleeding, talk with your healthcare provider before using these medicines.  Follow-up care  Follow up with a healthcare provider or our staff if you or your child are not getting better over the next week.  When to seek medical  advice  Call your healthcare provider right away if any of these occur:    Fever as directed by your healthcare provider.  For children, seek care if:  ? Your child is of any age and has repeated fevers above 104 F (40 C).  ? Your child is younger than 2 years of age and has a fever of 100.4 F (38 C) for more than 1 day.  ? Your child is 2 years old or older and has a fever of 100.4 F (38 C) for more than 3 days.    New or worsening ear pain, sinus pain, or headache    Painful lumps in the back of neck    Stiff neck    Lymph nodes are getting larger    Can t swallow liquids, a lot of drooling, or can t open mouth wide due to throat pain    Signs of dehydration, such as very dark urine or no urine, sunken eyes, dizziness    Trouble breathing or noisy breathing    Muffled voice    New rash    Other symptoms are getting worse  Date Last Reviewed: 10/1/2017    3164-7741 [employer]. [address] [place] [address], [place] [address]. All rights reserved. This information is not intended as a substitute for professional medical care. Always follow your healthcare professional's instructions.

## 2018-09-11 NOTE — MR AVS SNAPSHOT
After Visit Summary   9/11/2018    Pennie Nichols    MRN: [id number]           Patient Information     Date Of Birth          1950        Visit Information        Provider Department      9/11/2018 10:30 AM Cookie Schultz APRN [facility]        Today's Diagnoses     Throat pain    -  1    Onychogryphosis        Decreased activities of daily living (ADL)          Care Instructions      Viral Pharyngitis (Sore Throat)    You or your child have pharyngitis (sore throat). This infection is caused by a virus. It can cause throat pain that is worse when swallowing, aching all over, headache, and fever. The infection may be spread by coughing, kissing, or touching others after touching your mouth or nose. Antibiotic medicines do not work against viruses. They are not used for treating this illness.  Home care    If symptoms are severe, you or your child should rest at home. Return to work or school when you or your child feel well enough.     You or your child should drink plenty of fluids to prevent dehydration.    Use throat lozenges or numbing throat sprays to help reduce pain. Gargling with warm salt water will also help reduce throat pain. Dissolve 1/2 teaspoon of salt in 1 glass of warm water. Children can sip on juice or a popsicle. Children 5 years and older can also suck on a lollipop or hard candy.    Don t eat salty or spicy foods or give them to your child. These can be irritating to the throat.  Medicines for a child: You can give your child acetaminophen for fever, fussiness, or discomfort. In babies over 6 months of age, you may use ibuprofen instead of acetaminophen. If your child has chronic liver or kidney disease or ever had a stomach ulcer or GI bleeding, talk with your child s healthcare provider before giving these medicines. Aspirin should never be used by any child under 18 years of age who has a fever. It may cause severe liver damage.  Medicines for  an adult: You may use acetaminophen or ibuprofen to control pain or fever, unless another medicine was prescribed for this. If you have chronic liver or kidney disease or ever had a stomach ulcer or GI bleeding, talk with your healthcare provider before using these medicines.  Follow-up care  Follow up with a healthcare provider or our staff if you or your child are not getting better over the next week.  When to seek medical advice  Call your healthcare provider right away if any of these occur:    Fever as directed by your healthcare provider.  For children, seek care if:  ? Your child is of any age and has repeated fevers above 104 F (40 C).  ? Your child is younger than 2 years of age and has a fever of 100.4 F (38 C) for more than 1 day.  ? Your child is 2 years old or older and has a fever of 100.4 F (38 C) for more than 3 days.    New or worsening ear pain, sinus pain, or headache    Painful lumps in the back of neck    Stiff neck    Lymph nodes are getting larger    Can t swallow liquids, a lot of drooling, or can t open mouth wide due to throat pain    Signs of dehydration, such as very dark urine or no urine, sunken eyes, dizziness    Trouble breathing or noisy breathing    Muffled voice    New rash    Other symptoms are getting worse  Date Last Reviewed: 10/1/2017    9430-9152 [employer]. [address] [place] [address]. All rights reserved. This information is not intended as a substitute for professional medical care. Always follow your healthcare professional's instructions.                Follow-ups after your visit        Follow-up notes from your care team     Return if symptoms worsen or fail to improve.      Your next 10 appointments already scheduled     Sep 11, 2018 10:30 AM CDT   Office Visit with YANN Melissa CNP   [facility] ([facility])    [address]  [place] [facility] [address] [phone]           Bring  a current list of meds and any records pertaining to this visit. For Physicals, please bring immunization records and any forms needing to be filled out. Please arrive 10 minutes early to complete paperwork.              Who to contact     If you have questions or need follow up information about today's clinic visit or your schedule please contact [facility] directly at [phone].  Normal or non-critical lab and imaging results will be communicated to you by MyChart, letter or phone within 4 business days after the clinic has received the results. If you do not hear from us within 7 days, please contact the clinic through MyChart or phone. If you have a critical or abnormal lab result, we will notify you by phone as soon as possible.  Submit refill requests through [employer] or call your pharmacy and they will forward the refill request to us. Please allow 3 business days for your refill to be completed.          Additional Information About Your Visit        Care EveryWhere ID     This is your Care EveryWhere ID. This could be used by other organizations to access your [place] medical records  [id number]        Your Vitals Were     Pulse Temperature Last Period Breastfeeding? BMI (Body Mass Index)       80 97.5  F (36.4  C) (LMP Unknown) No 43.71 kg/m2        Blood Pressure from Last 3 Encounters:   09/11/18 118/72   08/10/18 108/64   07/10/18 110/60    Weight from Last 3 Encounters:   09/11/18 216 lb 6.4 oz (98.2 kg)   08/10/18 214 lb 9.6 oz (97.3 kg)   07/10/18 215 lb (97.5 kg)              We Performed the Following     Strep, Rapid Screen        Primary Care Provider Office Phone # Fax #    YANN Melissa [phone] [address]  [facility] [address]        Equal Access to Services     DANIEL NOEL : Marina De Los Santos, richie guillaume, juanjo taveras. So [facility]  [phone].    ATENCIÓN: Si lacho palomo, tiene a lobato disposición servicios gratuitos de asistencia lingüística. Boy lynch [phone].    We comply with applicable federal civil rights laws and [place] laws. We do not discriminate on the basis of race, color, national origin, age, disability, sex, sexual orientation, or gender identity.            Thank you!     Thank you for choosing [facility]  for your care. Our goal is always to provide you with excellent care. Hearing back from our patients is one way we can continue to improve our services. Please take a few minutes to complete the written survey that you may receive in the mail after your visit with us. Thank you!             Your Updated Medication List - Protect others around you: Learn how to safely use, store and throw away your medicines at www.disposemymeds.org.          This list is accurate as of 9/11/18 10:28 AM.  Always use your most recent med list.                   Brand Name Dispense Instructions for use Diagnosis    ammonium lactate 12 % cream    AMLACTIN    140 g    Apply topically 2 times daily    Dermatitis, Dry skin dermatitis, PVD (peripheral vascular disease) (H)       aspirin 81 MG tablet     90 tablet    Take 1 tablet (81 mg) by mouth daily    PVD (peripheral vascular disease) (H)       carBAMazepine 200 MG tablet    TEGretol     Take 0.5 tablets by mouth 2 times daily        eucerin cream      Eucerin or insurance covered moisturizer cream to body 2 x daily and as needed        rosuvastatin 20 MG tablet    CRESTOR    90 tablet    Take 1 tablet (20 mg) by mouth daily    PVD (peripheral vascular disease) (H), History of tobacco use disorder       SM INCONTINENT LINER DISP Misc      Patient changes liner 2-3 time daily

## 2018-09-12 NOTE — PROGRESS NOTES
SUBJECTIVE:   Pennie Nichols is a 67 year old female who presents to clinic today for the following health issues:    Presents for pharyngitis started yesterday and mild congestion--denies any fever--- multiple exposures at [facility]    Has PCA services however reports has been inconsistent with multiple rescheduling's average about once or twice a week-----initially assessed for 3 times a week  Has been getting some help with foot hygiene however PCA does not trim nails and patient is unable to physically  Severe overgrown nails last year--due to physical inability to maintain foot care        HPI    Patient Active Problem List   Diagnosis     Abnormal findings in stool     Asymptomatic varicose veins of bilateral lower extremities     Bilateral lower extremity edema     Fibromyalgia     CMC arthritis     History of tobacco use     Onychogryphosis     History of small bowel obstruction     Positive colorectal cancer screening using DNA-based stool test     Requires assistance with activities of daily living (ADL)     Trigeminal neuralgia of right side of face     Abnormal ankle brachial index (CURT)     Decreased activities of daily living (ADL)     Past Surgical History:   Procedure Laterality Date     APPENDECTOMY OPEN      1996,Incidental, Enterolysis--Dr Barrow     CHOLECYSTECTOMY      1996,Laparoscopic     COLONOSCOPY      1990?     HYSTERECTOMY TOTAL ABDOMINAL      01/1995,Azucena     OTHER SURGICAL HISTORY      1994,208526,CRYOTHERAPY OF CERVIX,Abnormal Pap       Social History   Substance Use Topics     Smoking status: Former Smoker     Packs/day: 1.00     Years: 30.00     Types: Cigarettes     Quit date: 1/21/2001     Smokeless tobacco: Never Used     Alcohol use No     Family History   Problem Relation Age of Onset     Other - See Comments Mother      lung cancer     HEART DISEASE Father 70     Heart Disease,MI     Cancer Father      Cancer,Liver cancer     Cancer Other      Cancer,Lung cancer      Diabetes Other      Diabetes     Colon Cancer No family hx of      Cancer-colon     Prostate Cancer No family hx of      Cancer-prostate     Ovarian Cancer No family hx of      Cancer-ovarian     Blood Disease No family hx of      Blood Disease     Hypertension No family hx of      Hypertension     Other - See Comments No family hx of      Stroke     Thyroid Disease No family hx of      Thyroid Disease     Breast Cancer No family hx of      Cancer-breast         Current Outpatient Prescriptions   Medication Sig Dispense Refill     ammonium lactate (AMLACTIN) 12 % cream Apply topically 2 times daily 140 g 3     aspirin 81 MG tablet Take 1 tablet (81 mg) by mouth daily 90 tablet 1     carBAMazepine (TEGRETOL) 200 MG tablet Take 0.5 tablets by mouth 2 times daily       Incontinence Supply Disposable (SM INCONTINENT LINER DISP) MISC Patient changes liner 2-3 time daily       rosuvastatin (CRESTOR) 20 MG tablet Take 1 tablet (20 mg) by mouth daily 90 tablet 3     Skin Protectants, Misc. (EUCERIN) cream Eucerin or insurance covered moisturizer cream to body 2 x daily and as needed       Allergies   Allergen Reactions     Diatrizoate      Other reaction(s): Angioedema  As noted 2009 note Dr Stone     BP Readings from Last 3 Encounters:   09/11/18 118/72   08/10/18 108/64   07/10/18 110/60    Wt Readings from Last 3 Encounters:   09/11/18 216 lb 6.4 oz (98.2 kg)   08/10/18 214 lb 9.6 oz (97.3 kg)   07/10/18 215 lb (97.5 kg)                  Labs reviewed in EPIC    Review of Systems   HENT: Positive for congestion and sore throat.    All other systems reviewed and are negative.       OBJECTIVE:     /72  Pulse 80  Temp 97.5  F (36.4  C)  Wt 216 lb 6.4 oz (98.2 kg)  LMP  (LMP Unknown)  Breastfeeding? No  BMI 43.71 kg/m2  Body mass index is 43.71 kg/(m^2).  Physical Exam   Constitutional: She is oriented to person, place, and time. She appears well-developed and well-nourished.   HENT:   Head: Normocephalic and  atraumatic.   Posterior pharynx injected without tonsillar hypertrophy or pustules, uvula midline without edema   Eyes: Conjunctivae are normal.   Neck: Normal range of motion. Neck supple.   Cardiovascular: Normal rate.    Pulmonary/Chest: Effort normal and breath sounds normal.   Musculoskeletal: Normal range of motion.   Lymphadenopathy:     She has no cervical adenopathy.   Neurological: She is alert and oriented to person, place, and time.   Skin: Skin is warm and dry.   Toenails trimmed and smoothed surfaces with dremel  Moisturized both feet with Vaseline    Tolerated well no complications   Psychiatric: She has a normal mood and affect. Her behavior is normal. Judgment and thought content normal.   Nursing note and vitals reviewed.          ASSESSMENT/PLAN:     Sore throat and congestion related to upper respiratory viral illness--- recommend hot tea with honey, Tylenol as needed, pushing fluids  Discussed expected course--return to clinic if not improving as expected    Fungal toenail maintenance-and debridement of nail beds completed during office visit  She will return to clinic in 1 month for nail debridement follow-up and maintenance therapies    1. Throat pain    - Strep, Rapid Screen    2. Onychogryphosis      3. Decreased activities of daily living (ADL)          YANN Melissa CNP  [facility] AND [facility]

## 2018-09-18 ENCOUNTER — TELEPHONE (OUTPATIENT)
Dept: FAMILY MEDICINE | Facility: OTHER | Age: 68
End: 2018-09-18

## 2018-09-18 ENCOUNTER — OFFICE VISIT (OUTPATIENT)
Dept: FAMILY MEDICINE | Facility: OTHER | Age: 68
End: 2018-09-18
Attending: NURSE PRACTITIONER
Payer: COMMERCIAL

## 2018-09-18 VITALS
WEIGHT: 216.7 LBS | SYSTOLIC BLOOD PRESSURE: 118 MMHG | BODY MASS INDEX: 43.77 KG/M2 | TEMPERATURE: 99 F | DIASTOLIC BLOOD PRESSURE: 78 MMHG | RESPIRATION RATE: 22 BRPM | HEART RATE: 82 BPM

## 2018-09-18 DIAGNOSIS — R07.0 THROAT PAIN: ICD-10-CM

## 2018-09-18 DIAGNOSIS — J06.9 VIRAL URI WITH COUGH: Primary | ICD-10-CM

## 2018-09-18 PROCEDURE — G0463 HOSPITAL OUTPT CLINIC VISIT: HCPCS

## 2018-09-18 PROCEDURE — 25000125 ZZHC RX 250: Performed by: NURSE PRACTITIONER

## 2018-09-18 PROCEDURE — 99213 OFFICE O/P EST LOW 20 MIN: CPT | Performed by: NURSE PRACTITIONER

## 2018-09-18 RX ORDER — BENZONATATE 200 MG/1
200 CAPSULE ORAL 3 TIMES DAILY PRN
Qty: 21 CAPSULE | Refills: 0 | Status: SHIPPED | OUTPATIENT
Start: 2018-09-18 | End: 2018-09-25

## 2018-09-18 RX ORDER — DEXAMETHASONE SODIUM PHOSPHATE 4 MG/ML
8 VIAL (ML) INJECTION ONCE
Status: COMPLETED | OUTPATIENT
Start: 2018-09-18 | End: 2018-09-18

## 2018-09-18 RX ADMIN — DEXAMETHASONE SODIUM PHOSPHATE 8 MG: 4 INJECTION, SOLUTION INTRA-ARTICULAR; INTRALESIONAL; INTRAMUSCULAR; INTRAVENOUS; SOFT TISSUE at 16:22

## 2018-09-18 ASSESSMENT — PAIN SCALES - GENERAL: PAINLEVEL: WORST PAIN (10)

## 2018-09-18 NOTE — TELEPHONE ENCOUNTER
After birth date was verified, spoke with patient and let her know the below information. No further questions or concerns at this time.          Ross Contreras LPN 09/18/18 1:45 PM

## 2018-09-18 NOTE — TELEPHONE ENCOUNTER
Her strep was normal--she was told this during the office visit she probably has something viral    If she is unable to drink fluids or swallow she will need to be seen--- recommend taking her into rapid clinic this afternoon    YANN Melissa CNP   September 18, 2018

## 2018-09-18 NOTE — PATIENT INSTRUCTIONS
We gave you decadron    You have a cold. These last 2 weeks.     Cold Medicines   What are cold medicines?  Symptoms of the common cold start gradually over several days and usually last about two weeks. Symptoms may include sneezing, a stuffy or runny nose, sore throat, cough, watery eyes, mild headache, or body aches. A cold will go away on its own without treatment. However, there are many nonprescription products that may help relieve some of the symptoms of a cold. Cold medicines often contain more than one ingredient and are used to treat more than one symptom. Read the labels and buy products that have only the ingredients that you need. If you are not sure which medicine is best, ask your pharmacist.  How do they work?  Decongestants reduce swelling in your nose and sinuses. They may also lessen the amount of mucus made by your nose. If you use decongestants more often than directed, your stuffy nose may get worse.   Antihistamines block the effect of histamine. Histamine is a chemical your body makes when you have an allergic reaction. Antihistamines are most often used to treat itchy or watery eyes or a stuffy or runny nose caused by an allergy. Antihistamines may not help a stuffy or runny nose caused by a cold because they can make mucus thick and dry.  Mucolytics are medicines that make mucus thinner so that it is easier to cough up out of your throat and lungs.  Expectorants are cough medicines that may help to keep the mucus thin and bring up mucus from the lungs when you cough. This may relieve chest congestion and make it easier to breathe.   Cough suppressants (antitussives) are medicines that lessen the urge to cough. They may give relief from a dry, hacking cough. If you have a cough that is wet sounding and produces mucus, it is important for you to cough the mucus up out of your lungs. For this reason, cough suppressants are not recommended for a wet sounding cough.  Fever and pain relievers,  such as acetaminophen, aspirin, or other nonsteroidal anti-inflammatory drugs (NSAIDs), are often included in cold medicine. Read labels carefully to avoid taking more medicine than you need.  What else do I need to know about this medicine?    Talk to your healthcare provider if your symptoms start suddenly or you have severe symptoms. This may mean you have something more serious than a cold.    Follow the directions that come with your medicine, including information about food or alcohol. Make sure you know how and when to take your medicine. Do not take more or less than you are supposed to take.    Try to get all of your medicine at the same place. Your pharmacist can help make sure that all of your medicines are safe to take together.    Keep a list of your medicines with you. List all of the prescription medicines, nonprescription medicines, supplements, natural remedies, and vitamins that you take. Tell all healthcare providers who treat you about all of the products you are taking.    Many medicines have side effects. A side effect is a symptom or problem that is caused by the medicine. Ask your healthcare provider or pharmacist what side effects the medicine may cause and what you should do if you have side effects.    Nonsteroidal anti-inflammatory medicines (NSAIDs), such as ibuprofen, naproxen, and aspirin, may cause stomach bleeding and other problems. These risks increase with age. Read the label and take as directed. Unless recommended by your healthcare provider, do not take for more than 10 days for any reason.    Acetaminophen may cause liver damage or other problems. Unless recommended by your provider, don't take more than 3000 milligrams (mg) in 24 hours. To make sure you don t take too much, check other medicines you take to see if they also contain acetaminophen. Ask your provider if you need to avoid drinking alcohol while taking this medicine.  If you have any questions, ask your  healthcare provider or pharmacist for more information. Be sure to keep all appointments for provider visits or tests.      Symptoms likely due to virus. No antibiotic is needed at this time. Symptoms typically worse on days 2-5 and then stabilize and you are sick for days 5-12. Days 12-14 there is slow resolution and if there is a cough, studies show it can linger longer, however one is not as ill as in the beginning. If symptoms begin worsening or fail to improve after 14 days, return to clinic for reevaluation.

## 2018-09-18 NOTE — PROGRESS NOTES
"Nursing Notes:   Jovana Graves LPN  9/18/2018  3:46 PM  Unsigned  Patient presents to the clinic for sore throat. States her sore throat started on 9/10. Also has a severe cough, states she feels SOB at times and can't catch her breath. States her coughing has become painful. Has been afebrile.   Jovana Graves LPN............. September 18, 2018 3:43 PM     Chief Complaint   Patient presents with     Throat Problem       Initial /78 (BP Location: Left arm, Patient Position: Sitting, Cuff Size: Adult Large)  Pulse 82  Temp 99  F (37.2  C) (Tympanic)  Resp 22  Wt 216 lb 11.2 oz (98.3 kg)  LMP  (LMP Unknown)  Breastfeeding? No  BMI 43.77 kg/m2 Estimated body mass index is 43.77 kg/(m^2) as calculated from the following:    Height as of 3/15/18: 4' 11\" (1.499 m).    Weight as of this encounter: 216 lb 11.2 oz (98.3 kg).  Medication Reconciliation: complete    Jovana Graves LPN     SUBJECTIVE:   Pennie Nichols is a 67 year old female who presents to clinic today for the following health issues:    RESPIRATORY SYMPTOMS      Duration: 6 days    Description  nasal congestion, rhinorrhea, sore throat, facial pressure and cough    Severity: moderate    Accompanying signs and symptoms: No fevers, chills, SOB with cough, no wheezing.     History (predisposing factors):  No asthma, did smoke but not currently, no hx of copd    Precipitating or alleviating factors: None    Therapies tried and outcome:  rest and fluids OTC NSAID, throat spray  She is not worse, she is just not better.       Problem list and histories reviewed & adjusted, as indicated.  Additional history: as documented    Current Outpatient Prescriptions   Medication Sig Dispense Refill     ammonium lactate (AMLACTIN) 12 % cream Apply topically 2 times daily 140 g 3     aspirin 81 MG tablet Take 1 tablet (81 mg) by mouth daily 90 tablet 1     carBAMazepine (TEGRETOL) 200 MG tablet Take 0.5 tablets by mouth 2 times daily       " Incontinence Supply Disposable (SM INCONTINENT LINER DISP) MISC Patient changes liner 2-3 time daily       rosuvastatin (CRESTOR) 20 MG tablet Take 1 tablet (20 mg) by mouth daily 90 tablet 3     Skin Protectants, Misc. (EUCERIN) cream Eucerin or insurance covered moisturizer cream to body 2 x daily and as needed       Allergies   Allergen Reactions     Diatrizoate      Other reaction(s): Angioedema  As noted 2009 note Dr Chase RAMOS:  Notable findings in the HPI.       OBJECTIVE:     /78 (BP Location: Left arm, Patient Position: Sitting, Cuff Size: Adult Large)  Pulse 82  Temp 99  F (37.2  C) (Tympanic)  Resp 22  Wt 216 lb 11.2 oz (98.3 kg)  LMP  (LMP Unknown)  Breastfeeding? No  BMI 43.77 kg/m2  Body mass index is 43.77 kg/(m^2).  GENERAL: healthy, alert and no distress  EYES: Eyes grossly normal to inspection  HENT: normal cephalic/atraumatic, right ear: normal: no effusions, no erythema, normal landmarks, left ear: normal: no effusions, no erythema, normal landmarks, nose and mouth without ulcers or lesions, nasal mucosa edematous , rhinorrhea clear, oropharynx clear, oral mucous membranes moist, No tonsillar erythema and sinuses: not tender  NECK: no adenopathy  RESP: lungs clear to auscultation - no rales, rhonchi or wheezes and does have a dry hacking cough  CV: regular rates and rhythm, normal S1 S2, no S3 or S4 and no murmur, click or rub  SKIN: no suspicious lesions or rashes    Diagnostic Test Results:  none     ASSESSMENT/PLAN:     1. Viral URI with cough  - benzonatate (TESSALON) 200 MG capsule; Take 1 capsule (200 mg) by mouth 3 times daily as needed for cough  Dispense: 21 capsule; Refill: 0    2. Throat pain  - dexamethasone (DECADRON) oral solution (inj used orally) 8 mg; Take 2 mLs (8 mg) by mouth once      PLAN:    URI Adult:  Tylenol, Ibuprofen, Fluids, Rest, OTC cough suppressant/expectorant, OTC decongestant/antihistamine, Saline gargles, Saline nasal spray and  Vaporizer  Symptoms likely due to virus. No antibiotic is needed at this time. Symptoms typically worse on days 2-5 and then stabilize and you are sick for days 5-12. Days 12-14 there is slow resolution and if there is a cough, studies show it can linger longer, however one is not as ill as in the beginning. If symptoms begin worsening or fail to improve after 14 days, return to clinic for reevaluation. All questions were answered and she is in agreement with plan.     She will give this another 7-10 days, if not slowly getting better f/u. If gets drastically worse she will follow up.     Followup:    If not improving or if condition worsens, follow up with your Primary Care Provider    I explained my diagnostic considerations and recommendations to the patient, who voiced understanding and agreement with the treatment plan. All questions were answered. We discussed potential side effects of any prescribed or recommended therapies, as well as expectations for response to treatments. She was advised to contact our office if there is no improvement or worsening of conditions or symptoms.  If s/s worsen or persist, patient will either come back or follow up with PCP.    Disclaimer:  This note consists of words and symbols derived from keyboarding, dictation, or using voice recognition software. As a result, there may be errors in the script that have gone undetected. Please consider this when interpreting information found in this note.      Jo-Ann Julien NP, 9/18/2018 3:51 PM

## 2018-09-18 NOTE — NURSING NOTE
"Patient presents to the clinic for sore throat. States her sore throat started on 9/10. Also has a severe cough, states she feels SOB at times and can't catch her breath. States her coughing has become painful. Has been afebrile.   Jovana Graves LPN............. September 18, 2018 3:43 PM     Chief Complaint   Patient presents with     Throat Problem       Initial /78 (BP Location: Left arm, Patient Position: Sitting, Cuff Size: Adult Large)  Pulse 82  Temp 99  F (37.2  C) (Tympanic)  Resp 22  Wt 216 lb 11.2 oz (98.3 kg)  LMP  (LMP Unknown)  Breastfeeding? No  BMI 43.77 kg/m2 Estimated body mass index is 43.77 kg/(m^2) as calculated from the following:    Height as of 3/15/18: 4' 11\" (1.499 m).    Weight as of this encounter: 216 lb 11.2 oz (98.3 kg).  Medication Reconciliation: complete    Jovana Graves LPN   "

## 2018-09-18 NOTE — MR AVS SNAPSHOT
After Visit Summary   9/18/2018    Pennie Nichols    MRN: [id number]           Patient Information     Date Of Birth          1950        Visit Information        Provider Department      9/18/2018 3:30 PM Jo-Ann Julien NP [facility]        Today's Diagnoses     Acute nasopharyngitis    -  1    Throat pain          Care Instructions    We gave you decadron    You have a cold. These last 2 weeks.     Cold Medicines   What are cold medicines?  Symptoms of the common cold start gradually over several days and usually last about two weeks. Symptoms may include sneezing, a stuffy or runny nose, sore throat, cough, watery eyes, mild headache, or body aches. A cold will go away on its own without treatment. However, there are many nonprescription products that may help relieve some of the symptoms of a cold. Cold medicines often contain more than one ingredient and are used to treat more than one symptom. Read the labels and buy products that have only the ingredients that you need. If you are not sure which medicine is best, ask your pharmacist.  How do they work?  Decongestants reduce swelling in your nose and sinuses. They may also lessen the amount of mucus made by your nose. If you use decongestants more often than directed, your stuffy nose may get worse.   Antihistamines block the effect of histamine. Histamine is a chemical your body makes when you have an allergic reaction. Antihistamines are most often used to treat itchy or watery eyes or a stuffy or runny nose caused by an allergy. Antihistamines may not help a stuffy or runny nose caused by a cold because they can make mucus thick and dry.  Mucolytics are medicines that make mucus thinner so that it is easier to cough up out of your throat and lungs.  Expectorants are cough medicines that may help to keep the mucus thin and bring up mucus from the lungs when you cough. This may relieve chest congestion and make it  easier to breathe.   Cough suppressants (antitussives) are medicines that lessen the urge to cough. They may give relief from a dry, hacking cough. If you have a cough that is wet sounding and produces mucus, it is important for you to cough the mucus up out of your lungs. For this reason, cough suppressants are not recommended for a wet sounding cough.  Fever and pain relievers, such as acetaminophen, aspirin, or other nonsteroidal anti-inflammatory drugs (NSAIDs), are often included in cold medicine. Read labels carefully to avoid taking more medicine than you need.  What else do I need to know about this medicine?    Talk to your healthcare provider if your symptoms start suddenly or you have severe symptoms. This may mean you have something more serious than a cold.    Follow the directions that come with your medicine, including information about food or alcohol. Make sure you know how and when to take your medicine. Do not take more or less than you are supposed to take.    Try to get all of your medicine at the same place. Your pharmacist can help make sure that all of your medicines are safe to take together.    Keep a list of your medicines with you. List all of the prescription medicines, nonprescription medicines, supplements, natural remedies, and vitamins that you take. Tell all healthcare providers who treat you about all of the products you are taking.    Many medicines have side effects. A side effect is a symptom or problem that is caused by the medicine. Ask your healthcare provider or pharmacist what side effects the medicine may cause and what you should do if you have side effects.    Nonsteroidal anti-inflammatory medicines (NSAIDs), such as ibuprofen, naproxen, and aspirin, may cause stomach bleeding and other problems. These risks increase with age. Read the label and take as directed. Unless recommended by your healthcare provider, do not take for more than 10 days for any  reason.    Acetaminophen may cause liver damage or other problems. Unless recommended by your provider, don't take more than 3000 milligrams (mg) in 24 hours. To make sure you don t take too much, check other medicines you take to see if they also contain acetaminophen. Ask your provider if you need to avoid drinking alcohol while taking this medicine.  If you have any questions, ask your healthcare provider or pharmacist for more information. Be sure to keep all appointments for provider visits or tests.      Symptoms likely due to virus. No antibiotic is needed at this time. Symptoms typically worse on days 2-5 and then stabilize and you are sick for days 5-12. Days 12-14 there is slow resolution and if there is a cough, studies show it can linger longer, however one is not as ill as in the beginning. If symptoms begin worsening or fail to improve after 14 days, return to clinic for reevaluation.            Follow-ups after your visit        Your next 10 appointments already scheduled     Oct 09, 2018 10:30 AM CDT   [facility]ANN Melissa CNP   [facility]    [address]  [facility] [id number] [phone]           Bring a current list of meds and any records pertaining to this visit. For Physicals, please bring immunization records and any forms needing to be filled out. Please arrive 10 minutes early to complete paperwork.              Who to contact     If you have questions or need follow up information about today's clinic visit or your schedule please contact [facility] directly at [phone].  Normal or non-critical lab and imaging results will be communicated to you by MyChart, letter or phone within 4 business days after the clinic has received the results. If you do not hear from us within 7 days, please contact the clinic through MyChart or phone. If you have a critical or abnormal lab result,  we will notify you by phone as soon as possible.  Submit refill requests through [employer]: [place] or call your pharmacy and they will forward the refill request to us. Please allow 3 business days for your refill to be completed.          Additional Information About Your Visit        Care EveryWhere ID     This is your Care EveryWhere ID. This could be used by other organizations to access your [place] medical records  [id number]        Your Vitals Were     Pulse Temperature Respirations Last Period Breastfeeding? BMI (Body Mass Index)    82 99  F (37.2  C) (Tympanic) 22 (LMP Unknown) No 43.77 kg/m2       Blood Pressure from Last 3 Encounters:   09/18/18 118/78   09/11/18 118/72   08/10/18 108/64    Weight from Last 3 Encounters:   09/18/18 216 lb 11.2 oz (98.3 kg)   09/11/18 216 lb 6.4 oz (98.2 kg)   08/10/18 214 lb 9.6 oz (97.3 kg)              Today, you had the following     No orders found for display         Today's Medication Changes          These changes are accurate as of 9/18/18  4:02 PM.  If you have any questions, ask your nurse or doctor.               Start taking these medicines.        Dose/Directions    benzonatate 200 MG capsule   Commonly known as:  TESSALON   Used for:  Acute nasopharyngitis   Started by:  Jo-Ann Julien NP        Dose:  200 mg   Take 1 capsule (200 mg) by mouth 3 times daily as needed for cough   Quantity:  21 capsule   Refills:  0            Where to get your medicines      These medications were sent to [facility] [address] [address]     Phone:  [phone]     benzonatate 200 MG capsule                Primary Care Provider Office Phone # Fax #    YANN Melissa [phone]       [address] [facility] [address]        Equal Access to Services     DANIEL NOEL AH: Marina godoy Sodora, waaxda luqadaha, qaybta kaalmada mimi, juanjo zepeda  oanh baumanaahowie ah. So [facility] [phone].    ATENCIÓN: Si lacho palomo, tiene a lobato disposición servicios gratuitos de asistencia lingüística. Boy lynch [phone].    We comply with applicable federal civil rights laws and [place] laws. We do not discriminate on the basis of race, color, national origin, age, disability, sex, sexual orientation, or gender identity.            Thank you!     Thank you for choosing [facility] AND [facility]  for your care. Our goal is always to provide you with excellent care. Hearing back from our patients is one way we can continue to improve our services. Please take a few minutes to complete the written survey that you may receive in the mail after your visit with us. Thank you!             Your Updated Medication List - Protect others around you: Learn how to safely use, store and throw away your medicines at www.disposemymeds.org.          This list is accurate as of 9/18/18  4:02 PM.  Always use your most recent med list.                   Brand Name Dispense Instructions for use Diagnosis    ammonium lactate 12 % cream    AMLACTIN    140 g    Apply topically 2 times daily    Dermatitis, Dry skin dermatitis, PVD (peripheral vascular disease) (H)       aspirin 81 MG tablet     90 tablet    Take 1 tablet (81 mg) by mouth daily    PVD (peripheral vascular disease) (H)       benzonatate 200 MG capsule    TESSALON    21 capsule    Take 1 capsule (200 mg) by mouth 3 times daily as needed for cough    Acute nasopharyngitis       carBAMazepine 200 MG tablet    TEGretol     Take 0.5 tablets by mouth 2 times daily        eucerin cream      Eucerin or insurance covered moisturizer cream to body 2 x daily and as needed        rosuvastatin 20 MG tablet    CRESTOR    90 tablet    Take 1 tablet (20 mg) by mouth daily    PVD (peripheral vascular disease) (H), History of tobacco use disorder       SM INCONTINENT LINER DISP Misc      Patient changes liner 2-3 time daily

## 2018-09-18 NOTE — TELEPHONE ENCOUNTER
Spoke with JUDY Wren and she states that patient is worse today.  Really struggling to swallow, throat is worse, lots of pain.  Chills on and off.. States that she never heard back with the culture.

## 2018-09-25 ENCOUNTER — OFFICE VISIT (OUTPATIENT)
Dept: FAMILY MEDICINE | Facility: OTHER | Age: 68
End: 2018-09-25
Attending: NURSE PRACTITIONER
Payer: COMMERCIAL

## 2018-09-25 VITALS
HEART RATE: 96 BPM | DIASTOLIC BLOOD PRESSURE: 86 MMHG | TEMPERATURE: 97.7 F | BODY MASS INDEX: 43.76 KG/M2 | HEIGHT: 59 IN | WEIGHT: 217.1 LBS | OXYGEN SATURATION: 91 % | SYSTOLIC BLOOD PRESSURE: 152 MMHG

## 2018-09-25 DIAGNOSIS — J06.9 VIRAL URI WITH COUGH: Primary | ICD-10-CM

## 2018-09-25 DIAGNOSIS — R49.0 HOARSE: ICD-10-CM

## 2018-09-25 PROCEDURE — 99213 OFFICE O/P EST LOW 20 MIN: CPT | Performed by: NURSE PRACTITIONER

## 2018-09-25 PROCEDURE — G0463 HOSPITAL OUTPT CLINIC VISIT: HCPCS

## 2018-09-25 RX ORDER — BENZONATATE 200 MG/1
200 CAPSULE ORAL 3 TIMES DAILY PRN
Qty: 21 CAPSULE | Refills: 0 | Status: SHIPPED | OUTPATIENT
Start: 2018-09-25 | End: 2018-10-09

## 2018-09-25 ASSESSMENT — PAIN SCALES - GENERAL: PAINLEVEL: NO PAIN (0)

## 2018-09-25 NOTE — PATIENT INSTRUCTIONS
ASSESSMENT/PLAN:     1. Viral URI with cough  Resolving  - Continue with symptomatic treatments.   -- Symptomatic treatments recommended.  -Discussed that antibiotics would not help symptoms of viral URI. Education provided on symptoms of secondary bacterial infection such as new fever, chills, rigors, shortness of breath, increased work of breathing, that can occur with viral URI and need for further evaluation, if they occur.   - Ensure you are staying hydrated by drinking plenty of fluids or eating foods such as popsicles, jello, pudding.  - Honey and Salt water gurgles can help soothe sore throat  - Rest  - Humidifier can help with congestion and help keep mucus membranes such as throat and nose from drying out.  - Sleeping slightly propped up can help with congestion and postnasal drainage that can worsen cough at bedtime.  - As long as you have never been told to take Tylenol and/or Ibuprofen you can use them to manage fever and body aches per package instructions  Make sure you eat when you take ibuprofen to avoid stomach upset.  - OTC cough medications per package instructions to help with cough. Check to see if the cough/cold medication already has acetaminophen (Tylenol) in it. If it does avoid taking additional Tylenol.  - If sudden onset of new fever, worsening symptoms return for further evaluation.  - OTC antihistamine such as Allegra, Zyrtec, Claritin (generic is okay) can help with nasal/sinus congestion and OTC nasal steroid such as Flonase can help decrease sinus inflammation to help with congestion.      2. Hoarse  Likely symptom from coughing possibly irritation from post nasal drainage and will improve over time.            FUTURE APPOINTMENTS:       - Follow-up visit: If no improvement or worsening symptoms such as fever, body aches, worsening cough.    Sofia Willis, CNP  [facility] AND [facility]

## 2018-09-25 NOTE — PROGRESS NOTES
SUBJECTIVE:   Pennie Nichols is a 67 year old female who presents to clinic today for the following health issues:      Acute Illness   Acute illness concerns: Tried to buy Sudafed at [facility] pharmacy and was told she could not purchase it by pharmacy worker as she sounded bad and needed to be evaluated.  Onset: 09/18/2108    Fever: no    Chills/Sweats: no    Headache (location?): no    Sinus Pressure:no    Conjunctivitis:  no    Ear Pain: no    Rhinorrhea: no    Congestion: YES    Sore Throat: no but hoarse voice     Cough: no- has resolved since 9/18/2018 visit    Wheeze: no    SOB: Denies    ZABALA: Denies    Decreased Appetite: no    Nausea: no    Vomiting: no    Diarrhea:  no    Dysuria/Freq.: no    Fatigue/Achiness: no    Sick/Strep Exposure: no     Therapies Tried and outcome: Sudafed helps with congestion, benzonatate helps with cough          Problem list and histories reviewed & adjusted, as indicated.  Additional history: as documented    Patient Active Problem List   Diagnosis     Abnormal findings in stool     Asymptomatic varicose veins of bilateral lower extremities     Bilateral lower extremity edema     Fibromyalgia     CMC arthritis     History of tobacco use     Onychogryphosis     History of small bowel obstruction     Positive colorectal cancer screening using DNA-based stool test     Requires assistance with activities of daily living (ADL)     Trigeminal neuralgia of right side of face     Abnormal ankle brachial index (CURT)     Decreased activities of daily living (ADL)     Past Surgical History:   Procedure Laterality Date     APPENDECTOMY OPEN      1996,Incidental, Enterolysis--Dr Barrow     CHOLECYSTECTOMY      1996,Laparoscopic     COLONOSCOPY      1990?     HYSTERECTOMY TOTAL ABDOMINAL      01/1995,Azucena     OTHER SURGICAL HISTORY      1994,208526,CRYOTHERAPY OF CERVIX,Abnormal Pap       Social History   Substance Use Topics     Smoking status: Former Smoker     Packs/day: 1.00     Years:  30.00     Types: Cigarettes     Quit date: 1/21/2001     Smokeless tobacco: Never Used     Alcohol use No     Family History   Problem Relation Age of Onset     Other - See Comments Mother      lung cancer     HEART DISEASE Father 70     Heart Disease,MI     Cancer Father      Cancer,Liver cancer     Cancer Other      Cancer,Lung cancer     Diabetes Other      Diabetes     Colon Cancer No family hx of      Cancer-colon     Prostate Cancer No family hx of      Cancer-prostate     Ovarian Cancer No family hx of      Cancer-ovarian     Blood Disease No family hx of      Blood Disease     Hypertension No family hx of      Hypertension     Other - See Comments No family hx of      Stroke     Thyroid Disease No family hx of      Thyroid Disease     Breast Cancer No family hx of      Cancer-breast         Current Outpatient Prescriptions   Medication Sig Dispense Refill     ammonium lactate (AMLACTIN) 12 % cream Apply topically 2 times daily 140 g 3     aspirin 81 MG tablet Take 1 tablet (81 mg) by mouth daily 90 tablet 1     benzonatate (TESSALON) 200 MG capsule Take 1 capsule (200 mg) by mouth 3 times daily as needed for cough 21 capsule 0     carBAMazepine (TEGRETOL) 200 MG tablet Take 0.5 tablets by mouth 2 times daily       Incontinence Supply Disposable (SM INCONTINENT LINER DISP) MISC Patient changes liner 2-3 time daily       rosuvastatin (CRESTOR) 20 MG tablet Take 1 tablet (20 mg) by mouth daily 90 tablet 3     Skin Protectants, Misc. (EUCERIN) cream Eucerin or insurance covered moisturizer cream to body 2 x daily and as needed       Allergies   Allergen Reactions     Diatrizoate      Other reaction(s): Angioedema  As noted 2009 note Dr Stone     Recent Labs   Lab Test  06/08/18   1001  03/07/18   1136  01/05/18   1207   LDL  50  118*   --    HDL  57  57   --    TRIG  100  95   --    ALT  16  28   --    CR   --   0.66  0.62*   GFRESTIMATED   --   90   --    GFRESTBLACK   --   >90  >60   POTASSIUM   --   4.3   "4.2      BP Readings from Last 3 Encounters:   09/18/18 118/78   09/11/18 118/72   08/10/18 108/64    Wt Readings from Last 3 Encounters:   09/18/18 216 lb 11.2 oz (98.3 kg)   09/11/18 216 lb 6.4 oz (98.2 kg)   08/10/18 214 lb 9.6 oz (97.3 kg)                    Reviewed and updated as needed this visit by clinical staff  Allergies       Reviewed and updated as needed this visit by Provider         ROS:    Constitutional, HEENT, cardiovascular, pulmonary, gi and gu systems are negative, except as otherwise noted.    OBJECTIVE:     /90 (BP Location: Left arm, Patient Position: Sitting, Cuff Size: Adult Regular)  Pulse 96  Temp 97.7  F (36.5  C) (Tympanic)  Ht 4' 11.45\" (1.51 m)  Wt 217 lb 1.6 oz (98.5 kg)  LMP  (LMP Unknown)  SpO2 91%  Breastfeeding? No  BMI 43.19 kg/m2    GENERAL: healthy, alert and no distress  EYES: Eyes grossly normal to inspection, PERRLA and conjunctivae and sclerae normal  HENT: ear canals and TM's normal, nose and mouth without ulcers or lesions, congested, hoarse voice  NECK: no adenopathy, no asymmetry, masses, or scars  RESP: lungs clear to auscultation - no rales, rhonchi or wheezes. Respirations easy, even, unlabored. No cough during exam  CV: regular rate and rhythm, normal S1 S2, no S3 or S4, no murmur, click or rub  NEURO: Normal strength and tone, mentation intact and speech normal  PSYCH: mentation appears normal, affect normal    Diagnostic Test Results:  none     ASSESSMENT/PLAN:     1. Viral URI with cough  Resolving  - Continue with symptomatic treatments.   -- Symptomatic treatments recommended.  -Discussed that antibiotics would not help symptoms of viral URI. Education provided on symptoms of secondary bacterial infection such as new fever, chills, rigors, shortness of breath, increased work of breathing, that can occur with viral URI and need for further evaluation, if they occur.   - Ensure you are staying hydrated by drinking plenty of fluids or eating foods " such as popsicles, jello, pudding.  - Honey and Salt water gurgles can help soothe sore throat  - Rest  - Humidifier can help with congestion and help keep mucus membranes such as throat and nose from drying out.  - Sleeping slightly propped up can help with congestion and postnasal drainage that can worsen cough at bedtime.  - As long as you have never been told to take Tylenol and/or Ibuprofen you can use them to manage fever and body aches per package instructions  Make sure you eat when you take ibuprofen to avoid stomach upset.  - OTC cough medications per package instructions to help with cough. Check to see if the cough/cold medication already has acetaminophen (Tylenol) in it. If it does avoid taking additional Tylenol.  - If sudden onset of new fever, worsening symptoms return for further evaluation.  - OTC antihistamine such as Allegra, Zyrtec, Claritin (generic is okay) can help with nasal/sinus congestion and OTC nasal steroid such as Flonase can help decrease sinus inflammation to help with congestion.      2. Hoarse  Likely symptom from coughing possibly irritation from post nasal drainage and will improve over time.            FUTURE APPOINTMENTS:       - Follow-up visit: If no improvement or worsening symptoms such as fever, body aches, worsening cough.    Sofia Willis, CNP  [facility] AND [facility]

## 2018-09-25 NOTE — MR AVS SNAPSHOT
After Visit Summary   9/25/2018    Pennie Nichols    MRN: [id number]           Patient Information     Date Of Birth          1950        Visit Information        Provider Department      9/25/2018 4:45 PM Sofia Willis CNP [facility]        Today's Diagnoses     Viral URI with cough    -  1    Hoarse          Care Instructions    ASSESSMENT/PLAN:     1. Viral URI with cough  Resolving  - Continue with symptomatic treatments.   -- Symptomatic treatments recommended.  -Discussed that antibiotics would not help symptoms of viral URI. Education provided on symptoms of secondary bacterial infection such as new fever, chills, rigors, shortness of breath, increased work of breathing, that can occur with viral URI and need for further evaluation, if they occur.   - Ensure you are staying hydrated by drinking plenty of fluids or eating foods such as popsicles, jello, pudding.  - Honey and Salt water gurgles can help soothe sore throat  - Rest  - Humidifier can help with congestion and help keep mucus membranes such as throat and nose from drying out.  - Sleeping slightly propped up can help with congestion and postnasal drainage that can worsen cough at bedtime.  - As long as you have never been told to take Tylenol and/or Ibuprofen you can use them to manage fever and body aches per package instructions  Make sure you eat when you take ibuprofen to avoid stomach upset.  - OTC cough medications per package instructions to help with cough. Check to see if the cough/cold medication already has acetaminophen (Tylenol) in it. If it does avoid taking additional Tylenol.  - If sudden onset of new fever, worsening symptoms return for further evaluation.  - OTC antihistamine such as Allegra, Zyrtec, Claritin (generic is okay) can help with nasal/sinus congestion and OTC nasal steroid such as Flonase can help decrease sinus inflammation to help with congestion.      2. Hoarse  Likely  "symptom from coughing possibly irritation from post nasal drainage and will improve over time.            FUTURE APPOINTMENTS:       - Follow-up visit: If no improvement or worsening symptoms such as fever, body aches, worsening cough.    Sofia Willis CNP  [facility]          Follow-ups after your visit        Your next 10 appointments already scheduled     Oct 09, 2018 10:30 AM CDT   Office Visit with YANN Melissa CNP   [facility])    [address]  [place] [address] [phone]           Bring a current list of meds and any records pertaining to this visit. For Physicals, please bring immunization records and any forms needing to be filled out. Please arrive 10 minutes early to complete paperwork.              Who to contact     If you have questions or need follow up information about today's clinic visit or your schedule please contact [facility] directly at [phone].  Normal or non-critical lab and imaging results will be communicated to you by MyChart, letter or phone within 4 business days after the clinic has received the results. If you do not hear from us within 7 days, please contact the clinic through [employer] or phone. If you have a critical or abnormal lab result, we will notify you by phone as soon as possible.  Submit refill requests through [employer] or call your pharmacy and they will forward the refill request to us. Please allow 3 business days for your refill to be completed.          Additional Information About Your Visit        Care EveryWhere ID     This is your Care EveryWhere ID. This could be used by other organizations to access your [place] medical records  [id number]        Your Vitals Were     Pulse Temperature Height Last Period Pulse Oximetry Breastfeeding?    96 97.7  F (36.5  C) (Tympanic) 1.51 m (4' 11.45\") (LMP Unknown) 91% No    BMI (Body Mass " Index)                   43.19 kg/m2            Blood Pressure from Last 3 Encounters:   09/25/18 152/86   09/18/18 118/78   09/11/18 118/72    Weight from Last 3 Encounters:   09/25/18 98.5 kg (217 lb 1.6 oz)   09/18/18 98.3 kg (216 lb 11.2 oz)   09/11/18 98.2 kg (216 lb 6.4 oz)              Today, you had the following     No orders found for display         Where to get your medicines      These medications were sent to [facility] [address] [place], [place] - [address] [address]     Phone:  [phone]     benzonatate 200 MG capsule          Primary Care Provider Office Phone # Fax #    Cookie GRACIA YANN Schultz [phone]       [address] [facility] [address]        Equal Access to Services     [facility] CenterJUAN : Hadii aad ku hadasho Sowaqarali, waaxda luqadaha, qaybta kaalmada adeegyada, juanjo amaya . So [facility] [phone].    ATENCIÓN: Si habla español, tiene a lobato disposición servicios gratuitos de asistencia lingüística. Boy al [phone].    We comply with applicable federal civil rights laws and [place] laws. We do not discriminate on the basis of race, color, national origin, age, disability, sex, sexual orientation, or gender identity.            Thank you!     Thank you for choosing [facility]  for your care. Our goal is always to provide you with excellent care. Hearing back from our patients is one way we can continue to improve our services. Please take a few minutes to complete the written survey that you may receive in the mail after your visit with us. Thank you!             Your Updated Medication List - Protect others around you: Learn how to safely use, store and throw away your medicines at www.disposemymeds.org.          This list is accurate as of 9/25/18  5:16 PM.  Always use your most recent med list.                   Brand Name Dispense Instructions for use  Diagnosis    ammonium lactate 12 % cream    AMLACTIN    140 g    Apply topically 2 times daily    Dermatitis, Dry skin dermatitis, PVD (peripheral vascular disease) (H)       aspirin 81 MG tablet     90 tablet    Take 1 tablet (81 mg) by mouth daily    PVD (peripheral vascular disease) (H)       benzonatate 200 MG capsule    TESSALON    21 capsule    Take 1 capsule (200 mg) by mouth 3 times daily as needed for cough    Viral URI with cough       carBAMazepine 200 MG tablet    TEGretol     Take 0.5 tablets by mouth 2 times daily        eucerin cream      Eucerin or insurance covered moisturizer cream to body 2 x daily and as needed        rosuvastatin 20 MG tablet    CRESTOR    90 tablet    Take 1 tablet (20 mg) by mouth daily    PVD (peripheral vascular disease) (H), History of tobacco use disorder       SM INCONTINENT LINER DISP Misc      Patient changes liner 2-3 time daily

## 2018-09-25 NOTE — NURSING NOTE
Patient presents with cough occasionally productive white/light green, chest congestion. Patient was seen on the 18th. Patient states cough has improved and the tessalon helps but has lack of voice. Patient was getting a cough syrup and pharmacist at [facility] told her that she sounded bad and should be seen.  Medication Reconciliation: complete    Johanna Barnes LPN  ..................9/25/2018   4:51 PM

## 2018-10-09 ENCOUNTER — OFFICE VISIT (OUTPATIENT)
Dept: FAMILY MEDICINE | Facility: OTHER | Age: 68
End: 2018-10-09
Attending: FAMILY MEDICINE
Payer: COMMERCIAL

## 2018-10-09 VITALS
HEART RATE: 92 BPM | DIASTOLIC BLOOD PRESSURE: 72 MMHG | BODY MASS INDEX: 41.98 KG/M2 | SYSTOLIC BLOOD PRESSURE: 120 MMHG | WEIGHT: 211 LBS | TEMPERATURE: 98.1 F

## 2018-10-09 DIAGNOSIS — Z87.891 HISTORY OF TOBACCO USE DISORDER: ICD-10-CM

## 2018-10-09 DIAGNOSIS — H66.93 ACUTE EAR INFECTION, BILATERAL: Primary | ICD-10-CM

## 2018-10-09 DIAGNOSIS — I73.9 PVD (PERIPHERAL VASCULAR DISEASE) (H): ICD-10-CM

## 2018-10-09 PROCEDURE — 99214 OFFICE O/P EST MOD 30 MIN: CPT | Mod: 25 | Performed by: NURSE PRACTITIONER

## 2018-10-09 PROCEDURE — G0463 HOSPITAL OUTPT CLINIC VISIT: HCPCS | Mod: 25

## 2018-10-09 PROCEDURE — 11719 TRIM NAIL(S) ANY NUMBER: CPT | Performed by: NURSE PRACTITIONER

## 2018-10-09 PROCEDURE — G0463 HOSPITAL OUTPT CLINIC VISIT: HCPCS

## 2018-10-09 RX ORDER — AZITHROMYCIN 250 MG/1
TABLET, FILM COATED ORAL
Qty: 6 TABLET | Refills: 0 | Status: SHIPPED | OUTPATIENT
Start: 2018-10-09 | End: 2018-10-18

## 2018-10-09 RX ORDER — ROSUVASTATIN CALCIUM 20 MG/1
20 TABLET, COATED ORAL DAILY
Qty: 90 TABLET | Refills: 3 | Status: SHIPPED | OUTPATIENT
Start: 2018-10-09 | End: 2019-08-29

## 2018-10-09 ASSESSMENT — PAIN SCALES - GENERAL: PAINLEVEL: NO PAIN (0)

## 2018-10-09 NOTE — MR AVS SNAPSHOT
After Visit Summary   10/9/2018    Pennie Nichols    MRN: [id number]           Patient Information     Date Of Birth          1950        Visit Information        Provider Department      10/9/2018 10:30 AM Cookie Schultz APRN CNP [facility]        Today's Diagnoses     Acute ear infection, bilateral    -  1    PVD (peripheral vascular disease) (H)        History of tobacco use disorder          Care Instructions    Next month will update pneumonia shot and flu vaccine    Sent zpack to pharmacy for ear infection secondary to cold          Follow-ups after your visit        Follow-up notes from your care team     Return in about 4 weeks (around 11/6/2018), or if symptoms worsen or fail to improve, for Routine Visit, vaccines--foot care.      Who to contact     If you have questions or need follow up information about today's clinic visit or your schedule please contact [facility] directly at [phone].  Normal or non-critical lab and imaging results will be communicated to you by MyChart, letter or phone within 4 business days after the clinic has received the results. If you do not hear from us within 7 days, please contact the clinic through MyChart or phone. If you have a critical or abnormal lab result, we will notify you by phone as soon as possible.  Submit refill requests through [employer] or call your pharmacy and they will forward the refill request to us. Please allow 3 business days for your refill to be completed.          Additional Information About Your Visit        Care EveryWhere ID     This is your Care EveryWhere ID. This could be used by other organizations to access your [place] medical records  [id number]        Your Vitals Were     Pulse Temperature Last Period BMI (Body Mass Index)          92 98.1  F (36.7  C) (Temporal) (LMP Unknown) 41.98 kg/m2         Blood Pressure from Last 3 Encounters:   10/09/18 120/72   09/25/18  152/86   09/18/18 118/78    Weight from Last 3 Encounters:   10/09/18 211 lb (95.7 kg)   09/25/18 217 lb 1.6 oz (98.5 kg)   09/18/18 216 lb 11.2 oz (98.3 kg)              Today, you had the following     No orders found for display         Today's Medication Changes          These changes are accurate as of 10/9/18 11:16 AM.  If you have any questions, ask your nurse or doctor.               Start taking these medicines.        Dose/Directions    azithromycin 250 MG tablet   Commonly known as:  ZITHROMAX   Used for:  Acute ear infection, bilateral   Started by:  Cookie Schultz APRN CNP        Two tablets first day, then one tablet daily for four days.   Quantity:  6 tablet   Refills:  0            Where to get your medicines      These medications were sent to [facility] [address] [address]     Phone:  [phone]     azithromycin 250 MG tablet    rosuvastatin 20 MG tablet                Primary Care Provider Office Phone # Fax #    YANN Melissa [phone]       [address] [facility] [address]        Equal Access to Services     DANIEL NOEL AH: Hadii anne marie chaney hadasho Soomaali, waaxda luqadaha, qaybta kaalmada adeegyada, juanjo nunez. So [facility] [phone].    ATENCIÓN: Si habla español, tiene a lobato disposición servicios gratuitos de asistencia lingüística. Llame al [phone].    We comply with applicable federal civil rights laws and [place] laws. We do not discriminate on the basis of race, color, national origin, age, disability, sex, sexual orientation, or gender identity.            Thank you!     Thank you for choosing [facility]  for your care. Our goal is always to provide you with excellent care. Hearing back from our patients is one way we can continue to improve our services. Please take a few minutes to complete the written survey  that you may receive in the mail after your visit with us. Thank you!             Your Updated Medication List - Protect others around you: Learn how to safely use, store and throw away your medicines at www.disposemymeds.org.          This list is accurate as of 10/9/18 11:16 AM.  Always use your most recent med list.                   Brand Name Dispense Instructions for use Diagnosis    ammonium lactate 12 % cream    AMLACTIN    140 g    Apply topically 2 times daily    Dermatitis, Dry skin dermatitis, PVD (peripheral vascular disease) (H)       aspirin 81 MG tablet     90 tablet    Take 1 tablet (81 mg) by mouth daily    PVD (peripheral vascular disease) (H)       azithromycin 250 MG tablet    ZITHROMAX    6 tablet    Two tablets first day, then one tablet daily for four days.    Acute ear infection, bilateral       carBAMazepine 200 MG tablet    TEGretol     Take 0.5 tablets by mouth 2 times daily        eucerin cream      Eucerin or insurance covered moisturizer cream to body 2 x daily and as needed        rosuvastatin 20 MG tablet    CRESTOR    90 tablet    Take 1 tablet (20 mg) by mouth daily    PVD (peripheral vascular disease) (H), History of tobacco use disorder       SM INCONTINENT LINER DISP Misc      Patient changes liner 2-3 time daily

## 2018-10-09 NOTE — PATIENT INSTRUCTIONS
Next month will update pneumonia shot and flu vaccine    Sent zpack to pharmacy for ear infection secondary to cold

## 2018-10-09 NOTE — NURSING NOTE
"Patient is here today for a one month follow up foot care. Triny Henao LPN......................10/9/2018 10:25 AM  Chief Complaint   Patient presents with     RECHECK     one month follow up feet        Initial /72 (BP Location: Right arm, Patient Position: Sitting, Cuff Size: Adult Large)  Pulse 92  Temp 98.1  F (36.7  C) (Temporal)  Wt 211 lb (95.7 kg)  LMP  (LMP Unknown)  BMI 41.98 kg/m2 Estimated body mass index is 41.98 kg/(m^2) as calculated from the following:    Height as of 9/25/18: 4' 11.45\" (1.51 m).    Weight as of this encounter: 211 lb (95.7 kg).  Medication Reconciliation: complete    Triny Henao LPN    "

## 2018-10-10 NOTE — PROGRESS NOTES
SUBJECTIVE:   Pennie Nichols is a 67 year old female who presents to clinic today for the following health issues:    Presents for monthly foot care and onychomycosis nail care with susie Wynne overall has been doing okay, has a lingring cold however this is improving--lives in apartment complex many exposures  Wants to wait to get her flu shot until it is completely resolved--has bilateral otalgia  Has a new PCA feels is working out pretty well comes and helps her with ADLs showers and foot care        HPI    Patient Active Problem List   Diagnosis     Abnormal findings in stool     Asymptomatic varicose veins of bilateral lower extremities     Bilateral lower extremity edema     Fibromyalgia     CMC arthritis     History of tobacco use     Onychogryphosis     History of small bowel obstruction     Positive colorectal cancer screening using DNA-based stool test     Requires assistance with activities of daily living (ADL)     Trigeminal neuralgia of right side of face     Abnormal ankle brachial index (CURT)     Decreased activities of daily living (ADL)     Past Surgical History:   Procedure Laterality Date     APPENDECTOMY OPEN      1996,Incidental, Enterolysis--Dr Barrow     CHOLECYSTECTOMY      1996,Laparoscopic     COLONOSCOPY      1990?     HYSTERECTOMY TOTAL ABDOMINAL      01/1995,Azucena     OTHER SURGICAL HISTORY      1994,208526,CRYOTHERAPY OF CERVIX,Abnormal Pap       Social History   Substance Use Topics     Smoking status: Former Smoker     Packs/day: 1.00     Years: 30.00     Types: Cigarettes     Quit date: 1/21/2001     Smokeless tobacco: Never Used     Alcohol use No     Family History   Problem Relation Age of Onset     Other - See Comments Mother      lung cancer     HEART DISEASE Father 70     Heart Disease,MI     Cancer Father      Cancer,Liver cancer     Cancer Other      Cancer,Lung cancer     Diabetes Other      Diabetes     Colon Cancer No family hx of      Cancer-colon     Prostate  Cancer No family hx of      Cancer-prostate     Ovarian Cancer No family hx of      Cancer-ovarian     Blood Disease No family hx of      Blood Disease     Hypertension No family hx of      Hypertension     Other - See Comments No family hx of      Stroke     Thyroid Disease No family hx of      Thyroid Disease     Breast Cancer No family hx of      Cancer-breast         Current Outpatient Prescriptions   Medication Sig Dispense Refill     ammonium lactate (AMLACTIN) 12 % cream Apply topically 2 times daily 140 g 3     aspirin 81 MG tablet Take 1 tablet (81 mg) by mouth daily 90 tablet 1     azithromycin (ZITHROMAX) 250 MG tablet Two tablets first day, then one tablet daily for four days. 6 tablet 0     carBAMazepine (TEGRETOL) 200 MG tablet Take 0.5 tablets by mouth 2 times daily       Incontinence Supply Disposable (SM INCONTINENT LINER DISP) MISC Patient changes liner 2-3 time daily       rosuvastatin (CRESTOR) 20 MG tablet Take 1 tablet (20 mg) by mouth daily 90 tablet 3     Skin Protectants, Misc. (EUCERIN) cream Eucerin or insurance covered moisturizer cream to body 2 x daily and as needed       Allergies   Allergen Reactions     Diatrizoate      Other reaction(s): Angioedema  As noted 2009 note Dr Stone     Recent Labs   Lab Test  06/08/18   1001  03/07/18   1136  01/05/18   1207   LDL  50  118*   --    HDL  57  57   --    TRIG  100  95   --    ALT  16  28   --    CR   --   0.66  0.62*   GFRESTIMATED   --   90   --    GFRESTBLACK   --   >90  >60   POTASSIUM   --   4.3  4.2      BP Readings from Last 3 Encounters:   10/09/18 120/72   09/25/18 152/86   09/18/18 118/78    Wt Readings from Last 3 Encounters:   10/09/18 211 lb (95.7 kg)   09/25/18 217 lb 1.6 oz (98.5 kg)   09/18/18 216 lb 11.2 oz (98.3 kg)                  Labs reviewed in EPIC    Review of Systems   HENT: Positive for congestion and ear pain.    All other systems reviewed and are negative.       OBJECTIVE:     /72 (BP Location: Right arm,  Patient Position: Sitting, Cuff Size: Adult Large)  Pulse 92  Temp 98.1  F (36.7  C) (Temporal)  Wt 211 lb (95.7 kg)  LMP  (LMP Unknown)  BMI 41.98 kg/m2  Body mass index is 41.98 kg/(m^2).  Physical Exam   Constitutional: She is oriented to person, place, and time. She appears well-developed and well-nourished.   HENT:   Head: Normocephalic and atraumatic.   Bilateral TMs bulging fluid, erythema lateral border, no otorrhea   Eyes: Conjunctivae are normal.   Neck: Neck supple.   Cardiovascular: Normal rate.    Pulmonary/Chest: Effort normal.   Musculoskeletal: Normal range of motion.   Neurological: She is alert and oriented to person, place, and time.   Skin: Skin is warm and dry.   All toenails inspected, no erythema, no dermatitis, smoothed nails with dremel--tolerated well  Moisturized with Kerasal  Feet are both warm, dry, good circulation, good sensation, range of motion   Psychiatric: She has a normal mood and affect. Her behavior is normal. Judgment and thought content normal.   Nursing note and vitals reviewed.          ASSESSMENT/PLAN:     Will refill Crestor--has been a target lipids--tolerating medication well    Treat secondary otitis media from upper viral respiratory illness and congestion eustachian tube dysfunction with a azithromycin  Push fluids, hot tea with honey, Tylenol as needed and other supportive home therapies  Patient will [facility] flu vaccine [facility]    Return to clinic in 1 month for foot and nail care--history of onychogryphosis    1. PVD (peripheral vascular disease) (H)    - rosuvastatin (CRESTOR) 20 MG tablet; Take 1 tablet (20 mg) by mouth daily  Dispense: 90 tablet; Refill: 3    2. History of tobacco use disorder    - rosuvastatin (CRESTOR) 20 MG tablet; Take 1 tablet (20 mg) by mouth daily  Dispense: 90 tablet; Refill: 3    3. Acute ear infection, bilateral    - azithromycin (ZITHROMAX) 250 MG tablet; Two tablets first day, then one tablet daily for four days.  Dispense: 6  tablet; Refill: 0      YANN Melissa CNP  [facility]

## 2018-10-18 ENCOUNTER — OFFICE VISIT (OUTPATIENT)
Dept: FAMILY MEDICINE | Facility: OTHER | Age: 68
End: 2018-10-18
Attending: NURSE PRACTITIONER
Payer: COMMERCIAL

## 2018-10-18 VITALS
BODY MASS INDEX: 41.42 KG/M2 | WEIGHT: 208.2 LBS | SYSTOLIC BLOOD PRESSURE: 110 MMHG | HEART RATE: 80 BPM | DIASTOLIC BLOOD PRESSURE: 70 MMHG

## 2018-10-18 DIAGNOSIS — H65.93 OME (OTITIS MEDIA WITH EFFUSION), BILATERAL: Primary | ICD-10-CM

## 2018-10-18 PROCEDURE — G0463 HOSPITAL OUTPT CLINIC VISIT: HCPCS

## 2018-10-18 PROCEDURE — 99213 OFFICE O/P EST LOW 20 MIN: CPT | Performed by: NURSE PRACTITIONER

## 2018-10-18 RX ORDER — CETIRIZINE HYDROCHLORIDE 10 MG/1
10 TABLET ORAL EVERY EVENING
Qty: 30 TABLET | Refills: 1 | Status: SHIPPED | OUTPATIENT
Start: 2018-10-18 | End: 2018-11-15

## 2018-10-18 RX ORDER — FLUTICASONE PROPIONATE 50 MCG
1-2 SPRAY, SUSPENSION (ML) NASAL DAILY
Qty: 1 BOTTLE | Refills: 11 | Status: SHIPPED | OUTPATIENT
Start: 2018-10-18 | End: 2018-12-18

## 2018-10-18 ASSESSMENT — ANXIETY QUESTIONNAIRES
6. BECOMING EASILY ANNOYED OR IRRITABLE: NOT AT ALL
3. WORRYING TOO MUCH ABOUT DIFFERENT THINGS: NOT AT ALL
7. FEELING AFRAID AS IF SOMETHING AWFUL MIGHT HAPPEN: NOT AT ALL
5. BEING SO RESTLESS THAT IT IS HARD TO SIT STILL: NOT AT ALL
4. TROUBLE RELAXING: NOT AT ALL
1. FEELING NERVOUS, ANXIOUS, OR ON EDGE: NOT AT ALL
2. NOT BEING ABLE TO STOP OR CONTROL WORRYING: NOT AT ALL
IF YOU CHECKED OFF ANY PROBLEMS ON THIS QUESTIONNAIRE, HOW DIFFICULT HAVE THESE PROBLEMS MADE IT FOR YOU TO DO YOUR WORK, TAKE CARE OF THINGS AT HOME, OR GET ALONG WITH OTHER PEOPLE: NOT DIFFICULT AT ALL
GAD7 TOTAL SCORE: 0

## 2018-10-18 ASSESSMENT — PAIN SCALES - GENERAL: PAINLEVEL: MILD PAIN (3)

## 2018-10-18 NOTE — NURSING NOTE
Patient presents to the clinic for a cold follow up.  Андрей Amanda ..............10/18/2018 2:14 PM

## 2018-10-18 NOTE — MR AVS SNAPSHOT
After Visit Summary   10/18/2018    Pennie Nichols    MRN: [id number]           Patient Information     Date Of Birth          1950        Visit Information        Provider Department      10/18/2018 2:00 PM Sofia Willis CNP [facility]        Today's Diagnoses     OME (otitis media with effusion), bilateral    -  1      Care Instructions    ASSESSMENT/PLAN:     1. OME (otitis media with effusion), bilateral  Acute, symptomatic. Likely from recent URI and AOM. Discussed it can take 8-12 weeks to spontaneously resolve.  - Recommend trying an OTC antihistamine such as Claritin, Allegra, Zyrtec (generic is okay) and/or nasal steroid such as Flonase (generic is okay) to see if this helps alleviate symptoms    - Reviewed potential drug interaction with tegretol and none indicated between tegretol and cetirizine on Uptodate.             FUTURE APPOINTMENTS:       - Follow-up visit: No improvement or worsening symptoms.    Sofia Willis CNP  [facility]    Otitis Media (Middle-Ear Infection) in Adults  Otitis media is another name for a middle-ear infection. It means an infection behind your eardrum. This kind of ear infection can happen after any condition that keeps fluid from draining from the middle ear. These conditions include allergies, a cold, a sore throat, or a respiratory infection.  Middle-ear infections are common in children, but they can also happen in adults. An ear infection in an adult may mean a more serious problem than in a child. So you may need additional tests. If you have an ear infection, you should see your health care provider for treatment.  What are the types of middle-ear infections?  Infections can affect the middle ear in several ways. They are:    Acute otitis media. This middle-ear infection occurs suddenly. It causes swelling and redness. Fluid and mucus become trapped inside the ear. You can have a fever and ear  pain.    Otitis media with effusion. Fluid (effusion) and mucus build up in the middle ear after the infection goes away. You may feel like your middle ear is full. This can continue for months and may affect your hearing.    Chronic otitis media with effusion. Fluid (effusion) remains in the middle ear for a long time. Or it builds up again and again, even though there is no infection. This type of middle-ear infection may be hard to treat. It may also affect your hearing.  Who is more likely to get a middle-ear infection?  You are more likely to get an ear infection if you:    Smoke or are around someone who smokes    Have seasonal or year-round allergy symptoms    Have a cold or other upper respiratory infection  What causes a middle-ear infection?  The middle ear connects to the throat by a canal called the eustachian tube. This tube helps even out the pressure between the outer ear and the inner ear. A cold or allergy can irritate the tube or cause the area around it to swell. This can keep fluid from draining from the middle ear. The fluid builds up behind the eardrum. Bacteria and viruses can grow in this fluid. The bacteria and viruses cause the middle-ear infection.  What are the symptoms of a middle-ear infection?  Common symptoms of a middle-ear infection in adults are:    Pain in 1 or both ears    Drainage from the ear    Muffled hearing    Sore throat   You may also have a fever. Rarely, your balance can be affected.  These symptoms may be the same as for other conditions. It s important to talk with your health care provider if you think you have a middle-ear infection. If you have a high fever, severe pain behind your ear, or paralysis in your face, see your provider as soon as you can.  How is a middle-ear infection diagnosed?  Your health care provider will take a medical history and do a physical exam. He or she will look at the outer ear and eardrum with an otoscope. The otoscope is a lighted tool  that lets your provider see inside the ear. A pneumatic otoscope blows a puff of air into the ear to check how well your eardrum moves. If you eardrum doesn t move well, it may mean you have fluid behind it.  Your provider may also do a test called tympanometry. This test tells how well the middle ear is working. It can find any changes in pressure in the middle ear. Your provider may test your hearing with a tuning fork.  How is a middle-ear infection treated?  A middle-ear infection may be treated with:    Antibiotics, taken by mouth or as ear drops    Medication for pain    Decongestants, antihistamines, or nasal steroids  Your health care provider may also have you try autoinsufflation. This helps adjust the air pressure in your ear. For this, you pinch your nose and gently exhale. This forces air back through the eustachian tube.  The exact treatment for your ear infection will depend on the type of infection you have. In general, if your symptoms don t get better in 48 to 72 hours, contact your health care provider.  Middle-ear infections can cause long-term problems if not treated. They can lead to:    Infection in other parts of the head    Permanent hearing loss    Paralysis of a nerve in your face  If you have a middle-ear infection that doesn t get better, you may need to see an ear, nose, and throat specialist (otolaryngologist). You may need a CT scan or MRI to check for head and neck cancer.  Ear tubes  Sometimes fluid stays in the middle ear even after you take antibiotics and the infection goes away. In this case, your health care provider may suggest that a small tube be placed in your ear. The tube is put at the opening of the eardrum. The tube keeps fluid from building up and relieves pressure in the middle ear. It can also help you hear better. This surgery is called myringotomy. It is not often done in adults.  The tubes usually fall out on their own after 6 months to a year.    9968-6426 The  [employer]. [address] [place] [address]. All rights reserved. This information is not intended as a substitute for professional medical care. Always follow your healthcare professional's instructions.                Follow-ups after your visit        Your next 10 appointments already scheduled     Nov 08, 2018 10:30 AM CST   [facility]    [address]  [place] [address] [phone]           Bring a current list of meds and any records pertaining to this visit. For Physicals, please bring immunization records and any forms needing to be filled out. Please arrive 10 minutes early to complete paperwork.              Who to contact     If you have questions or need follow up information about today's clinic visit or your schedule please contact [facility] directly at [phone].  Normal or non-critical lab and imaging results will be communicated to you by MyChart, letter or phone within 4 business days after the clinic has received the results. If you do not hear from us within 7 days, please contact the clinic through MyChart or phone. If you have a critical or abnormal lab result, we will notify you by phone as soon as possible.  Submit refill requests through [employer] or call your pharmacy and they will forward the refill request to us. Please allow 3 business days for your refill to be completed.          Additional Information About Your Visit        Care EveryWhere ID     This is your Care EveryWhere ID. This could be used by other organizations to access your [place] medical records  [id number]        Your Vitals Were     Pulse Last Period Breastfeeding? BMI (Body Mass Index)          80 (LMP Unknown) No 41.42 kg/m2         Blood Pressure from Last 3 Encounters:   10/18/18 110/70   10/09/18 120/72   09/25/18 152/86    Weight from Last 3  Encounters:   10/18/18 208 lb 3.2 oz (94.4 kg)   10/09/18 211 lb (95.7 kg)   09/25/18 217 lb 1.6 oz (98.5 kg)              Today, you had the following     No orders found for display         Today's Medication Changes          These changes are accurate as of 10/18/18  2:35 PM.  If you have any questions, ask your nurse or doctor.               Start taking these medicines.        Dose/Directions    cetirizine 10 MG tablet   Commonly known as:  zyrTEC   Used for:  OME (otitis media with effusion), bilateral   Started by:  Sofia Willis CNP        Dose:  10 mg   Take 1 tablet (10 mg) by mouth every evening   Quantity:  30 tablet   Refills:  1       fluticasone 50 MCG/ACT spray   Commonly known as:  FLONASE   Used for:  OME (otitis media with effusion), bilateral   Started by:  Sofia Willis CNP        Dose:  1-2 spray   Spray 1-2 sprays into both nostrils daily   Quantity:  1 Bottle   Refills:  11            Where to get your medicines      These medications were sent to [facility] [address] [address]     Phone:  [phone]     cetirizine 10 MG tablet    fluticasone 50 MCG/ACT spray                Primary Care Provider Office Phone # Fax #    Cookie YANN Chester [phone]       [address] [facility] [address]        Equal Access to Services     JADEN NOEL AH: Hadii anne marie ku hadasho Soomaali, waaxda luqadaha, qaybta kaalmada adeegyada, juanjo amaya . So [facility] [phone].    ATENCIÓN: Si habla español, tiene a lobato disposición servicios gratuitos de asistencia lingüística. Boy al [phone].    We comply with applicable federal civil rights laws and [place] laws. We do not discriminate on the basis of race, color, national origin, age, disability, sex, sexual orientation, or gender identity.            Thank you!     Thank you for choosing [facility] AND  [facility]  for your care. Our goal is always to provide you with excellent care. Hearing back from our patients is one way we can continue to improve our services. Please take a few minutes to complete the written survey that you may receive in the mail after your visit with us. Thank you!             Your Updated Medication List - Protect others around you: Learn how to safely use, store and throw away your medicines at www.disposemymeds.org.          This list is accurate as of 10/18/18  2:35 PM.  Always use your most recent med list.                   Brand Name Dispense Instructions for use Diagnosis    ammonium lactate 12 % cream    AMLACTIN    140 g    Apply topically 2 times daily    Dermatitis, Dry skin dermatitis, PVD (peripheral vascular disease) (H)       aspirin 81 MG tablet     90 tablet    Take 1 tablet (81 mg) by mouth daily    PVD (peripheral vascular disease) (H)       carBAMazepine 200 MG tablet    TEGretol     Take 0.5 tablets by mouth 2 times daily        cetirizine 10 MG tablet    zyrTEC    30 tablet    Take 1 tablet (10 mg) by mouth every evening    OME (otitis media with effusion), bilateral       eucerin cream      Eucerin or insurance covered moisturizer cream to body 2 x daily and as needed        fluticasone 50 MCG/ACT spray    FLONASE    1 Bottle    Spray 1-2 sprays into both nostrils daily    OME (otitis media with effusion), bilateral       rosuvastatin 20 MG tablet    CRESTOR    90 tablet    Take 1 tablet (20 mg) by mouth daily    PVD (peripheral vascular disease) (H), History of tobacco use disorder       SM INCONTINENT LINER DISP Misc      Patient changes liner 2-3 time daily

## 2018-10-18 NOTE — PATIENT INSTRUCTIONS
ASSESSMENT/PLAN:     1. OME (otitis media with effusion), bilateral  Acute, symptomatic. Likely from recent URI and AOM. Discussed it can take 8-12 weeks to spontaneously resolve.  - Recommend trying an OTC antihistamine such as Claritin, Allegra, Zyrtec (generic is okay) and/or nasal steroid such as Flonase (generic is okay) to see if this helps alleviate symptoms    - Reviewed potential drug interaction with tegretol and none indicated between tegretol and cetirizine on Uptodate.             FUTURE APPOINTMENTS:       - Follow-up visit: No improvement or worsening symptoms.    Sofia Willis, CNP  [facility]    Otitis Media (Middle-Ear Infection) in Adults  Otitis media is another name for a middle-ear infection. It means an infection behind your eardrum. This kind of ear infection can happen after any condition that keeps fluid from draining from the middle ear. These conditions include allergies, a cold, a sore throat, or a respiratory infection.  Middle-ear infections are common in children, but they can also happen in adults. An ear infection in an adult may mean a more serious problem than in a child. So you may need additional tests. If you have an ear infection, you should see your health care provider for treatment.  What are the types of middle-ear infections?  Infections can affect the middle ear in several ways. They are:    Acute otitis media. This middle-ear infection occurs suddenly. It causes swelling and redness. Fluid and mucus become trapped inside the ear. You can have a fever and ear pain.    Otitis media with effusion. Fluid (effusion) and mucus build up in the middle ear after the infection goes away. You may feel like your middle ear is full. This can continue for months and may affect your hearing.    Chronic otitis media with effusion. Fluid (effusion) remains in the middle ear for a long time. Or it builds up again and again, even though there is no infection. This  type of middle-ear infection may be hard to treat. It may also affect your hearing.  Who is more likely to get a middle-ear infection?  You are more likely to get an ear infection if you:    Smoke or are around someone who smokes    Have seasonal or year-round allergy symptoms    Have a cold or other upper respiratory infection  What causes a middle-ear infection?  The middle ear connects to the throat by a canal called the eustachian tube. This tube helps even out the pressure between the outer ear and the inner ear. A cold or allergy can irritate the tube or cause the area around it to swell. This can keep fluid from draining from the middle ear. The fluid builds up behind the eardrum. Bacteria and viruses can grow in this fluid. The bacteria and viruses cause the middle-ear infection.  What are the symptoms of a middle-ear infection?  Common symptoms of a middle-ear infection in adults are:    Pain in 1 or both ears    Drainage from the ear    Muffled hearing    Sore throat   You may also have a fever. Rarely, your balance can be affected.  These symptoms may be the same as for other conditions. It s important to talk with your health care provider if you think you have a middle-ear infection. If you have a high fever, severe pain behind your ear, or paralysis in your face, see your provider as soon as you can.  How is a middle-ear infection diagnosed?  Your health care provider will take a medical history and do a physical exam. He or she will look at the outer ear and eardrum with an otoscope. The otoscope is a lighted tool that lets your provider see inside the ear. A pneumatic otoscope blows a puff of air into the ear to check how well your eardrum moves. If you eardrum doesn t move well, it may mean you have fluid behind it.  Your provider may also do a test called tympanometry. This test tells how well the middle ear is working. It can find any changes in pressure in the middle ear. Your provider may test  your hearing with a tuning fork.  How is a middle-ear infection treated?  A middle-ear infection may be treated with:    Antibiotics, taken by mouth or as ear drops    Medication for pain    Decongestants, antihistamines, or nasal steroids  Your health care provider may also have you try autoinsufflation. This helps adjust the air pressure in your ear. For this, you pinch your nose and gently exhale. This forces air back through the eustachian tube.  The exact treatment for your ear infection will depend on the type of infection you have. In general, if your symptoms don t get better in 48 to 72 hours, contact your health care provider.  Middle-ear infections can cause long-term problems if not treated. They can lead to:    Infection in other parts of the head    Permanent hearing loss    Paralysis of a nerve in your face  If you have a middle-ear infection that doesn t get better, you may need to see an ear, nose, and throat specialist (otolaryngologist). You may need a CT scan or MRI to check for head and neck cancer.  Ear tubes  Sometimes fluid stays in the middle ear even after you take antibiotics and the infection goes away. In this case, your health care provider may suggest that a small tube be placed in your ear. The tube is put at the opening of the eardrum. The tube keeps fluid from building up and relieves pressure in the middle ear. It can also help you hear better. This surgery is called myringotomy. It is not often done in adults.  The tubes usually fall out on their own after 6 months to a year.    5976-6487 [employer]. [address] [place] [address], [place] [address]. All rights reserved. This information is not intended as a substitute for professional medical care. Always follow your healthcare professional's instructions.

## 2018-10-18 NOTE — PROGRESS NOTES
"  SUBJECTIVE:   Pennie Nichols is a 67 year old female who presents to clinic today for the following health issues:      Acute Illness   Acute illness concerns: \"ears feel like I am wearing head phones, back of throat is hurting just a touch\"  Onset: throughout duration of cold s/s (few weeks)    Fever: no    Chills/Sweats: yes- yesterday    Headache (location?): no    Sinus Pressure:no    Conjunctivitis:  no    Ear Pain: Denies pain, but as above \"feels like I am wearing head phones.\"    Rhinorrhea: no    Congestion: YES- \"stuffy\"    Sore Throat: YES- \"just a touch\"     Cough: YES- has improved but has occasional cough    Wheeze: no    SOB: no    Decreased Appetite: no    Nausea: YES- yesterday and night before    Vomiting: no    Diarrhea:  no    Dysuria/Freq.: no    Fatigue/Achiness: no    Sick/Strep Exposure: no     Therapies Tried and outcome: Was on azithromycin for acute ear infection- last dose was 10/14/2018          Problem list and histories reviewed & adjusted, as indicated.  Additional history: as documented    Patient Active Problem List   Diagnosis     Abnormal findings in stool     Asymptomatic varicose veins of bilateral lower extremities     Bilateral lower extremity edema     Fibromyalgia     CMC arthritis     History of tobacco use     Onychogryphosis     History of small bowel obstruction     Positive colorectal cancer screening using DNA-based stool test     Requires assistance with activities of daily living (ADL)     Trigeminal neuralgia of right side of face     Abnormal ankle brachial index (CURT)     Decreased activities of daily living (ADL)     Past Surgical History:   Procedure Laterality Date     APPENDECTOMY OPEN      1996,Incidental, Enterolysis--Dr Barrow     CHOLECYSTECTOMY      1996,Laparoscopic     COLONOSCOPY      1990?     HYSTERECTOMY TOTAL ABDOMINAL      01/1995,Azucena     OTHER SURGICAL HISTORY      1994,208526,CRYOTHERAPY OF CERVIX,Abnormal Pap       Social History   Substance " Use Topics     Smoking status: Former Smoker     Packs/day: 1.00     Years: 30.00     Types: Cigarettes     Quit date: 1/21/2001     Smokeless tobacco: Never Used     Alcohol use No     Family History   Problem Relation Age of Onset     Other - See Comments Mother      lung cancer     HEART DISEASE Father 70     Heart Disease,MI     Cancer Father      Cancer,Liver cancer     Cancer Other      Cancer,Lung cancer     Diabetes Other      Diabetes     Colon Cancer No family hx of      Cancer-colon     Prostate Cancer No family hx of      Cancer-prostate     Ovarian Cancer No family hx of      Cancer-ovarian     Blood Disease No family hx of      Blood Disease     Hypertension No family hx of      Hypertension     Other - See Comments No family hx of      Stroke     Thyroid Disease No family hx of      Thyroid Disease     Breast Cancer No family hx of      Cancer-breast         Current Outpatient Prescriptions   Medication Sig Dispense Refill     ammonium lactate (AMLACTIN) 12 % cream Apply topically 2 times daily 140 g 3     aspirin 81 MG tablet Take 1 tablet (81 mg) by mouth daily 90 tablet 1     azithromycin (ZITHROMAX) 250 MG tablet Two tablets first day, then one tablet daily for four days. 6 tablet 0     carBAMazepine (TEGRETOL) 200 MG tablet Take 0.5 tablets by mouth 2 times daily       Incontinence Supply Disposable (SM INCONTINENT LINER DISP) MISC Patient changes liner 2-3 time daily       rosuvastatin (CRESTOR) 20 MG tablet Take 1 tablet (20 mg) by mouth daily 90 tablet 3     Skin Protectants, Misc. (EUCERIN) cream Eucerin or insurance covered moisturizer cream to body 2 x daily and as needed       Allergies   Allergen Reactions     Diatrizoate      Other reaction(s): Angioedema  As noted 2009 note Dr Stone     Recent Labs   Lab Test  06/08/18   1001  03/07/18   1136  01/05/18   1207   LDL  50  118*   --    HDL  57  57   --    TRIG  100  95   --    ALT  16  28   --    CR   --   0.66  0.62*   GFRESTIMATED   --    90   --    GFRESTBLACK   --   >90  >60   POTASSIUM   --   4.3  4.2      BP Readings from Last 3 Encounters:   10/18/18 110/70   10/09/18 120/72   09/25/18 152/86    Wt Readings from Last 3 Encounters:   10/18/18 208 lb 3.2 oz (94.4 kg)   10/09/18 211 lb (95.7 kg)   09/25/18 217 lb 1.6 oz (98.5 kg)                    Reviewed and updated as needed this visit by clinical staff  Tobacco  Allergies  Meds  Med Hx  Surg Hx  Fam Hx  Soc Hx      Reviewed and updated as needed this visit by Provider         ROS:    Constitutional, HEENT, cardiovascular, pulmonary, gi and gu systems are negative, except as otherwise noted.    OBJECTIVE:     /70 (BP Location: Right arm, Patient Position: Sitting, Cuff Size: Adult Large)  Pulse 80  Wt 208 lb 3.2 oz (94.4 kg)  LMP  (LMP Unknown)  Breastfeeding? No  BMI 41.42 kg/m2  Body mass index is 41.42 kg/(m^2).     GENERAL: healthy, alert and no distress  EYES: Eyes grossly normal to inspection, PERRL and conjunctivae and sclerae normal  HENT: normal cephalic/atraumatic, both ears: normal canals, TMs with clear fluid- no erythema, no otorrhea, nose and mouth without ulcers or lesions, oropharynx clear and oral mucous membranes moist, congested  NECK: no adenopathy, no asymmetry, masses, or scars  RESP: lungs clear to auscultation - no rales, rhonchi or wheezes  CV: regular rate and rhythm, normal S1 S2, no S3 or S4, no murmur, click or rub,  NEURO: Normal strength and tone, mentation intact and speech normal  PSYCH: mentation appears normal, affect normal    Diagnostic Test Results:  none     ASSESSMENT/PLAN:     1. OME (otitis media with effusion), bilateral  Acute, symptomatic. Likely from recent URI and AOM. Discussed it can take 8-12 weeks to spontaneously resolve.  - Recommend trying an OTC antihistamine such as Claritin, Allegra, Zyrtec (generic is okay) and/or nasal steroid such as Flonase (generic is okay) to see if this helps alleviate symptoms    - Reviewed  potential drug interaction with tegretol and none indicated between tegretol and cetirizine on Uptodate.             FUTURE APPOINTMENTS:       - Follow-up visit: No improvement or worsening symptoms.    Sofia Willis CNP  [facility]

## 2018-10-19 ASSESSMENT — ANXIETY QUESTIONNAIRES: GAD7 TOTAL SCORE: 0

## 2018-10-19 ASSESSMENT — PATIENT HEALTH QUESTIONNAIRE - PHQ9: SUM OF ALL RESPONSES TO PHQ QUESTIONS 1-9: 0

## 2018-11-15 ENCOUNTER — OFFICE VISIT (OUTPATIENT)
Dept: FAMILY MEDICINE | Facility: OTHER | Age: 68
End: 2018-11-15
Attending: NURSE PRACTITIONER
Payer: COMMERCIAL

## 2018-11-15 VITALS
TEMPERATURE: 97.9 F | BODY MASS INDEX: 41.06 KG/M2 | SYSTOLIC BLOOD PRESSURE: 120 MMHG | HEART RATE: 84 BPM | WEIGHT: 206.4 LBS | DIASTOLIC BLOOD PRESSURE: 70 MMHG

## 2018-11-15 DIAGNOSIS — G50.0 TRIGEMINAL NEURALGIA OF RIGHT SIDE OF FACE: ICD-10-CM

## 2018-11-15 DIAGNOSIS — Z23 NEED FOR PROPHYLACTIC VACCINATION AND INOCULATION AGAINST INFLUENZA: ICD-10-CM

## 2018-11-15 DIAGNOSIS — R51.9 RIGHT-SIDED FACE PAIN: ICD-10-CM

## 2018-11-15 DIAGNOSIS — Z23 NEED FOR IMMUNIZATION AGAINST INFLUENZA: Primary | ICD-10-CM

## 2018-11-15 DIAGNOSIS — M79.7 FIBROMYALGIA: ICD-10-CM

## 2018-11-15 DIAGNOSIS — H65.93 OME (OTITIS MEDIA WITH EFFUSION), BILATERAL: ICD-10-CM

## 2018-11-15 PROCEDURE — 90686 IIV4 VACC NO PRSV 0.5 ML IM: CPT | Performed by: NURSE PRACTITIONER

## 2018-11-15 PROCEDURE — G0463 HOSPITAL OUTPT CLINIC VISIT: HCPCS | Mod: 25

## 2018-11-15 PROCEDURE — 11719 TRIM NAIL(S) ANY NUMBER: CPT | Performed by: NURSE PRACTITIONER

## 2018-11-15 PROCEDURE — G0463 HOSPITAL OUTPT CLINIC VISIT: HCPCS

## 2018-11-15 PROCEDURE — G0008 ADMIN INFLUENZA VIRUS VAC: HCPCS

## 2018-11-15 PROCEDURE — 99214 OFFICE O/P EST MOD 30 MIN: CPT | Mod: 25 | Performed by: NURSE PRACTITIONER

## 2018-11-15 RX ORDER — CETIRIZINE HYDROCHLORIDE 10 MG/1
10 TABLET ORAL EVERY EVENING
Qty: 90 TABLET | Refills: 3 | Status: SHIPPED | OUTPATIENT
Start: 2018-11-15 | End: 2019-02-14

## 2018-11-15 RX ORDER — AZITHROMYCIN 250 MG/1
TABLET, FILM COATED ORAL
Qty: 6 TABLET | Refills: 0 | Status: SHIPPED | OUTPATIENT
Start: 2018-11-15 | End: 2018-12-18

## 2018-11-15 RX ORDER — CARBAMAZEPINE 200 MG/1
100 TABLET ORAL 2 TIMES DAILY
Qty: 120 TABLET | Refills: 1 | Status: SHIPPED | OUTPATIENT
Start: 2018-11-15 | End: 2019-05-14

## 2018-11-15 ASSESSMENT — ANXIETY QUESTIONNAIRES
3. WORRYING TOO MUCH ABOUT DIFFERENT THINGS: NOT AT ALL
4. TROUBLE RELAXING: NOT AT ALL
5. BEING SO RESTLESS THAT IT IS HARD TO SIT STILL: NOT AT ALL
2. NOT BEING ABLE TO STOP OR CONTROL WORRYING: NOT AT ALL
6. BECOMING EASILY ANNOYED OR IRRITABLE: NOT AT ALL
GAD7 TOTAL SCORE: 0
1. FEELING NERVOUS, ANXIOUS, OR ON EDGE: NOT AT ALL
IF YOU CHECKED OFF ANY PROBLEMS ON THIS QUESTIONNAIRE, HOW DIFFICULT HAVE THESE PROBLEMS MADE IT FOR YOU TO DO YOUR WORK, TAKE CARE OF THINGS AT HOME, OR GET ALONG WITH OTHER PEOPLE: NOT DIFFICULT AT ALL
7. FEELING AFRAID AS IF SOMETHING AWFUL MIGHT HAPPEN: NOT AT ALL

## 2018-11-15 ASSESSMENT — PAIN SCALES - GENERAL: PAINLEVEL: NO PAIN (0)

## 2018-11-15 ASSESSMENT — PATIENT HEALTH QUESTIONNAIRE - PHQ9: SUM OF ALL RESPONSES TO PHQ QUESTIONS 1-9: 0

## 2018-11-15 NOTE — PATIENT INSTRUCTIONS
You will get call for physical therapy eval for TMJ and right facial pain    Continue the hot tea with honey and push clear fluids    Will treat ear infection with 5 day zpack which is long acting    See melanie in 1 month for foot care and followup

## 2018-11-15 NOTE — MR AVS SNAPSHOT
After Visit Summary   11/15/2018    Pennie Nichols    MRN: [id number]           Patient Information     Date Of Birth          1950        Visit Information        Provider Department      11/15/2018 11:00 AM Cookie Schultz APRN CNP [facility] [place] [facility]        Today's Diagnoses     Need for immunization against influenza    -  1    Right-sided face pain        Trigeminal neuralgia of right side of face        Fibromyalgia        OME (otitis media with effusion), bilateral          Care Instructions    You will get call for physical therapy eval for TMJ and right facial pain    Continue the hot tea with honey and push clear fluids    Will treat ear infection with 5 day zpack which is long acting    See melanie in 1 month for foot care and followup          Follow-ups after your visit        Additional Services     PHYSICAL THERAPY REFERRAL       TMJ eval    Currently treated for trigeminal neuralagia  Has had imaging                  Follow-up notes from your care team     Return in about 4 weeks (around 12/13/2018), or if symptoms worsen or fail to improve, for followup.      Future tests that were ordered for you today     Open Future Orders        Priority Expected Expires Ordered    PHYSICAL THERAPY REFERRAL Routine  11/15/2019 11/15/2018            Who to contact     If you have questions or need follow up information about today's clinic visit or your schedule please contact [facility] directly at [phone].  Normal or non-critical lab and imaging results will be communicated to you by MyChart, letter or phone within 4 business days after the clinic has received the results. If you do not hear from us within 7 days, please contact the clinic through MyChart or phone. If you have a critical or abnormal lab result, we will notify you by phone as soon as possible.  Submit refill requests through [employer] or call your pharmacy and they will forward the refill  request to us. Please allow 3 business days for your refill to be completed.          Additional Information About Your Visit        Care EveryWhere ID     This is your Care EveryWhere ID. This could be used by other organizations to access your [facility] medical records  [id number]        Your Vitals Were     Pulse Temperature Last Period Breastfeeding? BMI (Body Mass Index)       84 97.9  F (36.6  C) (Oral) (LMP Unknown) No 41.06 kg/m2        Blood Pressure from Last 3 Encounters:   11/15/18 120/70   10/18/18 110/70   10/09/18 120/72    Weight from Last 3 Encounters:   11/15/18 206 lb 6.4 oz (93.6 kg)   10/18/18 208 lb 3.2 oz (94.4 kg)   10/09/18 211 lb (95.7 kg)              We Performed the Following      IMM-  HC FLU VAC PRESRV FREE QUAD SPLIT VIR 3+YRS IM          Today's Medication Changes          These changes are accurate as of 11/15/18 11:24 AM.  If you have any questions, ask your nurse or doctor.               Start taking these medicines.        Dose/Directions    azithromycin 250 MG tablet   Commonly known as:  ZITHROMAX   Used for:  OME (otitis media with effusion), bilateral   Started by:  Cookie Schultz APRN CNP        Two tablets first day, then one tablet daily for four days.   Quantity:  6 tablet   Refills:  0            Where to get your medicines      These medications were sent to [facility] [address] [place] [id number]     Phone:  [phone]     azithromycin 250 MG tablet    carBAMazepine 200 MG tablet                Primary Care Provider Office Phone # Fax #    YANN Melissa [phone]       [address] [facility] [address]        Equal Access to Services     [facility] BERT : Marina De Los Santos, waluca guillaume, qajosefa kaalmaherlinda le, juanjo nunez. So [facility] [phone].    ATENCIÓN: Si habla español, tiene a lobato disposición servicios  kota de asistencia lingüística. Boy lynch [phone].    We comply with applicable federal civil rights laws and [place] laws. We do not discriminate on the basis of race, color, national origin, age, disability, sex, sexual orientation, or gender identity.            Thank you!     Thank you for choosing [facility] AND [facility]  for your care. Our goal is always to provide you with excellent care. Hearing back from our patients is one way we can continue to improve our services. Please take a few minutes to complete the written survey that you may receive in the mail after your visit with us. Thank you!             Your Updated Medication List - Protect others around you: Learn how to safely use, store and throw away your medicines at www.disposemymeds.org.          This list is accurate as of 11/15/18 11:24 AM.  Always use your most recent med list.                   Brand Name Dispense Instructions for use Diagnosis    ammonium lactate 12 % cream    AMLACTIN    140 g    Apply topically 2 times daily    Dermatitis, Dry skin dermatitis, PVD (peripheral vascular disease) (H)       aspirin 81 MG tablet     90 tablet    Take 1 tablet (81 mg) by mouth daily    PVD (peripheral vascular disease) (H)       azithromycin 250 MG tablet    ZITHROMAX    6 tablet    Two tablets first day, then one tablet daily for four days.    OME (otitis media with effusion), bilateral       carBAMazepine 200 MG tablet    TEGretol    120 tablet    Take 0.5 tablets (100 mg) by mouth 2 times daily    Trigeminal neuralgia of right side of face, Fibromyalgia       cetirizine 10 MG tablet    zyrTEC    30 tablet    Take 1 tablet (10 mg) by mouth every evening    OME (otitis media with effusion), bilateral       eucerin cream      Eucerin or insurance covered moisturizer cream to body 2 x daily and as needed        fluticasone 50 MCG/ACT spray    FLONASE    1 Bottle    Spray 1-2 sprays into both nostrils daily    OME (otitis media  with effusion), bilateral       rosuvastatin 20 MG tablet    CRESTOR    90 tablet    Take 1 tablet (20 mg) by mouth daily    PVD (peripheral vascular disease) (H), History of tobacco use disorder       SM INCONTINENT LINER DISP Misc      Patient changes liner 2-3 time daily

## 2018-11-15 NOTE — PROGRESS NOTES
Injectable Influenza Immunization Documentation    1.  Is the person to be vaccinated sick today?   No    2. Does the person to be vaccinated have an allergy to a component   of the vaccine?   No  Egg Allergy Algorithm Link    3. Has the person to be vaccinated ever had a serious reaction   to influenza vaccine in the past?   No    4. Has the person to be vaccinated ever had Guillain-Barré syndrome?   No    Form completed by Андрей Amanda ..............11/15/2018 11:41 AM  Prior to injection verified patient identity using patient's name and date of birth.  Due to injection administration, patient instructed to remain in clinic for 15 minutes  afterwards, and to report any adverse reaction to me immediately.

## 2018-11-15 NOTE — NURSING NOTE
Patient presents to the clinic for her 1 month follow up on her foot.    Medication Reconciliation Completed.    Андрей Amanda LPN  11/15/2018 10:31 AM

## 2018-11-16 ASSESSMENT — ANXIETY QUESTIONNAIRES: GAD7 TOTAL SCORE: 0

## 2018-11-18 NOTE — PROGRESS NOTES
Nursing Notes:   Андрей Amanda LPN  11/15/2018 11:00 AM  Signed  Patient presents to the clinic for her 1 month follow up on her foot.    Medication Reconciliation Completed.    Андрей Amanda LPN  11/15/2018 10:31 AM  Nursing note reviewed with patient.  Accurracy and completeness verified.   Ms. Nichols is a 68 year old female who:  Patient presents with:  RECHECK: Follow up foot  Imm/Inj      ICD-10-CM    1. Need for immunization against influenza Z23 GH IMM-  HC FLU VAC PRESRV FREE QUAD SPLIT VIR 3+YRS IM     CANCELED: GH IMM-  FLU VACCINE, INCREASED ANTIGEN, PRESV FREE   2. Right-sided face pain R51 PHYSICAL THERAPY REFERRAL   3. Trigeminal neuralgia of right side of face G50.0 carBAMazepine (TEGRETOL) 200 MG tablet   4. Fibromyalgia M79.7 carBAMazepine (TEGRETOL) 200 MG tablet   5. OME (otitis media with effusion), bilateral H65.93 azithromycin (ZITHROMAX) 250 MG tablet     cetirizine (ZYRTEC) 10 MG tablet   6. Need for prophylactic vaccination and inoculation against influenza Z23      HPI    Here for couple of issues--- she has had an upper respiratory viral illness and has had some postnasal drainage and was concerned as she felt this lingered however it is improving but she has bilateral otalgia there is been no fever and rarely coughs  She needs a refill on her Tegretol that she is using one half tab twice a day for her trigeminal neuralgia--- she reports that the pain is flaring again however denies any claudication or triggering with eating  At 1 time she had reported the symptoms completely resolved however she feels that her flaring again and denies any other symptoms pain seems to radiate right TMJ area    She would like her toenails trimmed--- she is due for flu vaccine    The PCA that works with her at least 3 times a week and this is been going well for her as she is unable to shower independently and needs standby assistance and is unable to do her own foot care    Review of Systems    HENT: Positive for ear pain.    All other systems reviewed and are negative.     All other systems reviewed and negative.     PRIMO:   PRIMO-7 SCORE 10/18/2018 11/15/2018   Total Score 0 0     PHQ9:  PHQ-9 SCORE 4/5/2018 10/18/2018 11/15/2018   Total Score 0 0 0       I have personally reviewed the past medical history, past surgical history, medications, allergies, family and social history as listed below, on 11/18/2018.    Allergies   Allergen Reactions     Diatrizoate      Other reaction(s): Angioedema  As noted 2009 note Dr Stone       Current Outpatient Prescriptions   Medication Sig Dispense Refill     azithromycin (ZITHROMAX) 250 MG tablet Two tablets first day, then one tablet daily for four days. 6 tablet 0     carBAMazepine (TEGRETOL) 200 MG tablet Take 0.5 tablets (100 mg) by mouth 2 times daily 120 tablet 1     cetirizine (ZYRTEC) 10 MG tablet Take 1 tablet (10 mg) by mouth every evening 90 tablet 3     ammonium lactate (AMLACTIN) 12 % cream Apply topically 2 times daily 140 g 3     aspirin 81 MG tablet Take 1 tablet (81 mg) by mouth daily 90 tablet 1     fluticasone (FLONASE) 50 MCG/ACT spray Spray 1-2 sprays into both nostrils daily 1 Bottle 11     Incontinence Supply Disposable (SM INCONTINENT LINER DISP) MISC Patient changes liner 2-3 time daily       rosuvastatin (CRESTOR) 20 MG tablet Take 1 tablet (20 mg) by mouth daily 90 tablet 3     Skin Protectants, Misc. (EUCERIN) cream Eucerin or insurance covered moisturizer cream to body 2 x daily and as needed          Patient Active Problem List    Diagnosis Date Noted     Decreased activities of daily living (ADL) 04/06/2018     Priority: Medium     Abnormal ankle brachial index (CURT) 02/06/2018     Priority: Medium     CMC arthritis 01/08/2018     Priority: Medium     Positive colorectal cancer screening using DNA-based stool test 01/07/2018     Priority: Medium     Bilateral lower extremity edema 01/05/2018     Priority: Medium     History of tobacco  use 01/05/2018     Priority: Medium     Asymptomatic varicose veins of bilateral lower extremities 08/07/2017     Priority: Medium     History of small bowel obstruction 07/09/2017     Priority: Medium     Overview:   8/31/95--Dr Barrow--had incidental Appendectomy--Multiple adhesions released       Abnormal findings in stool 07/05/2017     Priority: Medium     Onychogryphosis 04/24/2017     Priority: Medium     Requires assistance with activities of daily living (ADL) 04/24/2017     Priority: Medium     Trigeminal neuralgia of right side of face 03/06/2017     Priority: Medium     Fibromyalgia 07/09/1997     Priority: Medium     Past Medical History:   Diagnosis Date     Dorsopathy     Recurrent back problems , secondary to injury at [facility]     Fibromyalgia     7/9/1997     Nonpsychotic mental disorder     not otherwise specified     Onychogryphosis     4/24/2017     Personal history of other diseases of the digestive system (CODE)     7/9/2017,8/31/95--Dr Barrow--had incidental Appendectomy--Multiple adhesions released     Primary osteoarthritis of hand     1/8/2018     Past Surgical History:   Procedure Laterality Date     APPENDECTOMY OPEN      1996,Incidental, Enterolysis--Dr Barrow     CHOLECYSTECTOMY      1996,Laparoscopic     COLONOSCOPY      1990?     HYSTERECTOMY TOTAL ABDOMINAL      01/1995,[facility]     OTHER SURGICAL HISTORY      1994,208526,CRYOTHERAPY OF CERVIX,Abnormal Pap     Social History     Social History     Marital status:      Spouse name: N/A     Number of children: N/A     Years of education: N/A     Social History Main Topics     Smoking status: Former Smoker     Packs/day: 1.00     Years: 30.00     Types: Cigarettes     Quit date: 1/21/2001     Smokeless tobacco: Never Used     Alcohol use No     Drug use: No     Sexual activity: No     Other Topics Concern     None     Social History Narrative    Smoked one pack per day until two years ago.  Now a nonsmoker.     Family History   Problem  "Relation Age of Onset     Other - See Comments Mother      lung cancer     HEART DISEASE Father 70     Heart Disease,MI     Cancer Father      Cancer,Liver cancer     Cancer Other      Cancer,Lung cancer     Diabetes Other      Diabetes     Colon Cancer No family hx of      Cancer-colon     Prostate Cancer No family hx of      Cancer-prostate     Ovarian Cancer No family hx of      Cancer-ovarian     Blood Disease No family hx of      Blood Disease     Hypertension No family hx of      Hypertension     Other - See Comments No family hx of      Stroke     Thyroid Disease No family hx of      Thyroid Disease     Breast Cancer No family hx of      Cancer-breast       EXAM:   Vitals:    11/15/18 1036   BP: 120/70   BP Location: Right arm   Patient Position: Sitting   Cuff Size: Adult Large   Pulse: 84   Temp: 97.9  F (36.6  C)   TempSrc: Oral   Weight: 206 lb 6.4 oz (93.6 kg)       Current Pain Score: No Pain (0)     BP Readings from Last 3 Encounters:   11/15/18 120/70   10/18/18 110/70   10/09/18 120/72      Wt Readings from Last 3 Encounters:   11/15/18 206 lb 6.4 oz (93.6 kg)   10/18/18 208 lb 3.2 oz (94.4 kg)   10/09/18 211 lb (95.7 kg)      Estimated body mass index is 41.06 kg/(m^2) as calculated from the following:    Height as of 9/25/18: 4' 11.45\" (1.51 m).    Weight as of this encounter: 206 lb 6.4 oz (93.6 kg).     Physical Exam   Constitutional: She is oriented to person, place, and time. She appears well-developed and well-nourished.   HENT:   Head: Normocephalic and atraumatic.   Bilateral TMs effusion and injected, mild fluid bulge, no evidence of purulence       Neck: Normal range of motion. Neck supple.   Cardiovascular: Normal rate.    Pulmonary/Chest: Effort normal and breath sounds normal.   Musculoskeletal: Normal range of motion.   Neurological: She is alert and oriented to person, place, and time.   Skin: Skin is warm and dry.   All of her toenails were trimmed with dremel  No erythema or any " evidence of infection  Feet are clean and dry, good circulation sensation and range of movement    No open areas   Psychiatric: She has a normal mood and affect. Her behavior is normal. Judgment and thought content normal.   Nursing note and vitals reviewed.       INVESTIGATIONS:  Results for orders placed or performed in visit on 09/11/18   Strep, Rapid Screen   Result Value Ref Range    Specimen Description Throat     Rapid Strep A Screen       Negative presumptive for Group A Beta Streptococcus       ASSESSMENT AND PLAN:  Problem List Items Addressed This Visit        Nervous and Auditory    Fibromyalgia    Relevant Medications    carBAMazepine (TEGRETOL) 200 MG tablet    Trigeminal neuralgia of right side of face    Relevant Medications    carBAMazepine (TEGRETOL) 200 MG tablet      Other Visit Diagnoses     Need for immunization against influenza    -  Primary    Relevant Orders    GH IMM-  HC FLU VAC PRESRV FREE QUAD SPLIT VIR 3+YRS IM (Completed)    Right-sided face pain        Relevant Orders    PHYSICAL THERAPY REFERRAL    OME (otitis media with effusion), bilateral        Relevant Medications    azithromycin (ZITHROMAX) 250 MG tablet    cetirizine (ZYRTEC) 10 MG tablet    Need for prophylactic vaccination and inoculation against influenza          Will refill her Tegretol at the current dose, hesitate to escalate the dose at this time  Would like her to see physical therapy for TMJ evaluation and therapy if indicated    We will treat otitis media which is likely secondary to eustachian tube dysfunction and postnasal drainage from viral illness  She has many exposures as she lives at an apartment complex with many different contacts    Recommend trying daily Zyrtec and nasal spray to encourage eustachian tube drainage      -- Expected clinical course discussed    -- Medications and their side effects discussed    Patient Instructions   You will get call for physical therapy eval for TMJ and right facial  pain    Continue the hot tea with honey and push clear fluids    Will treat ear infection with 5 day zpack which is long acting    See melanie in 1 month for foot care and followup    Cookie Schultz CNP  [facility]     Portions of this note were dictated using speech recognition software. The note has been proofread but errors in the text may have been overlooked. Please contact me if there are any concerns regarding the accuracy of the dictation.

## 2018-11-30 ENCOUNTER — HOSPITAL ENCOUNTER (OUTPATIENT)
Dept: PHYSICAL THERAPY | Facility: OTHER | Age: 68
Setting detail: THERAPIES SERIES
End: 2018-11-30
Attending: NURSE PRACTITIONER
Payer: COMMERCIAL

## 2018-11-30 DIAGNOSIS — R51.9 RIGHT-SIDED FACE PAIN: ICD-10-CM

## 2018-11-30 PROCEDURE — 97162 PT EVAL MOD COMPLEX 30 MIN: CPT | Mod: GP | Performed by: PHYSICAL THERAPIST

## 2018-11-30 PROCEDURE — 97140 MANUAL THERAPY 1/> REGIONS: CPT | Mod: GP | Performed by: PHYSICAL THERAPIST

## 2018-11-30 PROCEDURE — 40000185 ZZHC STATISTIC PT OUTPT VISIT: Performed by: PHYSICAL THERAPIST

## 2018-11-30 PROCEDURE — G8984 CARRY CURRENT STATUS: HCPCS | Mod: GP,CK | Performed by: PHYSICAL THERAPIST

## 2018-11-30 PROCEDURE — G8985 CARRY GOAL STATUS: HCPCS | Mod: GP,CI | Performed by: PHYSICAL THERAPIST

## 2018-11-30 NOTE — PROGRESS NOTES
" 11/30/18 1400   General Information   Type of Visit Initial OP Ortho PT Evaluation   Start of Care Date 11/30/18   Referring Physician Cookie Schulzt NP   Orders Evaluate and Treat   Date of Order 11/15/18   Insurance Type Medicare   Medical Diagnosis right sided face pain   Surgical/Medical history reviewed Yes   Precautions/Limitations no known precautions/limitations   Body Part(s)   Body Part(s) TMJ;Cervical Spine   Presentation and Etiology   Pertinent history of current problem (include personal factors and/or comorbidities that impact the POC) Facial pain started over a year ago.  No specific incident.  Was diagnosed with trigeminal neuralgia about 1 year ago.  Tx includes medications (epitol, occasional ibuprofen).  Recent referral to PT to see if possible to wean from medications possibly.  Had a CT 1 year ago, showed \"nerve that wasn't connected\".  No other testing.  Currently takes pain medication 1x/day in the morning.     Impairments A. Pain;E. Decreased flexibility;D. Decreased ROM;N. Headaches   Symptom Location Starts in neck and spreads to temporal area of head and sometimes toward right ear.  Not really any pain in her face.  Reports she cannot turn her head in either direction very far.     How/Where did it occur (Sudden onset)   Onset date of current episode/exacerbation 11/01/17   Chronicity Chronic   Pain rating (0-10 point scale) Best (/10);Worst (/10)   Best (/10) 3/10   Worst (/10) 9/10   Pain quality E. Shooting   Frequency of pain/symptoms B. Intermittent   Pain/symptoms exacerbated by H. Overhead reach;C. Lifting;D. Carrying   Pain/symptoms eased by H. Cold;I. OTC medication(s)   Progression of symptoms since onset: (not sure)   Current Level of Function   Current Community Support PCA   Patient role/employment history F. Retired   Living environment Apartment/condo   Fall Risk Screen   Fall screen completed by PT   Have you fallen 2 or more times in the past year? No   Have you fallen and " had an injury in the past year? No   Is patient a fall risk? No   Cervical Spine   Cervical Flexion ROM 32   Cervical Extension ROM 4   Cervical Right Side Bending ROM 10   Cervical Left Side Bending ROM 10   Cervical Right Rotation ROM 23   Cervical Left Rotation ROM 23   Thoracic Flexion ROM WFL   Thoracic Extension ROM Moderate to severe limitation, not able to achieve neutral   Thoracic Right Side Bending ROM Moderate limitation   Thoracic Left Sidebending ROM  Moderate limitation   Thoracic Right Rotation Severe limitation (less than 20 degrees)   Thoracic Left Rotation Severe limitation (less than 20 degrees)   Shoulder AROM Screen Active = 80 degrees B   Passive = approximately 130 degrees B   Shoulder Shrug (C2-C4) Strength 5/5   Shoulder Abd (C5) Strength 3/5 minimal effort   Shoulder Add (C7) Strength 5-/5   Shoulder ER (C5, C6) Strength 4-/5   Shoulder IR (C5, C6) Strength 4+/5   Elbow Flexion (C5, C6) Strength 4-/5   Elbow Extension (C7) Strength 4/5   Neer Impingement Test (+) bilaterally   Segmental Mobility-Cervical NT   Segmental Mobility-Thoracic NT   Palpation Pain with occasional wincing along bilateral mid to upper back and neck muscles.   TMJ Objective Findings   Opening click No   Closing click No   Crepitus No   Subluxation Yes   TMJ ROM Mandible Opening;Left Laterotrusion;Right Laterotrusion   Palpation (Mild discomfort in masseters B; No pain in dygastric or mylo)   Mandible Opening 45 mm, subluxation bilaterally, no reports or pain or discomfort   Left Laterotrusion more than 10 mm - discomfort at right TMJ   Right Laterotrusion more than 10 mm   Subluxation comment bilateral with max opening   Planned Therapy Interventions   Planned Therapy Interventions manual therapy;joint mobilization;ROM;strengthening;stretching   Planned Modality Interventions   Planned Modality Interventions Cryotherapy;Hot packs;Ultrasound   Clinical Impression   Criteria for Skilled Therapeutic Interventions Met  yes, treatment indicated   PT Diagnosis impaired mobility   Influenced by the following impairments impaired ROM, impaired strength, postural impairment, muscle spasms    Functional limitations due to impairments lifting, carrying, reaching, headaches   Clinical Presentation Stable/Uncomplicated   Clinical Presentation Rationale clinical observation   Clinical Decision Making (Complexity) Moderate complexity   Therapy Frequency 2 times/Week   Predicted Duration of Therapy Intervention (days/wks) 8 weeks   Risk & Benefits of therapy have been explained Yes   Patient, Family & other staff in agreement with plan of care Yes   ORTHO GOALS   PT Ortho Eval Goals 1;2;3;4   Ortho Goal 1   Goal Identifier ROM   Goal Description Patient will demonstrate increased cervical extension from 4 degrees to 10 degrees or more for improved ability to reach overhead.   Target Date 12/28/18   Ortho Goal 2   Goal Identifier ROM   Goal Description Patient will demonstrate increased cervical rotation from 23 degrees to 30 degrees or more for improved reaching and ability to participate in social interaction.   Target Date 01/11/19   Ortho Goal 3   Goal Identifier ROM   Goal Description Patient will demonstrate increased cervical sidebending from 10 degrees to 15 degrees or more for decreased head/neck pain.   Target Date 01/25/19   Ortho Goal 4   Goal Identifier posture   Goal Description Patient will demonstrate improved neutral postural alignment for decreased neck and head pain.   Target Date 01/25/19   Total Evaluation Time   Total Evaluation Time 30   Therapy Certification   Certification date from 11/30/18   Certification date to 01/25/19   Medical Diagnosis right sided face pain

## 2018-12-04 ENCOUNTER — HOSPITAL ENCOUNTER (OUTPATIENT)
Dept: PHYSICAL THERAPY | Facility: OTHER | Age: 68
Setting detail: THERAPIES SERIES
End: 2018-12-04
Attending: NURSE PRACTITIONER
Payer: COMMERCIAL

## 2018-12-04 PROCEDURE — 40000185 ZZHC STATISTIC PT OUTPT VISIT: Performed by: PHYSICAL THERAPIST

## 2018-12-04 PROCEDURE — 97110 THERAPEUTIC EXERCISES: CPT | Mod: GP | Performed by: PHYSICAL THERAPIST

## 2018-12-04 PROCEDURE — 97140 MANUAL THERAPY 1/> REGIONS: CPT | Mod: GP | Performed by: PHYSICAL THERAPIST

## 2018-12-07 ENCOUNTER — HOSPITAL ENCOUNTER (OUTPATIENT)
Dept: PHYSICAL THERAPY | Facility: OTHER | Age: 68
Setting detail: THERAPIES SERIES
End: 2018-12-07
Attending: NURSE PRACTITIONER
Payer: COMMERCIAL

## 2018-12-07 PROCEDURE — 40000185 ZZHC STATISTIC PT OUTPT VISIT: Performed by: PHYSICAL THERAPIST

## 2018-12-07 PROCEDURE — 97140 MANUAL THERAPY 1/> REGIONS: CPT | Mod: GP | Performed by: PHYSICAL THERAPIST

## 2018-12-13 ENCOUNTER — HOSPITAL ENCOUNTER (OUTPATIENT)
Dept: PHYSICAL THERAPY | Facility: OTHER | Age: 68
Setting detail: THERAPIES SERIES
End: 2018-12-13
Attending: NURSE PRACTITIONER
Payer: COMMERCIAL

## 2018-12-13 PROCEDURE — 97140 MANUAL THERAPY 1/> REGIONS: CPT | Mod: GP

## 2018-12-17 ENCOUNTER — HOSPITAL ENCOUNTER (OUTPATIENT)
Dept: PHYSICAL THERAPY | Facility: OTHER | Age: 68
Setting detail: THERAPIES SERIES
End: 2018-12-17
Attending: NURSE PRACTITIONER
Payer: COMMERCIAL

## 2018-12-17 PROCEDURE — 97140 MANUAL THERAPY 1/> REGIONS: CPT | Mod: GP

## 2018-12-18 ENCOUNTER — OFFICE VISIT (OUTPATIENT)
Dept: FAMILY MEDICINE | Facility: OTHER | Age: 68
End: 2018-12-18
Attending: NURSE PRACTITIONER
Payer: COMMERCIAL

## 2018-12-18 VITALS
DIASTOLIC BLOOD PRESSURE: 78 MMHG | HEART RATE: 88 BPM | BODY MASS INDEX: 41.14 KG/M2 | WEIGHT: 206.8 LBS | TEMPERATURE: 97.8 F | SYSTOLIC BLOOD PRESSURE: 122 MMHG

## 2018-12-18 DIAGNOSIS — R05.3 CHRONIC COUGH: ICD-10-CM

## 2018-12-18 DIAGNOSIS — Z87.891 PERSONAL HISTORY OF TOBACCO USE: ICD-10-CM

## 2018-12-18 DIAGNOSIS — K21.9 GASTROESOPHAGEAL REFLUX DISEASE, ESOPHAGITIS PRESENCE NOT SPECIFIED: Primary | ICD-10-CM

## 2018-12-18 DIAGNOSIS — H65.93 OME (OTITIS MEDIA WITH EFFUSION), BILATERAL: ICD-10-CM

## 2018-12-18 DIAGNOSIS — Z87.891 HISTORY OF TOBACCO USE: ICD-10-CM

## 2018-12-18 PROCEDURE — G0463 HOSPITAL OUTPT CLINIC VISIT: HCPCS

## 2018-12-18 PROCEDURE — 99213 OFFICE O/P EST LOW 20 MIN: CPT | Performed by: NURSE PRACTITIONER

## 2018-12-18 RX ORDER — FAMOTIDINE 40 MG/1
40 TABLET, FILM COATED ORAL DAILY
Qty: 45 TABLET | Refills: 1 | Status: SHIPPED | OUTPATIENT
Start: 2018-12-18 | End: 2019-04-10

## 2018-12-18 RX ORDER — FLUTICASONE PROPIONATE 50 MCG
2 SPRAY, SUSPENSION (ML) NASAL DAILY
Qty: 1 BOTTLE | Refills: 11 | Status: SHIPPED | OUTPATIENT
Start: 2018-12-18 | End: 2019-03-05

## 2018-12-18 ASSESSMENT — PAIN SCALES - GENERAL: PAINLEVEL: MILD PAIN (3)

## 2018-12-18 ASSESSMENT — ENCOUNTER SYMPTOMS: COUGH: 1

## 2018-12-18 NOTE — NURSING NOTE
"Chief Complaint   Patient presents with     RECHECK     one month F/U foot care        Initial /78 (BP Location: Right arm, Patient Position: Sitting, Cuff Size: Adult Large)   Pulse 88   Temp 97.8  F (36.6  C) (Temporal)   Wt 93.8 kg (206 lb 12.8 oz)   LMP  (LMP Unknown)   BMI 41.14 kg/m   Estimated body mass index is 41.14 kg/m  as calculated from the following:    Height as of 9/25/18: 1.51 m (4' 11.45\").    Weight as of this encounter: 93.8 kg (206 lb 12.8 oz).  Medication Reconciliation: complete    Triny Henao LPN  "

## 2018-12-18 NOTE — PATIENT INSTRUCTIONS
Start pepcid 40 mg daily--will followup 4 weeks on progress    1 refill on flonase      Lung Cancer Screening   Frequently Asked Questions  If you are at high-risk for lung cancer, getting screened with low-dose computed tomography (LDCT) every year can help save your life. This handout offers answers to some of the most common questions about lung cancer screening. If you have other questions, please call [phone] ([phone]).     What is it?  Lung cancer screening uses special X-ray technology to create an image of your lung tissue. The exam is quick and easy and takes less than 10 seconds. We don t give you any medicine or use any needles. You can eat before and after the exam. You don t need to change your clothes as long as the clothing on your chest doesn t contain metal. But, you do need to be able to hold your breath for at least 6 seconds during the exam.    What is the goal of lung cancer screening?  The goal of lung cancer screening is to save lives. Many times, lung cancer is not found until a person starts having physical symptoms. Lung cancer screening can help detect lung cancer in the earliest stages when it may be easier to treat.    Who should be screened for lung cancer?  We suggest lung cancer screening for anyone who is at high-risk for lung cancer. You are in the high-risk group if you:      are between the ages of 55 and 79, and    have smoked at least 1 pack of cigarettes a day for 30 or more years, and    still smoke or have quit within the past 15 years.    However, if you have a new cough or shortness of breath, you should talk to your doctor before being screened.    Some national lung health advocacy groups also recommend screening for people ages 50 to 79 who have smoked an average of 1 pack of cigarettes a day for 20 years. They must also have at least 1 other risk factor for lung cancer, not including exposure to secondhand smoke. Other risk factors are having had cancer  in the past, emphysema, pulmonary fibrosis, COPD, a family history of lung cancer, or exposure to certain materials such as arsenic, asbestos, beryllium, cadmium, chromium, diesel fumes, nickel, radon or silica. Your care team can help you know if you have one of these risk factors.     Why does it matter if I have symptoms?  Certain symptoms can be a sign that you have a condition in your lungs that should be checked and treated by your doctor. These symptoms include fever, chest pain, a new or changing cough, shortness of breath that you have never felt before, coughing up blood or unexplained weight loss. Having any of these symptoms can greatly affect the results of lung cancer screening.       Should all smokers get an LDCT lung cancer screening exam?  It depends. Lung cancer screening is for a very specific group of men and women who have a history of heavy smoking over a long period of time (see  Who should be screened for lung cancer  above).  I am in the high-risk group, but have been diagnosed with cancer in the past. Is LDCT lung cancer screening right for me?  In some cases, you should not have LDCT lung screening, such as when your doctor is already following your cancer with CT scan studies. Your doctor will help you decide if LDCT lung screening is right for you.  Do I need to have a screening exam every year?  Yes. If you are in the high-risk group described earlier, you should get an LDCT lung cancer screening exam every year until you are 79, or are no longer willing or able to undergo screening and possible procedures to diagnose and treat lung cancer.  How effective is LDCT at preventing death from lung cancer?  Studies have shown that LDCT lung cancer screening can lower the risk of death from lung cancer by 20 percent in people who are at high-risk.  What are the risks?  There are some risks and limitations of LDCT lung cancer screening. We want to make sure you understand the risks and  benefits, so please let us know if you have any questions. Your doctor may want to talk with you more about these risks.    Radiation exposure: As with any exam that uses radiation, there is a very small increased risk of cancer. The amount of radiation in LDCT is small--about the same amount a person would get from a mammogram. Your doctor orders the exam when he or she feels the potential benefits outweigh the risks.    False negatives: No test is perfect, including LDCT. It is possible that you may have a medical condition, including lung cancer, that is not found during your exam. This is called a false negative result.    False positives and more testing: LDCT very often finds something in the lung that could be cancer, but in fact is not. This is called a false positive result. False positive tests often cause anxiety. To make sure these findings are not cancer, you may need to have more tests. These tests will be done only if you give us permission. Sometimes patients need a treatment that can have side effects, such as a biopsy. For more information on false positives, see  What can I expect from the results?     Findings not related to lung cancer: Your LDCT exam also takes pictures of areas of your body next to your lungs. In a very small number of cases, the CT scan will show an abnormal finding in one of these areas, such as your kidneys, adrenal glands, liver or thyroid. This finding may not be serious, but you may need more tests. Your doctor can help you decide what other tests you may need, if any.  What can I expect from the results?  About 1 out of 4 LDCT exams will find something that may need more tests. Most of the time, these findings are lung nodules. Lung nodules are very small collections of tissue in the lung. These nodules are very common, and the vast majority--more than 97 percent--are not cancer (benign). Most are normal lymph nodes or small areas of scarring from past infections.  But,  if a small lung nodule is found to be cancer, the cancer can be cured more than 90 percent of the time. To know if the nodule is cancer, we may need to get more images before your next yearly screening exam. If the nodule has suspicious features (for example, it is large, has an odd shape or grows over time), we will refer you to a specialist for further testing.  Will my doctor also get the results?  Yes. Your doctor will get a copy of your results.  Is it okay to keep smoking now that there s a cancer screening exam?  No. Tobacco is one of the strongest cancer-causing agents. It causes not only lung cancer, but other cancers and cardiovascular (heart) diseases as well. The damage caused by smoking builds over time. This means that the longer you smoke, the higher your risk of disease. While it is never too late to quit, the sooner you quit, the better.  Where can I find help to quit smoking?  The best way to prevent lung cancer is to stop smoking. If you have already quit smoking, congratulations and keep it up! For help on quitting smoking, please call [employer] at 0-210-839-IUVN ([phone] or the [employer] at 1-331.829.6383 to find local resources near you.  One-on-one health coaching:  If you d prefer to work individually with a health care provider on tobacco cessation, we offer:      Medication Therapy Management:  Our specially trained pharmacists work closely with you and your doctor to help you quit smoking.  Call [phone] or [phone] (toll free).     Can Do: Health coaching offered by [facility].  [email or web address]

## 2018-12-19 ENCOUNTER — HOSPITAL ENCOUNTER (OUTPATIENT)
Dept: PHYSICAL THERAPY | Facility: OTHER | Age: 68
Setting detail: THERAPIES SERIES
End: 2018-12-19
Attending: NURSE PRACTITIONER
Payer: COMMERCIAL

## 2018-12-19 PROCEDURE — 97140 MANUAL THERAPY 1/> REGIONS: CPT | Mod: GP | Performed by: PHYSICAL THERAPIST

## 2018-12-19 NOTE — PROGRESS NOTES
"Nursing Notes:   Triny Henao LPN  12/18/2018  2:11 PM  Signed  Chief Complaint   Patient presents with     RECHECK     one month F/U foot care        Initial /78 (BP Location: Right arm, Patient Position: Sitting, Cuff Size: Adult Large)   Pulse 88   Temp 97.8  F (36.6  C) (Temporal)   Wt 93.8 kg (206 lb 12.8 oz)   LMP  (LMP Unknown)   BMI 41.14 kg/m    Estimated body mass index is 41.14 kg/m  as calculated from the following:    Height as of 9/25/18: 1.51 m (4' 11.45\").    Weight as of this encounter: 93.8 kg (206 lb 12.8 oz).  Medication Reconciliation: complete    Triny Henao LPN  Nursing note reviewed with patient.  Accurracy and completeness verified.   Ms. Nichols is a 68 year old female who:  Patient presents with:  RECHECK: one month F/U foot care       ICD-10-CM    1. Gastroesophageal reflux disease, esophagitis presence not specified K21.9 famotidine (PEPCID) 40 MG tablet   2. Chronic cough R05    3. History of tobacco use Z87.891    4. OME (otitis media with effusion), bilateral H65.93 fluticasone (FLONASE) 50 MCG/ACT nasal spray   5. Personal history of tobacco use Z87.891      HPI     Presents with her PCA for monthly foot toenail care--- PCA does not trim toenails  Patient also reports has been using her Flonase but continues to have an intermittent cough  Does report postnasal drainage and this may be contributory  There is not been any worsening in her cough, it is intermittent, there is no fever chills or nausea    Has a history of tobacco use 30+ years---           Review of Systems   Respiratory: Positive for cough.    All other systems reviewed and are negative.     All other systems reviewed and negative.     PRIMO:   PRIMO-7 SCORE 10/18/2018 11/15/2018   Total Score 0 0     PHQ9:  PHQ-9 SCORE 4/5/2018 10/18/2018 11/15/2018   PHQ-9 Total Score 0 0 0       I have personally reviewed the past medical history, past surgical history, medications, allergies, family and social history " as listed below, on 12/18/2018.    Allergies   Allergen Reactions     Diatrizoate      Other reaction(s): Angioedema  As noted 2009 note Dr Stone       Current Outpatient Medications   Medication Sig Dispense Refill     ammonium lactate (AMLACTIN) 12 % cream Apply topically 2 times daily 140 g 3     aspirin 81 MG tablet Take 1 tablet (81 mg) by mouth daily 90 tablet 1     carBAMazepine (TEGRETOL) 200 MG tablet Take 0.5 tablets (100 mg) by mouth 2 times daily 120 tablet 1     cetirizine (ZYRTEC) 10 MG tablet Take 1 tablet (10 mg) by mouth every evening 90 tablet 3     famotidine (PEPCID) 40 MG tablet Take 1 tablet (40 mg) by mouth daily 45 tablet 1     fluticasone (FLONASE) 50 MCG/ACT nasal spray Spray 2 sprays into both nostrils daily 1 Bottle 11     Incontinence Supply Disposable (SM INCONTINENT LINER DISP) MISC Patient changes liner 2-3 time daily       rosuvastatin (CRESTOR) 20 MG tablet Take 1 tablet (20 mg) by mouth daily 90 tablet 3     Skin Protectants, Misc. (EUCERIN) cream Eucerin or insurance covered moisturizer cream to body 2 x daily and as needed          Patient Active Problem List    Diagnosis Date Noted     Decreased activities of daily living (ADL) 04/06/2018     Priority: Medium     Abnormal ankle brachial index (CURT) 02/06/2018     Priority: Medium     CMC arthritis 01/08/2018     Priority: Medium     Positive colorectal cancer screening using DNA-based stool test 01/07/2018     Priority: Medium     Bilateral lower extremity edema 01/05/2018     Priority: Medium     History of tobacco use 01/05/2018     Priority: Medium     Asymptomatic varicose veins of bilateral lower extremities 08/07/2017     Priority: Medium     History of small bowel obstruction 07/09/2017     Priority: Medium     Overview:   8/31/95--Dr Barrow--had incidental Appendectomy--Multiple adhesions released       Abnormal findings in stool 07/05/2017     Priority: Medium     Onychogryphosis 04/24/2017     Priority: Medium      Requires assistance with activities of daily living (ADL) 2017     Priority: Medium     Trigeminal neuralgia of right side of face 2017     Priority: Medium     Fibromyalgia 1997     Priority: Medium     Past Medical History:   Diagnosis Date     Dorsopathy     Recurrent back problems , secondary to injury at [facility]     Fibromyalgia     1997     Nonpsychotic mental disorder     not otherwise specified     Onychogryphosis     2017     Personal history of other diseases of the digestive system (CODE)     2017,95--Dr Barrow--had incidental Appendectomy--Multiple adhesions released     Primary osteoarthritis of hand     2018     Past Surgical History:   Procedure Laterality Date     APPENDECTOMY OPEN      ,Incidental, Enterolysis--Dr Barrow     CHOLECYSTECTOMY      ,Laparoscopic     COLONOSCOPY      ?     HYSTERECTOMY TOTAL ABDOMINAL      1995,[facility]     OTHER SURGICAL HISTORY      ,CRYOTHERAPY OF CERVIX,Abnormal Pap     Social History     Socioeconomic History     Marital status:      Spouse name: None     Number of children: None     Years of education: None     Highest education level: None   Social Needs     Financial resource strain: None     Food insecurity - worry: None     Food insecurity - inability: None     Transportation needs - medical: None     Transportation needs - non-medical: None   Occupational History     None   Tobacco Use     Smoking status: Former Smoker     Packs/day: 1.00     Years: 30.00     Pack years: 30.00     Types: Cigarettes     Last attempt to quit: 2001     Years since quittin.9     Smokeless tobacco: Never Used   Substance and Sexual Activity     Alcohol use: No     Drug use: No     Sexual activity: No     Birth control/protection: Surgical   Other Topics Concern     Parent/sibling w/ CABG, MI or angioplasty before 65F 55M? Not Asked   Social History Narrative    Smoked one pack per day until two years ago.   "Now a nonsmoker.     Family History   Problem Relation Age of Onset     Other - See Comments Mother         lung cancer     Heart Disease Father 70        Heart Disease,MI     Cancer Father         Cancer,Liver cancer     Cancer Other         Cancer,Lung cancer     Diabetes Other         Diabetes     Colon Cancer No family hx of         Cancer-colon     Prostate Cancer No family hx of         Cancer-prostate     Ovarian Cancer No family hx of         Cancer-ovarian     Blood Disease No family hx of         Blood Disease     Hypertension No family hx of         Hypertension     Other - See Comments No family hx of         Stroke     Thyroid Disease No family hx of         Thyroid Disease     Breast Cancer No family hx of         Cancer-breast       EXAM:   Vitals:    12/18/18 1337   BP: 122/78   BP Location: Right arm   Patient Position: Sitting   Cuff Size: Adult Large   Pulse: 88   Temp: 97.8  F (36.6  C)   TempSrc: Temporal   Weight: 93.8 kg (206 lb 12.8 oz)       Current Pain Score: Mild Pain (3)     BP Readings from Last 3 Encounters:   12/18/18 122/78   11/15/18 120/70   10/18/18 110/70      Wt Readings from Last 3 Encounters:   12/18/18 93.8 kg (206 lb 12.8 oz)   11/15/18 93.6 kg (206 lb 6.4 oz)   10/18/18 94.4 kg (208 lb 3.2 oz)      Estimated body mass index is 41.14 kg/m  as calculated from the following:    Height as of 9/25/18: 1.51 m (4' 11.45\").    Weight as of this encounter: 93.8 kg (206 lb 12.8 oz).     Physical Exam   Constitutional: She is oriented to person, place, and time. She appears well-developed and well-nourished.   Cardiovascular: Normal rate.   Pulmonary/Chest: Effort normal and breath sounds normal.   Musculoskeletal: Normal range of motion.   All of her toenails were trimmed down with dremel  Nails are thickened with fungus however there is no evidence of inflammation infection or erythema  Very small nicked areas will trimming her toenails right foot #4 and 5--- bleeding stopped  May " be a 2 mm area    Covered with Band-Aid dressing   Neurological: She is alert and oriented to person, place, and time.   Skin: Skin is warm and dry.   Psychiatric: She has a normal mood and affect. Her behavior is normal. Judgment and thought content normal.   Nursing note and vitals reviewed.       INVESTIGATIONS:  Results for orders placed or performed in visit on 09/11/18   Strep, Rapid Screen   Result Value Ref Range    Specimen Description Throat     Rapid Strep A Screen       Negative presumptive for Group A Beta Streptococcus       ASSESSMENT AND PLAN:  Problem List Items Addressed This Visit        Behavioral    History of tobacco use      Other Visit Diagnoses     Gastroesophageal reflux disease, esophagitis presence not specified    -  Primary    Relevant Medications    famotidine (PEPCID) 40 MG tablet    Chronic cough        Relevant Medications    fluticasone (FLONASE) 50 MCG/ACT nasal spray    OME (otitis media with effusion), bilateral        Relevant Medications    fluticasone (FLONASE) 50 MCG/ACT nasal spray    Personal history of tobacco use            She admits she does have postnasal drainage that may be contributing to her cough--- she may have reflux as well and would certainly be at risk with her body habitus  She does not want chest CT screening at this time    We will try Pepcid daily along with elevating head of bed and avoiding food triggers and awareness of triggers if there is no improvement in 4-6 weeks and the cough continues would proceed with further workup    She would like to continue the Flonase as she feels it is beneficial    We will see her in 4 weeks for follow-up on cough, congestion and toenail care  PCA we will keep an eye on those nicked areas to make sure that they continue to heal      -- Expected clinical course discussed    -- Medications and their side effects discussed    Patient Instructions   Start pepcid 40 mg daily--will followup 4 weeks on progress    1 refill  on flonase      Lung Cancer Screening   Frequently Asked Questions  If you are at high-risk for lung cancer, getting screened with low-dose computed tomography (LDCT) every year can help save your life. This handout offers answers to some of the most common questions about lung cancer screening. If you have other questions, please call [phone] ([phone]).     What is it?  Lung cancer screening uses special X-ray technology to create an image of your lung tissue. The exam is quick and easy and takes less than 10 seconds. We don t give you any medicine or use any needles. You can eat before and after the exam. You don t need to change your clothes as long as the clothing on your chest doesn t contain metal. But, you do need to be able to hold your breath for at least 6 seconds during the exam.    What is the goal of lung cancer screening?  The goal of lung cancer screening is to save lives. Many times, lung cancer is not found until a person starts having physical symptoms. Lung cancer screening can help detect lung cancer in the earliest stages when it may be easier to treat.    Who should be screened for lung cancer?  We suggest lung cancer screening for anyone who is at high-risk for lung cancer. You are in the high-risk group if you:      are between the ages of 55 and 79, and    have smoked at least 1 pack of cigarettes a day for 30 or more years, and    still smoke or have quit within the past 15 years.    However, if you have a new cough or shortness of breath, you should talk to your doctor before being screened.    Some national lung health advocacy groups also recommend screening for people ages 50 to 79 who have smoked an average of 1 pack of cigarettes a day for 20 years. They must also have at least 1 other risk factor for lung cancer, not including exposure to secondhand smoke. Other risk factors are having had cancer in the past, emphysema, pulmonary fibrosis, COPD, a family history of  lung cancer, or exposure to certain materials such as arsenic, asbestos, beryllium, cadmium, chromium, diesel fumes, nickel, radon or silica. Your care team can help you know if you have one of these risk factors.     Why does it matter if I have symptoms?  Certain symptoms can be a sign that you have a condition in your lungs that should be checked and treated by your doctor. These symptoms include fever, chest pain, a new or changing cough, shortness of breath that you have never felt before, coughing up blood or unexplained weight loss. Having any of these symptoms can greatly affect the results of lung cancer screening.       Should all smokers get an LDCT lung cancer screening exam?  It depends. Lung cancer screening is for a very specific group of men and women who have a history of heavy smoking over a long period of time (see  Who should be screened for lung cancer  above).  I am in the high-risk group, but have been diagnosed with cancer in the past. Is LDCT lung cancer screening right for me?  In some cases, you should not have LDCT lung screening, such as when your doctor is already following your cancer with CT scan studies. Your doctor will help you decide if LDCT lung screening is right for you.  Do I need to have a screening exam every year?  Yes. If you are in the high-risk group described earlier, you should get an LDCT lung cancer screening exam every year until you are 79, or are no longer willing or able to undergo screening and possible procedures to diagnose and treat lung cancer.  How effective is LDCT at preventing death from lung cancer?  Studies have shown that LDCT lung cancer screening can lower the risk of death from lung cancer by 20 percent in people who are at high-risk.  What are the risks?  There are some risks and limitations of LDCT lung cancer screening. We want to make sure you understand the risks and benefits, so please let us know if you have any questions. Your doctor may  want to talk with you more about these risks.    Radiation exposure: As with any exam that uses radiation, there is a very small increased risk of cancer. The amount of radiation in LDCT is small--about the same amount a person would get from a mammogram. Your doctor orders the exam when he or she feels the potential benefits outweigh the risks.    False negatives: No test is perfect, including LDCT. It is possible that you may have a medical condition, including lung cancer, that is not found during your exam. This is called a false negative result.    False positives and more testing: LDCT very often finds something in the lung that could be cancer, but in fact is not. This is called a false positive result. False positive tests often cause anxiety. To make sure these findings are not cancer, you may need to have more tests. These tests will be done only if you give us permission. Sometimes patients need a treatment that can have side effects, such as a biopsy. For more information on false positives, see  What can I expect from the results?     Findings not related to lung cancer: Your LDCT exam also takes pictures of areas of your body next to your lungs. In a very small number of cases, the CT scan will show an abnormal finding in one of these areas, such as your kidneys, adrenal glands, liver or thyroid. This finding may not be serious, but you may need more tests. Your doctor can help you decide what other tests you may need, if any.  What can I expect from the results?  About 1 out of 4 LDCT exams will find something that may need more tests. Most of the time, these findings are lung nodules. Lung nodules are very small collections of tissue in the lung. These nodules are very common, and the vast majority--more than 97 percent--are not cancer (benign). Most are normal lymph nodes or small areas of scarring from past infections.  But, if a small lung nodule is found to be cancer, the cancer can be cured more  than 90 percent of the time. To know if the nodule is cancer, we may need to get more images before your next yearly screening exam. If the nodule has suspicious features (for example, it is large, has an odd shape or grows over time), we will refer you to a specialist for further testing.  Will my doctor also get the results?  Yes. Your doctor will get a copy of your results.  Is it okay to keep smoking now that there s a cancer screening exam?  No. Tobacco is one of the strongest cancer-causing agents. It causes not only lung cancer, but other cancers and cardiovascular (heart) diseases as well. The damage caused by smoking builds over time. This means that the longer you smoke, the higher your risk of disease. While it is never too late to quit, the sooner you quit, the better.  Where can I find help to quit smoking?  The best way to prevent lung cancer is to stop smoking. If you have already quit smoking, congratulations and keep it up! For help on quitting smoking, please call [employer] at 3-461-749-NQSF ([phone]) or the American [employer] Society at 1-385.648.7861 to find local resources near you.  One-on-one health coaching:  If you d prefer to work individually with a health care provider on tobacco cessation, we offer:      Medication Therapy Management:  Our specially trained pharmacists work closely with you and your doctor to help you quit smoking.  Call [phone] or [phone] (toll free).     Can Do: Health coaching offered by [facility].  [email or web address]      Cookie Schultz CNP  [facility]     Portions of this note were dictated using speech recognition software. The note has been proofread but errors in the text may have been overlooked. Please contact me if there are any concerns regarding the accuracy of the dictation.

## 2018-12-26 ENCOUNTER — HOSPITAL ENCOUNTER (OUTPATIENT)
Dept: PHYSICAL THERAPY | Facility: OTHER | Age: 68
Setting detail: THERAPIES SERIES
End: 2018-12-26
Attending: NURSE PRACTITIONER
Payer: COMMERCIAL

## 2018-12-26 PROCEDURE — 97140 MANUAL THERAPY 1/> REGIONS: CPT | Mod: GP | Performed by: PHYSICAL THERAPIST

## 2018-12-26 PROCEDURE — 97110 THERAPEUTIC EXERCISES: CPT | Mod: GP | Performed by: PHYSICAL THERAPIST

## 2018-12-26 NOTE — PROGRESS NOTES
"Outpatient Physical Therapy Progress Note     Patient: Pennie Nichols  : 1950    Beginning/End Dates of Reporting Period:  2018 to 2018  Current Cert Date through 2019    Referring Provider: Cookie Schultz NP    Therapy Diagnosis: right sided face pain     Client Self Report: Patient feels therapy is \"helping a little\".  Feels she can move her head better, rather than having to turn her whole body.      Objective Measurements:  Objective Measure: pain rating   Details: 3/10 lately    Objective Measure: Cervical extension  Details: 18:  19 degrees    Objective Measure: Rotation  Details: 18:  R = 38  L = 26    Objective Measure: Sidebending  Details: 18:  R = 10  L = 10           Goals:  Goal Identifier ROM   Goal Description Patient will demonstrate increased cervical extension from 4 degrees to 10 degrees or more for improved ability to reach overhead.   Target Date 18   Date Met  18   Progress:  MET     Goal Identifier ROM   Goal Description Patient will demonstrate increased cervical rotation from 23 degrees to 30 degrees or more for improved reaching and ability to participate in social interaction.   Target Date 19   Date Met      Progress:  MET for right side;  Progress made for Left side     Goal Identifier ROM   Goal Description Patient will demonstrate increased cervical sidebending from 10 degrees to 15 degrees or more for decreased head/neck pain.   Target Date 19   Date Met      Progress:  No change     Goal Identifier posture   Goal Description Patient will demonstrate improved neutral postural alignment for decreased neck and head pain.   Target Date 19   Date Met      Progress:  Progress made         Progress Toward Goals:   Progress this reporting period: Improved cervical ROM, decreased neck and upper back muscle tension.  Patient continues to have limited cervical ROM, muscle tension (left worse than right) and postural " impairment.        Plan:  Continue therapy per current plan of care.  Current certification through 1/25/2019    Discharge:  No

## 2019-01-10 ENCOUNTER — HOSPITAL ENCOUNTER (OUTPATIENT)
Dept: PHYSICAL THERAPY | Facility: OTHER | Age: 69
Setting detail: THERAPIES SERIES
End: 2019-01-10
Attending: NURSE PRACTITIONER
Payer: COMMERCIAL

## 2019-01-10 PROCEDURE — 97140 MANUAL THERAPY 1/> REGIONS: CPT | Mod: GP | Performed by: PHYSICAL THERAPIST

## 2019-01-15 ENCOUNTER — OFFICE VISIT (OUTPATIENT)
Dept: FAMILY MEDICINE | Facility: OTHER | Age: 69
End: 2019-01-15
Attending: NURSE PRACTITIONER
Payer: COMMERCIAL

## 2019-01-15 VITALS
WEIGHT: 206.6 LBS | DIASTOLIC BLOOD PRESSURE: 62 MMHG | TEMPERATURE: 97 F | SYSTOLIC BLOOD PRESSURE: 122 MMHG | HEART RATE: 96 BPM | BODY MASS INDEX: 41.1 KG/M2

## 2019-01-15 DIAGNOSIS — L60.2 OVERGROWN TOENAILS: ICD-10-CM

## 2019-01-15 DIAGNOSIS — Z12.31 ENCOUNTER FOR SCREENING MAMMOGRAM FOR BREAST CANCER: Primary | ICD-10-CM

## 2019-01-15 PROCEDURE — G0463 HOSPITAL OUTPT CLINIC VISIT: HCPCS | Mod: 25

## 2019-01-15 PROCEDURE — 99214 OFFICE O/P EST MOD 30 MIN: CPT | Mod: 25 | Performed by: NURSE PRACTITIONER

## 2019-01-15 PROCEDURE — 11719 TRIM NAIL(S) ANY NUMBER: CPT | Performed by: NURSE PRACTITIONER

## 2019-01-15 PROCEDURE — G0463 HOSPITAL OUTPT CLINIC VISIT: HCPCS

## 2019-01-15 ASSESSMENT — ANXIETY QUESTIONNAIRES
GAD7 TOTAL SCORE: 0
IF YOU CHECKED OFF ANY PROBLEMS ON THIS QUESTIONNAIRE, HOW DIFFICULT HAVE THESE PROBLEMS MADE IT FOR YOU TO DO YOUR WORK, TAKE CARE OF THINGS AT HOME, OR GET ALONG WITH OTHER PEOPLE: NOT DIFFICULT AT ALL
5. BEING SO RESTLESS THAT IT IS HARD TO SIT STILL: NOT AT ALL
4. TROUBLE RELAXING: NOT AT ALL
1. FEELING NERVOUS, ANXIOUS, OR ON EDGE: NOT AT ALL
7. FEELING AFRAID AS IF SOMETHING AWFUL MIGHT HAPPEN: NOT AT ALL
6. BECOMING EASILY ANNOYED OR IRRITABLE: NOT AT ALL
3. WORRYING TOO MUCH ABOUT DIFFERENT THINGS: NOT AT ALL
2. NOT BEING ABLE TO STOP OR CONTROL WORRYING: NOT AT ALL

## 2019-01-15 ASSESSMENT — PAIN SCALES - GENERAL: PAINLEVEL: NO PAIN (0)

## 2019-01-15 ASSESSMENT — PATIENT HEALTH QUESTIONNAIRE - PHQ9: SUM OF ALL RESPONSES TO PHQ QUESTIONS 1-9: 0

## 2019-01-15 NOTE — NURSING NOTE
Patient presents to the clinic for a 1 month follow up regarding foot care.    Medication Reconciliation Completed.    Андрей Amanda LPN  1/15/2019 1:38 PM

## 2019-01-15 NOTE — PATIENT INSTRUCTIONS
followup 1 month for foot care and can schedule screening mammogram same day    Next month will update pneumonia shot

## 2019-01-16 ASSESSMENT — ANXIETY QUESTIONNAIRES: GAD7 TOTAL SCORE: 0

## 2019-01-16 NOTE — PROGRESS NOTES
Nursing Notes:   Андрей Amanda LPN  1/15/2019  1:40 PM  Signed  Patient presents to the clinic for a 1 month follow up regarding foot care.    Medication Reconciliation Completed.    Андрей Amanda LPN  1/15/2019 1:38 PM  Nursing note reviewed with patient.  Accurracy and completeness verified.   Ms. Nichols is a 68 year old female who:  Patient presents with:  RECHECK: 1 month follow up      ICD-10-CM    1. Encounter for screening mammogram for breast cancer Z12.31 MA Screen Bilateral w/Kin   2. Overgrown toenails L60.2      HPI   Presents for monthly foot care and toenail care and trimming as she is unable to complete this independently--does have a PCA who helps with cleansing feet and moisturizing skin but does not feel comfortable trimming nails or smoothing calluses    She reports overall she has been feeling well--has been socializing with some of the members in her apartment complex and did attend a dinner at [place] time and will be attending a pot luck for the Super Bowl--she is excited about this  Usually she tends to isolate and has social anxiety    She is due for a Pneumovax vaccine    She is due for mammography screening and is agreeable with getting this completed  She has declined colonoscopy screening and Pap screening      Review of Systems   All other systems reviewed and are negative.     All other systems reviewed and negative.     PRIMO:   PRIMO-7 SCORE 10/18/2018 11/15/2018 1/15/2019   Total Score 0 0 0     PHQ9:  PHQ-9 SCORE 10/18/2018 11/15/2018 1/15/2019   PHQ-9 Total Score 0 0 0       I have personally reviewed the past medical history, past surgical history, medications, allergies, family and social history as listed below, on 1/15/2019.    Allergies   Allergen Reactions     Diatrizoate      Other reaction(s): Angioedema  As noted 2009 note Dr Stone       Current Outpatient Medications   Medication Sig Dispense Refill     ammonium lactate (AMLACTIN) 12 % cream Apply topically 2  times daily 140 g 3     aspirin 81 MG tablet Take 1 tablet (81 mg) by mouth daily 90 tablet 1     carBAMazepine (TEGRETOL) 200 MG tablet Take 0.5 tablets (100 mg) by mouth 2 times daily 120 tablet 1     cetirizine (ZYRTEC) 10 MG tablet Take 1 tablet (10 mg) by mouth every evening 90 tablet 3     famotidine (PEPCID) 40 MG tablet Take 1 tablet (40 mg) by mouth daily 45 tablet 1     fluticasone (FLONASE) 50 MCG/ACT nasal spray Spray 2 sprays into both nostrils daily 1 Bottle 11     Incontinence Supply Disposable (SM INCONTINENT LINER DISP) MISC Patient changes liner 2-3 time daily       rosuvastatin (CRESTOR) 20 MG tablet Take 1 tablet (20 mg) by mouth daily 90 tablet 3     Skin Protectants, Misc. (EUCERIN) cream Eucerin or insurance covered moisturizer cream to body 2 x daily and as needed          Patient Active Problem List    Diagnosis Date Noted     Decreased activities of daily living (ADL) 04/06/2018     Priority: Medium     Abnormal ankle brachial index (CURT) 02/06/2018     Priority: Medium     CMC arthritis 01/08/2018     Priority: Medium     Positive colorectal cancer screening using DNA-based stool test 01/07/2018     Priority: Medium     Bilateral lower extremity edema 01/05/2018     Priority: Medium     History of tobacco use 01/05/2018     Priority: Medium     Asymptomatic varicose veins of bilateral lower extremities 08/07/2017     Priority: Medium     History of small bowel obstruction 07/09/2017     Priority: Medium     Overview:   8/31/95--Dr Barrow--had incidental Appendectomy--Multiple adhesions released       Abnormal findings in stool 07/05/2017     Priority: Medium     Onychogryphosis 04/24/2017     Priority: Medium     Requires assistance with activities of daily living (ADL) 04/24/2017     Priority: Medium     Trigeminal neuralgia of right side of face 03/06/2017     Priority: Medium     Fibromyalgia 07/09/1997     Priority: Medium     Past Medical History:   Diagnosis Date     Dorsopathy      Recurrent back problems , secondary to injury at [facility]     Fibromyalgia     1997     Nonpsychotic mental disorder     not otherwise specified     Onychogryphosis     2017     Personal history of other diseases of the digestive system (CODE)     2017,95--Dr Barrow--had incidental Appendectomy--Multiple adhesions released     Primary osteoarthritis of hand     2018     Past Surgical History:   Procedure Laterality Date     APPENDECTOMY OPEN      ,Incidental, Enterolysis--Dr Barrow     CHOLECYSTECTOMY      ,Laparoscopic     COLONOSCOPY      ?     HYSTERECTOMY TOTAL ABDOMINAL      1995,[place]     OTHER SURGICAL HISTORY      ,2085,CRYOTHERAPY OF CERVIX,Abnormal Pap     Social History     Socioeconomic History     Marital status:      Spouse name: None     Number of children: None     Years of education: None     Highest education level: None   Social Needs     Financial resource strain: None     Food insecurity - worry: None     Food insecurity - inability: None     Transportation needs - medical: None     Transportation needs - non-medical: None   Occupational History     None   Tobacco Use     Smoking status: Former Smoker     Packs/day: 1.00     Years: 30.00     Pack years: 30.00     Types: Cigarettes     Last attempt to quit: 2001     Years since quittin.9     Smokeless tobacco: Never Used   Substance and Sexual Activity     Alcohol use: No     Drug use: No     Sexual activity: No     Birth control/protection: Surgical   Other Topics Concern     Parent/sibling w/ CABG, MI or angioplasty before 65F 55M? Not Asked   Social History Narrative    Smoked one pack per day until two years ago.  Now a nonsmoker.     Family History   Problem Relation Age of Onset     Other - See Comments Mother         lung cancer     Heart Disease Father 70        Heart Disease,MI     Cancer Father         Cancer,Liver cancer     Cancer Other         Cancer,Lung cancer     Diabetes Other   "       Diabetes     Colon Cancer No family hx of         Cancer-colon     Prostate Cancer No family hx of         Cancer-prostate     Ovarian Cancer No family hx of         Cancer-ovarian     Blood Disease No family hx of         Blood Disease     Hypertension No family hx of         Hypertension     Other - See Comments No family hx of         Stroke     Thyroid Disease No family hx of         Thyroid Disease     Breast Cancer No family hx of         Cancer-breast       EXAM:   Vitals:    01/15/19 1343   BP: 122/62   BP Location: Right arm   Patient Position: Sitting   Cuff Size: Adult Large   Pulse: 96   Temp: 97  F (36.1  C)   Weight: 93.7 kg (206 lb 9.6 oz)       Current Pain Score: No Pain (0)     BP Readings from Last 3 Encounters:   01/15/19 122/62   12/18/18 122/78   11/15/18 120/70      Wt Readings from Last 3 Encounters:   01/15/19 93.7 kg (206 lb 9.6 oz)   12/18/18 93.8 kg (206 lb 12.8 oz)   11/15/18 93.6 kg (206 lb 6.4 oz)      Estimated body mass index is 41.1 kg/m  as calculated from the following:    Height as of 9/25/18: 1.51 m (4' 11.45\").    Weight as of this encounter: 93.7 kg (206 lb 9.6 oz).     Physical Exam   Constitutional: She is oriented to person, place, and time. She appears well-developed and well-nourished.   Cardiovascular: Normal rate.   Pulmonary/Chest: Effort normal.   Musculoskeletal: Normal range of motion.   Does need assistance stepping up on exam table and getting down--does not feel steady and has difficulty accessing her shoes independently    Toenails on all of her feet were inspected and were trimmed--filed with El--- tolerated well  No visible calluses or open sores  Feet are warm and dry, good circulation sensation   Neurological: She is alert and oriented to person, place, and time.   Skin: Skin is warm and dry.   Psychiatric: She has a normal mood and affect. Her behavior is normal. Judgment and thought content normal.   Vitals reviewed.   "     INVESTIGATIONS:      ASSESSMENT AND PLAN:  Problem List Items Addressed This Visit     None      Visit Diagnoses     Encounter for screening mammogram for breast cancer    -  Primary    Relevant Orders    MA Screen Bilateral w/Kin    Overgrown toenails            She did not have time to complete her Pneumovax at this appointment due to public transportation schedule    In 1 month she will follow-up for toenail and foot care  Will complete Pneumovax vaccine  Screening mammography will be coordinated same day to be completed    -- Expected clinical course discussed    -- Medications and their side effects discussed    Patient Instructions   followup 1 month for foot care and can schedule screening mammogram same day    Next month will update pneumonia shot    Cookie Schultz CNP  [facility]     Portions of this note were dictated using speech recognition software. The note has been proofread but errors in the text may have been overlooked. Please contact me if there are any concerns regarding the accuracy of the dictation.

## 2019-01-17 ENCOUNTER — HOSPITAL ENCOUNTER (OUTPATIENT)
Dept: PHYSICAL THERAPY | Facility: OTHER | Age: 69
Setting detail: THERAPIES SERIES
End: 2019-01-17
Attending: NURSE PRACTITIONER
Payer: COMMERCIAL

## 2019-01-17 PROCEDURE — 97140 MANUAL THERAPY 1/> REGIONS: CPT | Mod: GP | Performed by: PHYSICAL THERAPIST

## 2019-01-22 DIAGNOSIS — R31.29 MICROSCOPIC HEMATURIA: Primary | ICD-10-CM

## 2019-01-23 ENCOUNTER — HOSPITAL ENCOUNTER (OUTPATIENT)
Dept: PHYSICAL THERAPY | Facility: OTHER | Age: 69
Setting detail: THERAPIES SERIES
End: 2019-01-23
Attending: NURSE PRACTITIONER
Payer: COMMERCIAL

## 2019-01-23 PROCEDURE — 97140 MANUAL THERAPY 1/> REGIONS: CPT | Mod: GP

## 2019-01-29 NOTE — PROGRESS NOTES
Outpatient Physical Therapy Discharge Note     Patient: Pennie Nichols  : 1950    Beginning/End Dates of Reporting Period:  2018 to 2019    Referring Provider: Cookie Schultz NP    Diagnosis: right sided face pain     Client Self Report: Patient reports continued compliance with stretching.  Some improved ROM overall, but minimal.  Feels her muscles are not as tight and pain has decreased.    Objective Measurements:  Objective Measure: pain rating   Details: 3/10 lately    Objective Measure: Cervical extension  Details: 19: 12    Objective Measure: Rotation  Details: 18:  R = 30  L = 23    Objective Measure: Sidebending  Details: 18:  R = 7  L = 7          Goals:  Goal Identifier ROM   Goal Description Patient will demonstrate increased cervical extension from 4 degrees to 10 degrees or more for improved ability to reach overhead.   Target Date 18   Date Met  18   Progress:  MET     Goal Identifier ROM   Goal Description Patient will demonstrate increased cervical rotation from 23 degrees to 30 degrees or more for improved reaching and ability to participate in social interaction.   Target Date 19   Date Met      Progress:  MET for Right, not left     Goal Identifier ROM   Goal Description Patient will demonstrate increased cervical sidebending from 10 degrees to 15 degrees or more for decreased head/neck pain.   Target Date 19   Date Met      Progress:  No change - Actually decreased since last assessment on 18.      Goal Identifier posture   Goal Description Patient will demonstrate improved neutral postural alignment for decreased neck and head pain.   Target Date 19   Date Met      Progress:  Inconsistent, but improved since evaluation.         Progress Toward Goals:   Progress this reporting period: Minimal change since last progress note on 2018.  Improvement noted overall with cervical flexion, extension, and rotation.  Minimal to no  change in sidebending.  Decreased muscle tension and reported pain.        Plan:  Discharge from therapy.    Discharge:    Reason for Discharge: Patient has met a plateau in functional progress.    Equipment Issued: none    Discharge Plan: Patient to continue home program.

## 2019-01-29 NOTE — ADDENDUM NOTE
Encounter addended by: Rosemarie Soler, PT on: 1/29/2019 12:49 PM   Actions taken: Flowsheet accepted, Sign clinical note

## 2019-01-31 ENCOUNTER — TELEPHONE (OUTPATIENT)
Dept: FAMILY MEDICINE | Facility: OTHER | Age: 69
End: 2019-01-31

## 2019-01-31 NOTE — TELEPHONE ENCOUNTER
Attempted to reach [employer] with no answer. Will try again later.  Андрей Amanda LPN ..............1/31/2019 1:35 PM

## 2019-02-01 NOTE — TELEPHONE ENCOUNTER
Form faxed today , code put on and faxedback . Carmen Cardoza LPN ....................2/1/2019  10:05 AM

## 2019-02-05 ENCOUNTER — TELEPHONE (OUTPATIENT)
Dept: FAMILY MEDICINE | Facility: OTHER | Age: 69
End: 2019-02-05

## 2019-02-05 PROBLEM — N31.8 DETRUSOR DYSFUNCTION: Status: ACTIVE | Noted: 2019-02-05

## 2019-02-14 ENCOUNTER — HOSPITAL ENCOUNTER (OUTPATIENT)
Dept: MAMMOGRAPHY | Facility: OTHER | Age: 69
Discharge: HOME | End: 2019-02-14
Attending: NURSE PRACTITIONER | Admitting: NURSE PRACTITIONER
Payer: COMMERCIAL

## 2019-02-14 ENCOUNTER — OFFICE VISIT (OUTPATIENT)
Dept: FAMILY MEDICINE | Facility: OTHER | Age: 69
End: 2019-02-14
Attending: NURSE PRACTITIONER
Payer: COMMERCIAL

## 2019-02-14 VITALS
DIASTOLIC BLOOD PRESSURE: 62 MMHG | BODY MASS INDEX: 41.06 KG/M2 | WEIGHT: 206.4 LBS | RESPIRATION RATE: 16 BRPM | SYSTOLIC BLOOD PRESSURE: 120 MMHG | TEMPERATURE: 98.1 F | HEART RATE: 80 BPM

## 2019-02-14 DIAGNOSIS — L60.2 ONYCHOGRYPHOSIS: ICD-10-CM

## 2019-02-14 DIAGNOSIS — Z12.31 ENCOUNTER FOR SCREENING MAMMOGRAM FOR BREAST CANCER: ICD-10-CM

## 2019-02-14 DIAGNOSIS — I73.9 PVD (PERIPHERAL VASCULAR DISEASE) (H): Primary | ICD-10-CM

## 2019-02-14 DIAGNOSIS — L82.1 SEBORRHEIC KERATOSES: ICD-10-CM

## 2019-02-14 PROCEDURE — 99213 OFFICE O/P EST LOW 20 MIN: CPT | Mod: 25 | Performed by: NURSE PRACTITIONER

## 2019-02-14 PROCEDURE — 77063 BREAST TOMOSYNTHESIS BI: CPT

## 2019-02-14 PROCEDURE — G0463 HOSPITAL OUTPT CLINIC VISIT: HCPCS | Mod: 25

## 2019-02-14 PROCEDURE — G0463 HOSPITAL OUTPT CLINIC VISIT: HCPCS

## 2019-02-14 PROCEDURE — 11719 TRIM NAIL(S) ANY NUMBER: CPT | Performed by: NURSE PRACTITIONER

## 2019-02-14 ASSESSMENT — PAIN SCALES - GENERAL: PAINLEVEL: NO PAIN (0)

## 2019-02-14 NOTE — PATIENT INSTRUCTIONS
Continue your moisturization    If you would like some of the SK lesions removed from irritated areas--I see about 6  I would need at least 45 minutes to remove and treat some with cryo or liquid nitrogen    Your mamogram was read normal results    Think about a screening chest CT for your tobacco history

## 2019-02-14 NOTE — NURSING NOTE
Patient presents to clinic today for follow up 1 month foot care. Patient states no issues with feet. Would like nose looked at because it continues to bleed while blowing nose. Also, has a couple moles on back that need to be checked.     No LMP recorded (lmp unknown). Patient is postmenopausal.  Medication Reconciliation: complete    Darrion Almanzar LPN  2/14/2019 2:40 PM

## 2019-02-14 NOTE — PROGRESS NOTES
Nursing Notes:   Darrion Almanzar LPN  2/14/2019  3:02 PM  Signed  Patient presents to clinic today for follow up 1 month foot care. Patient states no issues with feet. Would like nose looked at because it continues to bleed while blowing nose. Also, has a couple moles on back that need to be checked.     No LMP recorded (lmp unknown). Patient is postmenopausal.  Medication Reconciliation: complete    Darrion Almanzar LPN  2/14/2019 2:40 PM    Nursing note reviewed with patient.  Accurracy and completeness verified.   Ms. Nichols is a 68 year old female who:  Patient presents with:  Follow Up: 1 month Foot Care      ICD-10-CM    1. PVD (peripheral vascular disease) (H) I73.9 aspirin (ASA) 81 MG tablet   2. Onychogryphosis L60.2    3. Seborrheic keratoses L82.1      HPI   Is for monthly foot care--- history of overgrown toenails, unable to care for independently--- PCA does not do foot care  The other issue is she reports multiple SK lesions on her chest back and waistline some areas irritative with clothing such as on her left breast bilateral shoulders a prior line right lower abdomen and a large one on her posterior upper back--- she would like to have these removed    She reports overall she has been feeling well has no concerns  Completed screening mammography today    Discussed considering CT chest screening--has 30-year tobacco history--- she will consider and let me know at her next appointment we will set this up      Review of Systems   Skin: Positive for rash.   All other systems reviewed and are negative.     All other systems reviewed and negative.     PRIMO:   PRIMO-7 SCORE 10/18/2018 11/15/2018 1/15/2019   Total Score 0 0 0     PHQ9:  PHQ-9 SCORE 10/18/2018 11/15/2018 1/15/2019   PHQ-9 Total Score 0 0 0       I have personally reviewed the past medical history, past surgical history, medications, allergies, family and social history as listed below, on 2/14/2019.    Allergies   Allergen Reactions     Diatrizoate       Other reaction(s): Angioedema  As noted 2009 note Dr Stone       Current Outpatient Medications   Medication Sig Dispense Refill     ammonium lactate (AMLACTIN) 12 % cream Apply topically 2 times daily 140 g 3     aspirin (ASA) 81 MG tablet Take 1 tablet (81 mg) by mouth daily 90 tablet 3     carBAMazepine (TEGRETOL) 200 MG tablet Take 0.5 tablets (100 mg) by mouth 2 times daily 120 tablet 1     famotidine (PEPCID) 40 MG tablet Take 1 tablet (40 mg) by mouth daily 45 tablet 1     fluticasone (FLONASE) 50 MCG/ACT nasal spray Spray 2 sprays into both nostrils daily 1 Bottle 11     Incontinence Supply Disposable (SM INCONTINENT LINER DISP) MISC Patient changes liner 2-3 time daily       rosuvastatin (CRESTOR) 20 MG tablet Take 1 tablet (20 mg) by mouth daily 90 tablet 3     Skin Protectants, Misc. 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     Priority: Medium     Decreased activities of daily living (ADL) 04/06/2018     Priority: Medium     Abnormal ankle brachial index (CURT) 02/06/2018     Priority: Medium     CMC arthritis 01/08/2018     Priority: Medium     Positive colorectal cancer screening using DNA-based stool test 01/07/2018     Priority: Medium     Bilateral lower extremity edema 01/05/2018     Priority: Medium     History of tobacco use 01/05/2018     Priority: Medium     Asymptomatic varicose veins of bilateral lower extremities 08/07/2017     Priority: Medium     History of small bowel obstruction 07/09/2017     Priority: Medium     Overview:   8/31/95--Dr Barrow--had incidental Appendectomy--Multiple adhesions released       Abnormal findings in stool 07/05/2017     Priority: Medium     Onychogryphosis 04/24/2017     Priority: Medium     Requires assistance with activities of daily living (ADL) 04/24/2017     Priority: Medium     Trigeminal neuralgia of  right side of face 2017     Priority: Medium     Fibromyalgia 1997     Priority: Medium     Past Medical History:   Diagnosis Date     Detrusor dysfunction 2019     Dorsopathy     Recurrent back problems , secondary to injury at [facility]     Fibromyalgia     1997     Nonpsychotic mental disorder     not otherwise specified     Onychogryphosis     2017     Personal history of other diseases of the digestive system (CODE)     2017,95--Dr Barrow--had incidental Appendectomy--Multiple adhesions released     Primary osteoarthritis of hand     2018     Past Surgical History:   Procedure Laterality Date     APPENDECTOMY OPEN      ,Incidental, Enterolysis--Dr Barrow     CHOLECYSTECTOMY      ,Laparoscopic     COLONOSCOPY      ?     HYSTERECTOMY TOTAL ABDOMINAL      1995,[place]     OTHER SURGICAL HISTORY      ,CRYOTHERAPY OF CERVIX,Abnormal Pap     Social History     Socioeconomic History     Marital status:      Spouse name: None     Number of children: None     Years of education: None     Highest education level: None   Social Needs     Financial resource strain: None     Food insecurity - worry: None     Food insecurity - inability: None     Transportation needs - medical: None     Transportation needs - non-medical: None   Occupational History     None   Tobacco Use     Smoking status: Former Smoker     Packs/day: 1.00     Years: 30.00     Pack years: 30.00     Types: Cigarettes     Last attempt to quit: 2001     Years since quittin.0     Smokeless tobacco: Never Used   Substance and Sexual Activity     Alcohol use: No     Drug use: No     Sexual activity: No     Birth control/protection: Surgical   Other Topics Concern     Parent/sibling w/ CABG, MI or angioplasty before 65F 55M? Not Asked   Social History Narrative    Smoked one pack per day until two years ago.  Now a nonsmoker.     Family History   Problem Relation Age of Onset     Other - See  "Comments Mother         lung cancer     Heart Disease Father 70        Heart Disease,MI     Cancer Father         Cancer,Liver cancer     Cancer Other         Cancer,Lung cancer     Diabetes Other         Diabetes     Colon Cancer No family hx of         Cancer-colon     Prostate Cancer No family hx of         Cancer-prostate     Ovarian Cancer No family hx of         Cancer-ovarian     Blood Disease No family hx of         Blood Disease     Hypertension No family hx of         Hypertension     Other - See Comments No family hx of         Stroke     Thyroid Disease No family hx of         Thyroid Disease     Breast Cancer No family hx of         Cancer-breast       EXAM:   Vitals:    02/14/19 1446   BP: 120/62   BP Location: Right arm   Patient Position: Sitting   Cuff Size: Adult Regular   Pulse: 80   Resp: 16   Temp: 98.1  F (36.7  C)   TempSrc: Tympanic   Weight: 93.6 kg (206 lb 6.4 oz)       Current Pain Score: No Pain (0)     BP Readings from Last 3 Encounters:   02/14/19 120/62   01/15/19 122/62   12/18/18 122/78      Wt Readings from Last 3 Encounters:   02/14/19 93.6 kg (206 lb 6.4 oz)   01/15/19 93.7 kg (206 lb 9.6 oz)   12/18/18 93.8 kg (206 lb 12.8 oz)      Estimated body mass index is 41.06 kg/m  as calculated from the following:    Height as of 9/25/18: 1.51 m (4' 11.45\").    Weight as of this encounter: 93.6 kg (206 lb 6.4 oz).     Physical Exam   Constitutional: She is oriented to person, place, and time. She appears well-developed and well-nourished.   Cardiovascular: Normal rate.   Pulmonary/Chest: Effort normal.   Musculoskeletal: Normal range of motion.   Neurological: She is alert and oriented to person, place, and time.   Skin: Skin is warm and dry.   Toenails overall mildly thickened with fungus however no erythema or evidence of active inflammation or infection  Toenails smoothed with dremel  Tolerated well    Has multiple brown seborrheic keratosis lesions on her posterior upper and lower " back, right anterior waistline, 2 on her left breast, one lesion on each shoulder at bra line  There is no redness, edema or erythema, there are nontender   Psychiatric: She has a normal mood and affect. Her behavior is normal. Judgment and thought content normal.   Nursing note and vitals reviewed.      INVESTIGATIONS:  Results for orders placed or performed during the hospital encounter of 02/14/19   MA Screen Bilateral w/Rosana    Narrative    EXAM: MA SCREENING BILATERAL W/ ROSANA, 2/14/2019 2:07 PM    COMPARISONS: 4/5/2017    HISTORY:68 years  Female  YES  Encounter for screening mammogram for  breast cancer    FINDINGS: No suspicious mass or microcalcification is seen in either  breast.    BREAST DENSITY: Heterogeneously dense.      Impression    IMPRESSION: BI-RADS CATEGORY: 1 -  Negative.    RECOMMENDED FOLLOW-UP: Annual Mammography.      LAURENCE CEJA MD       ASSESSMENT AND PLAN:  Problem List Items Addressed This Visit        Musculoskeletal and Integumentary    Onychogryphosis      Other Visit Diagnoses     PVD (peripheral vascular disease) (H)    -  Primary    Relevant Medications    aspirin (ASA) 81 MG tablet    Seborrheic keratoses            Was able to peel a couple of her SK lesions on her posterior back as they were quite soft from moisturization  Lesions on her bilateral shoulders at bra line, left breast, right anterior waistline--- she would like to have treated with cryotherapy and/or excised as these have become irritative with clothing  She will schedule an appointment extended 45 minutes in the clinic to remove these lesions that are irritative  In 1 month    [facility] will continue to help her with foot care soaking and moisturization of skin      -- Expected clinical course discussed    -- Medications and their side effects discussed    Patient Instructions   Continue your moisturization    If you would like some of the SK lesions removed from irritated areas--I see about 6  I would need at  least 45 minutes to remove and treat some with cryo or liquid nitrogen    Your mamogram was read normal results    Think about a screening chest CT for your tobacco history    Cookie Schultz CNP  [facility]     Portions of this note were dictated using speech recognition software. The note has been proofread but errors in the text may have been overlooked. Please contact me if there are any concerns regarding the accuracy of the dictation.

## 2019-03-05 ENCOUNTER — OFFICE VISIT (OUTPATIENT)
Dept: FAMILY MEDICINE | Facility: OTHER | Age: 69
End: 2019-03-05
Attending: NURSE PRACTITIONER
Payer: COMMERCIAL

## 2019-03-05 VITALS
SYSTOLIC BLOOD PRESSURE: 118 MMHG | HEART RATE: 62 BPM | TEMPERATURE: 97.6 F | HEIGHT: 60 IN | WEIGHT: 207.6 LBS | BODY MASS INDEX: 40.76 KG/M2 | RESPIRATION RATE: 14 BRPM | DIASTOLIC BLOOD PRESSURE: 72 MMHG

## 2019-03-05 DIAGNOSIS — L98.9 CHANGING SKIN LESION: ICD-10-CM

## 2019-03-05 DIAGNOSIS — L82.1 SK (SEBORRHEIC KERATOSIS): Primary | ICD-10-CM

## 2019-03-05 PROCEDURE — G0463 HOSPITAL OUTPT CLINIC VISIT: HCPCS | Mod: 25

## 2019-03-05 PROCEDURE — 88305 TISSUE EXAM BY PATHOLOGIST: CPT

## 2019-03-05 PROCEDURE — 11200 RMVL SKIN TAGS UP TO&INC 15: CPT | Performed by: NURSE PRACTITIONER

## 2019-03-05 PROCEDURE — 99214 OFFICE O/P EST MOD 30 MIN: CPT | Mod: 25 | Performed by: NURSE PRACTITIONER

## 2019-03-05 PROCEDURE — G0463 HOSPITAL OUTPT CLINIC VISIT: HCPCS

## 2019-03-05 RX ORDER — CLINDAMYCIN PHOSPHATE 10 MG/G
GEL TOPICAL 2 TIMES DAILY
Qty: 30 G | Refills: 1 | Status: SHIPPED | OUTPATIENT
Start: 2019-03-05 | End: 2019-06-04

## 2019-03-05 RX ORDER — LIDOCAINE/PRILOCAINE 2.5 %-2.5%
CREAM (GRAM) TOPICAL PRN
Qty: 30 G | Refills: 0 | Status: SHIPPED | OUTPATIENT
Start: 2019-03-05 | End: 2020-06-01

## 2019-03-05 ASSESSMENT — ENCOUNTER SYMPTOMS: WOUND: 0

## 2019-03-05 ASSESSMENT — PAIN SCALES - GENERAL: PAINLEVEL: NO PAIN (0)

## 2019-03-05 ASSESSMENT — MIFFLIN-ST. JEOR: SCORE: 1398.17

## 2019-03-05 NOTE — PATIENT INSTRUCTIONS
Keep dressed for 24 hours    Can shower tomorrow soap and water to clean--cover till healed    Monitor infection    Ok to use bandaid dressing till scabs or heals    Avoid immersion or bath tub    Can use clindagel to affected areas needed to prevent infection

## 2019-03-05 NOTE — NURSING NOTE
Patient presents to clinic today for skin lesion removal procedure. Patient denies pain or irritation today.     No LMP recorded (lmp unknown). Patient is postmenopausal.  Medication Reconciliation: complete    Darrion Almanzar LPN  3/5/2019 2:42 PM

## 2019-03-06 NOTE — PROGRESS NOTES
Nursing Notes:   Darrion Almanzar LPN  3/5/2019  3:06 PM  Signed  Patient presents to clinic today for skin lesion removal procedure. Patient denies pain or irritation today.     No LMP recorded (lmp unknown). Patient is postmenopausal.  Medication Reconciliation: complete    Darrion Almanzar LPN  3/5/2019 2:42 PM    Nursing note reviewed with patient.  Accurracy and completeness verified.   Ms. Nichols is a 68 year old female who:  Patient presents with:  Procedure      ICD-10-CM    1. Changing skin lesion L98.9 Surgical pathology exam   2. SK (seborrheic keratosis) L82.1 clindamycin (CLINDAMAX) 1 % external gel     lidocaine-prilocaine (EMLA) 2.5-2.5 % external cream     HPI   Presents with her PCA for removal of several SK lesions that have become irritated with clothing changes  And a lesion that is changing on her left breast.  No other concerns at this time  Has had SK lesions treated with cryo in the past      Review of Systems   Skin: Positive for rash. Negative for wound.   All other systems reviewed and are negative.     All other systems reviewed and negative.     PRIMO:   PRIMO-7 SCORE 10/18/2018 11/15/2018 1/15/2019   Total Score 0 0 0     PHQ9:  PHQ-9 SCORE 10/18/2018 11/15/2018 1/15/2019   PHQ-9 Total Score 0 0 0       I have personally reviewed the past medical history, past surgical history, medications, allergies, family and social history as listed below, on 3/5/2019.    Allergies   Allergen Reactions     Diatrizoate      Other reaction(s): Angioedema  As noted 2009 note Dr Stone       Current Outpatient Medications   Medication Sig Dispense Refill     ammonium lactate (AMLACTIN) 12 % cream Apply topically 2 times daily 140 g 3     aspirin (ASA) 81 MG tablet Take 1 tablet (81 mg) by mouth daily 90 tablet 3     carBAMazepine (TEGRETOL) 200 MG tablet Take 0.5 tablets (100 mg) by mouth 2 times daily 120 tablet 1     clindamycin (CLINDAMAX) 1 % external gel Apply topically 2 times daily As needed until healed 30  g 1     famotidine (PEPCID) 40 MG tablet Take 1 tablet (40 mg) by mouth daily 45 tablet 1     Incontinence Supply Disposable (SM INCONTINENT LINER DISP) MISC Patient changes liner 2-3 time daily       lidocaine-prilocaine (EMLA) 2.5-2.5 % external cream Apply topically as needed for moderate pain 30 g 0     rosuvastatin (CRESTOR) 20 MG tablet Take 1 tablet (20 mg) by mouth daily 90 tablet 3     Skin Protectants, Misc. 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     Priority: Medium     Decreased activities of daily living (ADL) 04/06/2018     Priority: Medium     Abnormal ankle brachial index (CURT) 02/06/2018     Priority: Medium     CMC arthritis 01/08/2018     Priority: Medium     Positive colorectal cancer screening using DNA-based stool test 01/07/2018     Priority: Medium     Bilateral lower extremity edema 01/05/2018     Priority: Medium     History of tobacco use 01/05/2018     Priority: Medium     Asymptomatic varicose veins of bilateral lower extremities 08/07/2017     Priority: Medium     History of small bowel obstruction 07/09/2017     Priority: Medium     Overview:   8/31/95--Dr Barrow--had incidental Appendectomy--Multiple adhesions released       Abnormal findings in stool 07/05/2017     Priority: Medium     Onychogryphosis 04/24/2017     Priority: Medium     Requires assistance with activities of daily living (ADL) 04/24/2017     Priority: Medium     Trigeminal neuralgia of right side of face 03/06/2017     Priority: Medium     Fibromyalgia 07/09/1997     Priority: Medium     Past Medical History:   Diagnosis Date     Detrusor dysfunction 2/5/2019     Dorsopathy     Recurrent back problems , secondary to injury at [facility]     Fibromyalgia     7/9/1997     Nonpsychotic mental disorder     not otherwise specified     Onychogryphosis     4/24/2017     Personal  history of other diseases of the digestive system (CODE)     2017,95--Dr Barrow--had incidental Appendectomy--Multiple adhesions released     Primary osteoarthritis of hand     2018     Past Surgical History:   Procedure Laterality Date     APPENDECTOMY OPEN      ,Incidental, Enterolysis--Dr Barrow     CHOLECYSTECTOMY      ,Laparoscopic     COLONOSCOPY      ?     HYSTERECTOMY TOTAL ABDOMINAL      1995,[place]     OTHER SURGICAL HISTORY      ,CRYOTHERAPY OF CERVIX,Abnormal Pap     Social History     Socioeconomic History     Marital status:      Spouse name: None     Number of children: None     Years of education: None     Highest education level: None   Occupational History     None   Social Needs     Financial resource strain: None     Food insecurity:     Worry: None     Inability: None     Transportation needs:     Medical: None     Non-medical: None   Tobacco Use     Smoking status: Former Smoker     Packs/day: 1.00     Years: 30.00     Pack years: 30.00     Types: Cigarettes     Last attempt to quit: 2001     Years since quittin.1     Smokeless tobacco: Never Used   Substance and Sexual Activity     Alcohol use: No     Drug use: No     Sexual activity: No     Birth control/protection: Surgical   Lifestyle     Physical activity:     Days per week: None     Minutes per session: None     Stress: None   Relationships     Social connections:     Talks on phone: None     Gets together: None     Attends Jain service: None     Active member of club or organization: None     Attends meetings of clubs or organizations: None     Relationship status: None     Intimate partner violence:     Fear of current or ex partner: None     Emotionally abused: None     Physically abused: None     Forced sexual activity: None   Other Topics Concern     Parent/sibling w/ CABG, MI or angioplasty before 65F 55M? Not Asked   Social History Narrative    Smoked one pack per day until  two years ago.  Now a nonsmoker.     Family History   Problem Relation Age of Onset     Other - See Comments Mother         lung cancer     Heart Disease Father 70        Heart Disease,MI     Cancer Father         Cancer,Liver cancer     Cancer Other         Cancer,Lung cancer     Diabetes Other         Diabetes     Colon Cancer No family hx of         Cancer-colon     Prostate Cancer No family hx of         Cancer-prostate     Ovarian Cancer No family hx of         Cancer-ovarian     Blood Disease No family hx of         Blood Disease     Hypertension No family hx of         Hypertension     Other - See Comments No family hx of         Stroke     Thyroid Disease No family hx of         Thyroid Disease     Breast Cancer No family hx of         Cancer-breast       EXAM:   Vitals:    03/05/19 1442   BP: 118/72   BP Location: Right arm   Patient Position: Sitting   Cuff Size: Adult Regular   Pulse: 62   Resp: 14   Temp: 97.6  F (36.4  C)   TempSrc: Tympanic   Weight: 94.2 kg (207 lb 9.6 oz)   Height: 1.524 m (5')       Current Pain Score: No Pain (0)     BP Readings from Last 3 Encounters:   03/05/19 118/72   02/14/19 120/62   01/15/19 122/62      Wt Readings from Last 3 Encounters:   03/05/19 94.2 kg (207 lb 9.6 oz)   02/14/19 93.6 kg (206 lb 6.4 oz)   01/15/19 93.7 kg (206 lb 9.6 oz)      Estimated body mass index is 40.54 kg/m  as calculated from the following:    Height as of this encounter: 1.524 m (5').    Weight as of this encounter: 94.2 kg (207 lb 9.6 oz).     Physical Exam   Constitutional: She is oriented to person, place, and time. She appears well-developed and well-nourished.   Cardiovascular: Normal rate.   Pulmonary/Chest: Effort normal.   Musculoskeletal: Normal range of motion.   Neurological: She is alert and oriented to person, place, and time.   Skin: Skin is warm and dry.   After reviewed possible adverse effects and risks patient wishes to proceed with removal of SK lesions and changing lesion on  left breast    6 total lesions were removed after areas were pre-anesthetized with lidocaine and epinephrine cleansed with Hibiclens removed via derma blade x3 lesions and scissor excision 3 lesions  Red annular lesion on left breast with papular lesion in center was excised via scissor excision and sent to pathology for identification    Good hemostasis to all areas, cleansed with Hibiclens, dressed with Medipore dressing  Tolerated well   Psychiatric: She has a normal mood and affect. Her behavior is normal. Judgment and thought content normal.   Nursing note and vitals reviewed.       INVESTIGATIONS:  Results for orders placed or performed during the hospital encounter of 02/14/19   MA Screen Bilateral w/Rosana    Narrative    EXAM: MA SCREENING BILATERAL W/ ROSANA, 2/14/2019 2:07 PM    COMPARISONS: 4/5/2017    HISTORY:68 years  Female  YES  Encounter for screening mammogram for  breast cancer    FINDINGS: No suspicious mass or microcalcification is seen in either  breast.    BREAST DENSITY: Heterogeneously dense.      Impression    IMPRESSION: BI-RADS CATEGORY: 1 -  Negative.    RECOMMENDED FOLLOW-UP: Annual Mammography.      LAURENCE CEJA MD       ASSESSMENT AND PLAN:  Problem List Items Addressed This Visit     None      Visit Diagnoses     Changing skin lesion    -  Primary    Relevant Medications    clindamycin (CLINDAMAX) 1 % external gel    lidocaine-prilocaine (EMLA) 2.5-2.5 % external cream    Other Relevant Orders    Surgical pathology exam    SK (seborrheic keratosis)        Relevant Medications    clindamycin (CLINDAMAX) 1 % external gel    lidocaine-prilocaine (EMLA) 2.5-2.5 % external cream        Discussed aftercare--given instructions regarding keeping all excision sites covered for 24 hours then okay to shower only cleansed with soap and water and dressed with Band-Aid until completely healed  Monitor for any signs of infection    We will call with pathology result    She will follow-up in 1  month per usual for foot care  She will contact if any concerns for infection    Was given clindamycin gel to spot treat areas to prevent infection  PCA requested getting EMLA cream for any comfort to areas with healing      -- Expected clinical course discussed    -- Medications and their side effects discussed    Patient Instructions   Keep dressed for 24 hours    Can shower tomorrow soap and water to clean--cover till healed    Monitor infection    Ok to use bandaid dressing till scabs or heals    Avoid immersion or bath tub    Can use clindagel to affected areas needed to prevent infection    Cookie Schultz [facility]     Portions of this note were dictated using speech recognition software. The note has been proofread but errors in the text may have been overlooked. Please contact me if there are any concerns regarding the accuracy of the dictation.

## 2019-04-09 ENCOUNTER — OFFICE VISIT (OUTPATIENT)
Dept: FAMILY MEDICINE | Facility: OTHER | Age: 69
End: 2019-04-09
Attending: NURSE PRACTITIONER
Payer: COMMERCIAL

## 2019-04-09 VITALS
DIASTOLIC BLOOD PRESSURE: 78 MMHG | TEMPERATURE: 97.8 F | HEART RATE: 82 BPM | WEIGHT: 207.8 LBS | BODY MASS INDEX: 40.8 KG/M2 | RESPIRATION RATE: 16 BRPM | HEIGHT: 60 IN | SYSTOLIC BLOOD PRESSURE: 114 MMHG

## 2019-04-09 DIAGNOSIS — L60.2 ONYCHOGRYPHOSIS: Primary | ICD-10-CM

## 2019-04-09 DIAGNOSIS — Z78.9 DECREASED ACTIVITIES OF DAILY LIVING (ADL): ICD-10-CM

## 2019-04-09 DIAGNOSIS — L30.9 DERMATITIS: ICD-10-CM

## 2019-04-09 PROCEDURE — 99213 OFFICE O/P EST LOW 20 MIN: CPT | Mod: 25 | Performed by: NURSE PRACTITIONER

## 2019-04-09 PROCEDURE — G0463 HOSPITAL OUTPT CLINIC VISIT: HCPCS

## 2019-04-09 PROCEDURE — 11719 TRIM NAIL(S) ANY NUMBER: CPT | Performed by: NURSE PRACTITIONER

## 2019-04-09 PROCEDURE — G0463 HOSPITAL OUTPT CLINIC VISIT: HCPCS | Mod: 25

## 2019-04-09 ASSESSMENT — PAIN SCALES - GENERAL: PAINLEVEL: NO PAIN (0)

## 2019-04-09 ASSESSMENT — MIFFLIN-ST. JEOR: SCORE: 1399.07

## 2019-04-09 NOTE — NURSING NOTE
Patient presents to clinic today for monthly foot care. Patient stated on lesion area removal did take a long time to heal, would like it looked at today. She in curious about an Echinacea Plus Tea; would it interfere with any of the medications she is currently taking.     No LMP recorded (lmp unknown). Patient is postmenopausal.  Medication Reconciliation: complete    Darrion Almanzar LPN  4/9/2019 2:49 PM

## 2019-04-09 NOTE — PATIENT INSTRUCTIONS
Use triple antibiotic ointment to healing areas--if not gone by next month would treat with cryo    Do not need to cover

## 2019-04-10 DIAGNOSIS — K21.9 GASTROESOPHAGEAL REFLUX DISEASE, ESOPHAGITIS PRESENCE NOT SPECIFIED: ICD-10-CM

## 2019-04-12 RX ORDER — FAMOTIDINE 40 MG/1
TABLET, FILM COATED ORAL
Qty: 90 TABLET | Refills: 3 | Status: SHIPPED | OUTPATIENT
Start: 2019-04-12 | End: 2020-04-01

## 2019-04-12 NOTE — TELEPHONE ENCOUNTER
famotidine (PEPCID) 40 MG tablet     Sig: TAKE 1 TABLET BY MOUTH ONCE DAILY           Last Written Prescription Date:  12/18/18  Last Fill Quantity: 45,   # refills: 1  Last Office Visit: 4/69/19  Future Office visit:    Next 5 appointments (look out 90 days)    May 09, 2019  2:30 PM CDT  Office Visit with YANN Melissa CNP  [facility] ([facility]) [address]  [place] [id number] [phone]           Routing refill request to provider for review/approval because:  Medication was given as a trial on 12/18/18 and note stated if did not take care of GERD further work up was indicated. Last filled by patient 2/6/19. To Cookie Schultz for consideration of refill. Naomie Browne RN on 4/12/2019 at 9:26 AM

## 2019-04-15 NOTE — PROGRESS NOTES
Nursing Notes:   Darrion Almanzar LPN  4/9/2019  3:06 PM  Signed  Patient presents to clinic today for monthly foot care. Patient stated on lesion area removal did take a long time to heal, would like it looked at today. She in curious about an Echinacea Plus Tea; would it interfere with any of the medications she is currently taking.     No LMP recorded (lmp unknown). Patient is postmenopausal.  Medication Reconciliation: complete    Darrion Almanzar LPN  4/9/2019 2:49 PM    Nursing note reviewed with patient.  Accurracy and completeness verified.   Ms. Nichols is a 68 year old female who:  Patient presents with:  Follow Up: foot care      ICD-10-CM    1. Onychogryphosis L60.2    2. Dermatitis L30.9    3. Decreased activities of daily living (ADL) R68.89      HPI    Since for monthly foot care--is unable to perform independently when I had seen her initially a couple of years ago  Her toenails were 4-6 cm and growing under her feet  We have kept up with monthly foot care--she does have a PCA however she does not do personal care other than skin care moisturization    The other issue is is that we removed/excised multiple SK lesions some of them very large  Areas are almost healed however there are 2 areas one on her right trunk underneath axilla and upper posterior left shoulder that are itchy  Been using an over-the-counter antibiotic ointment has tried to avoid wearing a bra to prevent friction as there were a couple of areas underneath her breasts and one on her breast those have healed    Review of Systems   Skin: Positive for rash.   All other systems reviewed and are negative.     All other systems reviewed and negative.     PRIMO:   PRIMO-7 SCORE 10/18/2018 11/15/2018 1/15/2019   Total Score 0 0 0     PHQ9:  PHQ-9 SCORE 10/18/2018 11/15/2018 1/15/2019   PHQ-9 Total Score 0 0 0       I have personally reviewed the past medical history, past surgical history, medications, allergies, family and social history as listed  below, on 4/15/2019.    Allergies   Allergen Reactions     Diatrizoate      Other reaction(s): Angioedema  As noted 2009 note Dr Stone       Current Outpatient Medications   Medication Sig Dispense Refill     ammonium lactate (AMLACTIN) 12 % cream Apply topically 2 times daily 140 g 3     aspirin (ASA) 81 MG tablet Take 1 tablet (81 mg) by mouth daily 90 tablet 3     carBAMazepine (TEGRETOL) 200 MG tablet Take 0.5 tablets (100 mg) by mouth 2 times daily 120 tablet 1     clindamycin (CLINDAMAX) 1 % external gel Apply topically 2 times daily As needed until healed 30 g 1     Incontinence Supply Disposable (SM INCONTINENT LINER DISP) MISC Patient changes liner 2-3 time daily       lidocaine-prilocaine (EMLA) 2.5-2.5 % external cream Apply topically as needed for moderate pain 30 g 0     rosuvastatin (CRESTOR) 20 MG tablet Take 1 tablet (20 mg) by mouth daily 90 tablet 3     Skin Protectants, Misc. (EUCERIN) cream Eucerin or insurance covered moisturizer cream to body 2 x daily and as needed       famotidine (PEPCID) 40 MG tablet TAKE 1 TABLET BY MOUTH ONCE DAILY 90 tablet 3        Patient Active Problem List    Diagnosis Date Noted     Detrusor dysfunction 02/05/2019     Priority: Medium     Decreased activities of daily living (ADL) 04/06/2018     Priority: Medium     Abnormal ankle brachial index (CURT) 02/06/2018     Priority: Medium     CMC arthritis 01/08/2018     Priority: Medium     Positive colorectal cancer screening using DNA-based stool test 01/07/2018     Priority: Medium     Bilateral lower extremity edema 01/05/2018     Priority: Medium     History of tobacco use 01/05/2018     Priority: Medium     Asymptomatic varicose veins of bilateral lower extremities 08/07/2017     Priority: Medium     History of small bowel obstruction 07/09/2017     Priority: Medium     Overview:   8/31/95--Dr Barrow--had incidental Appendectomy--Multiple adhesions released       Abnormal findings in stool 07/05/2017     Priority:  Medium     Onychogryphosis 2017     Priority: Medium     Requires assistance with activities of daily living (ADL) 2017     Priority: Medium     Trigeminal neuralgia of right side of face 2017     Priority: Medium     Fibromyalgia 1997     Priority: Medium     Past Medical History:   Diagnosis Date     Detrusor dysfunction 2019     Dorsopathy     Recurrent back problems , secondary to injury at [facility]     Fibromyalgia     1997     Nonpsychotic mental disorder     not otherwise specified     Onychogryphosis     2017     Personal history of other diseases of the digestive system (CODE)     2017,95--Dr Barrow--had incidental Appendectomy--Multiple adhesions released     Primary osteoarthritis of hand     2018     Past Surgical History:   Procedure Laterality Date     APPENDECTOMY OPEN      ,Incidental, Enterolysis--Dr Barrow     CHOLECYSTECTOMY      ,Laparoscopic     COLONOSCOPY      ?     HYSTERECTOMY TOTAL ABDOMINAL      1995,[place]     OTHER SURGICAL HISTORY      ,CRYOTHERAPY OF CERVIX,Abnormal Pap     Social History     Socioeconomic History     Marital status:      Spouse name: None     Number of children: None     Years of education: None     Highest education level: None   Occupational History     None   Social Needs     Financial resource strain: None     Food insecurity:     Worry: None     Inability: None     Transportation needs:     Medical: None     Non-medical: None   Tobacco Use     Smoking status: Former Smoker     Packs/day: 1.00     Years: 30.00     Pack years: 30.00     Types: Cigarettes     Last attempt to quit: 2001     Years since quittin.2     Smokeless tobacco: Never Used   Substance and Sexual Activity     Alcohol use: No     Drug use: No     Sexual activity: Never     Birth control/protection: Surgical   Lifestyle     Physical activity:     Days per week: None     Minutes per session: None     Stress: None    Relationships     Social connections:     Talks on phone: None     Gets together: None     Attends Druze service: None     Active member of club or organization: None     Attends meetings of clubs or organizations: None     Relationship status: None     Intimate partner violence:     Fear of current or ex partner: None     Emotionally abused: None     Physically abused: None     Forced sexual activity: None   Other Topics Concern     Parent/sibling w/ CABG, MI or angioplasty before 65F 55M? Not Asked   Social History Narrative    Smoked one pack per day until two years ago.  Now a nonsmoker.     Family History   Problem Relation Age of Onset     Other - See Comments Mother         lung cancer     Heart Disease Father 70        Heart Disease,MI     Cancer Father         Cancer,Liver cancer     Cancer Other         Cancer,Lung cancer     Diabetes Other         Diabetes     Colon Cancer No family hx of         Cancer-colon     Prostate Cancer No family hx of         Cancer-prostate     Ovarian Cancer No family hx of         Cancer-ovarian     Blood Disease No family hx of         Blood Disease     Hypertension No family hx of         Hypertension     Other - See Comments No family hx of         Stroke     Thyroid Disease No family hx of         Thyroid Disease     Breast Cancer No family hx of         Cancer-breast       EXAM:   Vitals:    04/09/19 1450   BP: 114/78   BP Location: Right arm   Patient Position: Sitting   Cuff Size: Adult Large   Pulse: 82   Resp: 16   Temp: 97.8  F (36.6  C)   TempSrc: Tympanic   Weight: 94.3 kg (207 lb 12.8 oz)   Height: 1.524 m (5')       Current Pain Score: No Pain (0)     BP Readings from Last 3 Encounters:   04/09/19 114/78   03/05/19 118/72   02/14/19 120/62      Wt Readings from Last 3 Encounters:   04/09/19 94.3 kg (207 lb 12.8 oz)   03/05/19 94.2 kg (207 lb 9.6 oz)   02/14/19 93.6 kg (206 lb 6.4 oz)      Estimated body mass index is 40.58 kg/m  as calculated from the  following:    Height as of this encounter: 1.524 m (5').    Weight as of this encounter: 94.3 kg (207 lb 12.8 oz).     Physical Exam   Constitutional: She is oriented to person, place, and time. She appears well-developed and well-nourished.   Cardiovascular: Normal rate.   Pulmonary/Chest: Effort normal.   Musculoskeletal: Normal range of motion.   Neurological: She is alert and oriented to person, place, and time.   Skin: Skin is warm and dry. There is erythema.   3 cm patch that was excised of SK on her right side beneath axilla area is healing, mild erythema, no edema open areas or drainage    Area on her upper posterior left shoulder pink macular where her SK was excised, no evidence of infection, drainage, edema    Toenails trimmed with nail tremor and area smoothed with dremel--regularly dorsum of bilateral great toenails  With thickened fungus  No erythema, no evidence of any infection, tolerated well   Psychiatric: She has a normal mood and affect. Her behavior is normal. Judgment and thought content normal.   Nursing note and vitals reviewed.      INVESTIGATIONS:  Results for orders placed or performed during the hospital encounter of 02/14/19   MA Screen Bilateral w/Rosana    Narrative    EXAM: MA SCREENING BILATERAL W/ ROSANA, 2/14/2019 2:07 PM    COMPARISONS: 4/5/2017    HISTORY:68 years  Female  YES  Encounter for screening mammogram for  breast cancer    FINDINGS: No suspicious mass or microcalcification is seen in either  breast.    BREAST DENSITY: Heterogeneously dense.      Impression    IMPRESSION: BI-RADS CATEGORY: 1 -  Negative.    RECOMMENDED FOLLOW-UP: Annual Mammography.      LAURENCE CEJA MD       ASSESSMENT AND PLAN:  Problem List Items Addressed This Visit        Musculoskeletal and Integumentary    Onychogryphosis - Primary       Other    Decreased activities of daily living (ADL)      Other Visit Diagnoses     Dermatitis          We reviewed normal mammography screening during office  visit    She is not interested and declines colonoscopy screening or Pap    Areas are almost healed--- area upper posterior shoulder probably friction from clothing    Given clindamycin gel and cover to prevent friction from clothing, keep open at night  Continue to monitor--- no evidence of any infection at this time    She will follow-up in a month for foot/toenail care          -- Expected clinical course discussed    -- Medications and their side effects discussed    Patient Instructions   Use triple antibiotic ointment to healing areas--if not gone by next month would treat with cryo    Do not need to cover    Cookie Schultz CNP  [facility]     Portions of this note were dictated using speech recognition software. The note has been proofread but errors in the text may have been overlooked. Please contact me if there are any concerns regarding the accuracy of the dictation.

## 2019-04-23 ENCOUNTER — HOSPITAL ENCOUNTER (EMERGENCY)
Facility: OTHER | Age: 69
Discharge: HOME | End: 2019-04-23
Attending: PHYSICIAN ASSISTANT | Admitting: PHYSICIAN ASSISTANT
Payer: COMMERCIAL

## 2019-04-23 ENCOUNTER — APPOINTMENT (OUTPATIENT)
Dept: GENERAL RADIOLOGY | Facility: OTHER | Age: 69
End: 2019-04-23
Attending: PHYSICIAN ASSISTANT
Payer: COMMERCIAL

## 2019-04-23 ENCOUNTER — OFFICE VISIT (OUTPATIENT)
Dept: FAMILY MEDICINE | Facility: OTHER | Age: 69
End: 2019-04-23
Attending: NURSE PRACTITIONER
Payer: COMMERCIAL

## 2019-04-23 VITALS
WEIGHT: 213.1 LBS | SYSTOLIC BLOOD PRESSURE: 139 MMHG | OXYGEN SATURATION: 93 % | TEMPERATURE: 97.7 F | RESPIRATION RATE: 20 BRPM | BODY MASS INDEX: 41.62 KG/M2 | DIASTOLIC BLOOD PRESSURE: 79 MMHG

## 2019-04-23 DIAGNOSIS — Z53.9 ERRONEOUS ENCOUNTER--DISREGARD: Primary | ICD-10-CM

## 2019-04-23 DIAGNOSIS — J18.9 PNEUMONIA: ICD-10-CM

## 2019-04-23 LAB
ALBUMIN SERPL-MCNC: 4.4 G/DL (ref 3.5–5.7)
ALBUMIN UR-MCNC: NEGATIVE MG/DL
ALP SERPL-CCNC: 98 U/L (ref 34–104)
ALT SERPL W P-5'-P-CCNC: 16 U/L (ref 7–52)
ANION GAP SERPL CALCULATED.3IONS-SCNC: 12 MMOL/L (ref 3–14)
APPEARANCE UR: CLEAR
AST SERPL W P-5'-P-CCNC: 14 U/L (ref 13–39)
BACTERIA #/AREA URNS HPF: ABNORMAL /HPF
BASOPHILS # BLD AUTO: 0.1 10E9/L (ref 0–0.2)
BASOPHILS NFR BLD AUTO: 0.5 %
BILIRUB SERPL-MCNC: 0.3 MG/DL (ref 0.3–1)
BILIRUB UR QL STRIP: NEGATIVE
BUN SERPL-MCNC: 8 MG/DL (ref 7–25)
CALCIUM SERPL-MCNC: 9.3 MG/DL (ref 8.6–10.3)
CHLORIDE SERPL-SCNC: 94 MMOL/L (ref 98–107)
CO2 SERPL-SCNC: 28 MMOL/L (ref 21–31)
COLOR UR AUTO: YELLOW
CREAT SERPL-MCNC: 0.47 MG/DL (ref 0.6–1.2)
DIFFERENTIAL METHOD BLD: ABNORMAL
EOSINOPHIL # BLD AUTO: 0.2 10E9/L (ref 0–0.7)
EOSINOPHIL NFR BLD AUTO: 2.1 %
ERYTHROCYTE [DISTWIDTH] IN BLOOD BY AUTOMATED COUNT: 12.4 % (ref 10–15)
GFR SERPL CREATININE-BSD FRML MDRD: >90 ML/MIN/{1.73_M2}
GLUCOSE SERPL-MCNC: 111 MG/DL (ref 70–105)
GLUCOSE UR STRIP-MCNC: NEGATIVE MG/DL
HCT VFR BLD AUTO: 45.2 % (ref 35–47)
HGB BLD-MCNC: 14.4 G/DL (ref 11.7–15.7)
HGB UR QL STRIP: ABNORMAL
IMM GRANULOCYTES # BLD: 0 10E9/L (ref 0–0.4)
IMM GRANULOCYTES NFR BLD: 0.3 %
KETONES UR STRIP-MCNC: NEGATIVE MG/DL
LACTATE SERPL-SCNC: 2.3 MMOL/L (ref 0.5–2.2)
LEUKOCYTE ESTERASE UR QL STRIP: NEGATIVE
LIPASE SERPL-CCNC: 7 U/L (ref 11–82)
LYMPHOCYTES # BLD AUTO: 1.7 10E9/L (ref 0.8–5.3)
LYMPHOCYTES NFR BLD AUTO: 17.3 %
MCH RBC QN AUTO: 26.5 PG (ref 26.5–33)
MCHC RBC AUTO-ENTMCNC: 31.9 G/DL (ref 31.5–36.5)
MCV RBC AUTO: 83 FL (ref 78–100)
MONOCYTES # BLD AUTO: 0.6 10E9/L (ref 0–1.3)
MONOCYTES NFR BLD AUTO: 6.3 %
NEUTROPHILS # BLD AUTO: 7.1 10E9/L (ref 1.6–8.3)
NEUTROPHILS NFR BLD AUTO: 73.5 %
NITRATE UR QL: NEGATIVE
NON-SQ EPI CELLS #/AREA URNS LPF: ABNORMAL /LPF
PH UR STRIP: 7 PH (ref 5–9)
PLATELET # BLD AUTO: 268 10E9/L (ref 150–450)
POTASSIUM SERPL-SCNC: 4 MMOL/L (ref 3.5–5.1)
PROT SERPL-MCNC: 7.7 G/DL (ref 6.4–8.9)
RBC # BLD AUTO: 5.44 10E12/L (ref 3.8–5.2)
RBC #/AREA URNS AUTO: ABNORMAL /HPF
SODIUM SERPL-SCNC: 134 MMOL/L (ref 134–144)
SOURCE: ABNORMAL
SP GR UR STRIP: <1.005 (ref 1–1.03)
UROBILINOGEN UR STRIP-ACNC: 0.2 EU/DL (ref 0.2–1)
WBC # BLD AUTO: 9.7 10E9/L (ref 4–11)
WBC #/AREA URNS AUTO: ABNORMAL /HPF

## 2019-04-23 PROCEDURE — 99284 EMERGENCY DEPT VISIT MOD MDM: CPT | Mod: 25 | Performed by: PHYSICIAN ASSISTANT

## 2019-04-23 PROCEDURE — 83690 ASSAY OF LIPASE: CPT | Performed by: PHYSICIAN ASSISTANT

## 2019-04-23 PROCEDURE — 85025 COMPLETE CBC W/AUTO DIFF WBC: CPT | Performed by: PHYSICIAN ASSISTANT

## 2019-04-23 PROCEDURE — 80053 COMPREHEN METABOLIC PANEL: CPT | Performed by: PHYSICIAN ASSISTANT

## 2019-04-23 PROCEDURE — 81001 URINALYSIS AUTO W/SCOPE: CPT | Performed by: PHYSICIAN ASSISTANT

## 2019-04-23 PROCEDURE — 83605 ASSAY OF LACTIC ACID: CPT | Performed by: PHYSICIAN ASSISTANT

## 2019-04-23 PROCEDURE — 25000132 ZZH RX MED GY IP 250 OP 250 PS 637: Performed by: PHYSICIAN ASSISTANT

## 2019-04-23 PROCEDURE — 99283 EMERGENCY DEPT VISIT LOW MDM: CPT | Mod: Z6 | Performed by: PHYSICIAN ASSISTANT

## 2019-04-23 PROCEDURE — 36415 COLL VENOUS BLD VENIPUNCTURE: CPT | Performed by: PHYSICIAN ASSISTANT

## 2019-04-23 PROCEDURE — 71045 X-RAY EXAM CHEST 1 VIEW: CPT

## 2019-04-23 RX ORDER — IBUPROFEN 200 MG
200 TABLET ORAL EVERY 4 HOURS PRN
COMMUNITY

## 2019-04-23 RX ORDER — AZITHROMYCIN 250 MG/1
TABLET, FILM COATED ORAL
Qty: 4 TABLET | Refills: 0 | Status: SHIPPED | OUTPATIENT
Start: 2019-04-24 | End: 2019-05-14

## 2019-04-23 RX ORDER — AZITHROMYCIN 250 MG/1
500 TABLET, FILM COATED ORAL ONCE
Status: COMPLETED | OUTPATIENT
Start: 2019-04-23 | End: 2019-04-23

## 2019-04-23 RX ADMIN — AZITHROMYCIN 500 MG: 250 TABLET, FILM COATED ORAL at 21:13

## 2019-04-23 ASSESSMENT — ENCOUNTER SYMPTOMS
SHORTNESS OF BREATH: 0
NAUSEA: 0
VOMITING: 0
COUGH: 1
ABDOMINAL PAIN: 1
FEVER: 0

## 2019-04-23 NOTE — ED TRIAGE NOTES
Patient to ER reporting spontaneous right sided abd pain that began when she coughed. Patient reports it is exacerbated with movement

## 2019-04-23 NOTE — ED AVS SNAPSHOT
[facility]  [address]  [place] [address]  Phone:  [phone]  Fax:  [phone]                                    Pennie Nichols   MRN: [id number]    Department:  [facility]   Date of Visit:  4/23/2019           After Visit Summary Signature Page    I have received my discharge instructions, and my questions have been answered. I have discussed any challenges I see with this plan with the nurse or doctor.    ..........................................................................................................................................  Patient/Patient Representative Signature      ..........................................................................................................................................  Patient Representative Print Name and Relationship to Patient    ..................................................               ................................................  Date                                   Time    ..........................................................................................................................................  Reviewed by Signature/Title    ...................................................              ..............................................  Date                                               Time          22EPIC Rev 08/18

## 2019-04-24 ENCOUNTER — OFFICE VISIT (OUTPATIENT)
Dept: FAMILY MEDICINE | Facility: OTHER | Age: 69
End: 2019-04-24
Attending: NURSE PRACTITIONER
Payer: COMMERCIAL

## 2019-04-24 VITALS
TEMPERATURE: 98 F | HEIGHT: 60 IN | SYSTOLIC BLOOD PRESSURE: 138 MMHG | WEIGHT: 210.2 LBS | DIASTOLIC BLOOD PRESSURE: 82 MMHG | BODY MASS INDEX: 41.27 KG/M2 | RESPIRATION RATE: 14 BRPM | HEART RATE: 76 BPM

## 2019-04-24 DIAGNOSIS — R05.9 COUGH: Primary | ICD-10-CM

## 2019-04-24 DIAGNOSIS — J18.9 PNEUMONIA, UNSPECIFIED ORGANISM: ICD-10-CM

## 2019-04-24 PROCEDURE — G0463 HOSPITAL OUTPT CLINIC VISIT: HCPCS

## 2019-04-24 PROCEDURE — 99214 OFFICE O/P EST MOD 30 MIN: CPT | Performed by: NURSE PRACTITIONER

## 2019-04-24 RX ORDER — BENZONATATE 100 MG/1
100 CAPSULE ORAL 3 TIMES DAILY PRN
Qty: 30 CAPSULE | Refills: 1 | Status: SHIPPED | OUTPATIENT
Start: 2019-04-24 | End: 2019-05-14

## 2019-04-24 ASSESSMENT — PAIN SCALES - GENERAL: PAINLEVEL: MODERATE PAIN (5)

## 2019-04-24 ASSESSMENT — MIFFLIN-ST. JEOR: SCORE: 1409.96

## 2019-04-24 ASSESSMENT — ENCOUNTER SYMPTOMS: COUGH: 1

## 2019-04-24 NOTE — PROGRESS NOTES
Nursing Notes:   Darrion Almanzar LPN  4/24/2019  4:59 PM  Signed  Patient presents to clinic today with a cough that started on 4/20/2019. She stated the cough is followed with pain in her right side. She has pain rated at 8 with each episode. Patient is stating she started the Zpak from the ER doctor last night at 9:30 pm.    No LMP recorded (lmp unknown). Patient is postmenopausal.  Medication Reconciliation: complete    Darrion Almanzar LPN  4/24/2019 3:55 PM    Nursing note reviewed with patient.  Accurracy and completeness verified.   Ms. Nichols is a 68 year old female who:  Patient presents with:  Cough      ICD-10-CM    1. Cough R05 benzonatate (TESSALON) 100 MG capsule   2. Pneumonia, unspecified organism J18.9      HPI     Presents for follow-up after ED visit last night for cough that is become severe had a chest x-ray confirms pneumonia she is not very physically active---  No GI symptoms reports did have some right--- upper abdominal lower rib pain associated would not allow ED provider to obtain additional abdominal work-up  She denies any fever, no nausea or vomiting, no diarrhea or changes in bowel or bladder  Taking fluids well--- past history of tobacco use  She has had a cough intermittently through the winter had subsided--- but returned again a couple of weeks ago--- very sedentary lifestyle  Cough is been bothering her at night when she is trying to sleep  She has not picked up her Z-West yet to get started on her antibiotics      Review of Systems   Respiratory: Positive for cough.    All other systems reviewed and are negative.     All other systems reviewed and negative.     PRIMO:   PRIMO-7 SCORE 10/18/2018 11/15/2018 1/15/2019   Total Score 0 0 0     PHQ9:  PHQ-9 SCORE 10/18/2018 11/15/2018 1/15/2019   PHQ-9 Total Score 0 0 0       I have personally reviewed the past medical history, past surgical history, medications, allergies, family and social history as listed below, on 4/24/2019.    Allergies    Allergen Reactions     Diatrizoate      Other reaction(s): Angioedema  As noted 2009 note Dr Stone       Current Outpatient Medications   Medication Sig Dispense Refill     ammonium lactate (AMLACTIN) 12 % cream Apply topically 2 times daily 140 g 3     aspirin (ASA) 81 MG tablet Take 1 tablet (81 mg) by mouth daily 90 tablet 3     azithromycin (ZITHROMAX Z-KAYLIE) 250 MG tablet Two tablets on the first day, then one tablet daily for the next 4 days 4 tablet 0     benzonatate (TESSALON) 100 MG capsule Take 1 capsule (100 mg) by mouth 3 times daily as needed for cough 30 capsule 1     carBAMazepine (TEGRETOL) 200 MG tablet Take 0.5 tablets (100 mg) by mouth 2 times daily 120 tablet 1     clindamycin (CLINDAMAX) 1 % external gel Apply topically 2 times daily As needed until healed 30 g 1     famotidine (PEPCID) 40 MG tablet TAKE 1 TABLET BY MOUTH ONCE DAILY 90 tablet 3     ibuprofen (ADVIL/MOTRIN) 200 MG tablet Take 200 mg by mouth every 4 hours as needed for mild pain       Incontinence Supply Disposable ( INCONTINENT LINER DISP) MISC Patient changes liner 2-3 time daily       lidocaine-prilocaine (EMLA) 2.5-2.5 % external cream Apply topically as needed for moderate pain 30 g 0     rosuvastatin (CRESTOR) 20 MG tablet Take 1 tablet (20 mg) by mouth daily 90 tablet 3     Skin Protectants, Misc. 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     Priority: Medium     Decreased activities of daily living (ADL) 04/06/2018     Priority: Medium     Abnormal ankle brachial index (CURT) 02/06/2018     Priority: Medium     CMC arthritis 01/08/2018     Priority: Medium     Positive colorectal cancer screening using DNA-based stool test 01/07/2018     Priority: Medium     Bilateral lower extremity edema 01/05/2018     Priority: Medium     History of tobacco use 01/05/2018      Priority: Medium     Asymptomatic varicose veins of bilateral lower extremities 08/07/2017     Priority: Medium     History of small bowel obstruction 07/09/2017     Priority: Medium     Overview:   8/31/95--Dr Barrow--had incidental Appendectomy--Multiple adhesions released       Abnormal findings in stool 07/05/2017     Priority: Medium     Onychogryphosis 04/24/2017     Priority: Medium     Requires assistance with activities of daily living (ADL) 04/24/2017     Priority: Medium     Trigeminal neuralgia of right side of face 03/06/2017     Priority: Medium     Fibromyalgia 07/09/1997     Priority: Medium     Past Medical History:   Diagnosis Date     Detrusor dysfunction 2/5/2019     Dorsopathy     Recurrent back problems , secondary to injury at [facility]     Fibromyalgia     7/9/1997     Nonpsychotic mental disorder     not otherwise specified     Onychogryphosis     4/24/2017     Personal history of other diseases of the digestive system (CODE)     7/9/2017,8/31/95--Dr Barrow--had incidental Appendectomy--Multiple adhesions released     Primary osteoarthritis of hand     1/8/2018     Past Surgical History:   Procedure Laterality Date     APPENDECTOMY OPEN      1996,Incidental, Enterolysis--Dr Barrow     CHOLECYSTECTOMY      1996,Laparoscopic     COLONOSCOPY      1990?     HYSTERECTOMY TOTAL ABDOMINAL      01/1995,[place]     OTHER SURGICAL HISTORY      1994,208526,CRYOTHERAPY OF CERVIX,Abnormal Pap     Social History     Socioeconomic History     Marital status:      Spouse name: None     Number of children: None     Years of education: None     Highest education level: None   Occupational History     None   Social Needs     Financial resource strain: None     Food insecurity:     Worry: None     Inability: None     Transportation needs:     Medical: None     Non-medical: None   Tobacco Use     Smoking status: Former Smoker     Packs/day: 1.00     Years: 30.00     Pack years: 30.00     Types: Cigarettes     Last  attempt to quit: 2001     Years since quittin.2     Smokeless tobacco: Never Used   Substance and Sexual Activity     Alcohol use: No     Drug use: No     Sexual activity: Never     Birth control/protection: Surgical   Lifestyle     Physical activity:     Days per week: None     Minutes per session: None     Stress: None   Relationships     Social connections:     Talks on phone: None     Gets together: None     Attends Caodaism service: None     Active member of club or organization: None     Attends meetings of clubs or organizations: None     Relationship status: None     Intimate partner violence:     Fear of current or ex partner: None     Emotionally abused: None     Physically abused: None     Forced sexual activity: None   Other Topics Concern     Parent/sibling w/ CABG, MI or angioplasty before 65F 55M? Not Asked   Social History Narrative    Smoked one pack per day until two years ago.  Now a nonsmoker.     Family History   Problem Relation Age of Onset     Other - See Comments Mother         lung cancer     Heart Disease Father 70        Heart Disease,MI     Cancer Father         Cancer,Liver cancer     Cancer Other         Cancer,Lung cancer     Diabetes Other         Diabetes     Colon Cancer No family hx of         Cancer-colon     Prostate Cancer No family hx of         Cancer-prostate     Ovarian Cancer No family hx of         Cancer-ovarian     Blood Disease No family hx of         Blood Disease     Hypertension No family hx of         Hypertension     Other - See Comments No family hx of         Stroke     Thyroid Disease No family hx of         Thyroid Disease     Breast Cancer No family hx of         Cancer-breast       EXAM:   Vitals:    19 1557   BP: 138/82   BP Location: Right arm   Patient Position: Sitting   Cuff Size: Adult Regular   Pulse: 76   Resp: 14   Temp: 98  F (36.7  C)   TempSrc: Tympanic   Weight: 95.3 kg (210 lb 3.2 oz)   Height: 1.524 m (5')       Current Pain  Score: Moderate Pain (5)     BP Readings from Last 3 Encounters:   04/24/19 138/82   04/23/19 139/79   04/09/19 114/78      Wt Readings from Last 3 Encounters:   04/24/19 95.3 kg (210 lb 3.2 oz)   04/23/19 96.7 kg (213 lb 1.6 oz)   04/09/19 94.3 kg (207 lb 12.8 oz)      Estimated body mass index is 41.05 kg/m  as calculated from the following:    Height as of this encounter: 1.524 m (5').    Weight as of this encounter: 95.3 kg (210 lb 3.2 oz).     Physical Exam   Constitutional: She is oriented to person, place, and time. She appears well-developed and well-nourished.   Cardiovascular: Normal rate.   Pulmonary/Chest: Effort normal. She has wheezes.   Abdominal: Soft. Bowel sounds are normal. She exhibits no distension and no mass. There is no tenderness. No hernia.   Musculoskeletal: Normal range of motion.   Neurological: She is alert and oriented to person, place, and time.   Skin: Skin is warm and dry.   Psychiatric: She has a normal mood and affect. Her behavior is normal. Judgment and thought content normal.   Nursing note and vitals reviewed.      INVESTIGATIONS:  Results for orders placed or performed during the hospital encounter of 04/23/19   XR Chest Port 1 View    Narrative    Procedure:XR CHEST PORT 1 VW    Clinical history:Female, 68 years, cough, abdominal pain, lungs?  subdiaphragmatic air?    Technique: Single view was obtained.    Comparison: 2/20/2017    Findings: The cardiac silhouette is enlarged and unchanged. The  pulmonary vasculature is normal.    The lungs demonstrate increased density at the right lung base and  possible retrocardiac density. Bony structures are unremarkable.      Impression    Impression:   Right basilar pneumonia/atelectasis and possible retrocardiac  pneumonia. No distinct evidence of free intraperitoneal gas.    SUNITA GUZMAN MD   *UA reflex to Microscopic   Result Value Ref Range    Color Urine Yellow     Appearance Urine Clear     Glucose Urine Negative  NEG^Negative mg/dL    Bilirubin Urine Negative NEG^Negative    Ketones Urine Negative NEG^Negative mg/dL    Specific Gravity Urine <1.005 1.000 - 1.030    Blood Urine Moderate (A) NEG^Negative    pH Urine 7.0 5.0 - 9.0 pH    Protein Albumin Urine Negative NEG^Negative mg/dL    Urobilinogen Urine 0.2 0.2 - 1.0 EU/dL    Nitrite Urine Negative NEG^Negative    Leukocyte Esterase Urine Negative NEG^Negative    Source Midstream Urine    CBC with platelets differential   Result Value Ref Range    WBC 9.7 4.0 - 11.0 10e9/L    RBC Count 5.44 (H) 3.8 - 5.2 10e12/L    Hemoglobin 14.4 11.7 - 15.7 g/dL    Hematocrit 45.2 35.0 - 47.0 %    MCV 83 78 - 100 fl    MCH 26.5 26.5 - 33.0 pg    MCHC 31.9 31.5 - 36.5 g/dL    RDW 12.4 10.0 - 15.0 %    Platelet Count 268 150 - 450 10e9/L    Diff Method Automated Method     % Neutrophils 73.5 %    % Lymphocytes 17.3 %    % Monocytes 6.3 %    % Eosinophils 2.1 %    % Basophils 0.5 %    % Immature Granulocytes 0.3 %    Absolute Neutrophil 7.1 1.6 - 8.3 10e9/L    Absolute Lymphocytes 1.7 0.8 - 5.3 10e9/L    Absolute Monocytes 0.6 0.0 - 1.3 10e9/L    Absolute Eosinophils 0.2 0.0 - 0.7 10e9/L    Absolute Basophils 0.1 0.0 - 0.2 10e9/L    Abs Immature Granulocytes 0.0 0 - 0.4 10e9/L   Comprehensive metabolic panel   Result Value Ref Range    Sodium 134 134 - 144 mmol/L    Potassium 4.0 3.5 - 5.1 mmol/L    Chloride 94 (L) 98 - 107 mmol/L    Carbon Dioxide 28 21 - 31 mmol/L    Anion Gap 12 3 - 14 mmol/L    Glucose 111 (H) 70 - 105 mg/dL    Urea Nitrogen 8 7 - 25 mg/dL    Creatinine 0.47 (L) 0.60 - 1.20 mg/dL    GFR Estimate >90 >60 mL/min/[1.73_m2]    GFR Estimate If Black >90 >60 mL/min/[1.73_m2]    Calcium 9.3 8.6 - 10.3 mg/dL    Bilirubin Total 0.3 0.3 - 1.0 mg/dL    Albumin 4.4 3.5 - 5.7 g/dL    Protein Total 7.7 6.4 - 8.9 g/dL    Alkaline Phosphatase 98 34 - 104 U/L    ALT 16 7 - 52 U/L    AST 14 13 - 39 U/L   Lipase   Result Value Ref Range    Lipase 7 (L) 11 - 82 U/L   Lactic acid   Result  Value Ref Range    Lactic Acid 2.3 (H) 0.5 - 2.2 mmol/L   Urine Microscopic   Result Value Ref Range    WBC Urine 0 - 5 OTO5^0 - 5 /HPF    RBC Urine 2-5 (A) OTO2^O - 2 /HPF    Squamous Epithelial /LPF Urine Few FEW^Few /LPF    Bacteria Urine Few (A) NEG^Negative /HPF       ASSESSMENT AND PLAN:  Problem List Items Addressed This Visit     None      Visit Diagnoses     Cough    -  Primary    Relevant Medications    benzonatate (TESSALON) 100 MG capsule    Pneumonia, unspecified organism        Relevant Medications    benzonatate (TESSALON) 100 MG capsule        Nontoxic-appearing--denies any abdominal pain  I suspect her upper abdominal lower rib pain due to cough and obesity    I have encouraged her and her PCA to remind her to take deep breathing and coughing exercises to expand her lungs  Continue to push fluids    Offered Tessalon Perles for comfort only    Encourage some walking exercise to mobilize secretions    Would like to see her in 4 to 6 weeks for recheck chest x-ray--we discussed if cough continues at that time will obtain chest CT to continue chronic cough work-up        -- Expected clinical course discussed    -- Medications and their side effects discussed    Patient Instructions   Repeat chest xray 4-6 weeks    Start zpak--use tessalon perles as needed for cough    Push fluids    Cookie Schultz CNP  [facility]     Portions of this note were dictated using speech recognition software. The note has been proofread but errors in the text may have been overlooked. Please contact me if there are any concerns regarding the accuracy of the dictation.

## 2019-04-24 NOTE — ED PROVIDER NOTES
History     Chief Complaint   Patient presents with     Abdominal Pain     spontaneous pain to R side of abd after coughing, exacerbated with movement     HPI  Pennie Nichols is a 68 year old female who presents to the ED today with a chief complaint of abdominal pain. Pt reported that she had a large cough today and shortly thereafter developed a right sided abdominal pain. Rated 7/10. Pain worse with movement. Pt has had past cholecystectomy and appendectomy.  Patient denies  nausea or vomiting, chest pain or any increased shortness of breath from her baseline.  Patient has tried ibuprofen with little to no relief.    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     Priority: Medium     Decreased activities of daily living (ADL) 04/06/2018     Priority: Medium     Abnormal ankle brachial index (CURT) 02/06/2018     Priority: Medium     CMC arthritis 01/08/2018     Priority: Medium     Positive colorectal cancer screening using DNA-based stool test 01/07/2018     Priority: Medium     Bilateral lower extremity edema 01/05/2018     Priority: Medium     History of tobacco use 01/05/2018     Priority: Medium     Asymptomatic varicose veins of bilateral lower extremities 08/07/2017     Priority: Medium     History of small bowel obstruction 07/09/2017     Priority: Medium     Overview:   8/31/95--Dr Barrow--had incidental Appendectomy--Multiple adhesions released       Abnormal findings in stool 07/05/2017     Priority: Medium     Onychogryphosis 04/24/2017     Priority: Medium     Requires assistance with activities of daily living (ADL) 04/24/2017     Priority: Medium     Trigeminal neuralgia of right side of face 03/06/2017     Priority: Medium     Fibromyalgia 07/09/1997     Priority: Medium        Past Medical History:    Past  Medical History:   Diagnosis Date     Detrusor dysfunction 2019     Dorsopathy      Fibromyalgia      Nonpsychotic mental disorder      Onychogryphosis      Personal history of other diseases of the digestive system (CODE)      Primary osteoarthritis of hand        Past Surgical History:    Past Surgical History:   Procedure Laterality Date     APPENDECTOMY OPEN      ,Incidental, Enterolysis--Dr Barrow     CHOLECYSTECTOMY      ,Laparoscopic     COLONOSCOPY      ?     HYSTERECTOMY TOTAL ABDOMINAL      1995,[facility]     OTHER SURGICAL HISTORY      ,CRYOTHERAPY OF CERVIX,Abnormal Pap       Family History:    Family History   Problem Relation Age of Onset     Other - See Comments Mother         lung cancer     Heart Disease Father 70        Heart Disease,MI     Cancer Father         Cancer,Liver cancer     Cancer Other         Cancer,Lung cancer     Diabetes Other         Diabetes     Colon Cancer No family hx of         Cancer-colon     Prostate Cancer No family hx of         Cancer-prostate     Ovarian Cancer No family hx of         Cancer-ovarian     Blood Disease No family hx of         Blood Disease     Hypertension No family hx of         Hypertension     Other - See Comments No family hx of         Stroke     Thyroid Disease No family hx of         Thyroid Disease     Breast Cancer No family hx of         Cancer-breast       Social History:  Marital Status:   [4]  Social History     Tobacco Use     Smoking status: Former Smoker     Packs/day: 1.00     Years: 30.00     Pack years: 30.00     Types: Cigarettes     Last attempt to quit: 2001     Years since quittin.2     Smokeless tobacco: Never Used   Substance Use Topics     Alcohol use: No     Drug use: No        Medications:      aspirin (ASA) 81 MG tablet   [START ON 2019] azithromycin (ZITHROMAX Z-KAYLIE) 250 MG tablet   ibuprofen (ADVIL/MOTRIN) 200 MG tablet   ammonium lactate (AMLACTIN) 12 % cream   carBAMazepine  (TEGRETOL) 200 MG tablet   clindamycin (CLINDAMAX) 1 % external gel   famotidine (PEPCID) 40 MG tablet   Incontinence Supply Disposable (SM INCONTINENT LINER DISP) MISC   lidocaine-prilocaine (EMLA) 2.5-2.5 % external cream   rosuvastatin (CRESTOR) 20 MG tablet   Skin Protectants, Misc. (EUCERIN) cream         Review of Systems   Constitutional: Negative for fever.   Respiratory: Positive for cough. Negative for shortness of breath.    Cardiovascular: Negative for chest pain.   Gastrointestinal: Positive for abdominal pain. Negative for nausea and vomiting.   All other systems reviewed and are negative.      Physical Exam   BP: 163/81  Heart Rate: 87  Temp: 98.5  F (36.9  C)  Resp: 18  Weight: 96.7 kg (213 lb 1.6 oz)  SpO2: 94 %      Physical Exam   Constitutional: She is oriented to person, place, and time. She appears well-developed and well-nourished. No distress.   HENT:   Head: Normocephalic and atraumatic.   Eyes: Conjunctivae are normal. No scleral icterus.   Neck: Neck supple.   Cardiovascular: Normal rate, regular rhythm and normal heart sounds.   No murmur heard.  Pulmonary/Chest: Effort normal and breath sounds normal. No respiratory distress.   Abdominal: Soft. Bowel sounds are normal. There is tenderness.   RUQ, not made worse with palpation   Musculoskeletal: Normal range of motion. She exhibits no deformity.   Lymphadenopathy:     She has no cervical adenopathy.   Neurological: She is alert and oriented to person, place, and time.   Skin: Skin is warm and dry. No rash noted. She is not diaphoretic.   Psychiatric: She has a normal mood and affect. Her behavior is normal. Judgment and thought content normal.       ED Course        Procedures               Critical Care time:  none              Results for orders placed or performed during the hospital encounter of 04/23/19 (from the past 24 hour(s))   CBC with platelets differential   Result Value Ref Range    WBC 9.7 4.0 - 11.0 10e9/L    RBC Count 5.44  (H) 3.8 - 5.2 10e12/L    Hemoglobin 14.4 11.7 - 15.7 g/dL    Hematocrit 45.2 35.0 - 47.0 %    MCV 83 78 - 100 fl    MCH 26.5 26.5 - 33.0 pg    MCHC 31.9 31.5 - 36.5 g/dL    RDW 12.4 10.0 - 15.0 %    Platelet Count 268 150 - 450 10e9/L    Diff Method Automated Method     % Neutrophils 73.5 %    % Lymphocytes 17.3 %    % Monocytes 6.3 %    % Eosinophils 2.1 %    % Basophils 0.5 %    % Immature Granulocytes 0.3 %    Absolute Neutrophil 7.1 1.6 - 8.3 10e9/L    Absolute Lymphocytes 1.7 0.8 - 5.3 10e9/L    Absolute Monocytes 0.6 0.0 - 1.3 10e9/L    Absolute Eosinophils 0.2 0.0 - 0.7 10e9/L    Absolute Basophils 0.1 0.0 - 0.2 10e9/L    Abs Immature Granulocytes 0.0 0 - 0.4 10e9/L   Comprehensive metabolic panel   Result Value Ref Range    Sodium 134 134 - 144 mmol/L    Potassium 4.0 3.5 - 5.1 mmol/L    Chloride 94 (L) 98 - 107 mmol/L    Carbon Dioxide 28 21 - 31 mmol/L    Anion Gap 12 3 - 14 mmol/L    Glucose 111 (H) 70 - 105 mg/dL    Urea Nitrogen 8 7 - 25 mg/dL    Creatinine 0.47 (L) 0.60 - 1.20 mg/dL    GFR Estimate >90 >60 mL/min/[1.73_m2]    GFR Estimate If Black >90 >60 mL/min/[1.73_m2]    Calcium 9.3 8.6 - 10.3 mg/dL    Bilirubin Total 0.3 0.3 - 1.0 mg/dL    Albumin 4.4 3.5 - 5.7 g/dL    Protein Total 7.7 6.4 - 8.9 g/dL    Alkaline Phosphatase 98 34 - 104 U/L    ALT 16 7 - 52 U/L    AST 14 13 - 39 U/L   Lipase   Result Value Ref Range    Lipase 7 (L) 11 - 82 U/L   Lactic acid   Result Value Ref Range    Lactic Acid 2.3 (H) 0.5 - 2.2 mmol/L   *UA reflex to Microscopic   Result Value Ref Range    Color Urine Yellow     Appearance Urine Clear     Glucose Urine Negative NEG^Negative mg/dL    Bilirubin Urine Negative NEG^Negative    Ketones Urine Negative NEG^Negative mg/dL    Specific Gravity Urine <1.005 1.000 - 1.030    Blood Urine Moderate (A) NEG^Negative    pH Urine 7.0 5.0 - 9.0 pH    Protein Albumin Urine Negative NEG^Negative mg/dL    Urobilinogen Urine 0.2 0.2 - 1.0 EU/dL    Nitrite Urine Negative NEG^Negative     Leukocyte Esterase Urine Negative NEG^Negative    Source Midstream Urine    Urine Microscopic   Result Value Ref Range    WBC Urine 0 - 5 OTO5^0 - 5 /HPF    RBC Urine 2-5 (A) OTO2^O - 2 /HPF    Squamous Epithelial /LPF Urine Few FEW^Few /LPF    Bacteria Urine Few (A) NEG^Negative /HPF   XR Chest Port 1 View    Narrative    Procedure:XR CHEST PORT 1 VW    Clinical history:Female, 68 years, cough, abdominal pain, lungs?  subdiaphragmatic air?    Technique: Single view was obtained.    Comparison: 2/20/2017    Findings: The cardiac silhouette is enlarged and unchanged. The  pulmonary vasculature is normal.    The lungs demonstrate increased density at the right lung base and  possible retrocardiac density. Bony structures are unremarkable.      Impression    Impression:   Right basilar pneumonia/atelectasis and possible retrocardiac  pneumonia. No distinct evidence of free intraperitoneal gas.    SUNITA GUZMAN MD       Medications   azithromycin (ZITHROMAX) tablet 500 mg (500 mg Oral Given 4/23/19 2113)       Assessments & Plan (with Medical Decision Making)   Patient is nontoxic-appearing in no acute distress.  Heart, lung, bowel sounds normal.  Abdomen soft but reports pain to the right upper quadrant area. This pain does seem to be out of proportion to my exam.     Lactate 2.3, UA showed blood which has been present in the past. VSS. Afebrile, normal wbc.    I discussed with pt and her PCA my concern for kidney stones, mesenteric ischemia as well as adverse affects that could occur with those conditions and her symptoms. An abd CT was offered but declined by pt at this time, because she would like to talk to her pcp. Pt did agree to CXR due to prolonged cough, and to evaluate for any free air.     CXR showed possible right sided pneumonia, retrocardiac pneumonia.     Curb 65 score 1.Pt appears very well. We will attempt treatment as outpt.    Strict return precautions given. Pt to f/u with pcp tomorrow,  started on Zpack. Pt and PCA  understood/agreed and pt was discharged.     Alli Villalba PA-C    I have reviewed the nursing notes.    I have reviewed the findings, diagnosis, plan and need for follow up with the patient.          Medication List      Started    azithromycin 250 MG tablet  Commonly known as:  ZITHROMAX Z-KAYLIE  Two tablets on the first day, then one tablet daily for the next 4 days  Start taking on:  4/24/2019            Final diagnoses:   Pneumonia       4/23/2019   [facility]     Alli Villalba PA  04/23/19 [id number]

## 2019-04-24 NOTE — NURSING NOTE
Patient presents to clinic today with a cough that started on 4/20/2019. She stated the cough is followed with pain in her right side. She has pain rated at 8 with each episode. Patient is stating she started the Zpak from the ER doctor last night at 9:30 pm.    No LMP recorded (lmp unknown). Patient is postmenopausal.  Medication Reconciliation: complete    Darrion Almanzar LPN  4/24/2019 3:55 PM

## 2019-04-24 NOTE — DISCHARGE INSTRUCTIONS
Get plenty of fluids and rest.  Take medication as prescribed.  Keep and attend your already scheduled appointment with PCP tomorrow for progress check.  Return to the ED if you have worsening fevers, shortness of breath, chest discomfort.

## 2019-05-14 ENCOUNTER — OFFICE VISIT (OUTPATIENT)
Dept: FAMILY MEDICINE | Facility: OTHER | Age: 69
End: 2019-05-14
Attending: NURSE PRACTITIONER
Payer: COMMERCIAL

## 2019-05-14 VITALS
WEIGHT: 210.6 LBS | DIASTOLIC BLOOD PRESSURE: 72 MMHG | BODY MASS INDEX: 41.35 KG/M2 | TEMPERATURE: 97.9 F | SYSTOLIC BLOOD PRESSURE: 130 MMHG | HEART RATE: 76 BPM | RESPIRATION RATE: 12 BRPM | HEIGHT: 60 IN

## 2019-05-14 DIAGNOSIS — Z87.01 HISTORY OF PNEUMONIA: Primary | ICD-10-CM

## 2019-05-14 DIAGNOSIS — M79.7 FIBROMYALGIA: ICD-10-CM

## 2019-05-14 DIAGNOSIS — G50.0 TRIGEMINAL NEURALGIA OF RIGHT SIDE OF FACE: ICD-10-CM

## 2019-05-14 DIAGNOSIS — R05.9 COUGH: ICD-10-CM

## 2019-05-14 PROCEDURE — G0463 HOSPITAL OUTPT CLINIC VISIT: HCPCS

## 2019-05-14 PROCEDURE — 99214 OFFICE O/P EST MOD 30 MIN: CPT | Performed by: NURSE PRACTITIONER

## 2019-05-14 RX ORDER — CARBAMAZEPINE 200 MG/1
100 TABLET ORAL 2 TIMES DAILY
Qty: 120 TABLET | Refills: 1 | Status: SHIPPED | OUTPATIENT
Start: 2019-05-14 | End: 2019-11-15

## 2019-05-14 RX ORDER — BENZONATATE 100 MG/1
100 CAPSULE ORAL 3 TIMES DAILY PRN
Qty: 45 CAPSULE | Refills: 1 | Status: SHIPPED | OUTPATIENT
Start: 2019-05-14 | End: 2019-06-04

## 2019-05-14 ASSESSMENT — MIFFLIN-ST. JEOR: SCORE: 1411.78

## 2019-05-14 ASSESSMENT — ENCOUNTER SYMPTOMS: COUGH: 1

## 2019-05-14 ASSESSMENT — PAIN SCALES - GENERAL: PAINLEVEL: NO PAIN (0)

## 2019-05-14 NOTE — PATIENT INSTRUCTIONS
Would like to see you anytime week June 5th for chest xray followup    Lets talk about tapering off tegretol

## 2019-05-14 NOTE — NURSING NOTE
Patient presents to clinic today for foot care follow up. The patient has not gotten better with her cold and cough.     No LMP recorded (lmp unknown). Patient is postmenopausal.  Medication Reconciliation: complete    Darrion Almanzar LPN  5/14/2019 3:05 PM

## 2019-05-15 NOTE — PROGRESS NOTES
Nursing Notes:   Darrion Almanzar LPN  5/14/2019  3:09 PM  Addendum  Patient presents to clinic today for foot care follow up. The patient has not gotten better with her cold and cough.     No LMP recorded (lmp unknown). Patient is postmenopausal.  Medication Reconciliation: complete    Darrion Almanzar LPN  5/14/2019 3:05 PM    Nursing note reviewed with patient.  Accurracy and completeness verified.   Ms. Nichols is a 68 year old female who:  Patient presents with:  Follow Up      ICD-10-CM    1. History of pneumonia Z87.01 XR Chest 2 Views   2. Trigeminal neuralgia of right side of face G50.0 carBAMazepine (TEGRETOL) 200 MG tablet   3. Fibromyalgia M79.7 carBAMazepine (TEGRETOL) 200 MG tablet   4. Cough R05 benzonatate (TESSALON) 100 MG capsule     XR Chest 2 Views     HPI   Follow-up for toenail care---dremel not available today will need to use clippers  Denies any toenail pain or erythemic areas    Continues to have intermittent cough was treated a few weeks ago for pneumonia would like her to follow-up week of June 5 for a follow-up chest x-ray she would like a refill on  Tessalon Perles  Feels trigeminal neuralgia and headaches resolved not ready to stop Tegretol yet      Review of Systems   Respiratory: Positive for cough.    All other systems reviewed and are negative.     All other systems reviewed and negative.     PRIMO:   PRIMO-7 SCORE 10/18/2018 11/15/2018 1/15/2019   Total Score 0 0 0     PHQ9:  PHQ-9 SCORE 10/18/2018 11/15/2018 1/15/2019   PHQ-9 Total Score 0 0 0       I have personally reviewed the past medical history, past surgical history, medications, allergies, family and social history as listed below, on 5/14/2019.    Allergies   Allergen Reactions     Diatrizoate      Other reaction(s): Angioedema  As noted 2009 note Dr Stone       Current Outpatient Medications   Medication Sig Dispense Refill     ammonium lactate (AMLACTIN) 12 % cream Apply topically 2 times daily 140 g 3     aspirin (ASA) 81 MG  tablet Take 1 tablet (81 mg) by mouth daily 90 tablet 3     benzonatate (TESSALON) 100 MG capsule Take 1 capsule (100 mg) by mouth 3 times daily as needed for cough 45 capsule 1     carBAMazepine (TEGRETOL) 200 MG tablet Take 0.5 tablets (100 mg) by mouth 2 times daily 120 tablet 1     clindamycin (CLINDAMAX) 1 % external gel Apply topically 2 times daily As needed until healed 30 g 1     famotidine (PEPCID) 40 MG tablet TAKE 1 TABLET BY MOUTH ONCE DAILY 90 tablet 3     ibuprofen (ADVIL/MOTRIN) 200 MG tablet Take 200 mg by mouth every 4 hours as needed for mild pain       Incontinence Supply Disposable (SM INCONTINENT LINER DISP) MISC Patient changes liner 2-3 time daily       lidocaine-prilocaine (EMLA) 2.5-2.5 % external cream Apply topically as needed for moderate pain 30 g 0     rosuvastatin (CRESTOR) 20 MG tablet Take 1 tablet (20 mg) by mouth daily 90 tablet 3     Skin Protectants, Misc. 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     Priority: Medium     Decreased activities of daily living (ADL) 04/06/2018     Priority: Medium     Abnormal ankle brachial index (CURT) 02/06/2018     Priority: Medium     CMC arthritis 01/08/2018     Priority: Medium     Positive colorectal cancer screening using DNA-based stool test 01/07/2018     Priority: Medium     Bilateral lower extremity edema 01/05/2018     Priority: Medium     History of tobacco use 01/05/2018     Priority: Medium     Asymptomatic varicose veins of bilateral lower extremities 08/07/2017     Priority: Medium     History of small bowel obstruction 07/09/2017     Priority: Medium     Overview:   8/31/95--Dr Barrow--had incidental Appendectomy--Multiple adhesions released       Abnormal findings in stool 07/05/2017     Priority: Medium     Onychogryphosis 04/24/2017     Priority: Medium     Requires assistance  with activities of daily living (ADL) 2017     Priority: Medium     Trigeminal neuralgia of right side of face 2017     Priority: Medium     Fibromyalgia 1997     Priority: Medium     Past Medical History:   Diagnosis Date     Detrusor dysfunction 2019     Dorsopathy     Recurrent back problems , secondary to injury at [facility]     Fibromyalgia     1997     Nonpsychotic mental disorder     not otherwise specified     Onychogryphosis     2017     Personal history of other diseases of the digestive system (CODE)     2017,95--Dr Barrow--had incidental Appendectomy--Multiple adhesions released     Primary osteoarthritis of hand     2018     Past Surgical History:   Procedure Laterality Date     APPENDECTOMY OPEN      ,Incidental, Enterolysis--Dr Barrow     CHOLECYSTECTOMY      ,Laparoscopic     COLONOSCOPY      ?     HYSTERECTOMY TOTAL ABDOMINAL      1995,Azucena     OTHER SURGICAL HISTORY      ,CRYOTHERAPY OF CERVIX,Abnormal Pap     Social History     Socioeconomic History     Marital status:      Spouse name: None     Number of children: None     Years of education: None     Highest education level: None   Occupational History     None   Social Needs     Financial resource strain: None     Food insecurity:     Worry: None     Inability: None     Transportation needs:     Medical: None     Non-medical: None   Tobacco Use     Smoking status: Former Smoker     Packs/day: 1.00     Years: 30.00     Pack years: 30.00     Types: Cigarettes     Last attempt to quit: 2001     Years since quittin.3     Smokeless tobacco: Never Used   Substance and Sexual Activity     Alcohol use: No     Drug use: No     Sexual activity: Never     Birth control/protection: Surgical   Lifestyle     Physical activity:     Days per week: None     Minutes per session: None     Stress: None   Relationships     Social connections:     Talks on phone: None     Gets together:  None     Attends Spiritism service: None     Active member of club or organization: None     Attends meetings of clubs or organizations: None     Relationship status: None     Intimate partner violence:     Fear of current or ex partner: None     Emotionally abused: None     Physically abused: None     Forced sexual activity: None   Other Topics Concern     Parent/sibling w/ CABG, MI or angioplasty before 65F 55M? Not Asked   Social History Narrative    Smoked one pack per day until two years ago.  Now a nonsmoker.     Family History   Problem Relation Age of Onset     Other - See Comments Mother         lung cancer     Heart Disease Father 70        Heart Disease,MI     Cancer Father         Cancer,Liver cancer     Cancer Other         Cancer,Lung cancer     Diabetes Other         Diabetes     Colon Cancer No family hx of         Cancer-colon     Prostate Cancer No family hx of         Cancer-prostate     Ovarian Cancer No family hx of         Cancer-ovarian     Blood Disease No family hx of         Blood Disease     Hypertension No family hx of         Hypertension     Other - See Comments No family hx of         Stroke     Thyroid Disease No family hx of         Thyroid Disease     Breast Cancer No family hx of         Cancer-breast       EXAM:   Vitals:    05/14/19 1511   BP: 130/72   BP Location: Right arm   Patient Position: Sitting   Cuff Size: Adult Regular   Pulse: 76   Resp: 12   Temp: 97.9  F (36.6  C)   TempSrc: Tympanic   Weight: 95.5 kg (210 lb 9.6 oz)   Height: 1.524 m (5')       Current Pain Score: No Pain (0)     BP Readings from Last 3 Encounters:   05/14/19 130/72   04/24/19 138/82   04/23/19 139/79      Wt Readings from Last 3 Encounters:   05/14/19 95.5 kg (210 lb 9.6 oz)   04/24/19 95.3 kg (210 lb 3.2 oz)   04/23/19 96.7 kg (213 lb 1.6 oz)      Estimated body mass index is 41.13 kg/m  as calculated from the following:    Height as of this encounter: 1.524 m (5').    Weight as of this encounter:  95.5 kg (210 lb 9.6 oz).     Physical Exam   Constitutional: She is oriented to person, place, and time. She appears well-developed and well-nourished.   Cardiovascular: Normal rate.   Pulmonary/Chest: Effort normal and breath sounds normal.   Musculoskeletal: Normal range of motion.   Neurological: She is alert and oriented to person, place, and time.   Skin: Skin is warm and dry.   Trimmed toenails with toenail clipper--no bleeding or erythemic areas, no open areas toenails thickened with fungus-no rash or focal erythema between toes  Bilateral ankle edema 1+ multiple spider veins and varicose veins both legs   Psychiatric: She has a normal mood and affect. Her behavior is normal. Judgment and thought content normal.   Nursing note and vitals reviewed.      INVESTIGATIONS:  Results for orders placed or performed during the hospital encounter of 04/23/19   XR Chest Port 1 View    Narrative    Procedure:XR CHEST PORT 1 VW    Clinical history:Female, 68 years, cough, abdominal pain, lungs?  subdiaphragmatic air?    Technique: Single view was obtained.    Comparison: 2/20/2017    Findings: The cardiac silhouette is enlarged and unchanged. The  pulmonary vasculature is normal.    The lungs demonstrate increased density at the right lung base and  possible retrocardiac density. Bony structures are unremarkable.      Impression    Impression:   Right basilar pneumonia/atelectasis and possible retrocardiac  pneumonia. No distinct evidence of free intraperitoneal gas.    SUNITA GUZMAN MD   *UA reflex to Microscopic   Result Value Ref Range    Color Urine Yellow     Appearance Urine Clear     Glucose Urine Negative NEG^Negative mg/dL    Bilirubin Urine Negative NEG^Negative    Ketones Urine Negative NEG^Negative mg/dL    Specific Gravity Urine <1.005 1.000 - 1.030    Blood Urine Moderate (A) NEG^Negative    pH Urine 7.0 5.0 - 9.0 pH    Protein Albumin Urine Negative NEG^Negative mg/dL    Urobilinogen Urine 0.2 0.2 - 1.0  EU/dL    Nitrite Urine Negative NEG^Negative    Leukocyte Esterase Urine Negative NEG^Negative    Source Midstream Urine    CBC with platelets differential   Result Value Ref Range    WBC 9.7 4.0 - 11.0 10e9/L    RBC Count 5.44 (H) 3.8 - 5.2 10e12/L    Hemoglobin 14.4 11.7 - 15.7 g/dL    Hematocrit 45.2 35.0 - 47.0 %    MCV 83 78 - 100 fl    MCH 26.5 26.5 - 33.0 pg    MCHC 31.9 31.5 - 36.5 g/dL    RDW 12.4 10.0 - 15.0 %    Platelet Count 268 150 - 450 10e9/L    Diff Method Automated Method     % Neutrophils 73.5 %    % Lymphocytes 17.3 %    % Monocytes 6.3 %    % Eosinophils 2.1 %    % Basophils 0.5 %    % Immature Granulocytes 0.3 %    Absolute Neutrophil 7.1 1.6 - 8.3 10e9/L    Absolute Lymphocytes 1.7 0.8 - 5.3 10e9/L    Absolute Monocytes 0.6 0.0 - 1.3 10e9/L    Absolute Eosinophils 0.2 0.0 - 0.7 10e9/L    Absolute Basophils 0.1 0.0 - 0.2 10e9/L    Abs Immature Granulocytes 0.0 0 - 0.4 10e9/L   Comprehensive metabolic panel   Result Value Ref Range    Sodium 134 134 - 144 mmol/L    Potassium 4.0 3.5 - 5.1 mmol/L    Chloride 94 (L) 98 - 107 mmol/L    Carbon Dioxide 28 21 - 31 mmol/L    Anion Gap 12 3 - 14 mmol/L    Glucose 111 (H) 70 - 105 mg/dL    Urea Nitrogen 8 7 - 25 mg/dL    Creatinine 0.47 (L) 0.60 - 1.20 mg/dL    GFR Estimate >90 >60 mL/min/[1.73_m2]    GFR Estimate If Black >90 >60 mL/min/[1.73_m2]    Calcium 9.3 8.6 - 10.3 mg/dL    Bilirubin Total 0.3 0.3 - 1.0 mg/dL    Albumin 4.4 3.5 - 5.7 g/dL    Protein Total 7.7 6.4 - 8.9 g/dL    Alkaline Phosphatase 98 34 - 104 U/L    ALT 16 7 - 52 U/L    AST 14 13 - 39 U/L   Lipase   Result Value Ref Range    Lipase 7 (L) 11 - 82 U/L   Lactic acid   Result Value Ref Range    Lactic Acid 2.3 (H) 0.5 - 2.2 mmol/L   Urine Microscopic   Result Value Ref Range    WBC Urine 0 - 5 OTO5^0 - 5 /HPF    RBC Urine 2-5 (A) OTO2^O - 2 /HPF    Squamous Epithelial /LPF Urine Few FEW^Few /LPF    Bacteria Urine Few (A) NEG^Negative /HPF       ASSESSMENT AND PLAN:  Problem List  Items Addressed This Visit        Nervous and Auditory    Fibromyalgia    Relevant Medications    carBAMazepine (TEGRETOL) 200 MG tablet    Trigeminal neuralgia of right side of face    Relevant Medications    carBAMazepine (TEGRETOL) 200 MG tablet      Other Visit Diagnoses     History of pneumonia    -  Primary    Relevant Orders    XR Chest 2 Views    Cough        Relevant Medications    benzonatate (TESSALON) 100 MG capsule    Other Relevant Orders    XR Chest 2 Views      Would like to recheck chest x-ray first week of June or 6 weeks from initial--- we discussed if not resolving would complete chest CT    We will do foot care    We will discuss tapering off of Tegretol at next office visit    Tessalon Perles refilled for comfort--encouraged to push fluids and try to get some walking exercise and deep breathing to prevent atelectasis      -- Expected clinical course discussed    -- Medications and their side effects discussed    Patient Instructions   Would like to see you anytime week June 5th for chest xray followup    Lets talk about tapering off tegretol        Cookie Schultz CNP  [facility]     Portions of this note were dictated using speech recognition software. The note has been proofread but errors in the text may have been overlooked. Please contact me if there are any concerns regarding the accuracy of the dictation.

## 2019-06-04 ENCOUNTER — HOSPITAL ENCOUNTER (OUTPATIENT)
Dept: GENERAL RADIOLOGY | Facility: OTHER | Age: 69
Discharge: HOME | End: 2019-06-04
Attending: NURSE PRACTITIONER | Admitting: NURSE PRACTITIONER
Payer: COMMERCIAL

## 2019-06-04 ENCOUNTER — OFFICE VISIT (OUTPATIENT)
Dept: FAMILY MEDICINE | Facility: OTHER | Age: 69
End: 2019-06-04
Attending: NURSE PRACTITIONER
Payer: COMMERCIAL

## 2019-06-04 VITALS
BODY MASS INDEX: 40.84 KG/M2 | SYSTOLIC BLOOD PRESSURE: 136 MMHG | HEART RATE: 72 BPM | HEIGHT: 60 IN | DIASTOLIC BLOOD PRESSURE: 76 MMHG | WEIGHT: 208 LBS | RESPIRATION RATE: 14 BRPM | TEMPERATURE: 98.2 F

## 2019-06-04 DIAGNOSIS — R09.82 ALLERGIC RHINITIS WITH POSTNASAL DRIP: ICD-10-CM

## 2019-06-04 DIAGNOSIS — R05.9 COUGH: Primary | ICD-10-CM

## 2019-06-04 DIAGNOSIS — J30.9 ALLERGIC RHINITIS WITH POSTNASAL DRIP: ICD-10-CM

## 2019-06-04 DIAGNOSIS — Z87.891 HISTORY OF TOBACCO ABUSE: ICD-10-CM

## 2019-06-04 DIAGNOSIS — Z87.01 HISTORY OF PNEUMONIA: ICD-10-CM

## 2019-06-04 DIAGNOSIS — Z87.891 PERSONAL HISTORY OF TOBACCO USE: ICD-10-CM

## 2019-06-04 DIAGNOSIS — R05.9 COUGH: ICD-10-CM

## 2019-06-04 PROCEDURE — G0463 HOSPITAL OUTPT CLINIC VISIT: HCPCS | Mod: 25

## 2019-06-04 PROCEDURE — 71046 X-RAY EXAM CHEST 2 VIEWS: CPT

## 2019-06-04 PROCEDURE — G0463 HOSPITAL OUTPT CLINIC VISIT: HCPCS

## 2019-06-04 PROCEDURE — 99213 OFFICE O/P EST LOW 20 MIN: CPT | Mod: 25 | Performed by: NURSE PRACTITIONER

## 2019-06-04 PROCEDURE — G0296 VISIT TO DETERM LDCT ELIG: HCPCS | Performed by: NURSE PRACTITIONER

## 2019-06-04 RX ORDER — BENZONATATE 100 MG/1
100 CAPSULE ORAL 3 TIMES DAILY PRN
Qty: 90 CAPSULE | Refills: 3 | Status: SHIPPED | OUTPATIENT
Start: 2019-06-04 | End: 2019-06-04

## 2019-06-04 RX ORDER — LORATADINE 10 MG/1
10 TABLET ORAL DAILY
Qty: 90 TABLET | Refills: 3 | Status: SHIPPED | OUTPATIENT
Start: 2019-06-04 | End: 2020-06-01

## 2019-06-04 RX ORDER — BENZONATATE 100 MG/1
100 CAPSULE ORAL 3 TIMES DAILY PRN
Qty: 90 CAPSULE | Refills: 3 | Status: SHIPPED | OUTPATIENT
Start: 2019-06-04 | End: 2020-04-03

## 2019-06-04 ASSESSMENT — MIFFLIN-ST. JEOR: SCORE: 1399.98

## 2019-06-04 ASSESSMENT — PAIN SCALES - GENERAL: PAINLEVEL: NO PAIN (0)

## 2019-06-04 NOTE — PROGRESS NOTES
Nursing Notes:   Darrion Almanzar LPN  6/4/2019  2:49 PM  Signed  Patient presents to clinic today for follow up foot care.     No LMP recorded (lmp unknown). Patient is postmenopausal.  Medication Reconciliation: complete    Darrion Almanzar LPN  6/4/2019 2:43 PM    Nursing note reviewed with patient.  Accurracy and completeness verified.   Ms. Nichols is a 68 year old female who:  No chief complaint on file.      ICD-10-CM    1. Cough R05 loratadine (CLARITIN) 10 MG tablet     benzonatate (TESSALON) 100 MG capsule     DISCONTINUED: benzonatate (TESSALON) 100 MG capsule   2. History of tobacco abuse Z87.891 Prof Fee: Shared Decision Making Visit for Lung Cancer Screening     CT Chest Lung Cancer Scrn Low Dose wo   3. Personal history of tobacco use Z87.891 Prof Fee: Shared Decision Making Visit for Lung Cancer Screening     CT Chest Lung Cancer Scrn Low Dose wo   4. Allergic rhinitis with postnasal drip J30.9 loratadine (CLARITIN) 10 MG tablet    R09.82      HPI    Presents for monthly toenail care and other issues  Reports continues to have a chronic cough this is been going on for months he has had negative chest x-rays  Does have a 31-year history of tobacco use--denies any hemoptysis or sore throat  Does have postnasal drainage related to allergies but does not feel this is the cause of her cough  She is using corticosteroid nasal spray and Claritin    Review of Systems   HENT: Positive for postnasal drip. Negative for rhinorrhea.    Respiratory: Positive for cough.    All other systems reviewed and are negative.     All other systems reviewed and negative.     PRIMO:   PRIMO-7 SCORE 10/18/2018 11/15/2018 1/15/2019   Total Score 0 0 0     PHQ9:  PHQ-9 SCORE 10/18/2018 11/15/2018 1/15/2019   PHQ-9 Total Score 0 0 0       I have personally reviewed the past medical history, past surgical history, medications, allergies, family and social history as listed below, on 6/4/2019.    Allergies   Allergen Reactions     Diatrizoate       Other reaction(s): Angioedema  As noted 2009 note Dr Stone       Current Outpatient Medications   Medication Sig Dispense Refill     ammonium lactate (AMLACTIN) 12 % cream Apply topically 2 times daily 140 g 3     aspirin (ASA) 81 MG tablet Take 1 tablet (81 mg) by mouth daily 90 tablet 3     benzonatate (TESSALON) 100 MG capsule Take 1 capsule (100 mg) by mouth 3 times daily as needed for cough 90 capsule 3     carBAMazepine (TEGRETOL) 200 MG tablet Take 0.5 tablets (100 mg) by mouth 2 times daily 120 tablet 1     famotidine (PEPCID) 40 MG tablet TAKE 1 TABLET BY MOUTH ONCE DAILY 90 tablet 3     ibuprofen (ADVIL/MOTRIN) 200 MG tablet Take 200 mg by mouth every 4 hours as needed for mild pain       Incontinence Supply Disposable (SM INCONTINENT LINER DISP) MISC Patient changes liner 2-3 time daily       lidocaine-prilocaine (EMLA) 2.5-2.5 % external cream Apply topically as needed for moderate pain 30 g 0     loratadine (CLARITIN) 10 MG tablet Take 1 tablet (10 mg) by mouth daily 90 tablet 3     rosuvastatin (CRESTOR) 20 MG tablet Take 1 tablet (20 mg) by mouth daily 90 tablet 3     Skin Protectants, Misc. 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     Priority: Medium     Decreased activities of daily living (ADL) 04/06/2018     Priority: Medium     Abnormal ankle brachial index (CURT) 02/06/2018     Priority: Medium     CMC arthritis 01/08/2018     Priority: Medium     Positive colorectal cancer screening using DNA-based stool test 01/07/2018     Priority: Medium     Bilateral lower extremity edema 01/05/2018     Priority: Medium     History of tobacco use 01/05/2018     Priority: Medium     Asymptomatic varicose veins of bilateral lower extremities 08/07/2017     Priority: Medium     History of small bowel obstruction 07/09/2017     Priority: Medium      Overview:   95--Dr Barrow--had incidental Appendectomy--Multiple adhesions released       Abnormal findings in stool 2017     Priority: Medium     Onychogryphosis 2017     Priority: Medium     Requires assistance with activities of daily living (ADL) 2017     Priority: Medium     Trigeminal neuralgia of right side of face 2017     Priority: Medium     Fibromyalgia 1997     Priority: Medium     Past Medical History:   Diagnosis Date     Detrusor dysfunction 2019     Dorsopathy     Recurrent back problems , secondary to injury at [facility]     Fibromyalgia     1997     Nonpsychotic mental disorder     not otherwise specified     Onychogryphosis     2017     Personal history of other diseases of the digestive system (CODE)     2017,95--Dr Barrow--had incidental Appendectomy--Multiple adhesions released     Primary osteoarthritis of hand     2018     Past Surgical History:   Procedure Laterality Date     APPENDECTOMY OPEN      ,Incidental, Enterolysis--Dr Barrow     CHOLECYSTECTOMY      ,Laparoscopic     COLONOSCOPY      ?     HYSTERECTOMY TOTAL ABDOMINAL      1995,[facility]     OTHER SURGICAL HISTORY      ,CRYOTHERAPY OF CERVIX,Abnormal Pap     Social History     Socioeconomic History     Marital status:      Spouse name: None     Number of children: None     Years of education: None     Highest education level: None   Occupational History     None   Social Needs     Financial resource strain: None     Food insecurity:     Worry: None     Inability: None     Transportation needs:     Medical: None     Non-medical: None   Tobacco Use     Smoking status: Former Smoker     Packs/day: 1.00     Years: 30.00     Pack years: 30.00     Types: Cigarettes     Last attempt to quit: 2001     Years since quittin.3     Smokeless tobacco: Never Used   Substance and Sexual Activity     Alcohol use: No     Drug use: No     Sexual activity: Never      Birth control/protection: Surgical   Lifestyle     Physical activity:     Days per week: None     Minutes per session: None     Stress: None   Relationships     Social connections:     Talks on phone: None     Gets together: None     Attends Synagogue service: None     Active member of club or organization: None     Attends meetings of clubs or organizations: None     Relationship status: None     Intimate partner violence:     Fear of current or ex partner: None     Emotionally abused: None     Physically abused: None     Forced sexual activity: None   Other Topics Concern     Parent/sibling w/ CABG, MI or angioplasty before 65F 55M? Not Asked   Social History Narrative    Smoked one pack per day until two years ago.  Now a nonsmoker.     Family History   Problem Relation Age of Onset     Other - See Comments Mother         lung cancer     Heart Disease Father 70        Heart Disease,MI     Cancer Father         Cancer,Liver cancer     Cancer Other         Cancer,Lung cancer     Diabetes Other         Diabetes     Lung Cancer Brother      Colon Cancer No family hx of         Cancer-colon     Prostate Cancer No family hx of         Cancer-prostate     Ovarian Cancer No family hx of         Cancer-ovarian     Blood Disease No family hx of         Blood Disease     Hypertension No family hx of         Hypertension     Thyroid Disease No family hx of         Thyroid Disease     Breast Cancer No family hx of         Cancer-breast       EXAM:   Vitals:    06/04/19 1444   BP: 136/76   BP Location: Right arm   Patient Position: Sitting   Cuff Size: Adult Regular   Pulse: 72   Resp: 14   Temp: 98.2  F (36.8  C)   TempSrc: Tympanic   Weight: 94.3 kg (208 lb)   Height: 1.524 m (5')       Current Pain Score: No Pain (0)     BP Readings from Last 3 Encounters:   06/04/19 136/76   05/14/19 130/72   04/24/19 138/82      Wt Readings from Last 3 Encounters:   06/04/19 94.3 kg (208 lb)   05/14/19 95.5 kg (210 lb 9.6 oz)    04/24/19 95.3 kg (210 lb 3.2 oz)      Estimated body mass index is 40.62 kg/m  as calculated from the following:    Height as of this encounter: 1.524 m (5').    Weight as of this encounter: 94.3 kg (208 lb).     Physical Exam   Constitutional: She is oriented to person, place, and time. She appears well-developed and well-nourished.   HENT:   Head: Normocephalic and atraumatic.   Clear postnasal drainage, no evidence of purulence, posterior pharynx injected without tonsillar hypertrophy or pustules, uvula midline without edema   Cardiovascular: Normal rate.   Pulmonary/Chest: Effort normal and breath sounds normal.   Musculoskeletal: Normal range of motion.   Neurological: She is alert and oriented to person, place, and time.   Skin: Skin is warm and dry.   Thickened fungal toenails without any evidence of erythema or active infection or inflammation--- dry mole used to smooth with toenails--- tolerated well no complications   Psychiatric: She has a normal mood and affect. Her behavior is normal. Judgment and thought content normal.   Nursing note and vitals reviewed.      INVESTIGATIONS:  Results for orders placed or performed during the hospital encounter of 06/04/19   XR Chest 2 Views    Narrative    PROCEDURE: XR CHEST 2 VW 6/4/2019 2:02 PM    HISTORY: Cough; History of pneumonia    COMPARISONS: 4/23/2018.    TECHNIQUE: 2 views.    FINDINGS: Heart is stable in size. Linear densities at the lung bases  appear stable. No acute infiltrate or effusion is seen. There is a  pectus deformity.    Mild degenerative changes seen in the spine.         Impression    IMPRESSION: No significant change in the appearance of the chest.  Stable linear densities at the right lung base.    JAMI EDWARD MD       ASSESSMENT AND PLAN:  Problem List Items Addressed This Visit     None      Visit Diagnoses     Cough    -  Primary    Relevant Medications    loratadine (CLARITIN) 10 MG tablet    benzonatate (TESSALON) 100 MG  capsule    History of tobacco abuse        Relevant Orders    Prof Fee: Shared Decision Making Visit for Lung Cancer Screening (Completed)    CT Chest Lung Cancer Scrn Low Dose wo    Personal history of tobacco use        Relevant Orders    Prof Fee: Shared Decision Making Visit for Lung Cancer Screening (Completed)    CT Chest Lung Cancer Scrn Low Dose wo    Allergic rhinitis with postnasal drip        Relevant Medications    loratadine (CLARITIN) 10 MG tablet          31-year tobacco history--- has been tobacco free at least for the last 6 years  We discussed low radiation chest CT--- discussed screening recommendations with history and patient is very agreeable and wishes to proceed    Recommend continuing daily Claritin and nasal corticosteroid spray for allergy control    Suggested that she return to clinic 2 to 3 days after chest CT to review results--- she will schedule with her monthly toenail care and foot care appointments    Chest x-ray not informative for chronic cough--- differentials for persistent cough postnasal pharyngeal irritation due to allergies  However with 31-year tobacco history would proceed with further work-up      -- Expected clinical course discussed    -- Medications and their side effects discussed    Patient Instructions       You will scheduled---and then I would see you 3 days later or next office        Lung Cancer Screening   Frequently Asked Questions  If you are at high-risk for lung cancer, getting screened with low-dose computed tomography (LDCT) every year can help save your life. This handout offers answers to some of the most common questions about lung cancer screening. If you have other questions, please call [phone] ([phone]).     What is it?  Lung cancer screening uses special X-ray technology to create an image of your lung tissue. The exam is quick and easy and takes less than 10 seconds. We don t give you any medicine or use any needles. You can eat  before and after the exam. You don t need to change your clothes as long as the clothing on your chest doesn t contain metal. But, you do need to be able to hold your breath for at least 6 seconds during the exam.    What is the goal of lung cancer screening?  The goal of lung cancer screening is to save lives. Many times, lung cancer is not found until a person starts having physical symptoms. Lung cancer screening can help detect lung cancer in the earliest stages when it may be easier to treat.    Who should be screened for lung cancer?  We suggest lung cancer screening for anyone who is at high-risk for lung cancer. You are in the high-risk group if you:      are between the ages of 55 and 79, and    have smoked at least 1 pack of cigarettes a day for 30 or more years, and    still smoke or have quit within the past 15 years.    However, if you have a new cough or shortness of breath, you should talk to your doctor before being screened.    Some national lung health advocacy groups also recommend screening for people ages 50 to 79 who have smoked an average of 1 pack of cigarettes a day for 20 years. They must also have at least 1 other risk factor for lung cancer, not including exposure to secondhand smoke. Other risk factors are having had cancer in the past, emphysema, pulmonary fibrosis, COPD, a family history of lung cancer, or exposure to certain materials such as arsenic, asbestos, beryllium, cadmium, chromium, diesel fumes, nickel, radon or silica. Your care team can help you know if you have one of these risk factors.     Why does it matter if I have symptoms?  Certain symptoms can be a sign that you have a condition in your lungs that should be checked and treated by your doctor. These symptoms include fever, chest pain, a new or changing cough, shortness of breath that you have never felt before, coughing up blood or unexplained weight loss. Having any of these symptoms can greatly affect the results  of lung cancer screening.       Should all smokers get an LDCT lung cancer screening exam?  It depends. Lung cancer screening is for a very specific group of men and women who have a history of heavy smoking over a long period of time (see  Who should be screened for lung cancer  above).  I am in the high-risk group, but have been diagnosed with cancer in the past. Is LDCT lung cancer screening right for me?  In some cases, you should not have LDCT lung screening, such as when your doctor is already following your cancer with CT scan studies. Your doctor will help you decide if LDCT lung screening is right for you.  Do I need to have a screening exam every year?  Yes. If you are in the high-risk group described earlier, you should get an LDCT lung cancer screening exam every year until you are 79, or are no longer willing or able to undergo screening and possible procedures to diagnose and treat lung cancer.  How effective is LDCT at preventing death from lung cancer?  Studies have shown that LDCT lung cancer screening can lower the risk of death from lung cancer by 20 percent in people who are at high-risk.  What are the risks?  There are some risks and limitations of LDCT lung cancer screening. We want to make sure you understand the risks and benefits, so please let us know if you have any questions. Your doctor may want to talk with you more about these risks.    Radiation exposure: As with any exam that uses radiation, there is a very small increased risk of cancer. The amount of radiation in LDCT is small--about the same amount a person would get from a mammogram. Your doctor orders the exam when he or she feels the potential benefits outweigh the risks.    False negatives: No test is perfect, including LDCT. It is possible that you may have a medical condition, including lung cancer, that is not found during your exam. This is called a false negative result.    False positives and more testing: LDCT very  often finds something in the lung that could be cancer, but in fact is not. This is called a false positive result. False positive tests often cause anxiety. To make sure these findings are not cancer, you may need to have more tests. These tests will be done only if you give us permission. Sometimes patients need a treatment that can have side effects, such as a biopsy. For more information on false positives, see  What can I expect from the results?     Findings not related to lung cancer: Your LDCT exam also takes pictures of areas of your body next to your lungs. In a very small number of cases, the CT scan will show an abnormal finding in one of these areas, such as your kidneys, adrenal glands, liver or thyroid. This finding may not be serious, but you may need more tests. Your doctor can help you decide what other tests you may need, if any.  What can I expect from the results?  About 1 out of 4 LDCT exams will find something that may need more tests. Most of the time, these findings are lung nodules. Lung nodules are very small collections of tissue in the lung. These nodules are very common, and the vast majority--more than 97 percent--are not cancer (benign). Most are normal lymph nodes or small areas of scarring from past infections.  But, if a small lung nodule is found to be cancer, the cancer can be cured more than 90 percent of the time. To know if the nodule is cancer, we may need to get more images before your next yearly screening exam. If the nodule has suspicious features (for example, it is large, has an odd shape or grows over time), we will refer you to a specialist for further testing.  Will my doctor also get the results?  Yes. Your doctor will get a copy of your results.  Is it okay to keep smoking now that there s a cancer screening exam?  No. Tobacco is one of the strongest cancer-causing agents. It causes not only lung cancer, but other cancers and cardiovascular (heart) diseases as  well. The damage caused by smoking builds over time. This means that the longer you smoke, the higher your risk of disease. While it is never too late to quit, the sooner you quit, the better.  Where can I find help to quit smoking?  The best way to prevent lung cancer is to stop smoking. If you have already quit smoking, congratulations and keep it up! For help on quitting smoking, please call [employer] at 1-795-702-QIEV ([phone]) or the American [employer] Society at 1-411.390.9691 to find local resources near you.  One-on-one health coaching:  If you d prefer to work individually with a health care provider on tobacco cessation, we offer:      Medication Therapy Management:  Our specially trained pharmacists work closely with you and your doctor to help you quit smoking.  Call [phone] or [phone] (toll free).     Can Do: Health coaching offered by [facility].  [email or web address]      Cookie Schultz CNP  [facility]     Portions of this note were dictated using speech recognition software. The note has been proofread but errors in the text may have been overlooked. Please contact me if there are any concerns regarding the accuracy of the dictation.  Lung Cancer Screening Shared Decision Making Visit     Pennie Nichols is eligible for lung cancer screening on the basis of the information provided in my signed lung cancer screening order.     I have discussed with patient the risks and benefits of screening for lung cancer with low-dose CT.     The risks include:  radiation exposure: one low dose chest CT has as much ionizing radiation as about 15 chest x-rays or 6 months of background radiation living in [place]    false positives: 96% of positive findings/nodules are NOT cancer, but some might still require additional diagnostic evaluation, including biopsy  over-diagnosis: some slow growing cancers that might never have been clinically significant will be detected and  treated unnecessarily     The benefit of early detection of lung cancer is contingent upon adherence to annual screening or more frequent follow up if indicated.     Furthermore, reaping the benefits of screening requires Pennie Nichols to be willing and physically able to undergo diagnostic procedures, if indicated. Although no specific guide is available for determining severity of comorbidities, it is reasonable to withhold screening in patients who have greater mortality risk from other diseases.     We did discuss that the only way to prevent lung cancer is to not smoke. Smoking cessation assistance was not offered.--quit tobacco          ShouldIScreen

## 2019-06-04 NOTE — PATIENT INSTRUCTIONS
You will scheduled---and then I would see you 3 days later or next office        Lung Cancer Screening   Frequently Asked Questions  If you are at high-risk for lung cancer, getting screened with low-dose computed tomography (LDCT) every year can help save your life. This handout offers answers to some of the most common questions about lung cancer screening. If you have other questions, please call [phone] ([phone]).     What is it?  Lung cancer screening uses special X-ray technology to create an image of your lung tissue. The exam is quick and easy and takes less than 10 seconds. We don t give you any medicine or use any needles. You can eat before and after the exam. You don t need to change your clothes as long as the clothing on your chest doesn t contain metal. But, you do need to be able to hold your breath for at least 6 seconds during the exam.    What is the goal of lung cancer screening?  The goal of lung cancer screening is to save lives. Many times, lung cancer is not found until a person starts having physical symptoms. Lung cancer screening can help detect lung cancer in the earliest stages when it may be easier to treat.    Who should be screened for lung cancer?  We suggest lung cancer screening for anyone who is at high-risk for lung cancer. You are in the high-risk group if you:      are between the ages of 55 and 79, and    have smoked at least 1 pack of cigarettes a day for 30 or more years, and    still smoke or have quit within the past 15 years.    However, if you have a new cough or shortness of breath, you should talk to your doctor before being screened.    Some national lung health advocacy groups also recommend screening for people ages 50 to 79 who have smoked an average of 1 pack of cigarettes a day for 20 years. They must also have at least 1 other risk factor for lung cancer, not including exposure to secondhand smoke. Other risk factors are having had cancer in  the past, emphysema, pulmonary fibrosis, COPD, a family history of lung cancer, or exposure to certain materials such as arsenic, asbestos, beryllium, cadmium, chromium, diesel fumes, nickel, radon or silica. Your care team can help you know if you have one of these risk factors.     Why does it matter if I have symptoms?  Certain symptoms can be a sign that you have a condition in your lungs that should be checked and treated by your doctor. These symptoms include fever, chest pain, a new or changing cough, shortness of breath that you have never felt before, coughing up blood or unexplained weight loss. Having any of these symptoms can greatly affect the results of lung cancer screening.       Should all smokers get an LDCT lung cancer screening exam?  It depends. Lung cancer screening is for a very specific group of men and women who have a history of heavy smoking over a long period of time (see  Who should be screened for lung cancer  above).  I am in the high-risk group, but have been diagnosed with cancer in the past. Is LDCT lung cancer screening right for me?  In some cases, you should not have LDCT lung screening, such as when your doctor is already following your cancer with CT scan studies. Your doctor will help you decide if LDCT lung screening is right for you.  Do I need to have a screening exam every year?  Yes. If you are in the high-risk group described earlier, you should get an LDCT lung cancer screening exam every year until you are 79, or are no longer willing or able to undergo screening and possible procedures to diagnose and treat lung cancer.  How effective is LDCT at preventing death from lung cancer?  Studies have shown that LDCT lung cancer screening can lower the risk of death from lung cancer by 20 percent in people who are at high-risk.  What are the risks?  There are some risks and limitations of LDCT lung cancer screening. We want to make sure you understand the risks and benefits,  so please let us know if you have any questions. Your doctor may want to talk with you more about these risks.    Radiation exposure: As with any exam that uses radiation, there is a very small increased risk of cancer. The amount of radiation in LDCT is small--about the same amount a person would get from a mammogram. Your doctor orders the exam when he or she feels the potential benefits outweigh the risks.    False negatives: No test is perfect, including LDCT. It is possible that you may have a medical condition, including lung cancer, that is not found during your exam. This is called a false negative result.    False positives and more testing: LDCT very often finds something in the lung that could be cancer, but in fact is not. This is called a false positive result. False positive tests often cause anxiety. To make sure these findings are not cancer, you may need to have more tests. These tests will be done only if you give us permission. Sometimes patients need a treatment that can have side effects, such as a biopsy. For more information on false positives, see  What can I expect from the results?     Findings not related to lung cancer: Your LDCT exam also takes pictures of areas of your body next to your lungs. In a very small number of cases, the CT scan will show an abnormal finding in one of these areas, such as your kidneys, adrenal glands, liver or thyroid. This finding may not be serious, but you may need more tests. Your doctor can help you decide what other tests you may need, if any.  What can I expect from the results?  About 1 out of 4 LDCT exams will find something that may need more tests. Most of the time, these findings are lung nodules. Lung nodules are very small collections of tissue in the lung. These nodules are very common, and the vast majority--more than 97 percent--are not cancer (benign). Most are normal lymph nodes or small areas of scarring from past infections.  But, if a  small lung nodule is found to be cancer, the cancer can be cured more than 90 percent of the time. To know if the nodule is cancer, we may need to get more images before your next yearly screening exam. If the nodule has suspicious features (for example, it is large, has an odd shape or grows over time), we will refer you to a specialist for further testing.  Will my doctor also get the results?  Yes. Your doctor will get a copy of your results.  Is it okay to keep smoking now that there s a cancer screening exam?  No. Tobacco is one of the strongest cancer-causing agents. It causes not only lung cancer, but other cancers and cardiovascular (heart) diseases as well. The damage caused by smoking builds over time. This means that the longer you smoke, the higher your risk of disease. While it is never too late to quit, the sooner you quit, the better.  Where can I find help to quit smoking?  The best way to prevent lung cancer is to stop smoking. If you have already quit smoking, congratulations and keep it up! For help on quitting smoking, please call [employer] at 8-766-630-HOAE ([phone]) or the American [employer] Society at 1-783.236.3147 to find local resources near you.  One-on-one health coaching:  If you d prefer to work individually with a health care provider on tobacco cessation, we offer:      Medication Therapy Management:  Our specially trained pharmacists work closely with you and your doctor to help you quit smoking.  Call [phone] or [phone] (toll free).     Can Do: Health coaching offered by [facility].  [email or web address]

## 2019-06-04 NOTE — NURSING NOTE
Patient presents to clinic today for follow up foot care.     No LMP recorded (lmp unknown). Patient is postmenopausal.  Medication Reconciliation: complete    Darrion Almanzar LPN  6/4/2019 2:43 PM

## 2019-06-10 ASSESSMENT — ENCOUNTER SYMPTOMS
COUGH: 1
RHINORRHEA: 0

## 2019-06-11 ENCOUNTER — HOSPITAL ENCOUNTER (OUTPATIENT)
Dept: CT IMAGING | Facility: OTHER | Age: 69
Discharge: HOME | End: 2019-06-11
Attending: NURSE PRACTITIONER | Admitting: NURSE PRACTITIONER
Payer: COMMERCIAL

## 2019-06-11 DIAGNOSIS — Z87.891 PERSONAL HISTORY OF TOBACCO USE: ICD-10-CM

## 2019-06-11 DIAGNOSIS — Z87.891 HISTORY OF TOBACCO ABUSE: ICD-10-CM

## 2019-06-11 PROCEDURE — G0297 LDCT FOR LUNG CA SCREEN: HCPCS

## 2019-07-01 ENCOUNTER — TRANSFERRED RECORDS (OUTPATIENT)
Dept: HEALTH INFORMATION MANAGEMENT | Facility: OTHER | Age: 69
End: 2019-07-01

## 2019-08-12 DIAGNOSIS — N31.8 DETRUSOR DYSFUNCTION: Primary | ICD-10-CM

## 2019-08-14 RX ORDER — INCONTINENCE PAD,LINER,DISP
EACH MISCELLANEOUS
Qty: 270 EACH | Refills: 11 | Status: SHIPPED | OUTPATIENT
Start: 2019-08-14 | End: 2020-09-14

## 2019-08-14 NOTE — TELEPHONE ENCOUNTER
[facility] sent Rx request for the following:  From pharmacy: 08/12/19-NEED NEW RX QTY ALLOWED #300/30-DAYSNOW FILLING AT [facility]     POISE PADS ULT THIN LT 30 CS6  Sig: POISE PAD REG LNG/ABS #3 ITEM #[id number] DX:R32 [facility]:  Last Office Visit:              6/4/19  Future Office visit:             Next 5 appointments (look out 90 days)    Aug 22, 2019  2:30 PM CDT  Office Visit with YANN Melissa [facility] ([facility]) [address]  [place] [id number] [phone]      Routing refill request to provider for review/approval because:    DME not on the [facility], [facility] or  Health refill protocol or controlled substance    Historically reported as changing pad 2-3 times daily.    Unable to complete prescription refill per RN Medication Refill Policy. Giselle Delong RN .............. 8/14/2019  2:58 PM

## 2019-08-29 ENCOUNTER — OFFICE VISIT (OUTPATIENT)
Dept: FAMILY MEDICINE | Facility: OTHER | Age: 69
End: 2019-08-29
Attending: NURSE PRACTITIONER
Payer: COMMERCIAL

## 2019-08-29 VITALS
RESPIRATION RATE: 12 BRPM | HEIGHT: 60 IN | HEART RATE: 72 BPM | SYSTOLIC BLOOD PRESSURE: 131 MMHG | BODY MASS INDEX: 42.25 KG/M2 | DIASTOLIC BLOOD PRESSURE: 80 MMHG | WEIGHT: 215.2 LBS | TEMPERATURE: 99.1 F

## 2019-08-29 DIAGNOSIS — I73.9 PVD (PERIPHERAL VASCULAR DISEASE) (H): Primary | ICD-10-CM

## 2019-08-29 DIAGNOSIS — L60.2 ONYCHOGRYPHOSIS: ICD-10-CM

## 2019-08-29 DIAGNOSIS — L60.2 OVERGROWN TOENAILS: ICD-10-CM

## 2019-08-29 DIAGNOSIS — Z87.891 HISTORY OF TOBACCO USE DISORDER: ICD-10-CM

## 2019-08-29 PROCEDURE — G0463 HOSPITAL OUTPT CLINIC VISIT: HCPCS

## 2019-08-29 PROCEDURE — 99213 OFFICE O/P EST LOW 20 MIN: CPT | Performed by: NURSE PRACTITIONER

## 2019-08-29 RX ORDER — ROSUVASTATIN CALCIUM 20 MG/1
20 TABLET, COATED ORAL DAILY
Qty: 90 TABLET | Refills: 3 | Status: SHIPPED | OUTPATIENT
Start: 2019-08-29 | End: 2020-06-30

## 2019-08-29 ASSESSMENT — PAIN SCALES - GENERAL: PAINLEVEL: NO PAIN (0)

## 2019-08-29 ASSESSMENT — MIFFLIN-ST. JEOR: SCORE: 1428.67

## 2019-08-29 NOTE — PROGRESS NOTES
"Nursing Notes:   Gabriela Swann LPN  8/29/2019  3:07 PM  Signed  Patient presents to clinic for follow up chest X Ray from 6/11/19. Had chest x ray to rule out COPD/lung cancer/other lung problems related to smoking for so many years.   Chief Complaint   Patient presents with     Clinic Care Coordination - Follow-up     CXR 6/11/19       Initial /80   Pulse 72   Temp 99.1  F (37.3  C) (Tympanic)   Resp 12   Ht 1.518 m (4' 11.75\")   Wt 97.6 kg (215 lb 3.2 oz)   LMP  (LMP Unknown)   Breastfeeding? No   BMI 42.38 kg/m    Estimated body mass index is 42.38 kg/m  as calculated from the following:    Height as of this encounter: 1.518 m (4' 11.75\").    Weight as of this encounter: 97.6 kg (215 lb 3.2 oz).  Medication Reconciliation: complete    Gabriela Swann LPN    Nursing note reviewed with patient.  Accurracy and completeness verified.   Ms. Nichols is a 68 year old female who:  Patient presents with:  Clinic Care Coordination - Follow-up: CXR 6/11/19      ICD-10-CM    1. PVD (peripheral vascular disease) (H) I73.9 rosuvastatin (CRESTOR) 20 MG tablet   2. History of tobacco use disorder Z87.891 rosuvastatin (CRESTOR) 20 MG tablet   3. Overgrown toenails L60.2    4. Onychogryphosis L60.2      HPI   She comes in to review her letter from the radiologist with chest CT report--- no findings suggestive of malignancy with yearly low risk radiation CT follow-up  Fiordaliza reports she is not congested cough has resolved feeling much better  She does need a refill on her Crestor  Which she is tolerating well    She would like her toenails trimmed as she is unable to do this independently--she does have a PCA however she does not do foot care  Fiordaliza has no other concerns today      All systems reviewed and negative except what is noted above    PRIMO:   PRIMO-7 SCORE 10/18/2018 11/15/2018 1/15/2019   Total Score 0 0 0     PHQ9:  PHQ-9 SCORE 10/18/2018 11/15/2018 1/15/2019   PHQ-9 Total Score 0 0 0       I have personally " reviewed the past medical history, past surgical history, medications, allergies, family and social history as listed below, on 8/29/2019.    Allergies   Allergen Reactions     Diatrizoate      Other reaction(s): Angioedema  As noted 2009 note Dr Stone       Current Outpatient Medications   Medication Sig Dispense Refill     ammonium lactate (AMLACTIN) 12 % cream Apply topically 2 times daily 140 g 3     aspirin (ASA) 81 MG tablet Take 1 tablet (81 mg) by mouth daily 90 tablet 3     benzonatate (TESSALON) 100 MG capsule Take 1 capsule (100 mg) by mouth 3 times daily as needed for cough 90 capsule 3     carBAMazepine (TEGRETOL) 200 MG tablet Take 0.5 tablets (100 mg) by mouth 2 times daily 120 tablet 1     famotidine (PEPCID) 40 MG tablet TAKE 1 TABLET BY MOUTH ONCE DAILY 90 tablet 3     ibuprofen (ADVIL/MOTRIN) 200 MG tablet Take 200 mg by mouth every 4 hours as needed for mild pain       Incontinence Supply Disposable (SM INCONTINENT LINER DISP) MISC POISE PAD REG LNG/ABS #3 ITEM #[id number] DX:R32 [facility]: 270 each 11     lidocaine-prilocaine (EMLA) 2.5-2.5 % external cream Apply topically as needed for moderate pain 30 g 0     loratadine (CLARITIN) 10 MG tablet Take 1 tablet (10 mg) by mouth daily 90 tablet 3     rosuvastatin (CRESTOR) 20 MG tablet Take 1 tablet (20 mg) by mouth daily 90 tablet 3     Skin Protectants, Misc. 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     Priority: Medium     Decreased activities of daily living (ADL) 04/06/2018     Priority: Medium     Abnormal ankle brachial index (CURT) 02/06/2018     Priority: Medium     CMC arthritis 01/08/2018     Priority: Medium     Positive colorectal cancer screening using DNA-based stool test 01/07/2018     Priority: Medium     Bilateral lower extremity edema 01/05/2018     Priority: Medium     History of  tobacco use 01/05/2018     Priority: Medium     Asymptomatic varicose veins of bilateral lower extremities 08/07/2017     Priority: Medium     History of small bowel obstruction 07/09/2017     Priority: Medium     Overview:   8/31/95--Dr Barrow--had incidental Appendectomy--Multiple adhesions released       Abnormal findings in stool 07/05/2017     Priority: Medium     Onychogryphosis 04/24/2017     Priority: Medium     Requires assistance with activities of daily living (ADL) 04/24/2017     Priority: Medium     Trigeminal neuralgia of right side of face 03/06/2017     Priority: Medium     Fibromyalgia 07/09/1997     Priority: Medium     Past Medical History:   Diagnosis Date     Detrusor dysfunction 2/5/2019     Dorsopathy     Recurrent back problems , secondary to injury at [facility]     Fibromyalgia     7/9/1997     Nonpsychotic mental disorder     not otherwise specified     Onychogryphosis     4/24/2017     Personal history of other diseases of the digestive system (CODE)     7/9/2017,8/31/95--Dr Barrow--had incidental Appendectomy--Multiple adhesions released     Primary osteoarthritis of hand     1/8/2018     Past Surgical History:   Procedure Laterality Date     APPENDECTOMY OPEN      1996,Incidental, Enterolysis--Dr Barrow     CHOLECYSTECTOMY      1996,Laparoscopic     COLONOSCOPY      1990?     HYSTERECTOMY TOTAL ABDOMINAL      01/1995,[place]     OTHER SURGICAL HISTORY      1994,208526,CRYOTHERAPY OF CERVIX,Abnormal Pap     Social History     Socioeconomic History     Marital status:      Spouse name: None     Number of children: None     Years of education: None     Highest education level: None   Occupational History     None   Social Needs     Financial resource strain: None     Food insecurity:     Worry: None     Inability: None     Transportation needs:     Medical: None     Non-medical: None   Tobacco Use     Smoking status: Former Smoker     Packs/day: 1.00     Years: 30.00     Pack years: 30.00      "Types: Cigarettes     Last attempt to quit: 2001     Years since quittin.6     Smokeless tobacco: Never Used   Substance and Sexual Activity     Alcohol use: No     Drug use: No     Sexual activity: Not Currently     Birth control/protection: Surgical   Lifestyle     Physical activity:     Days per week: None     Minutes per session: None     Stress: None   Relationships     Social connections:     Talks on phone: None     Gets together: None     Attends Anabaptism service: None     Active member of club or organization: None     Attends meetings of clubs or organizations: None     Relationship status: None     Intimate partner violence:     Fear of current or ex partner: None     Emotionally abused: None     Physically abused: None     Forced sexual activity: None   Other Topics Concern     Parent/sibling w/ CABG, MI or angioplasty before 65F 55M? Not Asked   Social History Narrative    Smoked one pack per day until two years ago.  Now a nonsmoker.     Family History   Problem Relation Age of Onset     Other - See Comments Mother         lung cancer     Heart Disease Father 70        Heart Disease,MI     Cancer Father         Cancer,Liver cancer     Cancer Other         Cancer,Lung cancer     Diabetes Other         Diabetes     Lung Cancer Brother      Colon Cancer No family hx of         Cancer-colon     Prostate Cancer No family hx of         Cancer-prostate     Ovarian Cancer No family hx of         Cancer-ovarian     Blood Disease No family hx of         Blood Disease     Hypertension No family hx of         Hypertension     Thyroid Disease No family hx of         Thyroid Disease     Breast Cancer No family hx of         Cancer-breast       EXAM:   Vitals:    19 1436   BP: 131/80   Pulse: 72   Resp: 12   Temp: 99.1  F (37.3  C)   TempSrc: Tympanic   Weight: 97.6 kg (215 lb 3.2 oz)   Height: 1.518 m (4' 11.75\")       Current Pain Score: No Pain (0)     BP Readings from Last 3 Encounters: " "  19 131/80   19 136/76   19 130/72      Wt Readings from Last 3 Encounters:   19 97.6 kg (215 lb 3.2 oz)   19 94.3 kg (208 lb)   19 95.5 kg (210 lb 9.6 oz)      Estimated body mass index is 42.38 kg/m  as calculated from the following:    Height as of this encounter: 1.518 m (4' 11.75\").    Weight as of this encounter: 97.6 kg (215 lb 3.2 oz).     Physical Exam   Constitutional: She is oriented to person, place, and time. She appears well-developed and well-nourished.   Cardiovascular: Normal rate.   Pulmonary/Chest: Effort normal.   Musculoskeletal: Normal range of motion.   Neurological: She is alert and oriented to person, place, and time.   Skin: Skin is warm and dry.   Toenails trimmed with dremal as requested  Tolerated well all toenails thickened with fungus however no erythema or dermatitis  Is wearing open toe sandals  CMS intact   Psychiatric: She has a normal mood and affect. Her behavior is normal. Judgment and thought content normal.   Nursing note and vitals reviewed.       INVESTIGATIONS:  Results for orders placed or performed during the hospital encounter of 19   CT Chest Lung Cancer Scrn Low Dose wo    Narrative    CT Low Dose Lung Cancer Screening    History: Screening for lung cancer, smoking.    Number of packs-year of smokin  Current or Former smoker?: Former  if former, number of years since quit?: >15    Comparison: None    Technique: Helical acquisition Low dose CT chest. Images reviewed in  lung, soft tissue and bone windows.  DLP: 139 (mGy*cm)    Findings: [All follow up of nodules are based on ACR guidelines for  lung cancer screening and measurements of each nodule size must be the  mean of the longest 2 axial plane perpendiculars]  Nodules: 0  There is scarring or atelectasis in both lung bases. No suspicious  lung nodule or mass.    Emphysema: Mild    Mosaic attenuation: None    Bronchial wall thickening: None.    Bronchiectasis: " Minimal    Coronary artery calcium: None    Other portions of the chest: The thoracic aorta is normal in caliber.  No lymphadenopathy in the chest.     Upper abdomen: Unremarkable     Bones: No suspicious osseous lesions      Impression    Impression:   1. ACR Assessment Category:  Lung-RADS Category 1. Negative .     Recommendation:  Lung-RADS Category 1. Negative, continue annual  screening with Lung cancer screening CT (please order exam code IMG  2290) .     2. Significant Incidental Finding(s):  Category S: No.    3. Prior history of Lung cancer:  Category C: no.    ROLANDO SKINNER MD       ASSESSMENT AND PLAN:  Problem List Items Addressed This Visit        Musculoskeletal and Integumentary    Onychogryphosis      Other Visit Diagnoses     PVD (peripheral vascular disease) (H)    -  Primary    Relevant Medications    rosuvastatin (CRESTOR) 20 MG tablet    History of tobacco use disorder        Relevant Medications    rosuvastatin (CRESTOR) 20 MG tablet    Overgrown toenails            She remains tobacco free--continue medical management with low-dose aspirin, statin and dietary and lifestyle  modifications for vascular health    She will return in 1 month for onychomycosis care  Yearly low radiation chest CT screenings  Current mammography screening    No changes to update health history since last office visit, medication reconciliation and complete history reviewed during office visit    -- Expected clinical course discussed    -- Medications and their side effects discussed    There are no Patient Instructions on file for this visit.  Cookie Schultz CNP  [facility]     Portions of this note were dictated using speech recognition software. The note has been proofread but errors in the text may have been overlooked. Please contact me if there are any concerns regarding the accuracy of the dictation.

## 2019-08-29 NOTE — NURSING NOTE
"Patient presents to clinic for follow up chest X Ray from 6/11/19. Had chest x ray to rule out COPD/lung cancer/other lung problems related to smoking for so many years.   Chief Complaint   Patient presents with     Clinic Care Coordination - Follow-up     CXR 6/11/19       Initial /80   Pulse 72   Temp 99.1  F (37.3  C) (Tympanic)   Resp 12   Ht 1.518 m (4' 11.75\")   Wt 97.6 kg (215 lb 3.2 oz)   LMP  (LMP Unknown)   Breastfeeding? No   BMI 42.38 kg/m   Estimated body mass index is 42.38 kg/m  as calculated from the following:    Height as of this encounter: 1.518 m (4' 11.75\").    Weight as of this encounter: 97.6 kg (215 lb 3.2 oz).  Medication Reconciliation: complete    Gabriela Swann LPN    "

## 2019-11-15 ENCOUNTER — OFFICE VISIT (OUTPATIENT)
Dept: INTERNAL MEDICINE | Facility: OTHER | Age: 69
End: 2019-11-15
Attending: NURSE PRACTITIONER
Payer: COMMERCIAL

## 2019-11-15 VITALS
TEMPERATURE: 98.4 F | BODY MASS INDEX: 43.62 KG/M2 | DIASTOLIC BLOOD PRESSURE: 72 MMHG | SYSTOLIC BLOOD PRESSURE: 130 MMHG | RESPIRATION RATE: 16 BRPM | WEIGHT: 221.5 LBS | HEART RATE: 103 BPM | OXYGEN SATURATION: 91 %

## 2019-11-15 DIAGNOSIS — Z23 NEED FOR PNEUMOCOCCAL VACCINATION: ICD-10-CM

## 2019-11-15 DIAGNOSIS — B35.1 ONYCHOMYCOSIS: Primary | ICD-10-CM

## 2019-11-15 DIAGNOSIS — R19.5 POSITIVE COLORECTAL CANCER SCREENING USING DNA-BASED STOOL TEST: ICD-10-CM

## 2019-11-15 DIAGNOSIS — I73.9 PERIPHERAL VASCULAR DISEASE (H): ICD-10-CM

## 2019-11-15 DIAGNOSIS — Z23 NEED FOR IMMUNIZATION AGAINST INFLUENZA: ICD-10-CM

## 2019-11-15 DIAGNOSIS — G50.0 TRIGEMINAL NEURALGIA OF RIGHT SIDE OF FACE: ICD-10-CM

## 2019-11-15 DIAGNOSIS — M79.7 FIBROMYALGIA: ICD-10-CM

## 2019-11-15 PROCEDURE — G0009 ADMIN PNEUMOCOCCAL VACCINE: HCPCS

## 2019-11-15 PROCEDURE — 90732 PPSV23 VACC 2 YRS+ SUBQ/IM: CPT

## 2019-11-15 PROCEDURE — G0463 HOSPITAL OUTPT CLINIC VISIT: HCPCS | Mod: 25

## 2019-11-15 PROCEDURE — 90471 IMMUNIZATION ADMIN: CPT

## 2019-11-15 PROCEDURE — G0127 TRIM NAIL(S): HCPCS | Performed by: NURSE PRACTITIONER

## 2019-11-15 PROCEDURE — G0008 ADMIN INFLUENZA VIRUS VAC: HCPCS

## 2019-11-15 PROCEDURE — 99215 OFFICE O/P EST HI 40 MIN: CPT | Mod: 25 | Performed by: NURSE PRACTITIONER

## 2019-11-15 PROCEDURE — 90662 IIV NO PRSV INCREASED AG IM: CPT

## 2019-11-15 RX ORDER — CARBAMAZEPINE 200 MG/1
100 TABLET ORAL 2 TIMES DAILY
Qty: 120 TABLET | Refills: 3 | Status: SHIPPED | OUTPATIENT
Start: 2019-11-15 | End: 2021-03-04

## 2019-11-15 ASSESSMENT — ENCOUNTER SYMPTOMS
DYSURIA: 0
VOMITING: 0
CONFUSION: 0
ARTHRALGIAS: 0
WHEEZING: 0
EYE DISCHARGE: 0
SLEEP DISTURBANCE: 0
FREQUENCY: 0
NAUSEA: 0
PARESTHESIAS: 0
POLYDIPSIA: 0
POLYPHAGIA: 0
CHILLS: 0
HEADACHES: 0
COUGH: 0
WOUND: 0
DYSPHORIC MOOD: 0
ADENOPATHY: 0
UNEXPECTED WEIGHT CHANGE: 0
DIARRHEA: 0
SHORTNESS OF BREATH: 0
SORE THROAT: 0
MYALGIAS: 0
HEARTBURN: 0
DIZZINESS: 0
EYE REDNESS: 0
HEMATOCHEZIA: 0
CONSTIPATION: 0
NERVOUS/ANXIOUS: 0
FEVER: 0
NUMBNESS: 0
PALPITATIONS: 0
ABDOMINAL PAIN: 0

## 2019-11-15 ASSESSMENT — PAIN SCALES - GENERAL: PAINLEVEL: NO PAIN (0)

## 2019-11-15 NOTE — PROGRESS NOTES
Subjective:  She is here today to establish care.  She also needs to have her toenails trimmed.  She has this completed every 3 months.  She has onychomycosis and painful toenails and moderate peripheral vascular disease as indicated on recent CURT.  She does not have symptoms of claudication.  She also needs a refill of Tegretol which she takes for trigeminal neuralgia and fibromyalgia.  This works well.  She is in need of immunization update.  She is up-to-date on mammogram.  She has had positive DNA based stool test for colorectal cancer.  She states this was about 2 years ago.  She declines colonoscopy.  She is aware of the concern for colon cancer with a positive DNA test.  She has history of tobacco abuse however quit in 2001.  She had a 30 pack/year history.  She did have a lung CT completed earlier this year that showed mild emphysema.  That was completed for a chronic cough that has subsided.    Patient Active Problem List   Diagnosis     Abnormal findings in stool     Asymptomatic varicose veins of bilateral lower extremities     Bilateral lower extremity edema     Fibromyalgia     CMC arthritis     History of tobacco use     Onychogryphosis     History of small bowel obstruction     Positive colorectal cancer screening using DNA-based stool test     Requires assistance with activities of daily living (ADL)     Trigeminal neuralgia of right side of face     Abnormal ankle brachial index (CURT)     Decreased activities of daily living (ADL)     Detrusor dysfunction     Peripheral vascular disease (H)     Past Medical History:   Diagnosis Date     Detrusor dysfunction 2/5/2019     Dorsopathy     Recurrent back problems , secondary to injury at [facility]     Fibromyalgia     7/9/1997     Nonpsychotic mental disorder     not otherwise specified     Onychogryphosis     4/24/2017     Personal history of other diseases of the digestive system (CODE)     7/9/2017,8/31/95--Dr Barrow--had incidental Appendectomy--Multiple  adhesions released     Primary osteoarthritis of hand     2018     Past Surgical History:   Procedure Laterality Date     APPENDECTOMY OPEN      ,Incidental, Enterolysis--Dr Barrow     CHOLECYSTECTOMY      ,Laparoscopic     COLONOSCOPY      ?     HYSTERECTOMY TOTAL ABDOMINAL      1995,Azucena     OTHER SURGICAL HISTORY      ,CRYOTHERAPY OF CERVIX,Abnormal Pap     Social History     Socioeconomic History     Marital status:      Spouse name: Not on file     Number of children: Not on file     Years of education: Not on file     Highest education level: Not on file   Occupational History     Not on file   Social Needs     Financial resource strain: Not on file     Food insecurity:     Worry: Not on file     Inability: Not on file     Transportation needs:     Medical: Not on file     Non-medical: Not on file   Tobacco Use     Smoking status: Former Smoker     Packs/day: 1.00     Years: 30.00     Pack years: 30.00     Types: Cigarettes     Last attempt to quit: 2001     Years since quittin.8     Smokeless tobacco: Never Used     Tobacco comment: no ecig   Substance and Sexual Activity     Alcohol use: No     Drug use: No     Sexual activity: Not Currently     Birth control/protection: Surgical   Lifestyle     Physical activity:     Days per week: Not on file     Minutes per session: Not on file     Stress: Not on file   Relationships     Social connections:     Talks on phone: Not on file     Gets together: Not on file     Attends Rastafari service: Not on file     Active member of club or organization: Not on file     Attends meetings of clubs or organizations: Not on file     Relationship status: Not on file     Intimate partner violence:     Fear of current or ex partner: Not on file     Emotionally abused: Not on file     Physically abused: Not on file     Forced sexual activity: Not on file   Other Topics Concern     Parent/sibling w/ CABG, MI or angioplasty before 65F  55M? Not Asked   Social History Narrative    Smoked one pack per day until two years ago.  Now a nonsmoker.     Family History   Problem Relation Age of Onset     Other - See Comments Mother         lung cancer     Heart Disease Father 70        Heart Disease,MI     Cancer Father         Cancer,Liver cancer     Cancer Other         Cancer,Lung cancer     Diabetes Other         Diabetes     Lung Cancer Brother      Colon Cancer No family hx of         Cancer-colon     Prostate Cancer No family hx of         Cancer-prostate     Ovarian Cancer No family hx of         Cancer-ovarian     Blood Disease No family hx of         Blood Disease     Hypertension No family hx of         Hypertension     Thyroid Disease No family hx of         Thyroid Disease     Breast Cancer No family hx of         Cancer-breast     Current Outpatient Medications   Medication Sig Dispense Refill     carBAMazepine (TEGRETOL) 200 MG tablet Take 0.5 tablets (100 mg) by mouth 2 times daily 120 tablet 3     ammonium lactate (AMLACTIN) 12 % cream Apply topically 2 times daily 140 g 3     aspirin (ASA) 81 MG tablet Take 1 tablet (81 mg) by mouth daily 90 tablet 3     benzonatate (TESSALON) 100 MG capsule Take 1 capsule (100 mg) by mouth 3 times daily as needed for cough 90 capsule 3     famotidine (PEPCID) 40 MG tablet TAKE 1 TABLET BY MOUTH ONCE DAILY 90 tablet 3     ibuprofen (ADVIL/MOTRIN) 200 MG tablet Take 200 mg by mouth every 4 hours as needed for mild pain       Incontinence Supply Disposable (SM INCONTINENT LINER DISP) MISC POISE PAD REG LNG/ABS #3 ITEM #[id number] DX:R32 [facility]: 270 each 11     lidocaine-prilocaine (EMLA) 2.5-2.5 % external cream Apply topically as needed for moderate pain 30 g 0     loratadine (CLARITIN) 10 MG tablet Take 1 tablet (10 mg) by mouth daily 90 tablet 3     rosuvastatin (CRESTOR) 20 MG tablet Take 1 tablet (20 mg) by mouth daily 90 tablet 3     Skin Protectants, Misc. (EUCERIN) cream Eucerin or insurance covered  moisturizer cream to body 2 x daily and as needed       Diatrizoate      Review of Systems:  Review of Systems   Constitutional: Negative for chills, fever and unexpected weight change.   HENT: Negative for congestion, ear pain and sore throat.    Eyes: Negative for discharge and redness.   Respiratory: Negative for cough, shortness of breath and wheezing.    Cardiovascular: Negative for chest pain, palpitations and peripheral edema.   Gastrointestinal: Negative for abdominal pain, constipation, diarrhea, heartburn, hematochezia, nausea and vomiting.   Endocrine: Negative for cold intolerance, heat intolerance, polydipsia, polyphagia and polyuria.   Genitourinary: Negative for dysuria, frequency and vaginal bleeding.   Musculoskeletal: Negative for arthralgias and myalgias.   Skin: Negative for pallor, rash and wound.        See HPI in regards to toenails   Allergic/Immunologic: Negative for immunocompromised state.   Neurological: Negative for dizziness, numbness, headaches and paresthesias.   Hematological: Negative for adenopathy.   Psychiatric/Behavioral: Negative for confusion, dysphoric mood and sleep disturbance. The patient is not nervous/anxious.        Objective:   /72 (BP Location: Right arm, Patient Position: Sitting, Cuff Size: Adult Regular)   Pulse 103   Temp 98.4  F (36.9  C) (Tympanic)   Resp 16   Wt 100.5 kg (221 lb 8 oz)   LMP  (LMP Unknown)   SpO2 91%   BMI 43.62 kg/m    Physical Exam  Pleasant female in no acute distress.  Affect normal.  Alert and oriented x4.  Skin color pink.  Sclera nonicteric.  Mucous memories moist.  Neck supple without adenopathy.  Lung fields clear to auscultation throughout.  Cardia vascular regular rate and rhythm.  Abdomen is soft with normal active bowel sounds x4 quadrants.  No tenderness masses organomegaly.  No abdominal bruits or pulsatile masses.  Examination difficult secondary to body habitus.  Bilateral extremities with trace edema.  She has a  long thick onychomycotic toenails.  10 toenails are trimmed with no disruption to the surrounding tissue.  Well-tolerated.  DP PT palpable.  Capillary refill less than 3 seconds.    Assessment:    ICD-10-CM    1. Onychomycosis B35.1 DEBRIDEMENT OF NAIL(S), 1-5     PODIATRY/FOOT & ANKLE SURGERY REFERRAL   2. Peripheral vascular disease (H) I73.9 PODIATRY/FOOT & ANKLE SURGERY REFERRAL   3. Trigeminal neuralgia of right side of face G50.0 carBAMazepine (TEGRETOL) 200 MG tablet   4. Fibromyalgia M79.7 carBAMazepine (TEGRETOL) 200 MG tablet   5. Need for immunization against influenza Z23 HC FLU VACCINE, INCREASED ANTIGEN, PRESV FREE   6. Need for pneumococcal vaccination Z23 PNEUMOCOCCAL VACCINE,ADULT,SQ OR IM   7. Positive colorectal cancer screening using DNA-based stool test R19.5        Plan:   1.  Referral provided for her to see podiatry.  I feel that she would best be served by filing of her nails especially with her moderate peripheral vascular disease.  She will need an appointment in 3 months.  2.  Refill provided of Tegretol which she takes for trigeminal neuralgia.  3.  Influenza vaccine and Pneumovax 23 updated.  4.  Encouraged colonoscopy evaluation due to positive Cologuard.  She states she is not interested and is aware of the risk and benefits of proceeding with colonoscopy.  She will let us know should she decides otherwise.    Marcela De Luna, DISHA   11/15/2019  3:04 PM

## 2019-11-15 NOTE — NURSING NOTE
Chief Complaint   Patient presents with     Follow Up     foot care       Medication Reconciliation: complete  Johanna Barnes LPN  ..................11/15/2019   2:30 PM

## 2020-02-21 ENCOUNTER — HOSPITAL ENCOUNTER (OUTPATIENT)
Dept: MAMMOGRAPHY | Facility: OTHER | Age: 70
Discharge: HOME | End: 2020-02-21
Attending: NURSE PRACTITIONER | Admitting: NURSE PRACTITIONER
Payer: COMMERCIAL

## 2020-02-21 DIAGNOSIS — Z12.31 VISIT FOR SCREENING MAMMOGRAM: ICD-10-CM

## 2020-02-21 PROCEDURE — 77067 SCR MAMMO BI INCL CAD: CPT

## 2020-04-01 DIAGNOSIS — K21.9 GASTROESOPHAGEAL REFLUX DISEASE, ESOPHAGITIS PRESENCE NOT SPECIFIED: ICD-10-CM

## 2020-04-01 DIAGNOSIS — R05.9 COUGH: ICD-10-CM

## 2020-04-02 NOTE — TELEPHONE ENCOUNTER
Routing refill request to provider for review/approval because:  Drug not on the [facility] refill protocol     LOV; 11/15/19    Kori Daigle RN on 4/2/2020 at 9:42 AM

## 2020-04-03 RX ORDER — FAMOTIDINE 40 MG/1
40 TABLET, FILM COATED ORAL DAILY
Qty: 90 TABLET | Refills: 1 | Status: SHIPPED | OUTPATIENT
Start: 2020-04-03 | End: 2020-06-30

## 2020-04-03 RX ORDER — BENZONATATE 100 MG/1
100 CAPSULE ORAL 3 TIMES DAILY PRN
Qty: 90 CAPSULE | Refills: 3 | Status: SHIPPED | OUTPATIENT
Start: 2020-04-03 | End: 2020-08-11

## 2020-05-26 DIAGNOSIS — R09.82 ALLERGIC RHINITIS WITH POSTNASAL DRIP: ICD-10-CM

## 2020-05-26 DIAGNOSIS — R05.9 COUGH: ICD-10-CM

## 2020-05-26 DIAGNOSIS — J30.9 ALLERGIC RHINITIS WITH POSTNASAL DRIP: ICD-10-CM

## 2020-05-27 RX ORDER — LORATADINE 10 MG/1
10 TABLET ORAL DAILY
Qty: 90 TABLET | Refills: 3 | OUTPATIENT
Start: 2020-05-27

## 2020-05-27 NOTE — TELEPHONE ENCOUNTER
Transferred patient to scheduling to set up a visit for refill. Johanna Barnes LPN .............5/27/2020  3:10 PM

## 2020-06-01 ENCOUNTER — VIRTUAL VISIT (OUTPATIENT)
Dept: INTERNAL MEDICINE | Facility: OTHER | Age: 70
End: 2020-06-01
Attending: NURSE PRACTITIONER
Payer: COMMERCIAL

## 2020-06-01 DIAGNOSIS — J30.9 ALLERGIC RHINITIS WITH POSTNASAL DRIP: ICD-10-CM

## 2020-06-01 DIAGNOSIS — R09.82 ALLERGIC RHINITIS WITH POSTNASAL DRIP: ICD-10-CM

## 2020-06-01 DIAGNOSIS — R05.9 COUGH: ICD-10-CM

## 2020-06-01 PROCEDURE — 99441 ZZC PHYSICIAN TELEPHONE EVALUATION 5-10 MIN: CPT | Performed by: NURSE PRACTITIONER

## 2020-06-01 RX ORDER — LORATADINE 10 MG/1
10 TABLET ORAL DAILY
Qty: 90 TABLET | Refills: 3 | Status: SHIPPED | OUTPATIENT
Start: 2020-06-01 | End: 2021-05-24

## 2020-06-01 ASSESSMENT — PAIN SCALES - GENERAL: PAINLEVEL: NO PAIN (0)

## 2020-06-01 NOTE — PROGRESS NOTES
"Pennie Nichols is a 69 year old female who is being evaluated via a billable telephone visit.      The patient has been notified of following:     \"This telephone visit will be conducted via a call between you and your physician/provider. We have found that certain health care needs can be provided without the need for a physical exam.  This service lets us provide the care you need with a short phone conversation.  If a prescription is necessary we can send it directly to your pharmacy.  If lab work is needed we can place an order for that and you can then stop by our lab to have the test done at a later time.    Telephone visits are billed at different rates depending on your insurance coverage. During this emergency period, for some insurers they may be billed the same as an in-person visit.  Please reach out to your insurance provider with any questions.    If during the course of the call the physician/provider feels a telephone visit is not appropriate, you will not be charged for this service.\"    Patient has given verbal consent for Telephone visit?  Yes    What phone number would you like to be contacted at? [phone]    How would you like to obtain your AVS? Mail a copy    Subjective     Pennie Nichols is a 69 year old female who presents via phone visit today for the following health issues:    HPI  She is needing a follow-up on allergic rhinitis.  Patient states that she uses Claritin.  She needs a refill of this medication and her insurance usually does cover this for her.  The allergy symptom that bothers her most is usually a postnasal drip which then leads to a cough.  As long as she takes the Claritin that is controlled.  Otherwise doing well and has no concerns.  She is warning when she needs immunizations again.  She would be due for the flu vaccine in the fall otherwise other immunizations are up-to-date.    Patient Active Problem List   Diagnosis     Abnormal findings in stool     " Asymptomatic varicose veins of bilateral lower extremities     Bilateral lower extremity edema     Fibromyalgia     CMC arthritis     History of tobacco use     Onychogryphosis     History of small bowel obstruction     Positive colorectal cancer screening using DNA-based stool test     Requires assistance with activities of daily living (ADL)     Trigeminal neuralgia of right side of face     Abnormal ankle brachial index (CURT)     Decreased activities of daily living (ADL)     Detrusor dysfunction     Peripheral vascular disease (H)     Past Surgical History:   Procedure Laterality Date     APPENDECTOMY OPEN      ,Incidental, Enterolysis--Dr Barrow     CHOLECYSTECTOMY      ,Laparoscopic     COLONOSCOPY      ?     HYSTERECTOMY TOTAL ABDOMINAL      1995,Azucena     OTHER SURGICAL HISTORY      ,CRYOTHERAPY OF CERVIX,Abnormal Pap       Social History     Tobacco Use     Smoking status: Former Smoker     Packs/day: 1.00     Years: 30.00     Pack years: 30.00     Types: Cigarettes     Last attempt to quit: 2001     Years since quittin.3     Smokeless tobacco: Never Used     Tobacco comment: no ecig   Substance Use Topics     Alcohol use: No     Family History   Problem Relation Age of Onset     Other - See Comments Mother         lung cancer     Heart Disease Father 70        Heart Disease,MI     Cancer Father         Cancer,Liver cancer     Cancer Other         Cancer,Lung cancer     Diabetes Other         Diabetes     Lung Cancer Brother      Colon Cancer No family hx of         Cancer-colon     Prostate Cancer No family hx of         Cancer-prostate     Ovarian Cancer No family hx of         Cancer-ovarian     Blood Disease No family hx of         Blood Disease     Hypertension No family hx of         Hypertension     Thyroid Disease No family hx of         Thyroid Disease     Breast Cancer No family hx of         Cancer-breast         Current Outpatient Medications   Medication Sig  Dispense Refill     aspirin (ASA) 81 MG tablet Take 1 tablet (81 mg) by mouth daily 90 tablet 3     carBAMazepine (TEGRETOL) 200 MG tablet Take 0.5 tablets (100 mg) by mouth 2 times daily 120 tablet 3     famotidine (PEPCID) 40 MG tablet Take 1 tablet (40 mg) by mouth daily 90 tablet 1     ibuprofen (ADVIL/MOTRIN) 200 MG tablet Take 200 mg by mouth every 4 hours as needed for mild pain       loratadine (CLARITIN) 10 MG tablet Take 1 tablet (10 mg) by mouth daily 90 tablet 3     rosuvastatin (CRESTOR) 20 MG tablet Take 1 tablet (20 mg) by mouth daily 90 tablet 3     Skin Protectants, Misc. (EUCERIN) cream Eucerin or insurance covered moisturizer cream to body 2 x daily and as needed       benzonatate (TESSALON) 100 MG capsule Take 1 capsule (100 mg) by mouth 3 times daily as needed for cough 90 capsule 3     Incontinence Supply Disposable (SM INCONTINENT LINER DISP) MISC POISE PAD REG LNG/ABS #3 ITEM #[id number] DX:R32 [facility]: 270 each 11     Allergies   Allergen Reactions     Diatrizoate      Other reaction(s): Angioedema  As noted 2009 note Dr Stone       Reviewed and updated as needed this visit by Provider         Review of Systems   Constitutional, HEENT, cardiovascular, pulmonary, gi and gu systems are negative, except as otherwise noted.       Objective   Reported vitals:  LMP  (LMP Unknown)    healthy, alert and no distress  PSYCH: Alert and oriented times 3; coherent speech, normal   rate and volume, able to articulate logical thoughts, able   to abstract reason, no tangential thoughts, no hallucinations   or delusions  Her affect is normal  RESP: No cough, no audible wheezing, able to talk in full sentences  Remainder of exam unable to be completed due to telephone visits          Assessment/Plan:    ICD-10-CM    1. Cough  R05 loratadine (CLARITIN) 10 MG tablet   2. Allergic rhinitis with postnasal drip  J30.9 loratadine (CLARITIN) 10 MG tablet    R09.82        No follow-ups on file.      Phone call  duration:  5minutes    Marcela De Luna, NP

## 2020-06-23 ENCOUNTER — NURSE TRIAGE (OUTPATIENT)
Dept: INTERNAL MEDICINE | Facility: OTHER | Age: 70
End: 2020-06-23

## 2020-06-23 ENCOUNTER — APPOINTMENT (OUTPATIENT)
Dept: GENERAL RADIOLOGY | Facility: OTHER | Age: 70
End: 2020-06-23
Attending: PHYSICIAN ASSISTANT
Payer: COMMERCIAL

## 2020-06-23 ENCOUNTER — HOSPITAL ENCOUNTER (EMERGENCY)
Facility: OTHER | Age: 70
Discharge: HOME | End: 2020-06-23
Attending: PHYSICIAN ASSISTANT | Admitting: PHYSICIAN ASSISTANT
Payer: COMMERCIAL

## 2020-06-23 VITALS
SYSTOLIC BLOOD PRESSURE: 149 MMHG | OXYGEN SATURATION: 95 % | BODY MASS INDEX: 45.3 KG/M2 | HEART RATE: 93 BPM | TEMPERATURE: 97.2 F | DIASTOLIC BLOOD PRESSURE: 85 MMHG | RESPIRATION RATE: 17 BRPM | WEIGHT: 230 LBS

## 2020-06-23 DIAGNOSIS — R07.9 CHEST PAIN: ICD-10-CM

## 2020-06-23 LAB
ALBUMIN SERPL-MCNC: 4.2 G/DL (ref 3.5–5.7)
ALP SERPL-CCNC: 83 U/L (ref 34–104)
ALT SERPL W P-5'-P-CCNC: 12 U/L (ref 7–52)
ANION GAP SERPL CALCULATED.3IONS-SCNC: 8 MMOL/L (ref 3–14)
APTT PPP: 32 SEC (ref 22–37)
AST SERPL W P-5'-P-CCNC: 13 U/L (ref 13–39)
BASOPHILS # BLD AUTO: 0 10E9/L (ref 0–0.2)
BASOPHILS NFR BLD AUTO: 0.5 %
BILIRUB SERPL-MCNC: 0.4 MG/DL (ref 0.3–1)
BUN SERPL-MCNC: 8 MG/DL (ref 7–25)
CALCIUM SERPL-MCNC: 9.2 MG/DL (ref 8.6–10.3)
CHLORIDE SERPL-SCNC: 94 MMOL/L (ref 98–107)
CO2 SERPL-SCNC: 30 MMOL/L (ref 21–31)
CREAT SERPL-MCNC: 0.64 MG/DL (ref 0.6–1.2)
DIFFERENTIAL METHOD BLD: ABNORMAL
EOSINOPHIL # BLD AUTO: 0.2 10E9/L (ref 0–0.7)
EOSINOPHIL NFR BLD AUTO: 2.2 %
ERYTHROCYTE [DISTWIDTH] IN BLOOD BY AUTOMATED COUNT: 13.2 % (ref 10–15)
GFR SERPL CREATININE-BSD FRML MDRD: >90 ML/MIN/{1.73_M2}
GLUCOSE SERPL-MCNC: 123 MG/DL (ref 70–105)
HCT VFR BLD AUTO: 43.6 % (ref 35–47)
HGB BLD-MCNC: 13.6 G/DL (ref 11.7–15.7)
IMM GRANULOCYTES # BLD: 0.1 10E9/L (ref 0–0.4)
IMM GRANULOCYTES NFR BLD: 0.6 %
INR PPP: 1.08 (ref 0–1.3)
LYMPHOCYTES # BLD AUTO: 1.4 10E9/L (ref 0.8–5.3)
LYMPHOCYTES NFR BLD AUTO: 16.4 %
MCH RBC QN AUTO: 26 PG (ref 26.5–33)
MCHC RBC AUTO-ENTMCNC: 31.2 G/DL (ref 31.5–36.5)
MCV RBC AUTO: 83 FL (ref 78–100)
MONOCYTES # BLD AUTO: 0.5 10E9/L (ref 0–1.3)
MONOCYTES NFR BLD AUTO: 5.4 %
NEUTROPHILS # BLD AUTO: 6.3 10E9/L (ref 1.6–8.3)
NEUTROPHILS NFR BLD AUTO: 74.9 %
NT-PROBNP SERPL-MCNC: 11 PG/ML (ref 0–100)
PLATELET # BLD AUTO: 277 10E9/L (ref 150–450)
POTASSIUM SERPL-SCNC: 4 MMOL/L (ref 3.5–5.1)
PROT SERPL-MCNC: 7.3 G/DL (ref 6.4–8.9)
RBC # BLD AUTO: 5.23 10E12/L (ref 3.8–5.2)
SODIUM SERPL-SCNC: 132 MMOL/L (ref 134–144)
TROPONIN I SERPL-MCNC: 2.9 PG/ML
TROPONIN I SERPL-MCNC: 3.8 PG/ML
WBC # BLD AUTO: 8.3 10E9/L (ref 4–11)

## 2020-06-23 PROCEDURE — 93005 ELECTROCARDIOGRAM TRACING: CPT | Performed by: PHYSICIAN ASSISTANT

## 2020-06-23 PROCEDURE — 83880 ASSAY OF NATRIURETIC PEPTIDE: CPT | Mod: GZ | Performed by: PHYSICIAN ASSISTANT

## 2020-06-23 PROCEDURE — 80053 COMPREHEN METABOLIC PANEL: CPT | Performed by: PHYSICIAN ASSISTANT

## 2020-06-23 PROCEDURE — 99285 EMERGENCY DEPT VISIT HI MDM: CPT | Mod: 25 | Performed by: PHYSICIAN ASSISTANT

## 2020-06-23 PROCEDURE — 85610 PROTHROMBIN TIME: CPT | Performed by: PHYSICIAN ASSISTANT

## 2020-06-23 PROCEDURE — 93010 ELECTROCARDIOGRAM REPORT: CPT | Performed by: INTERNAL MEDICINE

## 2020-06-23 PROCEDURE — 85025 COMPLETE CBC W/AUTO DIFF WBC: CPT | Performed by: PHYSICIAN ASSISTANT

## 2020-06-23 PROCEDURE — 36415 COLL VENOUS BLD VENIPUNCTURE: CPT | Mod: XU | Performed by: PHYSICIAN ASSISTANT

## 2020-06-23 PROCEDURE — 84484 ASSAY OF TROPONIN QUANT: CPT | Performed by: PHYSICIAN ASSISTANT

## 2020-06-23 PROCEDURE — U0003 INFECTIOUS AGENT DETECTION BY NUCLEIC ACID (DNA OR RNA); SEVERE ACUTE RESPIRATORY SYNDROME CORONAVIRUS 2 (SARS-COV-2) (CORONAVIRUS DISEASE [COVID-19]), AMPLIFIED PROBE TECHNIQUE, MAKING USE OF HIGH THROUGHPUT TECHNOLOGIES AS DESCRIBED BY CMS-2020-01-R: HCPCS | Performed by: PHYSICIAN ASSISTANT

## 2020-06-23 PROCEDURE — 25000132 ZZH RX MED GY IP 250 OP 250 PS 637: Performed by: PHYSICIAN ASSISTANT

## 2020-06-23 PROCEDURE — C9803 HOPD COVID-19 SPEC COLLECT: HCPCS | Performed by: PHYSICIAN ASSISTANT

## 2020-06-23 PROCEDURE — 99284 EMERGENCY DEPT VISIT MOD MDM: CPT | Mod: Z6 | Performed by: PHYSICIAN ASSISTANT

## 2020-06-23 PROCEDURE — 85730 THROMBOPLASTIN TIME PARTIAL: CPT | Performed by: PHYSICIAN ASSISTANT

## 2020-06-23 PROCEDURE — 71045 X-RAY EXAM CHEST 1 VIEW: CPT

## 2020-06-23 RX ORDER — ASPIRIN 81 MG/1
324 TABLET, CHEWABLE ORAL ONCE
Status: COMPLETED | OUTPATIENT
Start: 2020-06-23 | End: 2020-06-23

## 2020-06-23 RX ADMIN — ASPIRIN 81 MG 324 MG: 81 TABLET ORAL at 15:18

## 2020-06-23 NOTE — ED AVS SNAPSHOT
[facility]  [address]  [place] [address]  Phone:  [phone]  Fax:  [phone]                                    Pennie Nichols   MRN: [id number]    Department:  [facility]   Date of Visit:  6/23/2020           After Visit Summary Signature Page    I have received my discharge instructions, and my questions have been answered. I have discussed any challenges I see with this plan with the nurse or doctor.    ..........................................................................................................................................  Patient/Patient Representative Signature      ..........................................................................................................................................  Patient Representative Print Name and Relationship to Patient    ..................................................               ................................................  Date                                   Time    ..........................................................................................................................................  Reviewed by Signature/Title    ...................................................              ..............................................  Date                                               Time          22EPIC Rev 08/18

## 2020-06-23 NOTE — LETTER
June 27, 2020        Pennie Nichols  [address] [place] [address] [address]  [facility] [address]    This letter provides a written record that you were tested for COVID-19 on 6/23/20.       Your result was negative. This means that we didn t find the virus that causes COVID-19 in your sample. A test may show negative when you do actually have the virus. This can happen when the virus is in the early stages of infection, before you feel illness symptoms.    If you have symptoms   Stay home and away from others (self-isolate) until you meet ALL of the guidelines below:    You ve had no fever--and no medicine that reduces fever--for 3 full days (72 hours). And      Your other symptoms have gotten better. For example, your cough or breathing has improved. And     At least 10 days have passed since your symptoms started.    During this time:    Stay home. Don t go to work, school or anywhere else.     Stay in your own room, including for meals. Use your own bathroom if you can.    Stay away from others in your home. No hugging, kissing or shaking hands. No visitors.    Clean  high touch  surfaces often (doorknobs, counters, handles, etc.). Use a household cleaning spray or wipes. You can find a full list on the EPA website at www.epa.gov/pesticide-registration/list-n-disinfectants-use-against-sars-cov-2.    Cover your mouth and nose with a mask, tissue or washcloth to avoid spreading germs.    Wash your hands and face often with soap and water.    Going back to work  Check with your employer for any guidelines to follow for going back to work.    Employers: This document serves as formal notice that your employee tested negative for COVID-19, as of the testing date shown above.

## 2020-06-23 NOTE — ED TRIAGE NOTES
Pt in for C/o intermittent sharp chest pain, provoked by movement and external pressure. Pain is located under the left breast. No complaint of SOB.

## 2020-06-23 NOTE — TELEPHONE ENCOUNTER
"Called and spoke to Patient after verifying last name and date of birth. Pt triaged:    History of PAD  History of small bowel obstruction  History of tobacco abuse    Reason for Disposition    SEVERE abdominal pain (e.g., excruciating)    Pain lasting > 10 minutes and over 50 years old    Pain lasting > 10 minutes and over 35 years old and at least one cardiac risk factor    Additional Information    Negative: Passed out (i.e., fainted, collapsed and was not responding)    Negative: Shock suspected (e.g., cold/pale/clammy skin, too weak to stand, low BP, rapid pulse)    Negative: Visible sweat on face or sweat is dripping down    Answer Assessment - Initial Assessment Questions  1. LOCATION: \"Where does it hurt?\"   Underneath left breast.    2. RADIATION: \"Does the pain shoot anywhere else?\" (e.g., chest, back)  Denies.    3. ONSET: \"When did the pain begin?\" (e.g., minutes, hours or days ago)   Yesterday afternoon.    4. SUDDEN: \"Gradual or sudden onset?\"  Sudden onset.    5. PATTERN \"Does the pain come and go, or is it constant?\"     - If constant: \"Is it getting better, staying the same, or worsening?\"       (Note: Constant means the pain never goes away completely; most serious pain is constant and it progresses)      - If intermittent: \"How long does it last?\" \"Do you have pain now?\"      (Note: Intermittent means the pain goes away completely between bouts)  Comes and goes. Lasting 10-15 minutes at a time, but \"feels like 1/2 hour.\"    6. SEVERITY: \"How bad is the pain?\"  (e.g., Scale 1-10; mild, moderate, or severe)  MODERATE TO SEVERE (7-8/10): interferes with normal activities, has been holding her abdomen all day as slight pressure seems to relieve the pain some.     7. RECURRENT SYMPTOM: \"Have you ever had this type of abdominal pain before?\" If so, ask: \"When was the last time?\" and \"What happened that time?\"   Pt states the only time she had any type of pain that was similar, was when she had pneumonia " "1-2 years ago, however the pain doesn't feel like it is in her lungs    8. AGGRAVATING FACTORS: \"Does anything seem to cause this pain?\" (e.g., foods, stress, alcohol)  Denies. Unable to find a comfortable position.    9. CARDIAC SYMPTOMS: \"Do you have any of the following symptoms: chest pain, difficulty breathing, sweating, nausea?\"  Unsure if chest pain- she never thought about the possibility of it being caused by a cardiac issue.     Denies: difficulty breathing, sweating, nausea    10. OTHER SYMPTOMS: \"Do you have any other symptoms?\" (e.g., fever, vomiting, diarrhea)  Denies: fever (temperature 98.5), vomiting, diarrhea, abdominal bloating, weakness, abd tender to the touch    Pt states she had 2 BM's this morning (somewhere between being formed and loose). Pt wonders however, if she has an intestinal blockage.    Pain worse with random cough.     Pt states her PCA will be there in approximately 30 minutes for her regularly scheduled visit. Pt [facility] next to [facility].    Protocols used: ABDOMINAL PAIN - UPPER-A-OH    Protocol recommends Pt go to ED now, to R/O acute abdomen and due to higher risk of cardiac ischemia as cause of pain. The patient indicates understanding of these issues and agrees with the plan. Pt verbalized plan to call PCA immediately, to see if she can come sooner than 30 minutes, to bring her to ED. If she cannot, she will strongly consider calling 911. Giselle Delong RN .............. 6/23/2020  2:21 PM    "

## 2020-06-23 NOTE — DISCHARGE INSTRUCTIONS
Get plenty of fluids and rest.  As we discussed, all of your studies appeared very well today and you appear to be at very low risk for major adverse cardiac event in the next 6 weeks.  It is possible you are suffering from a muscle strain or some sort of inflammatory process that is occurring.  I recommend taking Tylenol and ibuprofen, alternating them every 4 hours over the next few days to see if that will help improve your discomfort.  An outpatient echocardiogram referral was placed for you, they should be contacting you for that follow-up appointment.  Referrals also placed for you to follow-up with your PCP for reassessment.  Return to the emergency department if you are having worsening or concerning symptoms.

## 2020-06-24 LAB — INTERPRETATION ECG - MUSE: NORMAL

## 2020-06-24 ASSESSMENT — ENCOUNTER SYMPTOMS
BRUISES/BLEEDS EASILY: 0
CHEST TIGHTNESS: 0
FEVER: 0
BACK PAIN: 0
ABDOMINAL PAIN: 0
CONFUSION: 0
WOUND: 0
SHORTNESS OF BREATH: 0
CHILLS: 0
HEMATURIA: 0
ADENOPATHY: 0

## 2020-06-24 NOTE — ED PROVIDER NOTES
History     Chief Complaint   Patient presents with     Chest Pain     HPI  Pennie Nichols is a 69 year old female who presents to the emergency department for evaluation of chest discomfort.  Patient reports that yesterday she was sitting in her recliner chair when she had a cough and immediately thereafter began to have some left-sided chest discomfort under her left breast.  She reports that increases with movement as well as palpation to the area.  It does not radiate.  She denies shortness of breath, abdominal pain, recent fevers.  She has had a slight cough recently.  She denies past history of blood clots or increased swelling in her lower extremities.  She has no other complaints.    Allergies:  Allergies   Allergen Reactions     Diatrizoate      Other reaction(s): Angioedema  As noted 2009 note Dr Stone       Problem List:    Patient Active Problem List    Diagnosis Date Noted     Peripheral vascular disease (H) 11/15/2019     Priority: Medium     Detrusor dysfunction 02/05/2019     Priority: Medium     Decreased activities of daily living (ADL) 04/06/2018     Priority: Medium     Abnormal ankle brachial index (CURT) 02/06/2018     Priority: Medium     CMC arthritis 01/08/2018     Priority: Medium     Positive colorectal cancer screening using DNA-based stool test 01/07/2018     Priority: Medium     Refuses colonoscopy       Bilateral lower extremity edema 01/05/2018     Priority: Medium     History of tobacco use 01/05/2018     Priority: Medium     Asymptomatic varicose veins of bilateral lower extremities 08/07/2017     Priority: Medium     History of small bowel obstruction 07/09/2017     Priority: Medium     Overview:   8/31/95--Dr Barrow--had incidental Appendectomy--Multiple adhesions released       Abnormal findings in stool 07/05/2017     Priority: Medium     Onychogryphosis 04/24/2017     Priority: Medium     Requires assistance with activities of daily living (ADL) 04/24/2017     Priority: Medium      Trigeminal neuralgia of right side of face 2017     Priority: Medium     Fibromyalgia 1997     Priority: Medium        Past Medical History:    Past Medical History:   Diagnosis Date     Detrusor dysfunction 2019     Dorsopathy      Fibromyalgia      Nonpsychotic mental disorder      Onychogryphosis      Personal history of other diseases of the digestive system (CODE)      Primary osteoarthritis of hand        Past Surgical History:    Past Surgical History:   Procedure Laterality Date     APPENDECTOMY OPEN      ,Incidental, Enterolysis--Dr Barrow     CHOLECYSTECTOMY      ,Laparoscopic     COLONOSCOPY      ?     HYSTERECTOMY TOTAL ABDOMINAL      1995,[place]     OTHER SURGICAL HISTORY      ,2085,CRYOTHERAPY OF CERVIX,Abnormal Pap       Family History:    Family History   Problem Relation Age of Onset     Other - See Comments Mother         lung cancer     Heart Disease Father 70        Heart Disease,MI     Cancer Father         Cancer,Liver cancer     Cancer Other         Cancer,Lung cancer     Diabetes Other         Diabetes     Lung Cancer Brother      Colon Cancer No family hx of         Cancer-colon     Prostate Cancer No family hx of         Cancer-prostate     Ovarian Cancer No family hx of         Cancer-ovarian     Blood Disease No family hx of         Blood Disease     Hypertension No family hx of         Hypertension     Thyroid Disease No family hx of         Thyroid Disease     Breast Cancer No family hx of         Cancer-breast       Social History:  Marital Status:   [4]  Social History     Tobacco Use     Smoking status: Former Smoker     Packs/day: 1.00     Years: 30.00     Pack years: 30.00     Types: Cigarettes     Last attempt to quit: 2001     Years since quittin.4     Smokeless tobacco: Never Used     Tobacco comment: no ecig   Substance Use Topics     Alcohol use: No     Drug use: No        Medications:    aspirin (ASA) 81 MG  tablet  benzonatate (TESSALON) 100 MG capsule  carBAMazepine (TEGRETOL) 200 MG tablet  famotidine (PEPCID) 40 MG tablet  loratadine (CLARITIN) 10 MG tablet  rosuvastatin (CRESTOR) 20 MG tablet  ibuprofen (ADVIL/MOTRIN) 200 MG tablet  Incontinence Supply Disposable (SM INCONTINENT LINER DISP) MISC  Skin Protectants, Misc. (EUCERIN) cream          Review of Systems   Constitutional: Negative for chills and fever.   HENT: Negative for congestion.    Eyes: Negative for visual disturbance.   Respiratory: Negative for chest tightness and shortness of breath.    Cardiovascular: Positive for chest pain.   Gastrointestinal: Negative for abdominal pain.   Genitourinary: Negative for hematuria.   Musculoskeletal: Negative for back pain.   Skin: Negative for rash and wound.   Neurological: Negative for syncope.   Hematological: Negative for adenopathy. Does not bruise/bleed easily.   Psychiatric/Behavioral: Negative for confusion.       Physical Exam   BP: 135/63  Pulse: 94  Heart Rate: 88  Temp: 98.6  F (37  C)  Resp: 18  Weight: 104.3 kg (230 lb)  SpO2: 96 %      Physical Exam  Constitutional:       General: She is not in acute distress.     Appearance: She is well-developed. She is not diaphoretic.   HENT:      Head: Normocephalic and atraumatic.   Eyes:      General: No scleral icterus.     Conjunctiva/sclera: Conjunctivae normal.   Neck:      Musculoskeletal: Neck supple.   Cardiovascular:      Rate and Rhythm: Normal rate and regular rhythm.   Pulmonary:      Effort: Pulmonary effort is normal.      Breath sounds: Normal breath sounds.   Abdominal:      Palpations: Abdomen is soft.      Tenderness: There is no abdominal tenderness.   Musculoskeletal:         General: No deformity.      Comments: Tenderness to palpation of left side of chest    Lymphadenopathy:      Cervical: No cervical adenopathy.   Skin:     General: Skin is warm and dry.      Findings: No rash.   Neurological:      Mental Status: She is alert and  oriented to person, place, and time. Mental status is at baseline.   Psychiatric:         Mood and Affect: Mood normal.         Behavior: Behavior normal.         ED Course        Procedures          EKG read at 1450.  Heart rate 92, normal sinus rhythm, no ST changes.    Critical Care time:  none               Results for orders placed or performed during the hospital encounter of 06/23/20 (from the past 24 hour(s))   EKG 12-lead, tracing only   Result Value Ref Range    Interpretation ECG Click View Image link to view waveform and result    CBC with platelets differential   Result Value Ref Range    WBC 8.3 4.0 - 11.0 10e9/L    RBC Count 5.23 (H) 3.8 - 5.2 10e12/L    Hemoglobin 13.6 11.7 - 15.7 g/dL    Hematocrit 43.6 35.0 - 47.0 %    MCV 83 78 - 100 fl    MCH 26.0 (L) 26.5 - 33.0 pg    MCHC 31.2 (L) 31.5 - 36.5 g/dL    RDW 13.2 10.0 - 15.0 %    Platelet Count 277 150 - 450 10e9/L    Diff Method Automated Method     % Neutrophils 74.9 %    % Lymphocytes 16.4 %    % Monocytes 5.4 %    % Eosinophils 2.2 %    % Basophils 0.5 %    % Immature Granulocytes 0.6 %    Absolute Neutrophil 6.3 1.6 - 8.3 10e9/L    Absolute Lymphocytes 1.4 0.8 - 5.3 10e9/L    Absolute Monocytes 0.5 0.0 - 1.3 10e9/L    Absolute Eosinophils 0.2 0.0 - 0.7 10e9/L    Absolute Basophils 0.0 0.0 - 0.2 10e9/L    Abs Immature Granulocytes 0.1 0 - 0.4 10e9/L   Troponin GH   Result Value Ref Range    Troponin 2.9 <34.0 pg/mL   INR   Result Value Ref Range    INR 1.08 0 - 1.3   Partial thromboplastin time   Result Value Ref Range    PTT 32 22 - 37 sec   Comprehensive metabolic panel   Result Value Ref Range    Sodium 132 (L) 134 - 144 mmol/L    Potassium 4.0 3.5 - 5.1 mmol/L    Chloride 94 (L) 98 - 107 mmol/L    Carbon Dioxide 30 21 - 31 mmol/L    Anion Gap 8 3 - 14 mmol/L    Glucose 123 (H) 70 - 105 mg/dL    Urea Nitrogen 8 7 - 25 mg/dL    Creatinine 0.64 0.60 - 1.20 mg/dL    GFR Estimate >90 >60 mL/min/[1.73_m2]    GFR Estimate If Black >90 >60  mL/min/[1.73_m2]    Calcium 9.2 8.6 - 10.3 mg/dL    Bilirubin Total 0.4 0.3 - 1.0 mg/dL    Albumin 4.2 3.5 - 5.7 g/dL    Protein Total 7.3 6.4 - 8.9 g/dL    Alkaline Phosphatase 83 34 - 104 U/L    ALT 12 7 - 52 U/L    AST 13 13 - 39 U/L   Nt probnp inpatient (BNP)   Result Value Ref Range    N-Terminal Pro BNP Inpatient 11 0 - 100 pg/mL   XR Chest Port 1 View    Narrative    XR CHEST PORT 1 VW    HISTORY: 69 yearsFemale left sided anterior chest, worse with  palpation    TECHNIQUE: A single view of the chest was performed    COMPARISON: 6/4/2018    FINDINGS: Heart size and pulmonary vascularity are within normal  limits. Lungs are clear. No consolidating airspace opacities are  present.        Impression    IMPRESSION: Clear chest    LAURENCE CEJA MD   Troponin GH (now)   Result Value Ref Range    Troponin 3.8 <34.0 pg/mL       Medications   aspirin (ASA) chewable tablet 324 mg (324 mg Oral Given 6/23/20 1518)       Assessments & Plan (with Medical Decision Making)   Pt nontoxic in NAD. Heart, lung, bowel sounds normal, abd soft, nontender to palpation, nondistended. VSS, afebrile    Patient had very well-appearing lab work including 2- troponins, no leukocytosis, normal hemoglobin and a BNP of 11.  Her EKG and chest x-ray appear very well and normal.    Heart score: 3    It is unclear was causing patient's discomfort today.  However, overall I am encouraged by the well appearance the patient as well as her stable vital signs and reassuring diagnostic studies.  She does not appear to be suffering from a pneumothorax, tamponade, have very low risk and symptoms are not consistent with a pulmonary embolism, she also does not appear to be suffering from a dissection and currently does not appear to be suffering from an MI.  She appears to be at low risk for major adverse cardiac event in the next 6 weeks.  Given that her pain continues to be a 0 out of 10 in the emergency department and is only there when I palpate  a very tiny area of her chest this may likely be a musculoskeletal discomfort that is occurring.  I do encourage her to use Tylenol ibuprofen at home and possibly even ice.  I would expect her symptoms to gradually improve, however, a outpatient echocardiogram is also scheduled for [place] referral for follow-up with your PCP was placed.  She should return the emergency department if she has worsening or concerning symptoms, she understands and agrees with plan patient is discharged.    Alli Villalba PA-C    I have reviewed the nursing notes.    I have reviewed the findings, diagnosis, plan and need for follow up with the patient.       Discharge Medication List as of 6/23/2020  6:31 PM          Final diagnoses:   Chest pain       6/23/2020   [facility] AND [facility]     Alli Villalba PA  06/24/20 0047

## 2020-06-27 LAB
SARS-COV-2 RNA SPEC QL NAA+PROBE: NOT DETECTED
SPECIMEN SOURCE: NORMAL

## 2020-06-29 DIAGNOSIS — K21.9 GASTROESOPHAGEAL REFLUX DISEASE, ESOPHAGITIS PRESENCE NOT SPECIFIED: ICD-10-CM

## 2020-06-30 ENCOUNTER — OFFICE VISIT (OUTPATIENT)
Dept: INTERNAL MEDICINE | Facility: OTHER | Age: 70
End: 2020-06-30
Attending: NURSE PRACTITIONER
Payer: COMMERCIAL

## 2020-06-30 VITALS
HEART RATE: 90 BPM | SYSTOLIC BLOOD PRESSURE: 136 MMHG | BODY MASS INDEX: 44.31 KG/M2 | RESPIRATION RATE: 16 BRPM | HEIGHT: 59 IN | DIASTOLIC BLOOD PRESSURE: 68 MMHG | OXYGEN SATURATION: 93 % | TEMPERATURE: 98.5 F | WEIGHT: 219.8 LBS

## 2020-06-30 DIAGNOSIS — R07.9 CHEST PAIN, UNSPECIFIED TYPE: Primary | ICD-10-CM

## 2020-06-30 DIAGNOSIS — L30.9 ECZEMA, UNSPECIFIED TYPE: ICD-10-CM

## 2020-06-30 DIAGNOSIS — S29.011A MUSCLE STRAIN OF CHEST WALL, INITIAL ENCOUNTER: ICD-10-CM

## 2020-06-30 DIAGNOSIS — Z87.891 HISTORY OF TOBACCO USE DISORDER: ICD-10-CM

## 2020-06-30 DIAGNOSIS — I73.9 PVD (PERIPHERAL VASCULAR DISEASE) (H): ICD-10-CM

## 2020-06-30 DIAGNOSIS — K21.9 GASTROESOPHAGEAL REFLUX DISEASE, ESOPHAGITIS PRESENCE NOT SPECIFIED: ICD-10-CM

## 2020-06-30 PROCEDURE — 99214 OFFICE O/P EST MOD 30 MIN: CPT | Performed by: NURSE PRACTITIONER

## 2020-06-30 PROCEDURE — G0463 HOSPITAL OUTPT CLINIC VISIT: HCPCS | Performed by: NURSE PRACTITIONER

## 2020-06-30 RX ORDER — FAMOTIDINE 40 MG/1
40 TABLET, FILM COATED ORAL DAILY
Qty: 90 TABLET | Refills: 3 | Status: SHIPPED | OUTPATIENT
Start: 2020-06-30 | End: 2021-07-13

## 2020-06-30 RX ORDER — ROSUVASTATIN CALCIUM 20 MG/1
20 TABLET, COATED ORAL DAILY
Qty: 90 TABLET | Refills: 3 | Status: SHIPPED | OUTPATIENT
Start: 2020-06-30 | End: 2021-07-13

## 2020-06-30 RX ORDER — FAMOTIDINE 40 MG/1
40 TABLET, FILM COATED ORAL DAILY
Qty: 90 TABLET | Refills: 1 | OUTPATIENT
Start: 2020-06-30

## 2020-06-30 RX ORDER — TRIAMCINOLONE ACETONIDE 0.25 MG/G
CREAM TOPICAL 2 TIMES DAILY
Qty: 454 G | Refills: 3 | Status: SHIPPED | OUTPATIENT
Start: 2020-06-30 | End: 2020-07-10

## 2020-06-30 ASSESSMENT — ENCOUNTER SYMPTOMS
CHEST TIGHTNESS: 0
WHEEZING: 0
CHILLS: 0
ABDOMINAL PAIN: 0
FEVER: 0
SORE THROAT: 0
EYE DISCHARGE: 0
HEARTBURN: 0
FREQUENCY: 0
EYE REDNESS: 0
FLANK PAIN: 0
DIZZINESS: 0
VOMITING: 0
ADENOPATHY: 0
PALPITATIONS: 0
APNEA: 0
DYSURIA: 0
TROUBLE SWALLOWING: 0
SHORTNESS OF BREATH: 0
DYSPHORIC MOOD: 0
COUGH: 0
UNEXPECTED WEIGHT CHANGE: 0

## 2020-06-30 ASSESSMENT — PAIN SCALES - GENERAL: PAINLEVEL: MODERATE PAIN (5)

## 2020-06-30 ASSESSMENT — MIFFLIN-ST. JEOR: SCORE: 1439.76

## 2020-06-30 NOTE — TELEPHONE ENCOUNTER
Famotidine refilled today 6/30/2019 #90 x 3 refills to [facility].    Kori Daigle RN on 6/30/2020 at 3:46 PM

## 2020-06-30 NOTE — NURSING NOTE
Chief Complaint   Patient presents with     Follow Up     ER chest pain       Medication Reconciliation complete.   Johanna Barnes LPN  ..................6/30/2020   3:00 PM

## 2020-06-30 NOTE — PROGRESS NOTES
Subjective:  She is here today to follow-up on emergency department visit on June 23.  She had acute onset of chest pain at the left anterior lower rib cage beneath the left breast.  For the time she arrived at the emergency department her pain was 0 out of 10.  It only hurts when the provider palpated the area.  She states that it was brought on by a cough originally.  She thinks she might of strained a muscle.  She had normal lab work and 2- troponins in addition to a normal EKG and chest x-ray.  Suspicion was low for cardiopulmonary disease with the work-up that was completed.  She states that she was told to take ibuprofen however she did not have that at home and instead has been taking acetaminophen 500 mg 2 pills up to 3 times daily.  She is found that her pain has significantly improved.  She can now only reproduce the pain if she is reaching to pick something up or pulling herself up out of bed.  She does not have a cough or shortness of breath.  She states on the day that she did cough and the injury occurred she was trying to clear phlegm from the back of her throat.  She is scheduled for an outpatient echocardiogram.  This was ordered by the emergency department provider.  She does need refills of Crestor and Pepcid which she takes for hyperlipidemia and GERD.  She also does have history of tobacco use but quit in 2001.  She had lung cancer screening with CT scan in 2018 and that was negative.  She does have an area just above the left antecubital  fossa that is pink and oval with some dryness along the medial aspect.  She states this was a smaller when it developed a week ago but only grew for a couple of days and has not changed except that it is becoming lighter in color.  It does not itch.  It is not painful.  It is not hot.  She was not any insects, ticks and it has never had a central punctate cherelle.    Patient Active Problem List   Diagnosis     Abnormal findings in stool     Asymptomatic varicose  veins of bilateral lower extremities     Bilateral lower extremity edema     Fibromyalgia     CMC arthritis     History of tobacco use     Onychogryphosis     History of small bowel obstruction     Positive colorectal cancer screening using DNA-based stool test     Requires assistance with activities of daily living (ADL)     Trigeminal neuralgia of right side of face     Abnormal ankle brachial index (CURT)     Decreased activities of daily living (ADL)     Detrusor dysfunction     Peripheral vascular disease (H)     Past Medical History:   Diagnosis Date     Detrusor dysfunction 2/5/2019     Dorsopathy     Recurrent back problems , secondary to injury at [facility]     Fibromyalgia     7/9/1997     Nonpsychotic mental disorder     not otherwise specified     Onychogryphosis     4/24/2017     Personal history of other diseases of the digestive system (CODE)     7/9/2017,8/31/95--Dr Barrow--had incidental Appendectomy--Multiple adhesions released     Primary osteoarthritis of hand     1/8/2018     Past Surgical History:   Procedure Laterality Date     APPENDECTOMY OPEN      1996,Incidental, Enterolysis--Dr Barrow     CHOLECYSTECTOMY      1996,Laparoscopic     COLONOSCOPY      1990?     HYSTERECTOMY TOTAL ABDOMINAL      01/1995,[place]     OTHER SURGICAL HISTORY      1994,208526,CRYOTHERAPY OF CERVIX,Abnormal Pap     Social History     Socioeconomic History     Marital status:      Spouse name: Not on file     Number of children: Not on file     Years of education: Not on file     Highest education level: Not on file   Occupational History     Not on file   Social Needs     Financial resource strain: Not on file     Food insecurity     Worry: Not on file     Inability: Not on file     Transportation needs     Medical: Not on file     Non-medical: Not on file   Tobacco Use     Smoking status: Former Smoker     Packs/day: 1.00     Years: 30.00     Pack years: 30.00     Types: Cigarettes     Last attempt to quit: 1/21/2001      Years since quittin.4     Smokeless tobacco: Never Used     Tobacco comment: no ecig   Substance and Sexual Activity     Alcohol use: No     Drug use: No     Sexual activity: Not Currently     Birth control/protection: Surgical   Lifestyle     Physical activity     Days per week: Not on file     Minutes per session: Not on file     Stress: Not on file   Relationships     Social connections     Talks on phone: Not on file     Gets together: Not on file     Attends Jainism service: Not on file     Active member of club or organization: Not on file     Attends meetings of clubs or organizations: Not on file     Relationship status: Not on file     Intimate partner violence     Fear of current or ex partner: Not on file     Emotionally abused: Not on file     Physically abused: Not on file     Forced sexual activity: Not on file   Other Topics Concern     Parent/sibling w/ CABG, MI or angioplasty before 65F 55M? Not Asked   Social History Narrative    Smoked one pack per day until two years ago.  Now a nonsmoker.     Family History   Problem Relation Age of Onset     Other - See Comments Mother         lung cancer     Heart Disease Father 70        Heart Disease,MI     Cancer Father         Cancer,Liver cancer     Cancer Other         Cancer,Lung cancer     Diabetes Other         Diabetes     Lung Cancer Brother      Colon Cancer No family hx of         Cancer-colon     Prostate Cancer No family hx of         Cancer-prostate     Ovarian Cancer No family hx of         Cancer-ovarian     Blood Disease No family hx of         Blood Disease     Hypertension No family hx of         Hypertension     Thyroid Disease No family hx of         Thyroid Disease     Breast Cancer No family hx of         Cancer-breast     Current Outpatient Medications   Medication Sig Dispense Refill     famotidine (PEPCID) 40 MG tablet Take 1 tablet (40 mg) by mouth daily 90 tablet 3     rosuvastatin (CRESTOR) 20 MG tablet Take 1 tablet  "(20 mg) by mouth daily 90 tablet 3     triamcinolone (KENALOG) 0.025 % cream Apply topically 2 times daily for 10 days 454 g 3     aspirin (ASA) 81 MG tablet Take 1 tablet (81 mg) by mouth daily 90 tablet 3     benzonatate (TESSALON) 100 MG capsule Take 1 capsule (100 mg) by mouth 3 times daily as needed for cough 90 capsule 3     carBAMazepine (TEGRETOL) 200 MG tablet Take 0.5 tablets (100 mg) by mouth 2 times daily 120 tablet 3     ibuprofen (ADVIL/MOTRIN) 200 MG tablet Take 200 mg by mouth every 4 hours as needed for mild pain       Incontinence Supply Disposable (SM INCONTINENT LINER DISP) [facility] POISE PAD REG LNG/ABS #3 ITEM #[id number] DX:R32 [facility]: 270 each 11     loratadine (CLARITIN) 10 MG tablet Take 1 tablet (10 mg) by mouth daily 90 tablet 3     Skin Protectants, Misc. (EUCERIN) cream Eucerin or insurance covered moisturizer cream to body 2 x daily and as needed       Contrast dye and Diatrizoate      Review of Systems:  Review of Systems   Constitutional: Negative for chills, fever and unexpected weight change.   HENT: Negative for congestion, ear pain, sore throat and trouble swallowing.    Eyes: Negative for discharge and redness.   Respiratory: Negative for apnea, cough, chest tightness, shortness of breath and wheezing.    Cardiovascular: Negative for chest pain, palpitations and peripheral edema.   Gastrointestinal: Negative for abdominal pain, heartburn and vomiting.   Genitourinary: Negative for dysuria, flank pain and frequency.   Musculoskeletal:        See HPI   Allergic/Immunologic: Negative for immunocompromised state.   Neurological: Negative for dizziness.   Hematological: Negative for adenopathy.   Psychiatric/Behavioral: Negative for dysphoric mood.       Objective:   /68 (BP Location: Right arm, Patient Position: Sitting, Cuff Size: Adult Large)   Pulse 90   Temp 98.5  F (36.9  C) (Tympanic)   Resp 16   Ht 1.51 m (4' 11.45\")   Wt 99.7 kg (219 lb 12.8 oz)   LMP  (LMP Unknown)   " SpO2 93%   Breastfeeding No   BMI 43.73 kg/m    Physical Exam  Pleasant morbidly obese female in no acute distress.  Affect normal.  Alert and oriented x4.  Neck supple without adenopathy.  Lung fields clear to auscultation.  Cardiovascular regular rate and rhythm with no murmur or S3 auscultated.  There is no tenderness with palpation over the left lower anterior rib cage.  This pain is reproduced by patient reaching across her body with her left arm and by helping herself from a seated to standing position with using her left arm.  Just proximal to the left antecubital fossa there is a oval shaped pink flat lesion that has dryness of the medial aspect with no central punctate noted.  No warmth or tenderness.  No excoriation.    Assessment:    ICD-10-CM    1. Chest pain, unspecified type  R07.9 FAMILY PRACTICE REFERRAL   2. Muscle strain of chest wall, initial encounter  S29.011A    3. Gastroesophageal reflux disease, esophagitis presence not specified  K21.9 famotidine (PEPCID) 40 MG tablet   4. PVD (peripheral vascular disease) (H)  I73.9 rosuvastatin (CRESTOR) 20 MG tablet   5. History of tobacco use disorder  Z87.891 rosuvastatin (CRESTOR) 20 MG tablet   6. Eczema, unspecified type  L30.9 triamcinolone (KENALOG) 0.025 % cream       Plan:   Patient has musculoskeletal chest wall pain.  She likely strained a muscle with the cough.  She does not have a daily cough or other cough related illnesses.  She will proceed with getting the echocardiogram.  She does have underlying hyperlipidemia and obesity with history of tobacco use in the past.  She does not need low-dose lung CT as she quit more than 15 years ago.  Her risk factors are equivalent to a non-smoker at this time.  She will continue with Pepcid for treatment of GERD, refill provided.  She also has an area that appears to be similar to eczema and therefore will treat with triamcinolone cream twice daily for the next 7-10 days, follow-up if no improvement  or if worsening.    Marcela De Luna, GNP   6/30/2020  3:35 PM

## 2020-07-02 ENCOUNTER — HOSPITAL ENCOUNTER (OUTPATIENT)
Dept: CARDIOLOGY | Facility: OTHER | Age: 70
Discharge: HOME | End: 2020-07-02
Attending: PHYSICIAN ASSISTANT | Admitting: PHYSICIAN ASSISTANT
Payer: COMMERCIAL

## 2020-07-02 DIAGNOSIS — R07.9 CHEST PAIN: ICD-10-CM

## 2020-07-02 PROCEDURE — 93306 TTE W/DOPPLER COMPLETE: CPT

## 2020-07-02 PROCEDURE — 93306 TTE W/DOPPLER COMPLETE: CPT | Mod: 26 | Performed by: INTERNAL MEDICINE

## 2020-08-10 DIAGNOSIS — R05.9 COUGH: ICD-10-CM

## 2020-08-10 NOTE — TELEPHONE ENCOUNTER
Routing refill request to provider for review/approval because:  Drug not on the [facility] refill protocol     LOV: 6/30/2020  Kori Daigle RN on 8/10/2020 at 4:04 PM

## 2020-08-11 RX ORDER — BENZONATATE 100 MG/1
100 CAPSULE ORAL 3 TIMES DAILY PRN
Qty: 90 CAPSULE | Refills: 3 | Status: SHIPPED | OUTPATIENT
Start: 2020-08-11 | End: 2020-12-16

## 2020-09-14 ENCOUNTER — TELEPHONE (OUTPATIENT)
Dept: INTERNAL MEDICINE | Facility: OTHER | Age: 70
End: 2020-09-14

## 2020-09-14 DIAGNOSIS — N31.8 DETRUSOR DYSFUNCTION: ICD-10-CM

## 2020-09-14 NOTE — TELEPHONE ENCOUNTER
[id number] sent Rx request for the following:   Incontinence Supply Disposable (SM INCONTINENT LINER DISP) MISC  Sig: POISE PAD REG LNG/ABS #3 ITEM #[id number] DX:R32 [facility]:     Last Prescription Date:   08/14/2019  Last Fill Qty/Refills:         270, R-11    Last Office Visit:              06/30/2020   Future Office visit:           none  Routing refill request to provider for review/approval because:  Drug not on the [facility], [facility] or [facility] refill protocol or controlled substance      Unable to complete prescription refill per RN Medication Refill Policy.................... Radha Gutierres RN ....................  9/14/2020   11:21 AM

## 2020-09-14 NOTE — TELEPHONE ENCOUNTER
Requests Poise Pad Light Reg 30cs4  Poise Ultra thim pads #3/R  PC# [id number]/[id number] Item #[id number]  DX: N31.8/R32 30/PP    RX  - need RX QTY allowed #300/30 days    Not on med list

## 2020-12-15 DIAGNOSIS — R05.9 COUGH: ICD-10-CM

## 2020-12-16 RX ORDER — BENZONATATE 100 MG/1
100 CAPSULE ORAL 3 TIMES DAILY PRN
Qty: 90 CAPSULE | Refills: 3 | Status: SHIPPED | OUTPATIENT
Start: 2020-12-16 | End: 2021-04-07

## 2020-12-16 NOTE — TELEPHONE ENCOUNTER
[facility] sent Rx request for the following:   benzonatate (TESSALON) 100 MG capsule  Sig: Take 1 capsule (100 mg) by mouth 3 times daily as needed for cough - Oral    Last Prescription Date:   08/11/2020  Last Fill Qty/Refills:         90, R-3    Last Office Visit:              06/30/2020   Future Office visit:           none  Routing refill request to provider for review/approval because:  Drug not on the [facility], [facility] or [facility] refill protocol or controlled substance          Unable to complete prescription refill per RN Medication Refill Policy.................... Radha Gutierres RN ....................  12/16/2020   9:25 AM

## 2021-04-07 DIAGNOSIS — R05.9 COUGH: ICD-10-CM

## 2021-04-07 RX ORDER — BENZONATATE 100 MG/1
100 CAPSULE ORAL 3 TIMES DAILY PRN
Qty: 90 CAPSULE | Refills: 2 | Status: SHIPPED | OUTPATIENT
Start: 2021-04-07 | End: 2021-07-13

## 2021-04-07 NOTE — TELEPHONE ENCOUNTER
[facility] sent Rx request for the following:      Requested Prescriptions   Pending Prescriptions Disp Refills     benzonatate (TESSALON) 100 MG capsule [Pharmacy Med Name: benzonatate 100 mg capsule] 90 capsule 3     Sig: Take 1 capsule (100 mg) by mouth 3 times daily as needed for cough       There is no refill protocol information for this order          Last Prescription Date:   12/16/2020  Last Fill Qty/Refills:         90, R-3  Last Office Visit:              6/30/2020   Future Office visit:           None noted     Routing refill request to provider for review/approval because:  Drug not on the [facility], [facility] or [facility] refill protocol or controlled substance.    Unable to complete prescription refill per RN Medication Refill Policy.   Dolores Kolb RN on 4/7/2021 at 8:35 AM

## 2021-05-21 DIAGNOSIS — R09.82 ALLERGIC RHINITIS WITH POSTNASAL DRIP: ICD-10-CM

## 2021-05-21 DIAGNOSIS — J30.9 ALLERGIC RHINITIS WITH POSTNASAL DRIP: ICD-10-CM

## 2021-05-21 DIAGNOSIS — R05.9 COUGH: ICD-10-CM

## 2021-05-21 NOTE — LETTER
May 24, 2021      Pennie Nichols  [address] [place] [address] [address]  [facility] [address]        Dear Pennie,     This letter is to remind you that you will be due for your annual exam with Marcela De Luna. Your last visit was 6/30/20.    A LIMITED refill of loratadine has been called into your pharmacy. Additional refills require you to complete this appointment.    Please call the clinic at [phone] to schedule your appointment.    If you should require additional refills before your scheduled appointment, please contact your pharmacy and we will refill your medication until the date of your appointment.    Thank you for choosing [facility] and [facility] for your health care needs.    Sincerely,    Refill RN  [facility]

## 2021-05-24 RX ORDER — LORATADINE 10 MG/1
10 TABLET ORAL DAILY
Qty: 90 TABLET | Refills: 0 | Status: SHIPPED | OUTPATIENT
Start: 2021-05-24 | End: 2021-07-13

## 2021-05-24 NOTE — TELEPHONE ENCOUNTER
[facility] sent Rx request for the following:      Requested Prescriptions   Pending Prescriptions Disp Refills     loratadine (CLARITIN) 10 MG tablet [Pharmacy Med Name: loratadine 10 mg tablet] 90 tablet 3     Sig: Take 1 tablet (10 mg) by mouth daily          Last Prescription Date:    6/1/20  Last Fill Qty/Refills:         90, R-3  Last Office Visit:              6/30/20  Future Office visit:           None  Routing refill request to provider for review/approval because:       Antihistamines Protocol Failed - 5/24/2021  8:20 AM        Failed - Patient is 3-64 years of age     Apply weight-based dosing for peds patients age 3 - 12 years of age.    Forward request to provider for patients under the age of 3 or over the age of 64.       Failed RN refill criteria. Will route to review and consider. Letter was sent to make an appointment to have a comprehensive appointment for medication review, and a note to pharmacy.

## 2021-07-13 ENCOUNTER — OFFICE VISIT (OUTPATIENT)
Dept: INTERNAL MEDICINE | Facility: OTHER | Age: 71
End: 2021-07-13
Attending: NURSE PRACTITIONER
Payer: COMMERCIAL

## 2021-07-13 VITALS
HEART RATE: 84 BPM | OXYGEN SATURATION: 94 % | HEIGHT: 59 IN | BODY MASS INDEX: 45.08 KG/M2 | RESPIRATION RATE: 20 BRPM | DIASTOLIC BLOOD PRESSURE: 80 MMHG | SYSTOLIC BLOOD PRESSURE: 138 MMHG | WEIGHT: 223.6 LBS | TEMPERATURE: 98.6 F

## 2021-07-13 DIAGNOSIS — Z12.31 ENCOUNTER FOR SCREENING MAMMOGRAM FOR BREAST CANCER: ICD-10-CM

## 2021-07-13 DIAGNOSIS — R19.5 POSITIVE COLORECTAL CANCER SCREENING USING DNA-BASED STOOL TEST: ICD-10-CM

## 2021-07-13 DIAGNOSIS — E78.2 MIXED HYPERLIPIDEMIA: ICD-10-CM

## 2021-07-13 DIAGNOSIS — M79.7 FIBROMYALGIA: ICD-10-CM

## 2021-07-13 DIAGNOSIS — R73.9 HYPERGLYCEMIA: ICD-10-CM

## 2021-07-13 DIAGNOSIS — Z87.891 HISTORY OF TOBACCO USE DISORDER: ICD-10-CM

## 2021-07-13 DIAGNOSIS — R09.82 ALLERGIC RHINITIS WITH POSTNASAL DRIP: ICD-10-CM

## 2021-07-13 DIAGNOSIS — K21.9 GASTROESOPHAGEAL REFLUX DISEASE WITHOUT ESOPHAGITIS: ICD-10-CM

## 2021-07-13 DIAGNOSIS — J30.9 ALLERGIC RHINITIS WITH POSTNASAL DRIP: ICD-10-CM

## 2021-07-13 DIAGNOSIS — R05.9 COUGH: ICD-10-CM

## 2021-07-13 DIAGNOSIS — G50.0 TRIGEMINAL NEURALGIA OF RIGHT SIDE OF FACE: ICD-10-CM

## 2021-07-13 DIAGNOSIS — I73.9 PVD (PERIPHERAL VASCULAR DISEASE) (H): ICD-10-CM

## 2021-07-13 DIAGNOSIS — Z00.00 ENCOUNTER FOR MEDICARE ANNUAL WELLNESS EXAM: Primary | ICD-10-CM

## 2021-07-13 DIAGNOSIS — E66.01 MORBID OBESITY (H): ICD-10-CM

## 2021-07-13 PROCEDURE — G0439 PPPS, SUBSEQ VISIT: HCPCS | Performed by: NURSE PRACTITIONER

## 2021-07-13 PROCEDURE — 99214 OFFICE O/P EST MOD 30 MIN: CPT | Mod: 25 | Performed by: NURSE PRACTITIONER

## 2021-07-13 PROCEDURE — G0463 HOSPITAL OUTPT CLINIC VISIT: HCPCS

## 2021-07-13 RX ORDER — ROSUVASTATIN CALCIUM 20 MG/1
20 TABLET, COATED ORAL DAILY
Qty: 90 TABLET | Refills: 3 | Status: SHIPPED | OUTPATIENT
Start: 2021-07-13 | End: 2022-08-16

## 2021-07-13 RX ORDER — GUAIFENESIN 600 MG/1
600 TABLET, EXTENDED RELEASE ORAL 2 TIMES DAILY
Qty: 60 TABLET | Refills: 0 | Status: SHIPPED | OUTPATIENT
Start: 2021-07-13 | End: 2021-07-27

## 2021-07-13 RX ORDER — LORATADINE 10 MG/1
10 TABLET ORAL DAILY
Qty: 90 TABLET | Refills: 3 | Status: SHIPPED | OUTPATIENT
Start: 2021-07-13 | End: 2022-08-16

## 2021-07-13 RX ORDER — BENZONATATE 100 MG/1
100 CAPSULE ORAL 3 TIMES DAILY PRN
Qty: 90 CAPSULE | Refills: 3 | Status: SHIPPED | OUTPATIENT
Start: 2021-07-13 | End: 2021-11-26

## 2021-07-13 RX ORDER — CARBAMAZEPINE 200 MG/1
TABLET ORAL
Qty: 90 TABLET | Refills: 3 | Status: SHIPPED | OUTPATIENT
Start: 2021-07-13 | End: 2022-09-02

## 2021-07-13 RX ORDER — FAMOTIDINE 40 MG/1
40 TABLET, FILM COATED ORAL DAILY
Qty: 90 TABLET | Refills: 3 | Status: SHIPPED | OUTPATIENT
Start: 2021-07-13 | End: 2022-08-16

## 2021-07-13 ASSESSMENT — ENCOUNTER SYMPTOMS
CONSTIPATION: 0
WEAKNESS: 0
DIARRHEA: 0
CHILLS: 0
BLOOD IN STOOL: 0
SHORTNESS OF BREATH: 0
NAUSEA: 0
VOMITING: 0
SLEEP DISTURBANCE: 0
EYE PAIN: 0
BACK PAIN: 0
COLOR CHANGE: 0
SEIZURES: 0
ENDOCRINE NEGATIVE: 1
PALPITATIONS: 0
DIZZINESS: 0
COUGH: 0
DYSURIA: 0
NERVOUS/ANXIOUS: 0
WOUND: 0
ANAL BLEEDING: 0
ARTHRALGIAS: 0
HEMATURIA: 0
SORE THROAT: 0
HEADACHES: 0
ADENOPATHY: 0
DYSPHORIC MOOD: 0
ABDOMINAL PAIN: 0
FEVER: 0
LIGHT-HEADEDNESS: 0

## 2021-07-13 ASSESSMENT — PAIN SCALES - GENERAL: PAINLEVEL: NO PAIN (0)

## 2021-07-13 ASSESSMENT — MIFFLIN-ST. JEOR: SCORE: 1444.87

## 2021-07-13 NOTE — NURSING NOTE
Patient is here for her annual medicare wellness.       No LMP recorded (lmp unknown). Patient is postmenopausal.  Medication Reconciliation: complete    Kristen Madison LPN  7/13/2021 3:10 PM    FOOD SECURITY SCREENING QUESTIONS  Hunger Vital Signs:  Within the past 12 months we worried whether our food would run out before we got money to buy more. Never  Within the past 12 months the food we bought just didn't last and we didn't have money to get more. Never  Kristen Madison LPN 7/13/2021 3:10 PM

## 2021-07-13 NOTE — PATIENT INSTRUCTIONS
Patient Education   Personalized Prevention Plan  You are due for the preventive services outlined below.  Your care team is available to assist you in scheduling these services.  If you have already completed any of these items, please share that information with your care team to update in your medical record.  Health Maintenance Due   Topic Date Due     Osteoporosis Screening  Never done     Annual Wellness Visit  Never done     Zoster (Shingles) Vaccine (2 of 3) 07/31/2017     Colorectal Cancer Screening  06/12/2020        
Home regimen: Norvasc 10 mg Po daily, losartan 100 mg PO daily, toprol  mg PO daily, HCTZ 25 mg PO daily. Patient currently normotensive.  -c/w home norvasc and toprol XL. Held home losartan, HCTZ in the setting of SHAE. Consider reinstating as SHAE improves.

## 2021-07-13 NOTE — PROGRESS NOTES
"Medicare Wellness Visit   Ms. Nichols is a 70 year old female who presents today who presents for Preventive Visit.  {Are you in the first 12 months of your Medicare coverage?  No    Health Risk Assessment     Do you feel safe in your environment? Yes    Physical Health:    In general, how would you rate your overall physical health? good    Outside of work, how many days during the week do you exercise? none    Outside of work, approximately how many minutes a day do you exercise?not applicable    If you drink alcohol do you typically have >3 drinks per day or >7 drinks per week? No    Do you usually eat at least 4 servings of fruit and vegetables a day, include whole grains & fiber and avoid regularly eating high fat or \"junk\" foods? Yes    Do you have any problems taking medications regularly?  No    Do you have any side effects from medications? none    Needs assistance for the following daily activities: bathing    Which of the following safety concerns are present in your home?  none identified     Hearing impairment: No    In the past 6 months, have you been bothered by leaking of urine? yes      Screening   Can hear at level of conversation  Fall risk  Fallen 2 or more times in the past year?: No  Any fall with injury in the past year?: No    Cognitive Screening   1) Repeat 3 items (Leader, Season, Table)  2) Clock draw: NORMAL  3) 3 item recall: Recalls 1 object   Results: NORMAL clock, 1-2 items recalled: COGNITIVE IMPAIRMENT LESS LIKELY    Mini-CogTM Copyright MYLES Tejada. Licensed by the author for use in [facility]; reprinted with permission ([email or web address]@.[email or web address]). All rights reserved.      Do you have sleep apnea, excessive snoring or daytime drowsiness?: no    Breast cancer screenin20    Regular dental visits: 2019    Eye exam with in 2 years: due      End of Life Planning     Have you ever done Advance Care Planning? (For example, a Health Directive, POLST, or a discussion with a medical " provider or your loved ones about your wishes): No, patient declines    End of Life Planning:  No Adv Directive        Medical Record Update     Reviewed and updated as needed this visit by clinical staff  Tobacco  Allergies  Meds  Med Hx  Surg Hx  Fam Hx  Soc Hx      Reviewed and updated as needed this visit by Provider  Tobacco  Allergies  Meds  Problems  Med Hx  Surg Hx  Fam Hx          Current Outpatient Medications   Medication     aspirin (ASA) 81 MG tablet     benzonatate (TESSALON) 100 MG capsule     carBAMazepine (TEGRETOL) 200 MG tablet     famotidine (PEPCID) 40 MG tablet     guaiFENesin (MUCINEX) 600 MG 12 hr tablet     ibuprofen (ADVIL/MOTRIN) 200 MG tablet     Incontinence Supply Disposable MISC     loratadine (CLARITIN) 10 MG tablet     rosuvastatin (CRESTOR) 20 MG tablet     No current facility-administered medications for this visit.          Current providers sharing in care for this patient include:   Patient Care Team:  Marcela De Luna NP as PCP - General (Nurse Practitioner)  Marcela De Luna NP as Assigned PCP     Subjective:   Patient is here today for annual wellness visit and also chronic disease management.  She has a history of trigeminal neuralgia and takes Tegretol half tablet twice daily.  Tolerating without side effects.  She has no pain unless she misses a dose of the Tegretol.  She also has fibromyalgia which has been stable.  She has a chronic cough with history of tobacco use but quit many years ago.  She states that the Tessalon Perles does help but she is finding that over the last couple years her sputum has been more slimy.  She has not tried Mucinex.  She does take Claritin 10 mg daily for allergic rhinitis and that helps with some of the cough symptoms as well.  She takes famotidine for GERD.  No breakthrough symptoms.  She has history of hyperlipidemia and peripheral vascular disease and takes Crestor and aspirin daily.  She has no claudication  "symptoms.  She did have a positive Cologuard screening test in 2017 and declined colonoscopy.  At this time she still does not want to proceed and is currently asymptomatic.  She is due for mammogram.  She does not want bone density scan.  She is up-to-date on immunizations.  By reviewing previous lab results she has had intermittent hyperglycemia unsure if fasting when labs were drawn.    Review of Systems   Constitutional: Negative for chills and fever.   HENT: Negative for ear pain and sore throat.    Eyes: Negative for pain and visual disturbance.   Respiratory: Negative for cough and shortness of breath.    Cardiovascular: Negative for chest pain and palpitations.   Gastrointestinal: Negative for abdominal pain, anal bleeding, blood in stool, constipation, diarrhea, nausea and vomiting.   Endocrine: Negative.    Genitourinary: Negative for dysuria, hematuria and vaginal bleeding.   Musculoskeletal: Negative for arthralgias, back pain and gait problem.   Skin: Negative for color change, rash and wound.   Neurological: Negative for dizziness, seizures, syncope, weakness, light-headedness and headaches.   Hematological: Negative for adenopathy.   Psychiatric/Behavioral: Negative for dysphoric mood and sleep disturbance. The patient is not nervous/anxious.    All other systems reviewed and are negative.         OBJECTIVE:     Vitals:    07/13/21 1510 07/13/21 1546   BP: (!) 148/70 138/80   BP Location: Right arm    Patient Position: Sitting    Cuff Size: Adult Large    Pulse: 91 84   Resp: 20    Temp: 98.6  F (37  C)    TempSrc: Tympanic    SpO2: 94%    Weight: 101.4 kg (223 lb 9.6 oz)    Height: 1.499 m (4' 11\")      Recheck blood pressure 138/80      Estimated body mass index is 45.16 kg/m  as calculated from the following:    Height as of this encounter: 1.499 m (4' 11\").    Weight as of this encounter: 101.4 kg (223 lb 9.6 oz).     Physical Exam  Pleasant morbidly obese female in no acute distress.  Affect " normal.  Alert and oriented x4.  Sclera nonicteric.  Conjunctiva noninflamed.  TMs clear bilaterally.  Wearing facial mask and denies oral mucosal concerns.  Neck supple without adenopathy.  No thyromegaly.  No carotid bruits.  Lung fields clear to auscultation throughout.  Cardiovascular regular rate and rhythm with no murmur or S3 auscultated.  Abdomen is soft and without masses, tenderness and organomegaly.  Extremities with trace bilateral edema.  DP PT is palpable.  Capillary refill at 3 seconds.  Varicose veins noted.  Gait stable.      Labs reviewed in EPIC    ASSESSMENT / PLAN:       ICD-10-CM    1. Encounter for Medicare annual wellness exam  Z00.00    2. Trigeminal neuralgia of right side of face  G50.0 carBAMazepine (TEGRETOL) 200 MG tablet     Carbamazepine   3. Fibromyalgia  M79.7 carBAMazepine (TEGRETOL) 200 MG tablet   4. Cough  R05 loratadine (CLARITIN) 10 MG tablet     benzonatate (TESSALON) 100 MG capsule     guaiFENesin (MUCINEX) 600 MG 12 hr tablet   5. Allergic rhinitis with postnasal drip  J30.9 loratadine (CLARITIN) 10 MG tablet    R09.82    6. Mixed hyperlipidemia  E78.2 Lipid Profile   7. PVD (peripheral vascular disease) (H)  I73.9 rosuvastatin (CRESTOR) 20 MG tablet   8. History of tobacco use disorder  Z87.891 rosuvastatin (CRESTOR) 20 MG tablet   9. Hyperglycemia  R73.9 Hemoglobin A1c   10. Morbid obesity (H)  E66.01    11. Gastroesophageal reflux disease without esophagitis  K21.9 famotidine (PEPCID) 40 MG tablet   12. Positive colorectal cancer screening using DNA-based stool test  R19.5    13. Encounter for screening mammogram for breast cancer  Z12.31 MA Screen Bilateral w/Kin     PLAN:  1.  Annual Medicare wellness visit complete.  See below for counseling.  2.  Continue Tegretol, will draw check a tegretol level at lab appointment.  3.  Stable fibromyalgia.  4.  Chronic cough with history of tobacco use.  Quit in 2001.  Continue Tessalon but recommend starting Mucinex 1 pill twice  daily to see if that does not help with the thicker secretions.  5.  Check fasting lipid panel next lab day.  6.  Continue statin and aspirin and avoid tobacco use with history of PVD.  Currently no symptoms of claudication.  7.  Check hemoglobin A1c next lab day.  8.  Work on heart healthy diet and weight loss.  9.  Stable GERD, famotidine refilled.  10.  Encouraged colonoscopy.  Risk and benefits reviewed and discussed especially with patient having positive Cologuard.  She continues to decline.  11.  Scheduled for mammogram.  12.  Discussed bone density scan and she declines at this time.  She is up-to-date on immunizations.  Also did not want paperwork for advance care plan.    Preventative Care Guidelines and Health Counseling     COUNSELING:  Reviewed preventive health counseling  Special attention given to:   Preventative screening  Regular exercise  Healthy diet/nutrition  Bladder control   Immunizations   Reviewed preventive health counseling, as reflected in patient instructions       Regular exercise       Healthy diet/nutrition       Dental care       Bladder control       Fall risk prevention       Aspirin prophylaxis        Osteoporosis prevention/bone health       Colon cancer screening       Advanced Planning     138/80   BP Screening:   Last 3 BP Readings:    BP Readings from Last 3 Encounters:   07/13/21 138/80   06/30/20 136/68   06/23/20 (!) 149/85       The following was recommended to the patient:  Re-screen BP within a year and recommended lifestyle modifications    Body mass index is 45.16 kg/m . Weight management plan: Discussed healthy diet and exercise guidelines        Appropriate preventive services were discussed with this patient, including applicable screening as appropriate for cardiovascular disease, diabetes, osteopenia/osteoporosis, and glaucoma.  As appropriate for age/gender, discussed screening for colorectal cancer, prostate cancer, breast cancer, and cervical cancer.  Checklist reviewing preventive services available has been given to the patient.    Reviewed patients plan of care and provided an AVS. The Basic Care Plan (routine screening as documented in Health Maintenance) for patient meets the Care Plan requirement. This Care Plan has been established and reviewed with the Patient and any available family members.    Counseling Resources:  ATP IV Guidelines  Pooled Cohorts Equation Calculator  Breast Cancer Risk Calculator  FRAX Risk Assessment  ICSI Preventive Guidelines  Dietary Guidelines for Americans, 2010  USDA's MyPlate  ASA Prophylaxis  Lung CA Screening    7/13/2021  3:13 PM  [facility]

## 2021-07-14 ENCOUNTER — TELEPHONE (OUTPATIENT)
Dept: INTERNAL MEDICINE | Facility: OTHER | Age: 71
End: 2021-07-14

## 2021-07-21 ENCOUNTER — LAB (OUTPATIENT)
Dept: LAB | Facility: OTHER | Age: 71
End: 2021-07-21
Attending: NURSE PRACTITIONER
Payer: COMMERCIAL

## 2021-07-21 ENCOUNTER — HOSPITAL ENCOUNTER (OUTPATIENT)
Dept: MAMMOGRAPHY | Facility: OTHER | Age: 71
End: 2021-07-21
Attending: NURSE PRACTITIONER
Payer: COMMERCIAL

## 2021-07-21 DIAGNOSIS — G50.0 TRIGEMINAL NEURALGIA OF RIGHT SIDE OF FACE: ICD-10-CM

## 2021-07-21 DIAGNOSIS — Z12.31 ENCOUNTER FOR SCREENING MAMMOGRAM FOR BREAST CANCER: ICD-10-CM

## 2021-07-21 DIAGNOSIS — R73.9 HYPERGLYCEMIA: ICD-10-CM

## 2021-07-21 DIAGNOSIS — E78.2 MIXED HYPERLIPIDEMIA: ICD-10-CM

## 2021-07-21 LAB
CARBAMAZEPINE SERPL-MCNC: 4 MG/L
CHOLEST SERPL-MCNC: 127 MG/DL
FASTING STATUS PATIENT QL REPORTED: YES
HBA1C MFR BLD: 6.5 % (ref 4–6.2)
HDLC SERPL-MCNC: 56 MG/DL (ref 23–92)
LDLC SERPL CALC-MCNC: 53 MG/DL
NONHDLC SERPL-MCNC: 71 MG/DL
TRIGL SERPL-MCNC: 91 MG/DL

## 2021-07-21 PROCEDURE — 80156 ASSAY CARBAMAZEPINE TOTAL: CPT | Mod: ZL

## 2021-07-21 PROCEDURE — 36415 COLL VENOUS BLD VENIPUNCTURE: CPT | Mod: ZL

## 2021-07-21 PROCEDURE — 82465 ASSAY BLD/SERUM CHOLESTEROL: CPT | Mod: ZL

## 2021-07-21 PROCEDURE — 83036 HEMOGLOBIN GLYCOSYLATED A1C: CPT | Mod: ZL

## 2021-07-21 PROCEDURE — 77063 BREAST TOMOSYNTHESIS BI: CPT

## 2021-07-27 ENCOUNTER — TELEPHONE (OUTPATIENT)
Dept: INTERNAL MEDICINE | Facility: OTHER | Age: 71
End: 2021-07-27

## 2021-07-27 ENCOUNTER — TELEPHONE (OUTPATIENT)
Dept: FAMILY MEDICINE | Facility: OTHER | Age: 71
End: 2021-07-27

## 2021-07-27 DIAGNOSIS — R05.9 COUGH: ICD-10-CM

## 2021-07-27 RX ORDER — GUAIFENESIN 600 MG/1
600 TABLET, EXTENDED RELEASE ORAL 2 TIMES DAILY
Qty: 60 TABLET | Refills: 11 | Status: SHIPPED | OUTPATIENT
Start: 2021-07-27

## 2021-07-27 NOTE — TELEPHONE ENCOUNTER
I talked to Amilcar at [facility]. The benzonatate is failing as it is an Rx only product and not on formulary. She is going to check and see if  Care can override and call me back. Rejection she is seeing for the guaifenesin from this morning is showing as a not covered but  Care is seeing as too early. Amilcar is calling me back shortly.

## 2021-07-27 NOTE — TELEPHONE ENCOUNTER
I sent over a prescription for the Mucinex.  If this is not covered by her insurance then I would recommend that she purchase over-the-counter

## 2021-07-27 NOTE — TELEPHONE ENCOUNTER
Please call Amilcar back regarding benzonatate (TESSALON) 100 MG capsule.  Thank you   Frida Shoemaker on 7/27/2021 at 8:41 AM

## 2021-07-27 NOTE — TELEPHONE ENCOUNTER
[facility] is going to grand-father in patient's who have been on the medication. Richard says she put in a lifetime override on the benzonatate. I ran a claim and it was covered 100%

## 2021-07-27 NOTE — TELEPHONE ENCOUNTER
Pennie's PCA called and we also talked to Pennie. She was requesting benzonatate, but her insurance is now rejecting stating it is not a covered Medicare Part D drug. I asked if she had tried any alternatives and she stated no. We do show that Mucinex 600 mg ER was filled and covered 7/13/21. I asked her about this but she did not recall having it.    Looking for an alternative product. The Mucinex was covered by her insurance, but so was benzonatate up until this fill. It is typically not covered by many Part D plans and her insurance may have just started not covering.

## 2021-10-11 ENCOUNTER — IMMUNIZATION (OUTPATIENT)
Dept: FAMILY MEDICINE | Facility: OTHER | Age: 71
End: 2021-10-11
Attending: FAMILY MEDICINE
Payer: COMMERCIAL

## 2021-10-11 PROCEDURE — 91300 PR COVID VAC PFIZER DIL RECON 30 MCG/0.3 ML IM: CPT | Performed by: PHARMACIST

## 2021-11-26 DIAGNOSIS — R05.9 COUGH: ICD-10-CM

## 2021-11-26 NOTE — TELEPHONE ENCOUNTER
Routing refill request to provider for review/approval because:  Drug not on the [facility] refill protocol   LOV: 7/13/2021    Kori Daigle RN on 11/26/2021 at 4:21 PM

## 2021-11-29 RX ORDER — BENZONATATE 100 MG/1
100 CAPSULE ORAL 3 TIMES DAILY PRN
Qty: 90 CAPSULE | Refills: 3 | Status: SHIPPED | OUTPATIENT
Start: 2021-11-29 | End: 2022-03-22

## 2022-01-23 NOTE — TELEPHONE ENCOUNTER
Patient notified.  Poonam Burns LPN 7/14/2021   11:48 AM  
[facility]-pt asking if she should stay on her Loratadine rx. Please call to advise. Thank you.  Mesha Sepulveda    
Spoke to patient.  She states she was recently placed on a new prescription for mucinex and was wondering if she should continue her Loratadine prescription.  Poonam Burns LPN 7/14/2021   11:17 AM  
yes  
Carried

## 2022-03-21 DIAGNOSIS — R05.9 COUGH: ICD-10-CM

## 2022-03-22 RX ORDER — BENZONATATE 100 MG/1
100 CAPSULE ORAL 3 TIMES DAILY PRN
Qty: 90 CAPSULE | Refills: 3 | Status: SHIPPED | OUTPATIENT
Start: 2022-03-22 | End: 2022-08-16

## 2022-03-22 NOTE — TELEPHONE ENCOUNTER
Routing refill request to provider for review/approval because:    LOV: 7/13/21    Kori Daigle RN on 3/22/2022 at 2:00 PM

## 2022-04-21 ENCOUNTER — ALLIED HEALTH/NURSE VISIT (OUTPATIENT)
Dept: FAMILY MEDICINE | Facility: OTHER | Age: 72
End: 2022-04-21
Attending: NURSE PRACTITIONER
Payer: COMMERCIAL

## 2022-04-21 DIAGNOSIS — Z23 NEED FOR VACCINATION: Primary | ICD-10-CM

## 2022-04-21 PROCEDURE — 91305 COVID-19,PF,PFIZER (12+ YRS): CPT

## 2022-04-21 PROCEDURE — G0463 HOSPITAL OUTPT CLINIC VISIT: HCPCS

## 2022-04-21 NOTE — PROGRESS NOTES
Immunization Documentation  Verified patient's first and last name, and . Stated reason for visit today is to receive Pfeizer Covid vaccine. Denied any concerns with previous immunizations. Allergies reviewed. VIS handout(s) reviewed and given to take home. Vaccine prepared and administered per standing order. Administration documented in IMMUNIZATIONS (see flowsheet and order for further information). Patient Instructed to wait in lobby for 15 minutes post-injection and notify staff immediately of any reaction.     Kim Orourke RN ....................  2022   4:32 PM

## 2022-06-21 NOTE — PROGRESS NOTES
[facility]          OUTPATIENT PHYSICAL THERAPY ORTHOPEDIC EVALUATION  PLAN OF TREATMENT FOR OUTPATIENT REHABILITATION  (COMPLETE FOR INITIAL CLAIMS ONLY)  Patient's Last Name, First Name, M.I.  YOB: 1950  SimonePennie  A    Provider s Name:  [facility]   Medical Record No.  [id number]   Start of Care Date:  11/30/18   Onset Date:  11/01/17   Type:     _X__PT   ___OT   ___SLP Medical Diagnosis:  right sided face pain     PT Diagnosis:  impaired mobility   Visits from SOC:  1      _________________________________________________________________________________  Plan of Treatment/Functional Goals:  manual therapy, joint mobilization, ROM, strengthening, stretching   Cryotherapy, Hot packs, Ultrasound       Goals  Goal Identifier: ROM  Goal Description: Patient will demonstrate increased cervical extension from 4 degrees to 10 degrees or more for improved ability to reach overhead.  Target Date: 12/28/18    Goal Identifier: ROM  Goal Description: Patient will demonstrate increased cervical rotation from 23 degrees to 30 degrees or more for improved reaching and ability to participate in social interaction.  Target Date: 01/11/19    Goal Identifier: ROM  Goal Description: Patient will demonstrate increased cervical sidebending from 10 degrees to 15 degrees or more for decreased head/neck pain.  Target Date: 01/25/19    Goal Identifier: posture  Goal Description: Patient will demonstrate improved neutral postural alignment for decreased neck and head pain.  Target Date: 01/25/19            Therapy Frequency:  2 times/Week  Predicted Duration of Therapy Intervention:  8 weeks    Rosemarie Soler, PT                 I CERTIFY THE NEED FOR THESE SERVICES FURNISHED UNDER        THIS PLAN OF TREATMENT AND WHILE UNDER MY CARE     (Physician co-signature of this document indicates review and certification of the therapy plan).                        Certification Date From:  11/30/18   Certification Date To:  01/25/19    Referring Provider:  Cookie Schultz NP    Initial Assessment        See Epic Evaluation Start of Care Date: 11/30/18                                                                               Yes

## 2022-08-16 DIAGNOSIS — Z87.891 HISTORY OF TOBACCO USE DISORDER: ICD-10-CM

## 2022-08-16 DIAGNOSIS — I73.9 PVD (PERIPHERAL VASCULAR DISEASE) (H): ICD-10-CM

## 2022-08-16 DIAGNOSIS — K21.9 GASTROESOPHAGEAL REFLUX DISEASE WITHOUT ESOPHAGITIS: ICD-10-CM

## 2022-08-16 DIAGNOSIS — J30.9 ALLERGIC RHINITIS WITH POSTNASAL DRIP: ICD-10-CM

## 2022-08-16 DIAGNOSIS — R05.9 COUGH: ICD-10-CM

## 2022-08-16 DIAGNOSIS — R09.82 ALLERGIC RHINITIS WITH POSTNASAL DRIP: ICD-10-CM

## 2022-08-16 RX ORDER — LORATADINE 10 MG/1
10 TABLET ORAL DAILY
Qty: 90 TABLET | Refills: 3 | Status: SHIPPED | OUTPATIENT
Start: 2022-08-16 | End: 2023-08-01

## 2022-08-16 RX ORDER — FAMOTIDINE 40 MG/1
40 TABLET, FILM COATED ORAL DAILY
Qty: 90 TABLET | Refills: 3 | Status: SHIPPED | OUTPATIENT
Start: 2022-08-16 | End: 2023-12-12

## 2022-08-16 RX ORDER — ROSUVASTATIN CALCIUM 20 MG/1
20 TABLET, COATED ORAL DAILY
Qty: 90 TABLET | Refills: 3 | Status: SHIPPED | OUTPATIENT
Start: 2022-08-16 | End: 2023-10-06

## 2022-08-16 RX ORDER — BENZONATATE 100 MG/1
CAPSULE ORAL
Qty: 90 CAPSULE | Refills: 3 | Status: SHIPPED | OUTPATIENT
Start: 2022-08-16 | End: 2022-11-29

## 2022-08-16 NOTE — TELEPHONE ENCOUNTER
"[facility]  sent Rx request for the following:      Requested Prescriptions   Pending Prescriptions Disp Refills     loratadine (CLARITIN) 10 MG tablet [Pharmacy Med Name: loratadine 10 mg tablet] 90 tablet 3     Sig: Take 1 tablet (10 mg) by mouth daily       Antihistamines Protocol Failed - 8/16/2022 11:48 AM        Failed - Patient is 3-64 years of age     Apply weight-based dosing for peds patients age 3 - 12 years of age.    Forward request to provider for patients under the age of 3 or over the age of 64.        Passed - Recent (12 mo) or future (30 days) visit within the authorizing provider's specialty     Patient has had an office visit with the authorizing provider or a provider within the authorizing providers department within the previous 12 mos or has a future within next 30 days. See \"Patient Info\" tab in inbasket, or \"Choose Columns\" in Meds & Orders section of the refill encounter.          Passed - Medication is active on med list     Last Prescription Date:   07/13/21  Last Fill Qty/Refills:         90, R-3          famotidine (PEPCID) 40 MG tablet [Pharmacy Med Name: famotidine 40 mg tablet] 90 tablet 3     Sig: Take 1 tablet (40 mg) by mouth daily       H2 Blockers Protocol Passed - 8/16/2022 11:48 AM        Passed - Patient is age 12 or older        Passed - Recent (12 mo) or future (30 days) visit within the authorizing provider's specialty     Patient has had an office visit with the authorizing provider or a provider within the authorizing providers department within the previous 12 mos or has a future within next 30 days. See \"Patient Info\" tab in inbasket, or \"Choose Columns\" in Meds & Orders section of the refill encounter.          Passed - Medication is active on med list     Last Prescription Date:   07/13/21  Last Fill Qty/Refills:         90, R-3          rosuvastatin (CRESTOR) 20 MG tablet [Pharmacy Med Name: rosuvastatin 20 mg tablet] 90 tablet 3     Sig: Take 1 tablet " "(20 mg) by mouth daily       Statins Protocol Failed - 8/16/2022 11:48 AM        Failed - LDL on file in past 12 months     Recent Labs   Lab Test 07/21/21  1514   LDL 53           Passed - No abnormal creatine kinase in past 12 months     No lab results found.          Passed - Recent (12 mo) or future (30 days) visit within the authorizing provider's specialty     Patient has had an office visit with the authorizing provider or a provider within the authorizing providers department within the previous 12 mos or has a future within next 30 days. See \"Patient Info\" tab in inbasket, or \"Choose Columns\" in Meds & Orders section of the refill encounter.          Passed - Medication is active on med list        Passed - Patient is age 18 or older        Passed - No active pregnancy on record        Passed - No positive pregnancy test in past 12 months     Last Prescription Date:   07/13/21  Last Fill Qty/Refills:         90, R-3        benzonatate (TESSALON) 100 MG capsule [Pharmacy Med Name: benzonatate 100 mg capsule] 90 capsule 3     Sig: TAKE 1 CAPSULE BY MOUTH 3 TIMES DAILY AS NEEDED FOR COUGH       There is no refill protocol information for this order        Last Prescription Date:   03/22/22  Last Fill Qty/Refills:         90, R-3  Last Office Visit:              07/13/21   Future Office visit:             Next 5 appointments (look out 90 days)    Sep 14, 2022  3:00 PM  PHYSICAL with Marcela De Luna NP  [facility] ([facility] ) [address]  [place] [address]  [phone]        Sandy Nguyen RN .............. 8/16/2022  11:57 AM      "

## 2022-08-30 DIAGNOSIS — G50.0 TRIGEMINAL NEURALGIA OF RIGHT SIDE OF FACE: ICD-10-CM

## 2022-08-30 DIAGNOSIS — M79.7 FIBROMYALGIA: ICD-10-CM

## 2022-09-01 NOTE — TELEPHONE ENCOUNTER
[facility] - [place], [place] - [address] sent Rx request for the following:      Requested Prescriptions   Pending Prescriptions Disp Refills     carBAMazepine (TEGRETOL) 200 MG tablet [Pharmacy Med Name: carbamazepine 200 mg tablet] 90 tablet 3     Sig: Take 1/2 tablet (100 mg) by mouth 2 times daily       Anti-Seizure Meds Protocol  Failed - 8/30/2022 11:22 AM        Failed - Review Authorizing provider's last note.      Refer to last progress notes: confirm request is for original authorizing provider (cannot be through other providers).          Failed - Normal CBC on file in past 26 months     Recent Labs   Lab Test 06/23/20  1535 04/23/19  1850 01/05/18  1148   WBC 8.3   < >  --    GICHWBC  --   --  7.6   RBC 5.23*   < >  --    GICHRBC  --   --  5.22*   HGB 13.6   < > 14.2   HCT 43.6   < > 44.6      < > 278    < > = values in this interval not displayed.                 Failed - Normal ALT or AST on file in past 26 months     Recent Labs   Lab Test 06/23/20  1535 03/07/18  1136 07/19/17  1750   ALT 12   < >  --    GICHALT  --   --  42    < > = values in this interval not displayed.     Recent Labs   Lab Test 06/23/20  1535 03/07/18  1136 07/19/17  1750   AST 13   < >  --    GICHAST  --   --  32    < > = values in this interval not displayed.             Failed - Normal platelet count on file in past 26 months     Recent Labs   Lab Test 06/23/20  1535                  Failed - Carbamazepine level within therapeutic range in last 26 months     No lab results found.    Carbamazepine level must be checked 2-4 weeks after dosage change.              Last Prescription Date:   07/13/2021  Last Fill Qty/Refills:         90, R-3  Last Office Visit:              07/13/2021  Future Office visit:           09/14/2022      Bharat Mccodr RN on 9/1/2022 at 11:39 AM

## 2022-09-02 RX ORDER — CARBAMAZEPINE 200 MG/1
TABLET ORAL
Qty: 90 TABLET | Refills: 3 | Status: SHIPPED | OUTPATIENT
Start: 2022-09-02 | End: 2024-02-16

## 2022-09-14 ENCOUNTER — OFFICE VISIT (OUTPATIENT)
Dept: INTERNAL MEDICINE | Facility: OTHER | Age: 72
End: 2022-09-14
Attending: NURSE PRACTITIONER
Payer: COMMERCIAL

## 2022-09-14 VITALS
DIASTOLIC BLOOD PRESSURE: 82 MMHG | TEMPERATURE: 98.2 F | HEART RATE: 90 BPM | HEIGHT: 59 IN | BODY MASS INDEX: 44.55 KG/M2 | RESPIRATION RATE: 16 BRPM | WEIGHT: 221 LBS | OXYGEN SATURATION: 95 % | SYSTOLIC BLOOD PRESSURE: 130 MMHG

## 2022-09-14 DIAGNOSIS — Z12.31 ENCOUNTER FOR SCREENING MAMMOGRAM FOR BREAST CANCER: ICD-10-CM

## 2022-09-14 DIAGNOSIS — Z23 NEED FOR INFLUENZA VACCINATION: ICD-10-CM

## 2022-09-14 DIAGNOSIS — M79.7 FIBROMYALGIA: ICD-10-CM

## 2022-09-14 DIAGNOSIS — E78.2 MIXED HYPERLIPIDEMIA: ICD-10-CM

## 2022-09-14 DIAGNOSIS — Z78.0 POSTMENOPAUSAL STATUS: ICD-10-CM

## 2022-09-14 DIAGNOSIS — J43.2 CENTRILOBULAR EMPHYSEMA (H): ICD-10-CM

## 2022-09-14 DIAGNOSIS — Z00.00 ENCOUNTER FOR MEDICARE ANNUAL WELLNESS EXAM: Primary | ICD-10-CM

## 2022-09-14 DIAGNOSIS — G50.0 TRIGEMINAL NEURALGIA OF RIGHT SIDE OF FACE: ICD-10-CM

## 2022-09-14 DIAGNOSIS — I73.9 PERIPHERAL VASCULAR DISEASE (H): ICD-10-CM

## 2022-09-14 LAB
ANION GAP SERPL CALCULATED.3IONS-SCNC: 7 MMOL/L (ref 3–14)
BUN SERPL-MCNC: 11 MG/DL (ref 7–25)
CALCIUM SERPL-MCNC: 9.6 MG/DL (ref 8.6–10.3)
CHLORIDE BLD-SCNC: 99 MMOL/L (ref 98–107)
CHOLEST SERPL-MCNC: 135 MG/DL
CO2 SERPL-SCNC: 33 MMOL/L (ref 21–31)
CREAT SERPL-MCNC: 0.69 MG/DL (ref 0.6–1.2)
ERYTHROCYTE [DISTWIDTH] IN BLOOD BY AUTOMATED COUNT: 12.7 % (ref 10–15)
FASTING STATUS PATIENT QL REPORTED: ABNORMAL
GFR SERPL CREATININE-BSD FRML MDRD: >90 ML/MIN/1.73M2
GLUCOSE BLD-MCNC: 111 MG/DL (ref 70–105)
HCT VFR BLD AUTO: 45.3 % (ref 35–47)
HDLC SERPL-MCNC: 56 MG/DL (ref 23–92)
HGB BLD-MCNC: 14.6 G/DL (ref 11.7–15.7)
HOLD SPECIMEN: NORMAL
LDLC SERPL CALC-MCNC: 48 MG/DL
MCH RBC QN AUTO: 26.8 PG (ref 26.5–33)
MCHC RBC AUTO-ENTMCNC: 32.2 G/DL (ref 31.5–36.5)
MCV RBC AUTO: 83 FL (ref 78–100)
NONHDLC SERPL-MCNC: 79 MG/DL
PLATELET # BLD AUTO: 286 10E3/UL (ref 150–450)
POTASSIUM BLD-SCNC: 4.5 MMOL/L (ref 3.5–5.1)
RBC # BLD AUTO: 5.45 10E6/UL (ref 3.8–5.2)
SODIUM SERPL-SCNC: 139 MMOL/L (ref 134–144)
TRIGL SERPL-MCNC: 154 MG/DL
WBC # BLD AUTO: 9.4 10E3/UL (ref 4–11)

## 2022-09-14 PROCEDURE — 82374 ASSAY BLOOD CARBON DIOXIDE: CPT | Mod: ZL | Performed by: NURSE PRACTITIONER

## 2022-09-14 PROCEDURE — G0008 ADMIN INFLUENZA VIRUS VAC: HCPCS

## 2022-09-14 PROCEDURE — G0463 HOSPITAL OUTPT CLINIC VISIT: HCPCS | Mod: 25

## 2022-09-14 PROCEDURE — 85027 COMPLETE CBC AUTOMATED: CPT | Mod: ZL | Performed by: NURSE PRACTITIONER

## 2022-09-14 PROCEDURE — 36415 COLL VENOUS BLD VENIPUNCTURE: CPT | Mod: ZL | Performed by: NURSE PRACTITIONER

## 2022-09-14 PROCEDURE — G0439 PPPS, SUBSEQ VISIT: HCPCS | Performed by: NURSE PRACTITIONER

## 2022-09-14 PROCEDURE — 80061 LIPID PANEL: CPT | Mod: ZL | Performed by: NURSE PRACTITIONER

## 2022-09-14 PROCEDURE — 99213 OFFICE O/P EST LOW 20 MIN: CPT | Mod: 25 | Performed by: NURSE PRACTITIONER

## 2022-09-14 PROCEDURE — 80156 ASSAY CARBAMAZEPINE TOTAL: CPT | Mod: ZL | Performed by: NURSE PRACTITIONER

## 2022-09-14 RX ORDER — DULOXETIN HYDROCHLORIDE 20 MG/1
20 CAPSULE, DELAYED RELEASE ORAL DAILY
Qty: 30 CAPSULE | Refills: 3 | Status: SHIPPED | OUTPATIENT
Start: 2022-09-14 | End: 2024-05-07

## 2022-09-14 ASSESSMENT — ENCOUNTER SYMPTOMS
CHEST TIGHTNESS: 0
ANAL BLEEDING: 0
DIAPHORESIS: 0
FEVER: 0
WOUND: 0
LIGHT-HEADEDNESS: 0
HEMATOCHEZIA: 0
TROUBLE SWALLOWING: 0
SHORTNESS OF BREATH: 0
DIARRHEA: 0
HEMATURIA: 0
WEAKNESS: 0
CONSTIPATION: 0
ENDOCRINE NEGATIVE: 1
DYSURIA: 0
DIZZINESS: 0
UNEXPECTED WEIGHT CHANGE: 0
DYSPHORIC MOOD: 0
NERVOUS/ANXIOUS: 0
DIFFICULTY URINATING: 0
MYALGIAS: 1

## 2022-09-14 ASSESSMENT — PATIENT HEALTH QUESTIONNAIRE - PHQ9
10. IF YOU CHECKED OFF ANY PROBLEMS, HOW DIFFICULT HAVE THESE PROBLEMS MADE IT FOR YOU TO DO YOUR WORK, TAKE CARE OF THINGS AT HOME, OR GET ALONG WITH OTHER PEOPLE: NOT DIFFICULT AT ALL
SUM OF ALL RESPONSES TO PHQ QUESTIONS 1-9: 0
SUM OF ALL RESPONSES TO PHQ QUESTIONS 1-9: 0

## 2022-09-14 ASSESSMENT — ACTIVITIES OF DAILY LIVING (ADL)
CURRENT_FUNCTION: LAUNDRY REQUIRES ASSISTANCE
CURRENT_FUNCTION: HOUSEWORK REQUIRES ASSISTANCE
CURRENT_FUNCTION: SHOPPING REQUIRES ASSISTANCE
CURRENT_FUNCTION: BATHING REQUIRES ASSISTANCE
CURRENT_FUNCTION: TRANSPORTATION REQUIRES ASSISTANCE

## 2022-09-14 ASSESSMENT — PAIN SCALES - GENERAL: PAINLEVEL: NO PAIN (0)

## 2022-09-14 NOTE — LETTER
September 20, 2022       Pennie MONTES Simone  [address]  [facility] [id number]        Dear ,    We are writing to inform you of your test results.    Your recent lab work overall looks good but blood glucose is slightly elevated.  I would recommend checking a lab to evaluate for diabetes within the next couple of months.    Resulted Orders   CBC with platelets   Result Value Ref Range    WBC Count 9.4 4.0 - 11.0 10e3/uL    RBC Count 5.45 (H) 3.80 - 5.20 10e6/uL    Hemoglobin 14.6 11.7 - 15.7 g/dL    Hematocrit 45.3 35.0 - 47.0 %    MCV 83 78 - 100 fL    MCH 26.8 26.5 - 33.0 pg    MCHC 32.2 31.5 - 36.5 g/dL    RDW 12.7 10.0 - 15.0 %    Platelet Count 286 150 - 450 10e3/uL   Basic metabolic panel   Result Value Ref Range    Sodium 139 134 - 144 mmol/L    Potassium 4.5 3.5 - 5.1 mmol/L    Chloride 99 98 - 107 mmol/L    Carbon Dioxide (CO2) 33 (H) 21 - 31 mmol/L    Anion Gap 7 3 - 14 mmol/L    Urea Nitrogen 11 7 - 25 mg/dL    Creatinine 0.69 0.60 - 1.20 mg/dL    Calcium 9.6 8.6 - 10.3 mg/dL    Glucose 111 (H) 70 - 105 mg/dL    GFR Estimate >90 >60 mL/min/1.73m2      Comment:      Effective December 21, 2021 eGFRcr in adults is calculated using the 2021 CKD-EPI creatinine equation which includes age and gender (Grace parikh al., NEJ, DOI: 10.1056/GNJQyn7248199)   Lipid Profile   Result Value Ref Range    Cholesterol 135 <200 mg/dL    Triglycerides 154 (H) <150 mg/dL    Direct Measure HDL 56 23 - 92 mg/dL    LDL Cholesterol Calculated 48 <=100 mg/dL    Non HDL Cholesterol 79 <130 mg/dL    Patient Fasting > 8hrs? Unknown     Narrative    Cholesterol  Desirable:  <200 mg/dL    Triglycerides  Normal:  Less than 150 mg/dL  Borderline High:  150-199 mg/dL  High:  200-499 mg/dL  Very High:  Greater than or equal to 500 mg/dL    Direct Measure HDL  Female:  Greater than or equal to 50 mg/dL   Male:  Greater than or equal to 40 mg/dL    LDL Cholesterol  Desirable:  <100mg/dL  Above Desirable:  100-129 mg/dL    Borderline High:  130-159 mg/dL   High:  160-189 mg/dL   Very High:  >= 190 mg/dL    Non HDL Cholesterol  Desirable:  130 mg/dL  Above Desirable:  130-159 mg/dL  Borderline High:  160-189 mg/dL  High:  190-219 mg/dL  Very High:  Greater than or equal to 220 mg/dL   Carbamazepine   Result Value Ref Range    Carbamazepine 4.1 4.0 - 12.0 ug/mL       If you have any questions or concerns, please call the clinic at the number listed above.       Sincerely,      Marcela De Luna, NP

## 2022-09-14 NOTE — PATIENT INSTRUCTIONS
Patient Education   Personalized Prevention Plan  You are due for the preventive services outlined below.  Your care team is available to assist you in scheduling these services.  If you have already completed any of these items, please share that information with your care team to update in your medical record.  Health Maintenance Due   Topic Date Due     Osteoporosis Screening  Never done     LUNG CANCER SCREENING  06/11/2020     Colorectal Cancer Screening  06/12/2020     Zoster (Shingles) Vaccine (3 of 3) 09/07/2021     Flu Vaccine (1) 09/01/2022       Exercise for a Healthier Heart  You may wonder how you can improve the health of your heart. If you re thinking about exercise, you re on the right track. You don t need to become an athlete. But you do need a certain amount of brisk exercise to help strengthen your heart. If you have been diagnosed with a heart condition, your healthcare provider may advise exercise to help stabilize your condition. To help make exercise a habit, choose safe, fun activities.      Exercise with a friend. When activity is fun, you're more likely to stick with it.   Before you start  Check with your healthcare provider before starting an exercise program. This is especially important if you have not been active for a while. It's also important if you have a long-term (chronic) health problem such as heart disease, diabetes, or obesity. Or if you are at high risk for having these problems.   Why exercise?  Exercising regularly offers many healthy rewards. It can help you do all of the following:     Improve your blood cholesterol level to help prevent further heart trouble    Lower your blood pressure to help prevent a stroke or heart attack    Control diabetes, or reduce your risk of getting this disease    Improve your heart and lung function    Reach and stay at a healthy weight    Make your muscles stronger so you can stay active    Prevent falls and fractures by slowing the  loss of bone mass (osteoporosis)    Manage stress better    Reduce your blood pressure    Improve your sense of self and your body image  Exercise tips      Ease into your routine. Set small goals. Then build on them. If you are not sure what your activity level should be, talk with your healthcare provider first before starting an exercise routine.    Exercise on most days. Aim for a total of 150 minutes (2 hours and 30 minutes) or more of moderate-intensity aerobic activity each week. Or 75 minutes (1 hour and 15 minutes) or more of vigorous-intensity aerobic activity each week. Or try for a combination of both. Moderate activity means that you breathe heavier and your heart rate increases but you can still talk. Think about doing 40 minutes of moderate exercise, 3 to 4 times a week. For best results, activity should last for about 40 minutes to lower blood pressure and cholesterol. It's OK to work up to the 40-minute period over time. Examples of moderate-intensity activity are walking 1 mile in 15 minutes. Or doing 30 to 45 minutes of yard work.    Step up your daily activity level.  Along with your exercise program, try being more active the whole day. Walk instead of drive. Or park further away so that you take more steps each day. Do more household tasks or yard work. You may not be able to meet the advised mount of physical activity. But doing some moderate- or vigorous-intensity aerobic activity can help reduce your risk for heart disease. Your healthcare provider can help you figure out what is best for you.    Choose 1 or more activities you enjoy.  Walking is one of the easiest things you can do. You can also try swimming, riding a bike, dancing, or taking an exercise class.    When to call your healthcare provider  Call your healthcare provider if you have any of these:     Chest pain or feel dizzy or lightheaded    Burning, tightness, pressure, or heaviness in your chest, neck, shoulders, back, or  arms    Abnormal shortness of breath    More joint or muscle pain    A very fast or irregular heartbeat (palpitations)  [employer] last reviewed this educational content on 7/1/2019 2000-2021 [employer]. All rights reserved. This information is not intended as a substitute for professional medical care. Always follow your healthcare professional's instructions.          Understanding USDA MyPlate  The USDA has guidelines to help you make healthy food choices. These are called MyPlate. MyPlate shows the food groups that make up healthy meals using the image of a place setting. Before you eat, think about the healthiest choices for what to put on your plate or in your cup or bowl. To learn more about building a healthy plate, visit www.choosemyplate.gov.    The food groups    Fruits. Any fruit or 100% fruit juice counts as part of the Fruit Group. Fruits may be fresh, canned, frozen, or dried, and may be whole, cut-up, or pureed. Make 1/2 of your plate fruits and vegetables.    Vegetables. Any vegetable or 100% vegetable juice counts as a member of the Vegetable Group. Vegetables may be fresh, frozen, canned, or dried. They can be served raw or cooked and may be whole, cut-up, or mashed. Make 1/2 of your plate fruits and vegetables.    Grains. All foods made from grains are part of the Grains Group. These include wheat, rice, oats, cornmeal, and barley. Grains are often used to make foods such as bread, pasta, oatmeal, cereal, tortillas, and grits. Grains should be no more than 1/4 of your plate. At least half of your grains should be whole grains.    Protein. This group includes meat, poultry, seafood, beans and peas, eggs, processed soy products (such as tofu), nuts (including nut butters), and seeds. Make protein choices no more than 1/4 of your plate. Meat and poultry choices should be lean or low fat.    Dairy. The Dairy Group includes all fluid milk products and foods made from milk that contain  calcium, such as yogurt and cheese. (Foods that have little calcium, such as cream, butter, and cream cheese, are not part of this group.) Most dairy choices should be low-fat or fat-free.    Oils. Oils aren't a food group, but they do contain essential nutrients. However it's important to watch your intake of oils. These are fats that are liquid at room temperature. They include canola, corn, olive, soybean, vegetable, and sunflower oil. Foods that are mainly oil include mayonnaise, certain salad dressings, and soft margarines. You likely already get your daily oil allowance from the foods you eat.  Things to limit  Eating healthy also means limiting these things in your diet:       Salt (sodium). Many processed foods have a lot of sodium. To keep sodium intake down, eat fresh vegetables, meats, poultry, and seafood when possible. Purchase low-sodium, reduced-sodium, or no-salt-added food products at the store. And don't add salt to your meals at home. Instead, season them with herbs and spices such as dill, oregano, cumin, and paprika. Or try adding flavor with lemon or lime zest and juice.    Saturated fat. Saturated fats are most often found in animal products such as beef, pork, and chicken. They are often solid at room temperature, such as butter. To reduce your saturated fat intake, choose leaner cuts of meat and poultry. And try healthier cooking methods such as grilling, broiling, roasting, or baking. For a simple lower-fat swap, use plain nonfat yogurt instead of mayonnaise when making potato salad or macaroni salad.    Added sugars. These are sugars added to foods. They are in foods such as ice cream, candy, soda, fruit drinks, sports drinks, energy drinks, cookies, pastries, jams, and syrups. Cut down on added sugars by sharing sweet treats with a family member or friend. You can also choose fruit for dessert, and drink water or other unsweetened beverages.     [employer] last reviewed this educational  content on 6/1/2020 2000-2021 [employer]. All rights reserved. This information is not intended as a substitute for professional medical care. Always follow your healthcare professional's instructions.        Activities of Daily Living    Your Health Risk Assessment indicates you have difficulties with activities of daily living such as housework, bathing, preparing meals, taking medication, etc. Please make a follow up appointment for us to address this issue in more detail.    Urinary Incontinence, Female (Adult)   Urinary incontinence means loss of bladder control. This problem affects many women, especially as they get older. If you have incontinence, you may be embarrassed to ask for help. But know that this problem can be treated.   Types of Incontinence  There are different types of incontinence. Two of the main types are described here. You can have more than one type.     Stress incontinence. With this type, urine leaks when pressure (stress) is put on the bladder. This may happen when you cough, sneeze, or laugh. Stress incontinence most often occurs because the pelvic floor muscles that support the bladder and urethra are weak. This can happen after pregnancy and vaginal childbirth or a hysterectomy. It can also be due to excess body weight or hormone changes.    Urge incontinence (also called overactive bladder). With this type, a sudden urge to urinate is felt often. This may happen even though there may not be much urine in the bladder. The need to urinate often during the night is common. Urge incontinence most often occurs because of bladder spasms. This may be due to bladder irritation or infection. Damage to bladder nerves or pelvic muscles, constipation, and certain medicines can also lead to urge incontinence.  Treatment depends on the cause. Further evaluation is needed to find the type you have. This will likely include an exam and certain tests. Based on the results, you and  your healthcare provider can then plan treatment. Until a diagnosis is made, the home care tips below can help ease symptoms.   Home care    Do pelvic floor muscle exercises, if they are prescribed. The pelvic floor muscles help support the bladder and urethra. Many women find that their symptoms improve when doing special exercises that strengthen these muscles. To do the exercises, contract the muscles you would use to stop your stream of urine. But do this when you re not urinating. Hold for 10 seconds, then relax. Repeat 10 to 20 times in a row, at least 3 times a day. Your healthcare provider may give you other instructions for how to do the exercises and how often.    Keep a bladder diary. This helps track how often and how much you urinate over a set period of time. Bring this diary with you to your next visit with the provider. The information can help your provider learn more about your bladder problem.    Lose weight, if advised to by your provider. Extra weight puts pressure on the bladder. Your provider can help you create a weight-loss plan that s right for you. This may include exercising more and making certain diet changes.    Don't have foods and drinks that may irritate the bladder. These can include alcohol and caffeinated drinks.    Quit smoking. Smoking and other tobacco use can lead to a long-term (chronic) cough that strains the pelvic floor muscles. Smoking may also damage the bladder and urethra. Talk with your provider about treatments or methods you can use to quit smoking.    If drinking large amounts of fluid makes you have symptoms, you may be advised to limit your fluid intake. You may also be advised to drink most of your fluids during the day and to limit fluids at night.    If you re worried about urine leakage or accidents, you may wear absorbent pads to catch urine. Change the pads often. This helps reduce discomfort. It may also reduce the risk of skin or bladder  infections.    Follow-up care  Follow up with your healthcare provider, or as directed. It may take some to find the right treatment for your problem. But healthy lifestyle changes can be made right away. These include such things as exercising on a regular basis, eating a healthy diet, losing weight (if needed), and quitting smoking. Your treatment plan may include special therapies or medicines. Certain procedures or surgery may also be options. Talk about any questions you have with your provider.   When to seek medical advice  Call the healthcare provider right away if any of these occur:    Fever of 100.4 F (38 C) or higher, or as directed by your provider    Bladder pain or fullness    Belly swelling    Nausea or vomiting    Back pain    Weakness, dizziness, or fainting  [employer] last reviewed this educational content on 1/1/2020 2000-2021 [employer]. All rights reserved. This information is not intended as a substitute for professional medical care. Always follow your healthcare professional's instructions.

## 2022-09-14 NOTE — PROGRESS NOTES
"    She is at risk for lack of exercise and has been provided with information to increase physical activity for the benefit of her well-being.  The patient was counseled and encouraged to consider modifying their diet and eating habits. She was provided with information on recommended healthy diet options.  The patient reports that she has difficulty with activities of daily living. I have asked that the patient make a follow up appointment in 0 weeks where this issue will be further evaluated and addressed.  Patient has a PCA to assist.  Information on urinary incontinence and treatment options given to patient.  Answers for HPI/ROS submitted by the patient on 9/14/2022  If you checked off any problems, how difficult have these problems made it for you to do your work, take care of things at home, or get along with other people?: Not difficult at all  PHQ9 TOTAL SCORE: 0  In general, how would you rate your overall physical health?: good  Frequency of exercise:: None  Do you usually eat at least 4 servings of fruit and vegetables a day, include whole grains & fiber, and avoid regularly eating high fat or \"junk\" foods? : No  Taking medications regularly:: Yes  Medication side effects:: None  Activities of Daily Living: transportation requires assistance, shopping requires assistance, housework requires assistance, bathing requires assistance, laundry requires assistance  Home safety: no safety concerns identified  Hearing Impairment:: no hearing concerns  In the past 6 months, have you been bothered by leaking of urine?: Yes  In general, how would you rate your overall mental or emotional health?: good  Additional concerns today:: No      "

## 2022-09-14 NOTE — NURSING NOTE
"Chief Complaint   Patient presents with     Medicare Visit       FOOD SECURITY SCREENING QUESTIONS  Hunger Vital Signs:  Within the past 12 months we worried whether our food would run out before we got money to buy more. Never  Within the past 12 months the food we bought just didn't last and we didn't have money to get more. Never  Katherine Delacruz LPN 9/14/2022 3:18 PM      Initial /82 (BP Location: Right arm, Patient Position: Sitting, Cuff Size: Adult Large)   Pulse 90   Temp 98.2  F (36.8  C) (Tympanic)   Resp 16   Ht 1.505 m (4' 11.25\")   Wt 100.2 kg (221 lb)   LMP  (LMP Unknown)   SpO2 95%   BMI 44.26 kg/m   Estimated body mass index is 44.26 kg/m  as calculated from the following:    Height as of this encounter: 1.505 m (4' 11.25\").    Weight as of this encounter: 100.2 kg (221 lb).  Medication Reconciliation: complete    Katherine Delacruz LPN  "

## 2022-09-14 NOTE — PROGRESS NOTES
"SUBJECTIVE:   Pennie is a 71 year old who presents for Preventive Visit.      Patient has been advised of split billing requirements and indicates understanding: Yes  Are you in the first 12 months of your Medicare coverage?  No    Patient is here today for Medicare wellness visit and chronic disease management.  She has had history of positive Cologuard in 2017.  She has had no change in bowel pattern or evidence of bleeding.  She does not want colon cancer screening with colonoscopy.  She is aware of the risk.  She has declined this before.  She is in need of annual mammogram.  She declines bone density scan.  She would like a prescription for calcium and vitamin D sent to pharmacy.  She also has history of fibromyalgia.  She states that she has chronic allover body pain.  She does not have any depression or anxiety.  She is wondering if there is any treatment that may be helpful with fibromyalgia pain.  She also has history of PAD but denies symptoms of claudication.  She is due for influenza vaccine and Shingrix.  Has history of trigeminal neuralgia and is on Tegretol chronically.  Also has history of mild emphysema and quit tobacco in 2001.  She uses Mucinex and Tessalon to help with cough symptoms and congestion.  Tolerating without side effects.    Healthy Habits:     In general, how would you rate your overall health?  Good    Frequency of exercise:  None    Do you usually eat at least 4 servings of fruit and vegetables a day, include whole grains    & fiber and avoid regularly eating high fat or \"junk\" foods?  No    Taking medications regularly:  Yes    Medication side effects:  None    Ability to successfully perform activities of daily living:  Transportation requires assistance, shopping requires assistance, housework requires assistance, bathing requires assistance and laundry requires assistance    Home Safety:  No safety concerns identified    Hearing Impairment:  No hearing concerns    In the past 6 " months, have you been bothered by leaking of urine? Yes    In general, how would you rate your overall mental or emotional health?  Good      PHQ-2 Total Score: 0    Additional concerns today:  No    Do you feel safe in your environment? Yes    Have you ever done Advance Care Planning? (For example, a Health Directive, POLST, or a discussion with a medical provider or your loved ones about your wishes): No, advance care planning information given to patient to review.  Patient plans to discuss their wishes with loved ones or provider.         Fall risk  Fallen 2 or more times in the past year?: No  Any fall with injury in the past year?: No    Cognitive Screening   1) Repeat 3 items (Leader, Season, Table)    2) Clock draw: NORMAL  3) 3 item recall: Recalls 1 object   Results: NORMAL clock, 1-2 items recalled: COGNITIVE IMPAIRMENT LESS LIKELY    Denies any memory concern    Mini-CogTM Copyright MYLES Tejada. Licensed by the author for use in [facility]; reprinted with permission (alexandrea@[email or web address]). All rights reserved.      Do you have sleep apnea, excessive snoring or daytime drowsiness?: yes    Reviewed and updated as needed this visit by clinical staff   Tobacco  Allergies  Meds      Soc Hx          Reviewed and updated as needed this visit by Provider                   Social History     Tobacco Use     Smoking status: Former Smoker     Packs/day: 1.00     Years: 30.00     Pack years: 30.00     Types: Cigarettes     Quit date: 2001     Years since quittin.6     Smokeless tobacco: Never Used     Tobacco comment: no ecig   Substance Use Topics     Alcohol use: No     If you drink alcohol do you typically have >3 drinks per day or >7 drinks per week? No    Alcohol Use 2022   Prescreen: >3 drinks/day or >7 drinks/week? No   Review current opioid prescriptions    For a patient with a current opioid prescription:    Reviewed potential Opioid Use Disorder (OUD) risk factors: Yes na    Evaluate  their pain severity and current treatment plan:     Provide information on non-opioid treatment options:    Refer to a specialist, as appropriate:    Get more information on pain management in the HHS Pain Management Best Practices Inter-agency Task Force Report    Screen for potential Substance Use Disorders (SUDs)    Reviewed the patient s potential risk factors for SUDs: Yes na    Refer to treatment or specialist, as appropriate:     A screening tool isn t required but you may use one:  Find more information in the National [place] on Drug Abuse Screening and Assessment Tools Chart              Current providers sharing in care for this patient include:   Patient Care Team:  Marcela De Luna NP as PCP - General (Nurse Practitioner)  Marcela De Luna NP as Assigned PCP    The following health maintenance items are reviewed in Epic and correct as of today:  Health Maintenance   Topic Date Due     DEXA  Never done     LUNG CANCER SCREENING  06/11/2020     COLORECTAL CANCER SCREENING  06/12/2020     ZOSTER IMMUNIZATION (3 of 3) 09/07/2021     MEDICARE ANNUAL WELLNESS VISIT  07/13/2022     INFLUENZA VACCINE (1) 09/01/2022     MAMMO SCREENING  07/21/2023     FALL RISK ASSESSMENT  09/14/2023     ADVANCE CARE PLANNING  06/30/2025     LIPID  07/21/2026     DTAP/TDAP/TD IMMUNIZATION (2 - Td or Tdap) 06/05/2027     HEPATITIS C SCREENING  Completed     PHQ-2 (once per calendar year)  Completed     Pneumococcal Vaccine: 65+ Years  Completed     COVID-19 Vaccine  Completed     IPV IMMUNIZATION  Aged Out     MENINGITIS IMMUNIZATION  Aged Out     HEPATITIS B IMMUNIZATION  Aged Out           Review of Systems   Constitutional: Negative for diaphoresis, fever and unexpected weight change.   HENT: Negative for hearing loss and trouble swallowing.    Respiratory: Negative for chest tightness and shortness of breath.    Cardiovascular: Negative for chest pain and peripheral edema.   Gastrointestinal: Negative for anal  "bleeding, constipation, diarrhea and hematochezia.   Endocrine: Negative.    Breasts:  negative.    Genitourinary: Negative for difficulty urinating, dysuria and hematuria.   Musculoskeletal: Positive for myalgias.   Skin: Negative for wound.   Neurological: Negative for dizziness, weakness and light-headedness.   Psychiatric/Behavioral: Negative for dysphoric mood. The patient is not nervous/anxious.          OBJECTIVE:   LMP  (LMP Unknown)  Estimated body mass index is 45.16 kg/m  as calculated from the following:    Height as of 7/13/21: 1.499 m (4' 11\").    Weight as of 7/13/21: 101.4 kg (223 lb 9.6 oz).  Physical Exam  Pleasant female no acute distress.  Affect normal.  Alert and oriented x4.  Sclera nonicteric.  Conjunctiva noninflamed.  TMs clear.  Patient wearing facial mask secondary to pandemic but denies oral mucosal concerns.  Neck supple without adenopathy.  No thyromegaly.  No carotid bruit.  Lung fields clear to auscultation throughout.  Cardiovascular regular rate and rhythm with no murmur or S3.  Abdomen is soft and obese without masses or tenderness.  No abdominal bruits.  Extremities with chronic 1+ edema and multiple varicosities around her ankles.  Gait is stable.        ASSESSMENT / PLAN:       ICD-10-CM    1. Encounter for Medicare annual wellness exam  Z00.00    2. Centrilobular emphysema (H)  J43.2 CBC with platelets     Basic metabolic panel     CBC with platelets     Basic metabolic panel   3. Trigeminal neuralgia of right side of face  G50.0    4. Fibromyalgia  M79.7 DULoxetine (CYMBALTA) 20 MG capsule   5. Peripheral vascular disease (H)  I73.9    6. Encounter for screening mammogram for breast cancer  Z12.31 MA Screening Digital Bilateral   7. Postmenopausal status  Z78.0 calcium carbonate-vitamin D (OSCAL W/D) 500-200 MG-UNIT tablet   8. Need for influenza vaccination  Z23 FLU SHOT 65+ (FLUZONE HD)   9. Mixed hyperlipidemia  E78.2 Lipid Profile     Lipid Profile     Plan:  Medicare " "wellness visit complete.  Stable emphysema.  No PFTs at this time.  She is due for nonfasting labs.  Check CBC, BMP, Tegretol and lipids.  Start duloxetine 20 mg daily for fibromyalgia.  Follow-up in 4 weeks.  Increase dose if needed.  We will need to monitor sodium level.  Discussed potential side effects.  Mammogram ordered.  Risk reviewed associated with not proceeding with colonoscopy.  She is aware of these risk and continues to decline.  She declined bone density scan.  We will start calcium 500 mg with vitamin D 1 pill twice daily.  Influenza vaccine provided.  She will get Shingrix vaccine at local pharmacy.      Patient has been advised of split billing requirements and indicates understanding: Yes    COUNSELING:  Reviewed preventive health counseling, as reflected in patient instructions       Regular exercise       Healthy diet/nutrition       Bladder control       Fall risk prevention       Aspirin prophylaxis        Osteoporosis prevention/bone health       Advanced Planning     Estimated body mass index is 45.16 kg/m  as calculated from the following:    Height as of 7/13/21: 1.499 m (4' 11\").    Weight as of 7/13/21: 101.4 kg (223 lb 9.6 oz).    Weight management plan: Discussed healthy diet and exercise guidelines    She reports that she quit smoking about 21 years ago. Her smoking use included cigarettes. She has a 30.00 pack-year smoking history. She has never used smokeless tobacco.      Appropriate preventive services were discussed with this patient, including applicable screening as appropriate for cardiovascular disease, diabetes, osteopenia/osteoporosis, and glaucoma.  As appropriate for age/gender, discussed screening for colorectal cancer, prostate cancer, breast cancer, and cervical cancer. Checklist reviewing preventive services available has been given to the patient.    Reviewed patients plan of care and provided an AVS. The Intermediate Care Plan ( asthma action plan, low back pain " action plan, and migraine action plan) for Pennie meets the Care Plan requirement. This Care Plan has been established and reviewed with the Patient.    Counseling Resources:  ATP IV Guidelines  Pooled Cohorts Equation Calculator  Breast Cancer Risk Calculator  Breast Cancer: Medication to Reduce Risk  FRAX Risk Assessment  ICSI Preventive Guidelines  Dietary Guidelines for Americans, 2010  [employer]'s MyPlate  ASA Prophylaxis  Lung CA Screening    Marcela De Luna NP  [facility]    Identified Health Risks:  Answers for HPI/ROS submitted by the patient on 9/14/2022  If you checked off any problems, how difficult have these problems made it for you to do your work, take care of things at home, or get along with other people?: Not difficult at all  PHQ9 TOTAL SCORE: 0

## 2022-09-15 LAB — CARBAMAZEPINE SERPL-MCNC: 4.1 UG/ML (ref 4–12)

## 2022-09-19 DIAGNOSIS — R73.9 HYPERGLYCEMIA: Primary | ICD-10-CM

## 2022-09-21 ENCOUNTER — HOSPITAL ENCOUNTER (OUTPATIENT)
Dept: MAMMOGRAPHY | Facility: OTHER | Age: 72
Discharge: HOME | End: 2022-09-21
Attending: NURSE PRACTITIONER | Admitting: NURSE PRACTITIONER
Payer: COMMERCIAL

## 2022-09-21 DIAGNOSIS — Z12.31 ENCOUNTER FOR SCREENING MAMMOGRAM FOR BREAST CANCER: ICD-10-CM

## 2022-09-21 PROCEDURE — 77067 SCR MAMMO BI INCL CAD: CPT

## 2022-10-12 ENCOUNTER — OFFICE VISIT (OUTPATIENT)
Dept: INTERNAL MEDICINE | Facility: OTHER | Age: 72
End: 2022-10-12
Attending: NURSE PRACTITIONER
Payer: COMMERCIAL

## 2022-10-12 VITALS
BODY MASS INDEX: 44.58 KG/M2 | WEIGHT: 222.6 LBS | OXYGEN SATURATION: 95 % | DIASTOLIC BLOOD PRESSURE: 80 MMHG | SYSTOLIC BLOOD PRESSURE: 134 MMHG | TEMPERATURE: 96.8 F | RESPIRATION RATE: 16 BRPM | HEART RATE: 56 BPM

## 2022-10-12 DIAGNOSIS — M79.7 FIBROMYALGIA: Primary | ICD-10-CM

## 2022-10-12 DIAGNOSIS — G50.0 TRIGEMINAL NEURALGIA OF RIGHT SIDE OF FACE: ICD-10-CM

## 2022-10-12 DIAGNOSIS — T88.7XXA MEDICATION SIDE EFFECTS: ICD-10-CM

## 2022-10-12 PROCEDURE — 99213 OFFICE O/P EST LOW 20 MIN: CPT | Performed by: NURSE PRACTITIONER

## 2022-10-12 PROCEDURE — G0463 HOSPITAL OUTPT CLINIC VISIT: HCPCS

## 2022-10-12 ASSESSMENT — ENCOUNTER SYMPTOMS
FEVER: 0
SHORTNESS OF BREATH: 0
NAUSEA: 0
PALPITATIONS: 0
COUGH: 0
DIAPHORESIS: 0

## 2022-10-12 ASSESSMENT — PAIN SCALES - GENERAL: PAINLEVEL: NO PAIN (0)

## 2022-10-12 NOTE — NURSING NOTE
"Chief Complaint   Patient presents with     Follow Up     fibromyalgia       FOOD SECURITY SCREENING QUESTIONS  Hunger Vital Signs:  Within the past 12 months we worried whether our food would run out before we got money to buy more. Never  Within the past 12 months the food we bought just didn't last and we didn't have money to get more. Never  Katherine Delacruz LPN 10/12/2022 3:12 PM      Initial /80 (BP Location: Right arm, Patient Position: Sitting, Cuff Size: Adult Large)   Pulse 56   Temp 96.8  F (36  C) (Tympanic)   Resp 16   Wt 101 kg (222 lb 9.6 oz)   LMP  (LMP Unknown)   SpO2 95%   BMI 44.58 kg/m   Estimated body mass index is 44.58 kg/m  as calculated from the following:    Height as of 9/14/22: 1.505 m (4' 11.25\").    Weight as of this encounter: 101 kg (222 lb 9.6 oz).  Medication Reconciliation: complete    Katherine Delacruz LPN  "

## 2022-10-12 NOTE — PROGRESS NOTES
ICD-10-CM    1. Fibromyalgia  M79.7 CANCELED: Sodium      2. Trigeminal neuralgia of right side of face  G50.0 CANCELED: Sodium      3. Medication side effects  T88.7XXA         Plan:  Patient was supposed to be seen today in follow-up of the duloxetine and needing to have a sodium level checked as she is on Tegretol for treatment of trigeminal neuralgia.  The 2 agents together can increase risk of hyponatremia..  She stopped the medication after taking it a couple of weeks.  We do not need any lab work at this time.  She did not tolerate the duloxetine.  This was added to her allergy list.  Would not recommend any change to treatment plan for fibromyalgia at this time.  Follow-up as needed.    Mark Casper is a 71 year old, presenting for the following health issues:  Follow Up (fibromyalgia)      She is here today to follow-up on fibromyalgia.  She was started on duloxetine after last visit.  She started the medication a couple of days after visit and took it for about 2 weeks.  She had gotten her COVID booster around that time.  She started did not feel well with nausea, chills, headache, neck pain, feeling really dark inside and loss of appetite.  She finally stopped the medication after about 9 days.  She states within a couple of days she started feeling better and those symptoms resolved.    History of Present Illness       Reason for visit:  Doctor requested    She eats 0-1 servings of fruits and vegetables daily.She consumes 0 sweetened beverage(s) daily.She exercises with enough effort to increase her heart rate 9 or less minutes per day.  She exercises with enough effort to increase her heart rate 3 or less days per week.   She is taking medications regularly.             Review of Systems   Constitutional: Negative for diaphoresis and fever.   Respiratory: Negative for cough and shortness of breath.    Cardiovascular: Negative for chest pain and palpitations.   Gastrointestinal: Negative for  nausea.            Objective    /80 (BP Location: Right arm, Patient Position: Sitting, Cuff Size: Adult Large)   Pulse 56   Temp 96.8  F (36  C) (Tympanic)   Resp 16   Wt 101 kg (222 lb 9.6 oz)   LMP  (LMP Unknown)   SpO2 95%   BMI 44.58 kg/m    Body mass index is 44.58 kg/m .  Physical Exam   Pleasant female no acute distress.  Affect normal.  Alert and oriented x4.  Lung fields clear to auscultation.  Cardiovascular regular.  Gait stable.

## 2022-10-21 DIAGNOSIS — N31.8 DETRUSOR DYSFUNCTION: ICD-10-CM

## 2022-10-24 NOTE — TELEPHONE ENCOUNTER
[id number] sent Rx request for the following:      Requested Prescriptions   Pending Prescriptions Disp Refills     Incontinence Supply Disposable MISC [Pharmacy Med Name: POISE PAD LIGHT REG 30CS4] 270 each 11     Sig: POISE ULTRA THIN PADS #3/R PC#17288/U1 ITEM#[id number] DX:N31.8/R32 30/PP HCPC: 300EA/10PKGS/30DS   Last Prescription Date:   9/14/20  Last Fill Qty/Refills:         270, R-11    Last Office Visit:              10/12/22   Future Office visit:           None    Giselle Delong RN .............. 10/24/2022  2:55 PM

## 2022-11-29 DIAGNOSIS — R05.9 COUGH: ICD-10-CM

## 2022-11-29 NOTE — TELEPHONE ENCOUNTER
[facility] sent Rx request for the following:      Requested Prescriptions   Pending Prescriptions Disp Refills     benzonatate (TESSALON) 100 MG capsule [Pharmacy Med Name: benzonatate 100 mg capsule] 90 capsule 3     Sig: TAKE 1 CAPSULE BY MOUTH 3 TIMES DAILY AS NEEDED FOR COUGH   Last Prescription Date:   8/16/22  Last Fill Qty/Refills:         90, R-3    Last Office Visit:              10/12/22   Future Office visit:           None    Giselle Delong RN .............. 11/29/2022  4:20 PM

## 2022-11-30 RX ORDER — BENZONATATE 100 MG/1
CAPSULE ORAL
Qty: 90 CAPSULE | Refills: 3 | Status: SHIPPED | OUTPATIENT
Start: 2022-11-30 | End: 2024-05-07

## 2023-03-20 DIAGNOSIS — R05.9 COUGH: ICD-10-CM

## 2023-03-22 RX ORDER — BENZONATATE 100 MG/1
CAPSULE ORAL
Qty: 90 CAPSULE | Refills: 3 | OUTPATIENT
Start: 2023-03-22

## 2023-03-22 NOTE — TELEPHONE ENCOUNTER
[facility] sent Rx request for the following:      Requested Prescriptions   Pending Prescriptions Disp Refills     benzonatate (TESSALON) 100 MG capsule [Pharmacy Med Name: benzonatate 100 mg capsule] 90 capsule 3     Sig: TAKE 1 CAPSULE BY MOUTH 3 TIMES DAILY AS NEEDED FOR COUGH       There is no refill protocol information for this order          Last Prescription Date:   11/30/2022  Last Fill Qty/Refills:         90, R-3    Last Office Visit:              10/12/2022   Future Office visit:           None    Redundant refill request refused: Too soon:      Joy Tran RN on 3/22/2023 at 1:28 PM

## 2023-07-27 NOTE — TELEPHONE ENCOUNTER
No care due was identified.  [facility] Embedded Care Due Messages. Reference number: [id number].   7/27/2023 11:53:56 AM CDT   Patient Information     Patient Name MRN Sex Pennie Fry [id number] Female 1950      Telephone Encounter by Jocelin Coe at 2017  4:05 PM     Author:  Jocelin Coe Service:  (none) Author Type:  (none)     Filed:  2017  4:06 PM Encounter Date:  2017 Status:  Signed     :  Jocelin Coe            Spoke with Adrienne and discussed patient symptoms. She is going to  patient and take her to [facility] to be evaluated and then follow up with Cookie Schultz CNP in 1 week.    Jocelin Coe LPN...................2017  4:06 PM

## 2023-07-31 DIAGNOSIS — J30.9 ALLERGIC RHINITIS WITH POSTNASAL DRIP: ICD-10-CM

## 2023-07-31 DIAGNOSIS — R05.9 COUGH: ICD-10-CM

## 2023-07-31 DIAGNOSIS — R09.82 ALLERGIC RHINITIS WITH POSTNASAL DRIP: ICD-10-CM

## 2023-08-01 RX ORDER — LORATADINE 10 MG/1
10 TABLET ORAL DAILY
Qty: 90 TABLET | Refills: 0 | Status: SHIPPED | OUTPATIENT
Start: 2023-08-01 | End: 2023-11-17

## 2023-08-01 NOTE — TELEPHONE ENCOUNTER
[facility] sent Rx request for the following:      Requested Prescriptions   Pending Prescriptions Disp Refills    loratadine (CLARITIN) 10 MG tablet [Pharmacy Med Name: loratadine 10 mg tablet] 90 tablet 3     Sig: Take 1 tablet (10 mg) by mouth daily       Antihistamines Protocol Failed - 8/1/2023  4:17 PM        Failed - Patient is 3-64 years of age     Apply weight-based dosing for peds patients age 3 - 12 years of age.    Forward request to provider for patients under the age of 3 or over the age of 64.       Last Prescription Date:   8/16/22  Last Fill Qty/Refills:         90, R-3    Last Office Visit:              10/12/22   Future Office visit:           None    Giselle Delong RN .............. 8/1/2023  4:19 PM

## 2023-10-01 DIAGNOSIS — I73.9 PVD (PERIPHERAL VASCULAR DISEASE) (H): ICD-10-CM

## 2023-10-01 DIAGNOSIS — Z87.891 HISTORY OF TOBACCO USE DISORDER: ICD-10-CM

## 2023-10-06 RX ORDER — ROSUVASTATIN CALCIUM 20 MG/1
20 TABLET, COATED ORAL DAILY
Qty: 90 TABLET | Refills: 3 | Status: SHIPPED | OUTPATIENT
Start: 2023-10-06 | End: 2024-05-07

## 2023-10-06 NOTE — TELEPHONE ENCOUNTER
[facility]  sent Rx request for the following:      Requested Prescriptions   Pending Prescriptions Disp Refills    rosuvastatin (CRESTOR) 20 MG tablet [Pharmacy Med Name: rosuvastatin 20 mg tablet] 90 tablet 3     Sig: Take 1 tablet (20 mg) by mouth daily       Statins Protocol Failed - 10/1/2023  8:02 AM        Failed - LDL on file in past 12 months     Recent Labs   Lab Test 09/14/22  1557   LDL 48        Last Prescription Date:   08/16/22  Last Fill Qty/Refills:         90, R-3  Last Office Visit:              10/12/22   Future Office visit:           None    Per office visit note from 10/12/22:  Follow-up as needed.     Unable to complete prescription refill per RN Medication Refill Policy.     Sandy Nguyen RN on 10/6/2023 at 9:36 AM

## 2023-10-17 ENCOUNTER — ALLIED HEALTH/NURSE VISIT (OUTPATIENT)
Dept: FAMILY MEDICINE | Facility: OTHER | Age: 73
End: 2023-10-17
Payer: COMMERCIAL

## 2023-10-17 DIAGNOSIS — Z23 NEED FOR PROPHYLACTIC VACCINATION AND INOCULATION AGAINST INFLUENZA: ICD-10-CM

## 2023-10-17 DIAGNOSIS — Z23 HIGH PRIORITY FOR 2019-NCOV VACCINE: Primary | ICD-10-CM

## 2023-10-17 PROCEDURE — 91320 SARSCV2 VAC 30MCG TRS-SUC IM: CPT

## 2023-10-17 PROCEDURE — 90662 IIV NO PRSV INCREASED AG IM: CPT

## 2023-11-14 DIAGNOSIS — R05.9 COUGH: ICD-10-CM

## 2023-11-14 DIAGNOSIS — K21.9 GASTROESOPHAGEAL REFLUX DISEASE WITHOUT ESOPHAGITIS: ICD-10-CM

## 2023-11-14 DIAGNOSIS — R09.82 ALLERGIC RHINITIS WITH POSTNASAL DRIP: ICD-10-CM

## 2023-11-14 DIAGNOSIS — J30.9 ALLERGIC RHINITIS WITH POSTNASAL DRIP: ICD-10-CM

## 2023-11-17 RX ORDER — FAMOTIDINE 40 MG/1
40 TABLET, FILM COATED ORAL DAILY
Qty: 90 TABLET | Refills: 0 | OUTPATIENT
Start: 2023-11-17

## 2023-11-17 RX ORDER — LORATADINE 10 MG/1
10 TABLET ORAL DAILY
Qty: 90 TABLET | Refills: 0 | Status: SHIPPED | OUTPATIENT
Start: 2023-11-17 | End: 2024-02-16

## 2023-11-17 NOTE — TELEPHONE ENCOUNTER
"[facility] sent Rx request for the following:      Requested Prescriptions   Pending Prescriptions Disp Refills    loratadine (CLARITIN) 10 MG tablet [Pharmacy Med Name: loratadine 10 mg tablet] 90 tablet 0     Sig: Take 1 tablet (10 mg) by mouth daily       Antihistamines Protocol Failed - 11/14/2023  2:59 PM        Failed - Patient is 3-64 years of age     Apply weight-based dosing for peds patients age 3 - 12 years of age.    Forward request to provider for patients under the age of 3 or over the age of 64.          Failed - Recent (12 mo) or future (30 days) visit within the authorizing provider's specialty     Patient has had an office visit with the authorizing provider or a provider within the authorizing providers department within the previous 12 mos or has a future within next 30 days. See \"Patient Info\" tab in inbasket, or \"Choose Columns\" in Meds & Orders section of the refill encounter.          Last Prescription Date:   8/1/23  Last Fill Qty/Refills:         90, R-0            famotidine (PEPCID) 40 MG tablet [Pharmacy Med Name: famotidine 40 mg tablet] 90 tablet 0     Sig: Take 1 tablet (40 mg) by mouth daily       H2 Blockers Protocol Failed - 11/17/2023  9:54 AM        Failed - Recent (12 mo) or future (30 days) visit within the authorizing provider's specialty     Patient has had an office visit with the authorizing provider or a provider within the authorizing providers department within the previous 12 mos or has a future within next 30 days. See \"Patient Info\" tab in inbasket, or \"Choose Columns\" in Meds & Orders section of the refill encounter.           Last Prescription Date:   8/16/23  Last Fill Qty/Refills:         90, R-3    Last Office Visit:              10/12/22   Future Office visit:           None    Rx for Pepcid will be refused as there are still refills on file. Rx for Claritin will route to provider per protocol.    KAUSHIK MILLS RN on 11/17/2023 at 9:57 AM        "

## 2023-11-17 NOTE — TELEPHONE ENCOUNTER
Rx for famotidine was written 22 so it is . Please send new Rx if appropriate.     Chinmay Bateman, PharmD  [facility]

## 2023-12-12 RX ORDER — FAMOTIDINE 40 MG/1
40 TABLET, FILM COATED ORAL DAILY
Qty: 90 TABLET | Refills: 3 | Status: SHIPPED | OUTPATIENT
Start: 2023-12-12 | End: 2024-05-07

## 2024-02-13 DIAGNOSIS — M79.7 FIBROMYALGIA: ICD-10-CM

## 2024-02-13 DIAGNOSIS — R09.82 ALLERGIC RHINITIS WITH POSTNASAL DRIP: ICD-10-CM

## 2024-02-13 DIAGNOSIS — J30.9 ALLERGIC RHINITIS WITH POSTNASAL DRIP: ICD-10-CM

## 2024-02-13 DIAGNOSIS — R05.9 COUGH: ICD-10-CM

## 2024-02-13 DIAGNOSIS — G50.0 TRIGEMINAL NEURALGIA OF RIGHT SIDE OF FACE: ICD-10-CM

## 2024-02-16 RX ORDER — LORATADINE 10 MG/1
10 TABLET ORAL DAILY
Qty: 90 TABLET | Refills: 0 | Status: SHIPPED | OUTPATIENT
Start: 2024-02-16 | End: 2024-05-07

## 2024-02-16 RX ORDER — CARBAMAZEPINE 200 MG/1
TABLET ORAL
Qty: 90 TABLET | Refills: 0 | Status: SHIPPED | OUTPATIENT
Start: 2024-02-16 | End: 2024-05-07

## 2024-02-16 NOTE — TELEPHONE ENCOUNTER
[facility] sent Rx request for the following:      Requested Prescriptions   Pending Prescriptions Disp Refills    loratadine (CLARITIN) 10 MG tablet [Pharmacy Med Name: loratadine 10 mg tablet] 90 tablet 0     Sig: Take 1 tablet (10 mg) by mouth daily   Last Prescription Date:   11/17/23  Last Fill Qty/Refills:         90, R-0      Antihistamines Protocol Failed - 2/16/2024  8:59 AM        Failed - Patient is 3-64 years of age     Apply weight-based dosing for peds patients age 3 - 12 years of age.    Forward request to provider for patients under the age of 3 or over the age of 64.        Failed - Recent (12 mo) or future (30 days) visit within the authorizing provider's specialty       carBAMazepine (TEGRETOL) 200 MG tablet [Pharmacy Med Name: carbamazepine 200 mg tablet] 90 tablet 0     Sig: Take 1/2 tablet (100 mg) by mouth 2 times daily   Last Prescription Date:   9/2/22  Last Fill Qty/Refills:         90, R-3      Anti-Seizure Meds Protocol  Failed - 2/16/2024  8:59 AM        Failed - Recent (12 mo) or future (30 days) visit within the authorizing provider's specialty        Failed - Review Authorizing provider's last note.      Refer to last progress notes: confirm request is for original authorizing provider (cannot be through other providers        Failed - Normal CBC on file in past 26 months     Recent Labs   Lab Test 09/14/22  1557 04/23/19  1850 01/05/18  1148   WBC 9.4   < >  --    GICHWBC  --   --  7.6   RBC 5.45*   < >  --    GICHRBC  --   --  5.22*   HGB 14.6   < > 14.2   HCT 45.3   < > 44.6      < > 278    < > = values in this interval not displayed.           Failed - Normal ALT or AST on file in past 26 months     Recent Labs   Lab Test 06/23/20  1535 03/07/18  1136 07/19/17  1750   ALT 12   < >  --    GICHALT  --   --  42    < > = values in this interval not displayed.     Recent Labs   Lab Test 06/23/20  1535 03/07/18  1136 07/19/17  1750   AST 13   < >  --    GICHAST  --    --  32    < > = values in this interval not displayed.        Last Office Visit:              10/12/22   Future Office visit:           None    Pt due for annual exam. Routing to provider for refill consideration. Routing to Unit scheduling pool, to assist Pt in scheduling appointment.     Unable to complete prescription refill per RN Medication Refill Policy.     Giselle Delong RN .............. 2/16/2024  9:00 AM

## 2024-02-16 NOTE — TELEPHONE ENCOUNTER
Patient scheduled appointment for March 26,2024 with primary care provider.  Camila Arriaga on 2/16/2024 at 4:45 PM

## 2024-03-11 ENCOUNTER — TRANSFERRED RECORDS (OUTPATIENT)
Dept: HEALTH INFORMATION MANAGEMENT | Facility: OTHER | Age: 74
End: 2024-03-11

## 2024-05-07 ENCOUNTER — OFFICE VISIT (OUTPATIENT)
Dept: INTERNAL MEDICINE | Facility: OTHER | Age: 74
End: 2024-05-07
Attending: NURSE PRACTITIONER
Payer: COMMERCIAL

## 2024-05-07 VITALS
BODY MASS INDEX: 43.71 KG/M2 | WEIGHT: 216.8 LBS | SYSTOLIC BLOOD PRESSURE: 132 MMHG | HEART RATE: 102 BPM | OXYGEN SATURATION: 90 % | DIASTOLIC BLOOD PRESSURE: 78 MMHG | HEIGHT: 59 IN | TEMPERATURE: 97.5 F | RESPIRATION RATE: 18 BRPM

## 2024-05-07 DIAGNOSIS — I73.9 PVD (PERIPHERAL VASCULAR DISEASE) (H): ICD-10-CM

## 2024-05-07 DIAGNOSIS — R05.3 CHRONIC COUGH: ICD-10-CM

## 2024-05-07 DIAGNOSIS — I73.9 PERIPHERAL VASCULAR DISEASE (H): ICD-10-CM

## 2024-05-07 DIAGNOSIS — K21.9 GASTROESOPHAGEAL REFLUX DISEASE WITHOUT ESOPHAGITIS: ICD-10-CM

## 2024-05-07 DIAGNOSIS — R26.81 GAIT INSTABILITY: ICD-10-CM

## 2024-05-07 DIAGNOSIS — Z87.891 HISTORY OF TOBACCO USE: ICD-10-CM

## 2024-05-07 DIAGNOSIS — J30.9 ALLERGIC RHINITIS WITH POSTNASAL DRIP: ICD-10-CM

## 2024-05-07 DIAGNOSIS — Z12.31 ENCOUNTER FOR SCREENING MAMMOGRAM FOR BREAST CANCER: ICD-10-CM

## 2024-05-07 DIAGNOSIS — Z00.00 ENCOUNTER FOR MEDICARE ANNUAL WELLNESS EXAM: Primary | ICD-10-CM

## 2024-05-07 DIAGNOSIS — Z87.891 HISTORY OF TOBACCO USE DISORDER: ICD-10-CM

## 2024-05-07 DIAGNOSIS — R73.9 HYPERGLYCEMIA: ICD-10-CM

## 2024-05-07 DIAGNOSIS — R09.82 ALLERGIC RHINITIS WITH POSTNASAL DRIP: ICD-10-CM

## 2024-05-07 DIAGNOSIS — M79.7 FIBROMYALGIA: ICD-10-CM

## 2024-05-07 DIAGNOSIS — G50.0 TRIGEMINAL NEURALGIA OF RIGHT SIDE OF FACE: ICD-10-CM

## 2024-05-07 DIAGNOSIS — J43.2 CENTRILOBULAR EMPHYSEMA (H): ICD-10-CM

## 2024-05-07 DIAGNOSIS — E66.01 MORBID OBESITY (H): ICD-10-CM

## 2024-05-07 LAB
ANION GAP SERPL CALCULATED.3IONS-SCNC: 9 MMOL/L (ref 7–15)
BUN SERPL-MCNC: 11.2 MG/DL (ref 8–23)
CALCIUM SERPL-MCNC: 9.1 MG/DL (ref 8.8–10.2)
CARBAMAZEPINE SERPL-MCNC: 5 UG/ML (ref 4–12)
CHLORIDE SERPL-SCNC: 100 MMOL/L (ref 98–107)
CHOLEST SERPL-MCNC: 136 MG/DL
CREAT SERPL-MCNC: 0.65 MG/DL (ref 0.51–0.95)
DEPRECATED HCO3 PLAS-SCNC: 30 MMOL/L (ref 22–29)
EGFRCR SERPLBLD CKD-EPI 2021: >90 ML/MIN/1.73M2
ERYTHROCYTE [DISTWIDTH] IN BLOOD BY AUTOMATED COUNT: 12.5 % (ref 10–15)
FASTING STATUS PATIENT QL REPORTED: YES
FASTING STATUS PATIENT QL REPORTED: YES
GLUCOSE SERPL-MCNC: 95 MG/DL (ref 70–99)
HBA1C MFR BLD: 6.3 % (ref 4–6.2)
HCT VFR BLD AUTO: 47.1 % (ref 35–47)
HDLC SERPL-MCNC: 59 MG/DL
HGB BLD-MCNC: 14.5 G/DL (ref 11.7–15.7)
LDLC SERPL CALC-MCNC: 52 MG/DL
MCH RBC QN AUTO: 26.4 PG (ref 26.5–33)
MCHC RBC AUTO-ENTMCNC: 30.8 G/DL (ref 31.5–36.5)
MCV RBC AUTO: 86 FL (ref 78–100)
NONHDLC SERPL-MCNC: 77 MG/DL
PLATELET # BLD AUTO: 254 10E3/UL (ref 150–450)
POTASSIUM SERPL-SCNC: 4.1 MMOL/L (ref 3.4–5.3)
RBC # BLD AUTO: 5.5 10E6/UL (ref 3.8–5.2)
SODIUM SERPL-SCNC: 139 MMOL/L (ref 135–145)
TRIGL SERPL-MCNC: 127 MG/DL
WBC # BLD AUTO: 8.4 10E3/UL (ref 4–11)

## 2024-05-07 PROCEDURE — 83036 HEMOGLOBIN GLYCOSYLATED A1C: CPT | Mod: ZL | Performed by: NURSE PRACTITIONER

## 2024-05-07 PROCEDURE — 80156 ASSAY CARBAMAZEPINE TOTAL: CPT | Mod: ZL | Performed by: NURSE PRACTITIONER

## 2024-05-07 PROCEDURE — 82465 ASSAY BLD/SERUM CHOLESTEROL: CPT | Mod: ZL | Performed by: NURSE PRACTITIONER

## 2024-05-07 PROCEDURE — 36415 COLL VENOUS BLD VENIPUNCTURE: CPT | Mod: ZL | Performed by: NURSE PRACTITIONER

## 2024-05-07 PROCEDURE — 82374 ASSAY BLOOD CARBON DIOXIDE: CPT | Mod: ZL | Performed by: NURSE PRACTITIONER

## 2024-05-07 PROCEDURE — G0463 HOSPITAL OUTPT CLINIC VISIT: HCPCS

## 2024-05-07 PROCEDURE — 99214 OFFICE O/P EST MOD 30 MIN: CPT | Mod: 25 | Performed by: NURSE PRACTITIONER

## 2024-05-07 PROCEDURE — G0439 PPPS, SUBSEQ VISIT: HCPCS | Performed by: NURSE PRACTITIONER

## 2024-05-07 PROCEDURE — 85018 HEMOGLOBIN: CPT | Mod: ZL | Performed by: NURSE PRACTITIONER

## 2024-05-07 RX ORDER — CARBAMAZEPINE 200 MG/1
TABLET ORAL
Qty: 45 TABLET | Refills: 4 | Status: SHIPPED | OUTPATIENT
Start: 2024-05-07

## 2024-05-07 RX ORDER — FAMOTIDINE 40 MG/1
40 TABLET, FILM COATED ORAL DAILY
Qty: 90 TABLET | Refills: 4 | Status: SHIPPED | OUTPATIENT
Start: 2024-05-07

## 2024-05-07 RX ORDER — ROSUVASTATIN CALCIUM 20 MG/1
20 TABLET, COATED ORAL DAILY
Qty: 90 TABLET | Refills: 4 | Status: SHIPPED | OUTPATIENT
Start: 2024-05-07

## 2024-05-07 RX ORDER — LORATADINE 10 MG/1
10 TABLET ORAL DAILY
Qty: 90 TABLET | Refills: 4 | Status: SHIPPED | OUTPATIENT
Start: 2024-05-07

## 2024-05-07 SDOH — HEALTH STABILITY: PHYSICAL HEALTH: ON AVERAGE, HOW MANY DAYS PER WEEK DO YOU ENGAGE IN MODERATE TO STRENUOUS EXERCISE (LIKE A BRISK WALK)?: 0 DAYS

## 2024-05-07 ASSESSMENT — PAIN SCALES - GENERAL: PAINLEVEL: NO PAIN (0)

## 2024-05-07 ASSESSMENT — SOCIAL DETERMINANTS OF HEALTH (SDOH): HOW OFTEN DO YOU GET TOGETHER WITH FRIENDS OR RELATIVES?: NEVER

## 2024-05-07 NOTE — COMMUNITY RESOURCES LIST (ENGLISH)
May 7, 2024           YOUR PERSONALIZED LIST OF SERVICES & PROGRAMS               Medical Transportation, (NEMT)      Social Service [facility] - Neighbor to Neighbor Program  Phone: [phone]  Email: [email or web address]  Website: [email or web address]/[email or web address]-[email or web address]-[email or web address]  Language: English  Hours: Mon 8:00 AM - 5:00 PM Tue 8:00 AM - 5:00 PM Wed 8:00 AM - 5:00 PM Thu 8:00 AM - 5:00 PM Fri 8:00 AM - 5:00 PM  Fee: Insurance, Self pay  Accessibility: Deaf or hard of hearing, Blind accommodation, Translation services    Expense Assistance      - Dislocated Worker/Adult [employer] Employment Program  Phone: [phone]) [phone]  Email: ely@[email or web address]  Website: https://Apple[employer]Book/services/employment-services/dislocated-worker-program/  Language: English, Cymraes  Hours: Mon 8:00 AM - 4:30 PM Tue 8:00 AM - 4:30 PM Wed 8:00 AM - 4:30 PM Thu 8:00 AM - 4:30 PM Fri 8:00 AM - 4:30 PM  Fee: Free  Accessibility: Ada accessible    Coordination      Transit - Scheduled Rides  [address] [address] (Distance: 0.7 miles)  Phone: [phone]  Website: https://[employer]/services/scheduled-rides/  Language: English  Fee: Self pay  Accessibility: Ada accessible      Transit - Dial-A-Ride  [address] [address] (Distance: 0.7 miles)  Phone: [phone]  Website: https://[employer]/services/dial-a-ride/  Language: English  Fee: Self pay      - Services  Phone: [phone]) [phone]  Website: https://[facility]/#noeitworks-section  Hours: Sun 12:00 AM - 11:45 PM Mon 12:00 AM - 11:45 PM Tue 12:00 AM - 11:45 PM Wed 12:00 AM - 11:45 PM Thu 12:00 AM - 11:45 PM Fri 12:00 AM - 11:45 PM Sat 12:00 AM - 11:45 PM  Fee: Self pay  Accessibility: Ada accessible, Blind accommodation, Deaf or hard of hearing               IMPORTANT NUMBERS & WEBSITES        Emergency Services  911  .   [place] [address]  http://211unitedway.org  .   Poison Control  (537) 241-9373 http://mnpoison.org http://wisconsinpoison.org  .     Suicide and Crisis Lifeline  988 http://988lifeline.org  .   Childhelp [place] Child Abuse Hotline  388.996.5531 http://Childhelphotline.org   .   [place] Sexual Assault Hotline  (806) 291-5840 (HOPE) http://2CRiskn.org   .     [place] Runaway Safeline  (249) 709-9778 (RUNAWAY) http://OpternativeruFeusd.Global Ad Source  .   Pregnancy & Postpartum Support  Call/text [phone]  [place]: http://ppsupportmn.org  [place]: http://psichapters.com/wi  .   Substance Abuse National Helpline ([facility])  027-721-HELP (9288) http://Findtreatment.gov   .                DISCLAIMER: These resources have been generated via the [employer] Platform. [employer] does not endorse any service providers mentioned in this resource list. [employer] does not guarantee that the services mentioned in this resource list will be available to you or will improve your health or wellness.    [facility]

## 2024-05-07 NOTE — LETTER
May 10, 2024      Pennie MONTES Simone  [address]  [facility] [facility] [address]        Dear ,    We are writing to inform you of your test results.    Your test results fall within the expected range(s) or remain unchanged from previous results.  Please continue with current treatment plan.    Resulted Orders   Carbamazepine   Result Value Ref Range    Carbamazepine 5.0 4.0 - 12.0 ug/mL   Lipid Profile   Result Value Ref Range    Cholesterol 136 <200 mg/dL    Triglycerides 127 <150 mg/dL    Direct Measure HDL 59 >=50 mg/dL    LDL Cholesterol Calculated 52 <=100 mg/dL    Non HDL Cholesterol 77 <130 mg/dL    Patient Fasting > 8hrs? Yes     Narrative    Cholesterol  Desirable:  <200 mg/dL    Triglycerides  Normal:  Less than 150 mg/dL  Borderline High:  150-199 mg/dL  High:  200-499 mg/dL  Very High:  Greater than or equal to 500 mg/dL    Direct Measure HDL  Female:  Greater than or equal to 50 mg/dL   Male:  Greater than or equal to 40 mg/dL    LDL Cholesterol  Desirable:  <100mg/dL  Above Desirable:  100-129 mg/dL   Borderline High:  130-159 mg/dL   High:  160-189 mg/dL   Very High:  >= 190 mg/dL    Non HDL Cholesterol  Desirable:  130 mg/dL  Above Desirable:  130-159 mg/dL  Borderline High:  160-189 mg/dL  High:  190-219 mg/dL  Very High:  Greater than or equal to 220 mg/dL   Hemoglobin A1c   Result Value Ref Range    Hemoglobin A1C 6.3 (H) 4.0 - 6.2 %   CBC with platelets   Result Value Ref Range    WBC Count 8.4 4.0 - 11.0 10e3/uL    RBC Count 5.50 (H) 3.80 - 5.20 10e6/uL    Hemoglobin 14.5 11.7 - 15.7 g/dL    Hematocrit 47.1 (H) 35.0 - 47.0 %    MCV 86 78 - 100 fL    MCH 26.4 (L) 26.5 - 33.0 pg    MCHC 30.8 (L) 31.5 - 36.5 g/dL    RDW 12.5 10.0 - 15.0 %    Platelet Count 254 150 - 450 10e3/uL   Basic metabolic panel   Result Value Ref Range    Sodium 139 135 - 145 mmol/L      Comment:      Reference intervals for this test were updated on 09/26/2023 to more accurately reflect our healthy population.  There may be differences in the flagging of prior results with similar values performed with this method. Interpretation of those prior results can be made in the context of the updated reference intervals.     Potassium 4.1 3.4 - 5.3 mmol/L    Chloride 100 98 - 107 mmol/L    Carbon Dioxide (CO2) 30 (H) 22 - 29 mmol/L    Anion Gap 9 7 - 15 mmol/L    Urea Nitrogen 11.2 8.0 - 23.0 mg/dL    Creatinine 0.65 0.51 - 0.95 mg/dL    GFR Estimate >90 >60 mL/min/1.73m2    Calcium 9.1 8.8 - 10.2 mg/dL    Glucose 95 70 - 99 mg/dL    Patient Fasting > 8hrs? Yes        If you have any questions or concerns, please call the clinic at the number listed above.       Sincerely,      Marcela De Luna NP

## 2024-05-07 NOTE — NURSING NOTE
"Chief Complaint   Patient presents with    Medicare Visit     Annual        Initial /78   Pulse 102   Temp 97.5  F (36.4  C) (Temporal)   Resp 18   Ht 1.495 m (4' 10.86\")   Wt 98.3 kg (216 lb 12.8 oz)   LMP  (LMP Unknown)   SpO2 90%   Breastfeeding No   BMI 44.00 kg/m   Estimated body mass index is 44 kg/m  as calculated from the following:    Height as of this encounter: 1.495 m (4' 10.86\").    Weight as of this encounter: 98.3 kg (216 lb 12.8 oz).  Medication Review: complete    The next two questions are to help us understand your food security.  If you are feeling you need any assistance in this area, we have resources available to support you today.          5/7/2024   SDOH- Food Insecurity   Within the past 12 months, did you worry that your food would run out before you got money to buy more? N   Within the past 12 months, did the food you bought just not last and you didn t have money to get more? N         Health Care Directive:  Patient has a Health Care Directive on file      Jessica Mazariegos LPN      "

## 2024-05-07 NOTE — PROGRESS NOTES
Preventive Care Visit  [facility] AND [facility]  Marcela De Luna NP, Internal Medicine  May 7, 2024        ICD-10-CM    1. Encounter for Medicare annual wellness exam  Z00.00       2. Trigeminal neuralgia of right side of face  G50.0 Carbamazepine     carBAMazepine (TEGRETOL) 200 MG tablet     Carbamazepine      3. Fibromyalgia  M79.7 carBAMazepine (TEGRETOL) 200 MG tablet     Primary Care - Care Coordination Referral      4. Centrilobular emphysema (H)  J43.2 Basic metabolic panel     Basic metabolic panel      5. Morbid obesity (H)  E66.01       6. Peripheral vascular disease (H24)  I73.9       7. History of tobacco use  Z87.891       8. Hyperglycemia  R73.9 Lipid Profile     Hemoglobin A1c     CBC with platelets     Basic metabolic panel     Lipid Profile     Hemoglobin A1c     CBC with platelets     Basic metabolic panel      9. Encounter for screening mammogram for breast cancer  Z12.31       10. PVD (peripheral vascular disease) (H24)  I73.9 Aspirin 81 MG CAPS     rosuvastatin (CRESTOR) 20 MG tablet      11. History of tobacco use disorder  Z87.891 rosuvastatin (CRESTOR) 20 MG tablet      12. Chronic cough  R05.3 loratadine (CLARITIN) 10 MG tablet      13. Allergic rhinitis with postnasal drip  J30.9 loratadine (CLARITIN) 10 MG tablet    R09.82       14. Gastroesophageal reflux disease without esophagitis  K21.9 famotidine (PEPCID) 40 MG tablet      15. Gait instability  R26.81 Primary Care - Care Coordination Referral         Plan:  Medicare wellness visit complete.  Follow-up annually.  Nonfasting labs today for lipids, Tegretol, BMP, CBC and hemoglobin A1c.  She did not want any additional labs ordered.  Medication refills completed.  She declined PFTs, mammogram (states she will schedule this herself in a few months), colon cancer screening and bone density scan.  She does not qualify for lung cancer screening.  She is in need of PCA services to assist with some bathing during the week and  other chores that are difficult due to gait instability.  Referral placed for primary care coordination to see if they can assist her with working with IM care for PCA services.  Recommended evaluation and treatment by physical therapy but patient declined.  Patient declined all vaccine.    Mark Casper is a 73 year old, presenting for the following:  Medicare Visit (Annual )        5/7/2024    12:29 PM   Additional Questions   Roomed by Lupe Mazariegos LPN         Health Care Directive  Patient has a Health Care Directive on file  Advance care planning document is on file and is current.    HPI  She is here today for Medicare wellness visit and chronic disease management.  Past medical history of trigeminal neuralgia, fibromyalgia, emphysema, hyperglycemia, chronic cough, allergic rhinitis, GERD and PVD.  She needs medication refills and lab work.  She is not fasting.  She does not want breast or colon cancer screening, bone density scan.  She reports she is doing well with no concerns.            5/7/2024   General Health   How would you rate your overall physical health? Good   Feel stress (tense, anxious, or unable to sleep) Only a little   (!) STRESS CONCERN      5/7/2024   Nutrition   Diet: Low salt         5/7/2024   Exercise   Days per week of moderate/strenous exercise 0 days   (!) EXERCISE CONCERN      5/7/2024   Social Factors   Frequency of gathering with friends or relatives Never   Worry food won't last until get money to buy more No   Food not last or not have enough money for food? No   Do you have housing?  Yes   Are you worried about losing your housing? No   Lack of transportation? Yes   Unable to get utilities (heat,electricity)? No    (!) TRANSPORTATION CONCERN PRESENT(!) SOCIAL CONNECTIONS CONCERN      5/7/2024   Fall Risk   Fallen 2 or more times in the past year? No   Trouble with walking or balance? Yes   Gait Speed Test (Document in seconds) 11.16   Gait Speed Test Interpretation  Greater than 5.01 seconds - ABNORMAL          2024   Activities of Daily Living- Home Safety   Needs help with the following daily activites Transportation    Housework    Bathing    Laundry   Safety concerns in the home None of the above         2024   Dental   Dentist two times every year? (!) NO         2024   Hearing Screening   Hearing concerns? None of the above         2024   Driving Risk Screening   Patient/family members have concerns about driving No         2024   General Alertness/Fatigue Screening   Have you been more tired than usual lately? No         2024   Urinary Incontinence Screening   Bothered by leaking urine in past 6 months Yes         2024   TB Screening   Were you born outside of the [place]? No         Today's PHQ-2 Score:       2024    12:25 PM   PHQ-2 (  [employer])   Q1: Little interest or pleasure in doing things 0   Q2: Feeling down, depressed or hopeless 0   PHQ-2 Score 0   Q1: Little interest or pleasure in doing things Not at all   Q2: Feeling down, depressed or hopeless Not at all   PHQ-2 Score 0           2024   Substance Use   Alcohol more than 3/day or more than 7/wk Not Applicable   Do you have a current opioid prescription? No   How severe/bad is pain from 1 to 10? 7/10   Do you use any other substances recreationally? No     Social History     Tobacco Use    Smoking status: Former     Current packs/day: 0.00     Average packs/day: 1 pack/day for 30.0 years (30.0 ttl pk-yrs)     Types: Cigarettes     Start date: 1971     Quit date: 2001     Years since quittin.3    Smokeless tobacco: Never    Tobacco comments:     no ecig   Vaping Use    Vaping status: Never Used   Substance Use Topics    Alcohol use: No    Drug use: No           2021   LAST FHS-7 RESULTS   1st degree relative breast or ovarian cancer No   Any relative bilateral breast cancer No   Any male have breast cancer No   Any ONE woman have BOTH breast AND ovarian  cancer Yes   Any woman with breast cancer before 50yrs Yes   2 or more relatives with breast AND/OR ovarian cancer No   2 or more relatives with breast AND/OR bowel cancer No        Patient will set up on mammogram, declined referral for genetic counseling    ASCVD Risk   The 10-year ASCVD risk score (Melia ATKINS, et al., 2019) is: 11.4%    Values used to calculate the score:      Age: 73 years      Sex: Female      Is Non- : No      Diabetic: No      Tobacco smoker: No      Systolic Blood Pressure: 132 mmHg      Is BP treated: No      HDL Cholesterol: 56 mg/dL      Total Cholesterol: 135 mg/dL            Reviewed and updated as needed this visit by Provider                    Past Medical History:   Diagnosis Date    Detrusor dysfunction 2/5/2019    Dorsopathy     Recurrent back problems , secondary to injury at [facility]    Fibromyalgia     7/9/1997    Nonpsychotic mental disorder     not otherwise specified    Onychogryphosis     4/24/2017    Personal history of other diseases of the digestive system (CODE)     7/9/2017,8/31/95--Dr Barrow--had incidental Appendectomy--Multiple adhesions released    Primary osteoarthritis of hand     1/8/2018     Past Surgical History:   Procedure Laterality Date    APPENDECTOMY OPEN      1996,Incidental, Enterolysis--Dr Barrow    CHOLECYSTECTOMY      1996,Laparoscopic    COLONOSCOPY      1990?    HYSTERECTOMY TOTAL ABDOMINAL      01/1995,[facility]    OTHER SURGICAL HISTORY      1994,208526,CRYOTHERAPY OF CERVIX,Abnormal Pap     Patient Active Problem List   Diagnosis    Abnormal findings in stool    Asymptomatic varicose veins of bilateral lower extremities    Bilateral lower extremity edema    Fibromyalgia    CMC arthritis    History of tobacco use    Onychogryphosis    History of small bowel obstruction    Positive colorectal cancer screening using DNA-based stool test    Requires assistance with activities of daily living (ADL)    Trigeminal neuralgia of  right side of face    Abnormal ankle brachial index (CURT)    Decreased activities of daily living (ADL)    Detrusor dysfunction    Peripheral vascular disease (H24)    Morbid obesity (H)    Centrilobular emphysema (H)    Hyperglycemia     Past Surgical History:   Procedure Laterality Date    APPENDECTOMY OPEN      ,Incidental, Enterolysis--Dr Barrow    CHOLECYSTECTOMY      ,Laparoscopic    COLONOSCOPY      ?    HYSTERECTOMY TOTAL ABDOMINAL      1995,[place]    OTHER SURGICAL HISTORY      ,CRYOTHERAPY OF CERVIX,Abnormal Pap       Social History     Tobacco Use    Smoking status: Former     Current packs/day: 0.00     Average packs/day: 1 pack/day for 30.0 years (30.0 ttl pk-yrs)     Types: Cigarettes     Start date: 1971     Quit date: 2001     Years since quittin.3    Smokeless tobacco: Never    Tobacco comments:     no ecig   Substance Use Topics    Alcohol use: No     Family History   Problem Relation Age of Onset    Other - See Comments Mother         lung cancer    Heart Disease Father 70        Heart Disease,MI    Cancer Father         Cancer,Liver cancer    Cancer Other         Cancer,Lung cancer    Diabetes Other         Diabetes    Lung Cancer Brother     Colon Cancer No family hx of         Cancer-colon    Prostate Cancer No family hx of         Cancer-prostate    Ovarian Cancer No family hx of         Cancer-ovarian    Blood Disease No family hx of         Blood Disease    Hypertension No family hx of         Hypertension    Thyroid Disease No family hx of         Thyroid Disease    Breast Cancer No family hx of         Cancer-breast         Current Outpatient Medications   Medication Sig Dispense Refill    Aspirin 81 MG CAPS Take 81 mg by mouth daily (with breakfast) 90 capsule 3    calcium carbonate-vitamin D (OS-NINA WITH D) 500-200 MG-UNIT tablet Take 1 tablet by mouth 2 times daily      carBAMazepine (TEGRETOL) 200 MG tablet Take 1/2 tablet (100 mg) by mouth 2  times daily 45 tablet 4    famotidine (PEPCID) 40 MG tablet Take 1 tablet (40 mg) by mouth daily 90 tablet 4    guaiFENesin (MUCINEX) 600 MG 12 hr tablet Take 1 tablet (600 mg) by mouth 2 times daily 60 tablet 11    ibuprofen (ADVIL/MOTRIN) 200 MG tablet Take 200 mg by mouth every 4 hours as needed for mild pain      Incontinence Supply Disposable (POISE ULTRA THINS) PADS 240 Units as needed      Incontinence Supply Disposable MISC POISE ULTRA THIN PADS #3/R PC#00691/U1 ITEM#[id number] DX:N31.8/R32 30/PP HCPC: 300EA/10PKGS/30DS 300 each 11    loratadine (CLARITIN) 10 MG tablet Take 1 tablet (10 mg) by mouth daily 90 tablet 4    rosuvastatin (CRESTOR) 20 MG tablet Take 1 tablet (20 mg) by mouth daily 90 tablet 4     Allergies   Allergen Reactions    Contrast Dye Anaphylaxis    Diatrizoate      Other reaction(s): Angioedema  As noted 2009 note Dr Chase Ruiz      Nausea, headache, poor appetite     Current providers sharing in care for this patient include:  Patient Care Team:  Marcela De Luna NP as PCP - General (Nurse Practitioner)  Marcela De Luna NP as Assigned PCP    The following health maintenance items are reviewed in Epic and correct as of today:  Health Maintenance   Topic Date Due    DEXA  Never done    SPIROMETRY  Never done    COPD ACTION PLAN  Never done    RSV VACCINE (Pregnancy & 60+) (1 - 1-dose 60+ series) Never done    LUNG CANCER SCREENING  06/11/2020    COLORECTAL CANCER SCREENING  06/12/2020    MEDICARE ANNUAL WELLNESS VISIT  09/14/2023    LIPID  09/14/2023    COVID-19 Vaccine (7 - 2023-24 season) 02/17/2024    MAMMO SCREENING  09/21/2024    FALL RISK ASSESSMENT  05/07/2025    GLUCOSE  09/14/2025    DTAP/TDAP/TD IMMUNIZATION (2 - Td or Tdap) 06/05/2027    ADVANCE CARE PLANNING  09/14/2027    HEPATITIS C SCREENING  Completed    PHQ-2 (once per calendar year)  Completed    INFLUENZA VACCINE  Completed    Pneumococcal Vaccine: 65+ Years  Completed    IPV IMMUNIZATION  Aged  "Out    HPV IMMUNIZATION  Aged Out    MENINGITIS IMMUNIZATION  Aged Out    RSV MONOCLONAL ANTIBODY  Aged Out    ZOSTER IMMUNIZATION  Discontinued         Review of Systems  Constitutional, HEENT, cardiovascular, pulmonary, GI, , musculoskeletal, neuro, skin, endocrine and psych systems are negative, except as otherwise noted.     Objective    Exam  /78   Pulse 102   Temp 97.5  F (36.4  C) (Temporal)   Resp 18   Ht 1.495 m (4' 10.86\")   Wt 98.3 kg (216 lb 12.8 oz)   LMP  (LMP Unknown)   SpO2 90%   Breastfeeding No   BMI 44.00 kg/m     Estimated body mass index is 44 kg/m  as calculated from the following:    Height as of this encounter: 1.495 m (4' 10.86\").    Weight as of this encounter: 98.3 kg (216 lb 12.8 oz).    Physical Exam  Pleasant female no acute distress.  Affect normal.  Alert and oriented x 4.  Skin color pink.  Sclera nonicteric.  Mucous membranes moist.  Neck supple without adenopathy.  No thyromegaly.  Lung fields clear to auscultation throughout.  Cardiovascular regular rate and rhythm.  Abdomen is soft and nontender.  No abdominal masses or organomegaly.  No suprapubic tenderness.  Extremities without edema.  Broad-based gait.        5/7/2024   Mini Cog   Clock Draw Score 2 Normal   3 Item Recall 1 object recalled   Mini Cog Total Score 3              Signed Electronically by: Marcela De Luna NP    "

## 2024-05-08 ENCOUNTER — PATIENT OUTREACH (OUTPATIENT)
Dept: CARE COORDINATION | Facility: CLINIC | Age: 74
End: 2024-05-08
Payer: COMMERCIAL

## 2024-05-08 NOTE — PROGRESS NOTES
Clinic Care Coordination Contact    Patient was referred to care coordination by her PCP Marcela De Luna, TIFFANIE.     Spoke with patient over the phone and she reports that everything has been completed through the county for PCA services to be covered. She reports she is waiting for them to find her a PCA provider. I sent patient a list of all of the PCA providers in the area and explained she could reach out to them and see if they have any PCA's available every couple weeks. Currently it sounds like no agencies have availability. I explained to patient that if she has any friends or families that would be interested in being a PCA for her she could recommend them to the agency of her choice and pending background check and other employment qualifications they could get paid to assist her. Patient was educated about [facility] and their services. She wanted time to decide if she wanted a referral made to [facility].     Care coordination will reach out in 2-4 weeks to follow-up with patient about [facility].     Delgado Lewis, FERNANDO on 5/8/2024 at 1:15 PM

## 2024-05-21 ENCOUNTER — PATIENT OUTREACH (OUTPATIENT)
Dept: CARE COORDINATION | Facility: CLINIC | Age: 74
End: 2024-05-21
Payer: COMMERCIAL

## 2024-05-21 NOTE — PROGRESS NOTES
Clinic Care Coordination Contact    Spoke with patient over the phone. She reports that she has not found a PCA worker yet but will continue trying. Patient reports that she is not sure if she would like a referral sent to [facility] and would like more time to think about it. Patient reported having no other needs from care coordination at this time.     Care coordination will check-in with patient in 2-4 weeks to see if she would like a referral made to [facility].     FERNANDO Joshi on 5/21/2024 at 4:43 PM

## 2024-06-05 ENCOUNTER — PATIENT OUTREACH (OUTPATIENT)
Dept: CARE COORDINATION | Facility: CLINIC | Age: 74
End: 2024-06-05
Payer: COMMERCIAL

## 2024-06-05 NOTE — PROGRESS NOTES
Clinic Care Coordination Contact    Spoke with patient over the phone and she decided she would like a referral made to [facility]. The referral has been made and Piper at [facility] was notified about referral. Patient reports no other needs from care coordination at this time.     Care coordination will close at this time.     FERNANDO Joshi on 6/5/2024 at 12:52 PM

## 2024-12-03 ENCOUNTER — IMMUNIZATION (OUTPATIENT)
Dept: FAMILY MEDICINE | Facility: OTHER | Age: 74
End: 2024-12-03
Attending: NURSE PRACTITIONER
Payer: COMMERCIAL

## 2024-12-03 VITALS — TEMPERATURE: 98.4 F

## 2024-12-03 DIAGNOSIS — Z23 NEED FOR PROPHYLACTIC VACCINATION AND INOCULATION AGAINST INFLUENZA: Primary | ICD-10-CM

## 2024-12-03 DIAGNOSIS — Z23 HIGH PRIORITY FOR 2019-NCOV VACCINE: ICD-10-CM

## 2024-12-03 PROCEDURE — 90662 IIV NO PRSV INCREASED AG IM: CPT

## 2024-12-03 PROCEDURE — 90480 ADMN SARSCOV2 VAC 1/ONLY CMP: CPT

## 2024-12-03 PROCEDURE — G0008 ADMIN INFLUENZA VIRUS VAC: HCPCS

## 2024-12-03 NOTE — PROGRESS NOTES
Immunization Documentation  Verified patient's first and last name, and . Stated reason for visit today is to receive influenza and covid vaccine. Temp was 98.4f. Denied any concerns with previous immunizations. Allergies and immunizations reviewed. VIS handout(s) reviewed and given to take home. RN prepared and administered per standing order. Administration documented in IMMUNIZATIONS.  Patient instructed to wait in lobby for 15 minutes post-injection and notify staff immediately of any reaction.     Pat Pillai RN ....................  12/3/2024   10:57 AM

## 2024-12-13 NOTE — TELEPHONE ENCOUNTER
Calling regarding fax for pad liners, need JKC initial and date next to dx code noted on form.   Pt denies current use; prior chart indicates hx of use

## 2024-12-18 DIAGNOSIS — N31.8 DETRUSOR DYSFUNCTION: ICD-10-CM

## 2024-12-20 NOTE — TELEPHONE ENCOUNTER
POISE PADS LIGHT REG 30CS4         Last Written Prescription Date:  10/24/22  Last Fill Quantity: 300,   # refills: 11  Last Office Visit: 5/7/24  Future Office visit:       Routing refill request to provider for review/approval because:  Drug not on the [facility], P or [facility] refill protocol or controlled substance.  Will forward to provider for consideration. Unable to complete prescription refill per RNMedication Refill Policy.................... Mi Tavares RN ....................  12/20/2024   4:26 PM

## 2025-02-26 RX ORDER — INCONTINENCE PAD,LINER,DISP
240 EACH MISCELLANEOUS PRN
OUTPATIENT
Start: 2025-02-26

## 2025-02-26 NOTE — TELEPHONE ENCOUNTER
Incontinence Supply Disposable (POISE ULTRA THINS) PADS   Refilled 12/23/24 300 with 11 refills.  Will update pharmacy and determine if needed. Unable to complete prescription refill per RNMedication Refill Policy.................... Mi Tavares RN ....................  2/26/2025   7:45 AM

## 2025-03-10 DIAGNOSIS — N31.8 DETRUSOR DYSFUNCTION: ICD-10-CM

## 2025-03-11 NOTE — TELEPHONE ENCOUNTER
"Received another fax from [employer] \"6th fax request\"  [facility] Jocelin and they found refill from 12/23/24 and will stop requests.  Kori Daigle RN on 3/11/2025 at 1:25 PM    "

## 2025-03-17 NOTE — TELEPHONE ENCOUNTER
7th fax request for poise ultra thin pads.    Called [facility] and they have refills and will stop requests.  Kori Daigle RN on 3/17/2025 at 2:37 PM

## 2025-06-02 DIAGNOSIS — R09.82 ALLERGIC RHINITIS WITH POSTNASAL DRIP: ICD-10-CM

## 2025-06-02 DIAGNOSIS — R05.3 CHRONIC COUGH: ICD-10-CM

## 2025-06-02 DIAGNOSIS — J30.9 ALLERGIC RHINITIS WITH POSTNASAL DRIP: ICD-10-CM

## 2025-06-05 RX ORDER — LORATADINE 10 MG/1
1 TABLET ORAL DAILY
Qty: 90 TABLET | Refills: 4 | Status: SHIPPED | OUTPATIENT
Start: 2025-06-05

## 2025-06-05 NOTE — TELEPHONE ENCOUNTER
[facility] sent Rx request for the following:      Requested Prescriptions   Pending Prescriptions Disp Refills    FT ALL DAY ALLERGY RELIEF 10 MG tablet [Pharmacy Med Name: Allergy Relief (loratadine) 10 mg tablet] 90 tablet 4     Sig: Take 1 tablet (10 mg) by mouth daily       Antihistamines Protocol Failed - 6/5/2025 10:36 AM        Failed - Patient is 3-64 years of age     Apply weight-based dosing for peds patients age 3 - 12 years of age.    Forward request to provider for patients under the age of 3 or over the age of 64.          Failed - Recent (12 month) or future (90 days) visit with authorizing provider's specialty (provided they have been seen in the past 15 months)     The patient must have completed an in-person or virtual visit within the past 12 months or has a future visit scheduled within the next 90 days with the authorizing provider s specialty.  Urgent care and e-visits do not qualify as an office visit for this protocol.         Last Prescription Date:   5/7/24  Last Fill Qty/Refills:         90, R-4    Last Office Visit:              5/7/24   Future Office visit:          none    Routing refill request to provider for review/approval because:  Drug not on the [facility] refill protocol     Adrienne Chan RN on 6/5/2025 at 10:37 AM

## 2025-06-12 DIAGNOSIS — K21.9 GASTROESOPHAGEAL REFLUX DISEASE WITHOUT ESOPHAGITIS: ICD-10-CM

## 2025-06-17 RX ORDER — FAMOTIDINE 40 MG/1
40 TABLET, FILM COATED ORAL DAILY
Qty: 90 TABLET | Refills: 0 | Status: SHIPPED | OUTPATIENT
Start: 2025-06-17

## 2025-06-17 NOTE — TELEPHONE ENCOUNTER
[facility] sent Rx request for the following:      Requested Prescriptions   Pending Prescriptions Disp Refills    famotidine (PEPCID) 40 MG tablet [Pharmacy Med Name: famotidine 40 mg tablet] 90 tablet 4     Sig: Take 1 tablet (40 mg) by mouth daily       H2 Blockers Protocol Failed - 6/17/2025 11:33 AM        Failed - Recent (12 month) or future (90 days) visit with authorizing provider's specialty (provided they have been seen in the past 15 months)     The patient must have completed an in-person or virtual visit within the past 12 months or has a future visit scheduled within the next 90 days with the authorizing provider s specialty.  Urgent care and e-visits do not qualify as an office visit for this protocol.     Gastroesophageal reflux disease without esophagitis [K21.9]     Last Prescription Date:   5/7/24  Last Fill Qty/Refills:         90, R-4    Last Office Visit:              5/7/24 (px)    Future Office visit:            None    Unable to complete prescription refill per RN Medication Refill Policy.     Pt due for annual. Routing to provider for refill consideration. Routing to Unit scheduling pool, to assist Pt in scheduling appointment.     Ross Grullon RN on 6/17/2025 at 11:35 AM

## 2025-06-30 ENCOUNTER — RESULTS FOLLOW-UP (OUTPATIENT)
Dept: FAMILY MEDICINE | Facility: OTHER | Age: 75
End: 2025-06-30

## 2025-06-30 ENCOUNTER — OFFICE VISIT (OUTPATIENT)
Dept: FAMILY MEDICINE | Facility: OTHER | Age: 75
End: 2025-06-30
Attending: NURSE PRACTITIONER
Payer: COMMERCIAL

## 2025-06-30 VITALS
OXYGEN SATURATION: 92 % | HEART RATE: 84 BPM | SYSTOLIC BLOOD PRESSURE: 126 MMHG | DIASTOLIC BLOOD PRESSURE: 70 MMHG | BODY MASS INDEX: 42.5 KG/M2 | WEIGHT: 210.8 LBS | TEMPERATURE: 98 F | HEIGHT: 59 IN | RESPIRATION RATE: 16 BRPM

## 2025-06-30 DIAGNOSIS — Z00.00 ENCOUNTER FOR MEDICARE ANNUAL WELLNESS EXAM: Primary | ICD-10-CM

## 2025-06-30 DIAGNOSIS — Z13.220 LIPID SCREENING: ICD-10-CM

## 2025-06-30 DIAGNOSIS — M79.7 FIBROMYALGIA: ICD-10-CM

## 2025-06-30 DIAGNOSIS — Z87.891 HISTORY OF TOBACCO USE DISORDER: ICD-10-CM

## 2025-06-30 DIAGNOSIS — J43.2 CENTRILOBULAR EMPHYSEMA (H): ICD-10-CM

## 2025-06-30 DIAGNOSIS — Z12.11 SCREEN FOR COLON CANCER: ICD-10-CM

## 2025-06-30 DIAGNOSIS — Z13.820 SCREENING FOR OSTEOPOROSIS: ICD-10-CM

## 2025-06-30 DIAGNOSIS — I73.9 PVD (PERIPHERAL VASCULAR DISEASE): ICD-10-CM

## 2025-06-30 DIAGNOSIS — Z13.29 SCREENING FOR THYROID DISORDER: ICD-10-CM

## 2025-06-30 DIAGNOSIS — Z13.1 SCREENING FOR DIABETES MELLITUS: ICD-10-CM

## 2025-06-30 DIAGNOSIS — K21.9 GASTROESOPHAGEAL REFLUX DISEASE WITHOUT ESOPHAGITIS: ICD-10-CM

## 2025-06-30 DIAGNOSIS — E66.01 MORBID OBESITY (H): ICD-10-CM

## 2025-06-30 DIAGNOSIS — G50.0 TRIGEMINAL NEURALGIA OF RIGHT SIDE OF FACE: ICD-10-CM

## 2025-06-30 DIAGNOSIS — Z12.31 ENCOUNTER FOR SCREENING MAMMOGRAM FOR BREAST CANCER: ICD-10-CM

## 2025-06-30 LAB
ALBUMIN SERPL BCG-MCNC: 4.2 G/DL (ref 3.5–5.2)
ALP SERPL-CCNC: 126 U/L (ref 40–150)
ALT SERPL W P-5'-P-CCNC: 20 U/L (ref 0–50)
ANION GAP SERPL CALCULATED.3IONS-SCNC: 10 MMOL/L (ref 7–15)
AST SERPL W P-5'-P-CCNC: 22 U/L (ref 0–45)
BASOPHILS # BLD AUTO: 0 10E3/UL (ref 0–0.2)
BASOPHILS NFR BLD AUTO: 1 %
BILIRUB SERPL-MCNC: 0.2 MG/DL
BUN SERPL-MCNC: 12.1 MG/DL (ref 8–23)
CALCIUM SERPL-MCNC: 9.3 MG/DL (ref 8.8–10.4)
CHLORIDE SERPL-SCNC: 101 MMOL/L (ref 98–107)
CHOLEST SERPL-MCNC: 136 MG/DL
CREAT SERPL-MCNC: 0.64 MG/DL (ref 0.51–0.95)
EGFRCR SERPLBLD CKD-EPI 2021: >90 ML/MIN/1.73M2
EOSINOPHIL # BLD AUTO: 0.2 10E3/UL (ref 0–0.7)
EOSINOPHIL NFR BLD AUTO: 4 %
ERYTHROCYTE [DISTWIDTH] IN BLOOD BY AUTOMATED COUNT: 12.5 % (ref 10–15)
EST. AVERAGE GLUCOSE BLD GHB EST-MCNC: 126 MG/DL
FASTING STATUS PATIENT QL REPORTED: NO
FASTING STATUS PATIENT QL REPORTED: NO
GLUCOSE SERPL-MCNC: 90 MG/DL (ref 70–99)
HBA1C MFR BLD: 6 %
HCO3 SERPL-SCNC: 30 MMOL/L (ref 22–29)
HCT VFR BLD AUTO: 44.6 % (ref 35–47)
HDLC SERPL-MCNC: 61 MG/DL
HGB BLD-MCNC: 13.8 G/DL (ref 11.7–15.7)
IMM GRANULOCYTES # BLD: 0 10E3/UL
IMM GRANULOCYTES NFR BLD: 0 %
LDLC SERPL CALC-MCNC: 47 MG/DL
LYMPHOCYTES # BLD AUTO: 1.6 10E3/UL (ref 0.8–5.3)
LYMPHOCYTES NFR BLD AUTO: 24 %
MCH RBC QN AUTO: 26.8 PG (ref 26.5–33)
MCHC RBC AUTO-ENTMCNC: 30.9 G/DL (ref 31.5–36.5)
MCV RBC AUTO: 87 FL (ref 78–100)
MONOCYTES # BLD AUTO: 0.4 10E3/UL (ref 0–1.3)
MONOCYTES NFR BLD AUTO: 7 %
NEUTROPHILS # BLD AUTO: 4.2 10E3/UL (ref 1.6–8.3)
NEUTROPHILS NFR BLD AUTO: 65 %
NONHDLC SERPL-MCNC: 75 MG/DL
NRBC # BLD AUTO: 0 10E3/UL
NRBC BLD AUTO-RTO: 0 /100
PLATELET # BLD AUTO: 241 10E3/UL (ref 150–450)
POTASSIUM SERPL-SCNC: 4.2 MMOL/L (ref 3.4–5.3)
PROT SERPL-MCNC: 7.7 G/DL (ref 6.4–8.3)
RBC # BLD AUTO: 5.15 10E6/UL (ref 3.8–5.2)
SODIUM SERPL-SCNC: 141 MMOL/L (ref 135–145)
TRIGL SERPL-MCNC: 139 MG/DL
TSH SERPL DL<=0.005 MIU/L-ACNC: 2.44 UIU/ML (ref 0.3–4.2)
WBC # BLD AUTO: 6.5 10E3/UL (ref 4–11)

## 2025-06-30 PROCEDURE — 85025 COMPLETE CBC W/AUTO DIFF WBC: CPT | Mod: ZL | Performed by: NURSE PRACTITIONER

## 2025-06-30 PROCEDURE — 80053 COMPREHEN METABOLIC PANEL: CPT | Mod: ZL | Performed by: NURSE PRACTITIONER

## 2025-06-30 PROCEDURE — 99214 OFFICE O/P EST MOD 30 MIN: CPT | Mod: 25 | Performed by: NURSE PRACTITIONER

## 2025-06-30 PROCEDURE — 99397 PER PM REEVAL EST PAT 65+ YR: CPT | Performed by: NURSE PRACTITIONER

## 2025-06-30 PROCEDURE — 36415 COLL VENOUS BLD VENIPUNCTURE: CPT | Mod: ZL | Performed by: NURSE PRACTITIONER

## 2025-06-30 PROCEDURE — 83036 HEMOGLOBIN GLYCOSYLATED A1C: CPT | Mod: ZL | Performed by: NURSE PRACTITIONER

## 2025-06-30 PROCEDURE — 82465 ASSAY BLD/SERUM CHOLESTEROL: CPT | Mod: ZL | Performed by: NURSE PRACTITIONER

## 2025-06-30 PROCEDURE — G0463 HOSPITAL OUTPT CLINIC VISIT: HCPCS | Performed by: NURSE PRACTITIONER

## 2025-06-30 PROCEDURE — 84443 ASSAY THYROID STIM HORMONE: CPT | Mod: ZL | Performed by: NURSE PRACTITIONER

## 2025-06-30 RX ORDER — CARBAMAZEPINE 200 MG/1
TABLET ORAL
Qty: 45 TABLET | Refills: 4 | Status: SHIPPED | OUTPATIENT
Start: 2025-06-30

## 2025-06-30 RX ORDER — ACETAMINOPHEN 325 MG/1
325-650 TABLET ORAL EVERY 6 HOURS PRN
COMMUNITY

## 2025-06-30 RX ORDER — ROSUVASTATIN CALCIUM 20 MG/1
20 TABLET, COATED ORAL DAILY
Qty: 90 TABLET | Refills: 4 | Status: SHIPPED | OUTPATIENT
Start: 2025-06-30

## 2025-06-30 RX ORDER — FAMOTIDINE 40 MG/1
40 TABLET, FILM COATED ORAL DAILY
Qty: 90 TABLET | Refills: 4 | Status: SHIPPED | OUTPATIENT
Start: 2025-06-30

## 2025-06-30 SDOH — HEALTH STABILITY: PHYSICAL HEALTH: ON AVERAGE, HOW MANY DAYS PER WEEK DO YOU ENGAGE IN MODERATE TO STRENUOUS EXERCISE (LIKE A BRISK WALK)?: 0 DAYS

## 2025-06-30 ASSESSMENT — PAIN SCALES - GENERAL: PAINLEVEL_OUTOF10: NO PAIN (0)

## 2025-06-30 ASSESSMENT — SOCIAL DETERMINANTS OF HEALTH (SDOH): HOW OFTEN DO YOU GET TOGETHER WITH FRIENDS OR RELATIVES?: NEVER

## 2025-06-30 NOTE — PATIENT INSTRUCTIONS
Patient Education   Preventive Care Advice   This is general advice given by our system to help you stay healthy. However, your care team may have specific advice just for you. Please talk to your care team about your preventive care needs.  Nutrition  Eat 5 or more servings of fruits and vegetables each day.  Try wheat bread, brown rice and whole grain pasta (instead of white bread, rice, and pasta).  Get enough calcium and vitamin D. Check the label on foods and aim for 100% of the RDA (recommended daily allowance).  Lifestyle  Exercise at least 150 minutes each week  (30 minutes a day, 5 days a week).  Do muscle strengthening activities 2 days a week. These help control your weight and prevent disease.  No smoking.  Wear sunscreen to prevent skin cancer.  Have a dental exam and cleaning every 6 months.  Yearly exams  See your health care team every year to talk about:  Any changes in your health.  Any medicines your care team has prescribed.  Preventive care, family planning, and ways to prevent chronic diseases.  Shots (vaccines)   HPV shots (up to age 26), if you've never had them before.  Hepatitis B shots (up to age 59), if you've never had them before.  COVID-19 shot: Get this shot when it's due.  Flu shot: Get a flu shot every year.  Tetanus shot: Get a tetanus shot every 10 years.  Pneumococcal, hepatitis A, and RSV shots: Ask your care team if you need these based on your risk.  Shingles shot (for age 50 and up)  General health tests  Diabetes screening:  Starting at age 35, Get screened for diabetes at least every 3 years.  If you are younger than age 35, ask your care team if you should be screened for diabetes.  Cholesterol test: At age 39, start having a cholesterol test every 5 years, or more often if advised.  Bone density scan (DEXA): At age 50, ask your care team if you should have this scan for osteoporosis (brittle bones).  Hepatitis C: Get tested at least once in your life.  STIs (sexually  transmitted infections)  Before age 24: Ask your care team if you should be screened for STIs.  After age 24: Get screened for STIs if you're at risk. You are at risk for STIs (including HIV) if:  You are sexually active with more than one person.  You don't use condoms every time.  You or a partner was diagnosed with a sexually transmitted infection.  If you are at risk for HIV, ask about PrEP medicine to prevent HIV.  Get tested for HIV at least once in your life, whether you are at risk for HIV or not.  Cancer screening tests  Cervical cancer screening: If you have a cervix, begin getting regular cervical cancer screening tests starting at age 21.  Breast cancer scan (mammogram): If you've ever had breasts, begin having regular mammograms starting at age 40. This is a scan to check for breast cancer.  Colon cancer screening: It is important to start screening for colon cancer at age 45.  Have a colonoscopy test every 10 years (or more often if you're at risk) Or, ask your provider about stool tests like a FIT test every year or Cologuard test every 3 years.  To learn more about your testing options, visit:   .  For help making a decision, visit:   https://bit.ly/it07748.  Prostate cancer screening test: If you have a prostate, ask your care team if a prostate cancer screening test (PSA) at age 55 is right for you.  Lung cancer screening: If you are a current or former smoker ages 50 to 80, ask your care team if ongoing lung cancer screenings are right for you.  For informational purposes only. Not to replace the advice of your health care provider. Copyright   2023 [facility]. All rights reserved. Clinically reviewed by the [facility] Transitions Program. [employer] 978433 - REV 01/24.  Learning About Activities of Daily Living  What are activities of daily living?     Activities of daily living (ADLs) are the basic self-care tasks you do every day. These include eating, bathing, dressing,  and moving around.  As you age, and if you have health problems, you may find that it's harder to do some of these tasks. If so, your doctor can suggest ideas that may help.  To measure what kind of help you may need, your doctor will ask how well you are able to do ADLs. Let your doctor know if there are any tasks that you are having trouble doing. This is an important first step to getting help. And when you have the help you need, you can stay as independent as possible.  How will a doctor assess your ADLs?  Asking about ADLs is part of a routine health checkup your doctor will likely do as you age. Your health check might be done in a doctor's office, in your home, or at a hospital. The goal is to find out if you are having any problems that could make it hard to care for yourself or that make it unsafe for you to be on your own.  To measure your ADLs, your doctor will ask how hard it is for you to do routine tasks. Your doctor may also want to know if you have changed the way you do a task because of a health problem. Your doctor may watch how you:  Walk back and forth.  Keep your balance while you stand or walk.  Move from sitting to standing or from a bed to a chair.  Button or unbutton a shirt or sweater.  Remove and put on your shoes.  It's common to feel a little worried or anxious if you find you can't do all the things you used to be able to do. Talking with your doctor about ADLs is a way to make sure you're as safe as possible and able to care for yourself as well as you can. You may want to bring a caregiver, friend, or family member to your checkup. They can help you talk to your doctor.  Follow-up care is a key part of your treatment and safety. Be sure to make and go to all appointments, and call your doctor if you are having problems. It's also a good idea to know your test results and keep a list of the medicines you take.  Current as of: October 24, 2024  Content Version: 14.5    5637-6982  [employer].   Care instructions adapted under license by your healthcare professional. If you have questions about a medical condition or this instruction, always ask your healthcare professional. [employer] disclaims any warranty or liability for your use of this information.    Preventing Falls: Care Instructions  Injuries and health problems such as trouble walking or poor eyesight can increase your risk of falling. So can some medicines. But there are things you can do to help prevent falls. You can exercise to get stronger. You can also arrange your home to make it safer.    Talk to your doctor about the medicines you take. Ask if any of them increase the risk of falls and whether they can be changed or stopped.   Try to exercise regularly. It can help improve your strength and balance. This can help lower your risk of falling.         Practice fall safety and prevention.   Wear low-heeled shoes that fit well and give your feet good support. Talk to your doctor if you have foot problems that make this hard.  Carry a cellphone or wear a medical alert device that you can use to call for help.  Use stepladders instead of chairs to reach high objects. Don't climb if you're at risk for falls. Ask for help, if needed.  Wear the correct eyeglasses, if you need them.        Make your home safer.   Remove rugs, cords, clutter, and furniture from walkways.  Keep your house well lit. Use night-lights in hallways and bathrooms.  Install and use sturdy handrails on stairways.  Wear nonskid footwear, even inside. Don't walk barefoot or in socks without shoes.        Be safe outside.   Use handrails, curb cuts, and ramps whenever possible.  Keep your hands free by using a shoulder bag or backpack.  Try to walk in well-lit areas. Watch out for uneven ground, changes in pavement, and debris.  Be careful in the winter. Walk on the grass or gravel when sidewalks are slippery. Use de-icer on steps and  "walkways. Add non-slip devices to shoes.    Put grab bars and nonskid mats in your shower or tub and near the toilet. Try to use a shower chair or bath bench when bathing.   Get into a tub or shower by putting in your weaker leg first. Get out with your strong side first. Have a phone or medical alert device in the bathroom with you.   Where can you learn more?  Go to https://www.[employer]/patiented  Enter G117 in the search box to learn more about \"Preventing Falls: Care Instructions.\"  Current as of: July 31, 2024  Content Version: 14.5 2024-2025 [employer].   Care instructions adapted under license by your healthcare professional. If you have questions about a medical condition or this instruction, always ask your healthcare professional. [employer] disclaims any warranty or liability for your use of this information.    Relationships for Good Health  Relationships are important for our health and happiness. Social isolation, loneliness and lack of support are bad for your health. Studies show that loneliness can harm health and limit your life span as much as high blood pressure and smoking.   Take some time to reflect on your relationships. Then answer these questions:  Are there people in your life that cause you stress or drain your energy? What can you do to set limits?  ________________________________________________________________________________________________________________________________________________________________________________________________________________________________________________________________________________________________________________________________________________  Who do you enjoy spending time with? Who can you go to for " support?  ________________________________________________________________________________________________________________________________________________________________________________________________________________________________________________________________________________________________________________________________________________  What can you do to improve your relationships with others?  __________________________________________________________________________________________________________________________________________________________________________________________________________________  ______________________________________________________________________________________________________________________________  What do you like most about your relationships with others?  ________________________________________________________________________________________________________________________________________________________________________________________________________________________________________________________________________________________________________________________________________________  My goal: ______________________________________________________________________  I will: ______________________________________________________________________________________________________________________________________________________________________________________________    For informational purposes only. Not to replace the advice of your health care provider. Copyright   2018 [facility]. All rights reserved. Clinically reviewed by [facility]  Team. [employer] 995097 - Rev 06/24.  Bladder Training: Care Instructions  Your Care Instructions     Bladder training is used to treat urge incontinence and stress incontinence. Urge incontinence means that the need to urinate comes on so fast that you can't get to a toilet in time. Stress incontinence means that you  leak urine because of pressure on your bladder. For example, it may happen when you laugh, cough, or lift something heavy.  Bladder training can increase how long you can wait before you have to urinate. It can also help your bladder hold more urine. And it can give you better control over the urge to urinate.  It is important to remember that bladder training takes a few weeks to a few months to make a difference. You may not see results right away, but don't give up.  Follow-up care is a key part of your treatment and safety. Be sure to make and go to all appointments, and call your doctor if you are having problems. It's also a good idea to know your test results and keep a list of the medicines you take.  How can you care for yourself at home?  Work with your doctor to come up with a bladder training program that is right for you. You may use one or more of the following methods.  Delayed urination  In the beginning, try to keep from urinating for 5 minutes after you first feel the need to go.  While you wait, take deep, slow breaths to relax. Kegel exercises can also help you delay the need to go to the bathroom.  After some practice, when you can easily wait 5 minutes to urinate, try to wait 10 minutes before you urinate.  Slowly increase the waiting period until you are able to control when you have to urinate.  Scheduled urination  Empty your bladder when you first wake up in the morning.  Schedule times throughout the day when you will urinate.  Start by going to the bathroom every hour, even if you don't need to go.  Slowly increase the time between trips to the bathroom.  When you have found a schedule that works well for you, keep doing it.  If you wake up during the night and have to urinate, do it. Apply your schedule to waking hours only.  Kegel exercises  These tighten and strengthen pelvic muscles, which can help you control the flow of urine. (If doing these exercises causes pain, stop doing them  "and talk with your doctor.) To do Kegel exercises:  Squeeze your muscles as if you were trying not to pass gas. Or squeeze your muscles as if you were stopping the flow of urine. Your belly, legs, and buttocks shouldn't move.  Hold the squeeze for 3 seconds, then relax for 5 to 10 seconds.  Start with 3 seconds, then add 1 second each week until you are able to squeeze for 10 seconds.  Repeat the exercise 10 times a session. Do 3 to 8 sessions a day.  When should you call for help?  Watch closely for changes in your health, and be sure to contact your doctor if:    Your incontinence is getting worse.     You do not get better as expected.   Where can you learn more?  Go to https://www.[employer].net/patiented  Enter V684 in the search box to learn more about \"Bladder Training: Care Instructions.\"  Current as of: April 30, 2024  Content Version: 14.5    2457-5965 [employer].   Care instructions adapted under license by your healthcare professional. If you have questions about a medical condition or this instruction, always ask your healthcare professional. [employer] disclaims any warranty or liability for your use of this information.       "

## 2025-06-30 NOTE — PROGRESS NOTES
Preventive Care Visit  [facility] AND [facility]  Elizabeth Cortes NP, Family Medicine  Jun 30, 2025      Assessment & Plan   Problem List Items Addressed This Visit       Fibromyalgia    Relevant Medications    carBAMazepine (TEGRETOL) 200 MG tablet    Trigeminal neuralgia of right side of face    Relevant Medications    carBAMazepine (TEGRETOL) 200 MG tablet    Morbid obesity (H)    Centrilobular emphysema (H)     Other Visit Diagnoses         Encounter for Medicare annual wellness exam    -  Primary    Relevant Orders    Lipid Panel (Completed)    CBC and Differential (Completed)    Comprehensive Metabolic Panel (Completed)    MA Screen Bilateral w/Kin      PVD (peripheral vascular disease)        Relevant Medications    Aspirin 81 MG CAPS    rosuvastatin (CRESTOR) 20 MG tablet      Gastroesophageal reflux disease without esophagitis        Relevant Medications    famotidine (PEPCID) 40 MG tablet      History of tobacco use disorder        Relevant Medications    rosuvastatin (CRESTOR) 20 MG tablet      Encounter for screening mammogram for breast cancer        Relevant Orders    MA Screen Bilateral w/Kin      Screen for colon cancer          Lipid screening        Relevant Orders    Lipid Panel (Completed)      Screening for diabetes mellitus        Relevant Orders    Hemoglobin A1c (Completed)      Screening for thyroid disorder        Relevant Orders    TSH Reflex GH (Completed)      Screening for osteoporosis                 Patient has been advised of split billing requirements and indicates understanding: Yes    1. Encounter for Medicare annual wellness exam (Primary)  - Lipid Panel  - MA Screen Bilateral w/Kin; Future  - CBC and Differential  - Comprehensive Metabolic Panel  Recommended all screenings and immunizations; patient is not agreeable to most. She is otherwise stable today and will continue her current regimen of medications.     2. PVD (peripheral vascular disease)  - Aspirin 81 MG  CAPS; Take 81 mg by mouth daily (with breakfast).  Dispense: 90 capsule; Refill: 3  - rosuvastatin (CRESTOR) 20 MG tablet; Take 1 tablet (20 mg) by mouth daily.  Dispense: 90 tablet; Refill: 4    3. Trigeminal neuralgia of right side of face  - carBAMazepine (TEGRETOL) 200 MG tablet; Take 1/2 tablet (100 mg) by mouth 2 times daily  Dispense: 45 tablet; Refill: 4    4. Centrilobular emphysema (H)  Declines PFTs; oxygen is low normal at 92%. Lungs are clear on exam. She is not an active smoker.     5. Morbid obesity (H)  Recommend diet/exercise management.     6. Fibromyalgia  - carBAMazepine (TEGRETOL) 200 MG tablet; Take 1/2 tablet (100 mg) by mouth 2 times daily  Dispense: 45 tablet; Refill: 4  This helps with her past diagnosis of trigeminal neuralgia; refilled 1 year     7. Gastroesophageal reflux disease without esophagitis  - famotidine (PEPCID) 40 MG tablet; Take 1 tablet (40 mg) by mouth daily.  Dispense: 90 tablet; Refill: 4  Controlled with famotidine; refilled 1 year     8. History of tobacco use disorder  - rosuvastatin (CRESTOR) 20 MG tablet; Take 1 tablet (20 mg) by mouth daily.  Dispense: 90 tablet; Refill: 4  Refilled for 1 year     9. Encounter for screening mammogram for breast cancer  - MA Screen Bilateral w/Kin; Future  I have encouraged this; she may consider it but initially declined.     10. Screen for colon cancer  She declines colon cancer screening with colonoscopy as well as cologuard. I recommended this. She is aware of the risk. She is not having any symptoms of concern. Per review of records; historically has denied colon cancer screening with her PCPs also.     11. Lipid screening  - Lipid Panel  Well controlled on rosuvastatin 20 mg daily     12. Screening for diabetes mellitus  - Hemoglobin A1c  Hemoglobin A1c of 6 today; this is improved as compared to past values and in the prediabetes range. She is not on medication. Discussed diet/exercise improvements and weight loss which would  "be helpful in her overall health     13. Screening for thyroid disorder  - TSH Reflex GH  Normal tsh     14. Screening for osteoporosis  She declines DEXA although this was discussed today. She does not have history of fractures and she takes vitamin d/calcium supplementation. She will let us know if she changes her mind and would like to move forward with dexa scan in the future        BMI  Estimated body mass index is 42.58 kg/m  as calculated from the following:    Height as of this encounter: 1.499 m (4' 11\").    Weight as of this encounter: 95.6 kg (210 lb 12.8 oz).   Weight management plan: Discussed healthy diet and exercise guidelines  Reviewed preventive health counseling, as reflected in patient instructions       Regular exercise       Healthy diet/nutrition       Vision screening       Hearing screening       Alcohol use       Osteoporosis prevention/bone health       Colorectal Cancer Screening  Counseling  Appropriate preventive services were addressed with this patient via screening, questionnaire, or discussion as appropriate for fall prevention, nutrition, physical activity, Tobacco-use cessation, social engagement, weight loss and cognition.  Checklist reviewing preventive services available has been given to the patient.  Reviewed patient's diet, addressing concerns and/or questions.   Patient is at risk for social isolation and has been provided with information about the benefit of social connection.   The patient was instructed to see the dentist every 6 months.   Updated plan of care.  Patient reported difficulty with activities of daily living were addressed today.Information on urinary incontinence and treatment options given to patient.         Mark Casper is a 74 year old, presenting for the following:  Wellness Visit        6/30/2025    11:00 AM   Additional Questions   Roomed by ANALY Johnson   Accompanied by Self         HPI    Pennie presents today for a physical and blood work. " "She tells me she declines having a mammogram, colonoscopy/cologuard, dexa scan, pulmonary function testing, or any vaccinations. She denies any bloody stools, constipation, diarrhea or any other bowel changes. Denies any family history of colon cancer, breast cancer, lung cancer. She does not smoke, quit more than 15 years ago. She denies any trouble breathing. She has emphysema noted on chart, but tells me she is unaware of this. She denies any breathing issues despite oxygenation at 92%. She does not wear oxygen at home. No known sleep apnea or daytime fatigue or snoring.     She takes tegretol for trigeminal neuralgia. If she misses a dose of this; she feels symptoms.      She takes daily calcium and vitamin d.     She is overall doing well. She later states she \"might be brave enough to do a mammogram.\" She is not interested in a cologuard.         Advance Care Planning    Document on file is a Health Care Directive or POLST.        6/30/2025   General Health   How would you rate your overall physical health? Good   Feel stress (tense, anxious, or unable to sleep) Only a little   (!) STRESS CONCERN      6/30/2025   Nutrition   Diet: Low salt         6/30/2025   Exercise   Days per week of moderate/strenous exercise 0 days   (!) EXERCISE CONCERN      6/30/2025   Social Factors   Frequency of gathering with friends or relatives Never   Worry food won't last until get money to buy more No   Food not last or not have enough money for food? No   Do you have housing? (Housing is defined as stable permanent housing and does not include staying outside in a car, in a tent, in an abandoned building, in an overnight shelter, or couch-surfing.) Yes   Are you worried about losing your housing? No   Lack of transportation? No   Unable to get utilities (heat,electricity)? No   (!) SOCIAL CONNECTIONS CONCERN      6/30/2025   Fall Risk   Fallen 2 or more times in the past year? No    No   Trouble with walking or balance? Yes    " Yes   Gait Speed Test Interpretation Greater than 5.01 seconds - ABNORMAL       Multiple values from one day are sorted in reverse-chronological order           2025   Activities of Daily Living- Home Safety   Needs help with the following daily activites Housework    Bathing    Laundry   Do you have the help that you need? Yes for Some   Safety concerns in the home None of the above       Multiple values from one day are sorted in reverse-chronological order         2025   Dental   Dentist two times every year? (!) NO         2025   Hearing Screening   Hearing concerns? None of the above         2025   Driving Risk Screening   Patient/family members have concerns about driving No         2025   General Alertness/Fatigue Screening   Have you been more tired than usual lately? No         2025   Urinary Incontinence Screening   Bothered by leaking urine in past 6 months Yes         Today's PHQ-2 Score:       2025    10:56 AM   PHQ-2 (  [employer])   Q1: Little interest or pleasure in doing things 1   Q2: Feeling down, depressed or hopeless 0   PHQ-2 Score 1    Q1: Little interest or pleasure in doing things Several days   Q2: Feeling down, depressed or hopeless Not at all   PHQ-2 Score 1       Patient-reported           2025   Substance Use   Alcohol more than 3/day or more than 7/wk Not Applicable   Do you have a current opioid prescription? No   How severe/bad is pain from 1 to 10? 4/10   Do you use any other substances recreationally? No     Social History     Tobacco Use    Smoking status: Former     Current packs/day: 0.00     Average packs/day: 1 pack/day for 30.0 years (30.0 ttl pk-yrs)     Types: Cigarettes     Start date: 1971     Quit date: 2001     Years since quittin.4    Smokeless tobacco: Never    Tobacco comments:     no ecig   Vaping Use    Vaping status: Never Used   Substance Use Topics    Alcohol use: No    Drug use: No           2021    LAST FHS-7 RESULTS   1st degree relative breast or ovarian cancer No   Any relative bilateral breast cancer No   Any male have breast cancer No   Any ONE woman have BOTH breast AND ovarian cancer Yes   Any woman with breast cancer before 50yrs Yes   2 or more relatives with breast AND/OR ovarian cancer No   2 or more relatives with breast AND/OR bowel cancer No        Mammogram Screening - Mammogram every 1-2 years updated in Health Maintenance based on mutual decision making    She DECLINES mammogram screening. She had a mammogram in 2022.     ASCVD Risk   The 10-year ASCVD risk score (Melia ATKINS, et al., 2019) is: 11.8%    Values used to calculate the score:      Age: 74 years      Sex: Female      Is Non- : No      Diabetic: No      Tobacco smoker: No      Systolic Blood Pressure: 126 mmHg      Is BP treated: No      HDL Cholesterol: 61 mg/dL      Total Cholesterol: 136 mg/dL          Reviewed and updated as needed this visit by Provider     Meds   Med Hx  Surg Hx             Current providers sharing in care for this patient include:  Patient Care Team:  Marcela De Luna NP as PCP - General (Nurse Practitioner)  Marcela De Luna NP as Assigned PCP    The following health maintenance items are reviewed in Epic and correct as of today:  Health Maintenance   Topic Date Due    DEXA  Never done    SPIROMETRY  Never done    COPD ACTION PLAN  Never done    RSV VACCINE (1 - Risk 60-74 years 1-dose series) Never done    COLORECTAL CANCER SCREENING  06/12/2020    MAMMO SCREENING  09/21/2024    COVID-19 VACCINE (8 - 2024-25 season) 06/03/2025    MEDICARE ANNUAL WELLNESS VISIT  06/30/2026    LIPID  06/30/2026    FALL RISK ASSESSMENT  06/30/2026    DTAP/TDAP/TD VACCINE (2 - Td or Tdap) 06/05/2027    DIABETES SCREENING  06/30/2028    ADVANCE CARE PLANNING  06/30/2030    HEPATITIS C SCREENING  Completed    PHQ-2 (once per calendar year)  Completed    INFLUENZA VACCINE  Completed  "   PNEUMOCOCCAL VACCINE 50+ YEARS  Completed    HPV VACCINE  Aged Out    MENINGITIS VACCINE  Aged Out    ZOSTER VACCINE  Discontinued    LUNG CANCER SCREENING  Discontinued       Review of Systems  Constitutional, neuro, ENT, endocrine, pulmonary, cardiac, gastrointestinal, genitourinary, musculoskeletal, integument and psychiatric systems are negative, except as otherwise noted.     Objective    Exam  /70   Pulse 84   Temp 98  F (36.7  C) (Temporal)   Resp 16   Ht 1.499 m (4' 11\")   Wt 95.6 kg (210 lb 12.8 oz)   LMP  (LMP Unknown)   SpO2 92%   Breastfeeding No   BMI 42.58 kg/m     Estimated body mass index is 42.58 kg/m  as calculated from the following:    Height as of this encounter: 1.499 m (4' 11\").    Weight as of this encounter: 95.6 kg (210 lb 12.8 oz).    Physical Exam  Vitals and nursing note reviewed.   Constitutional:       General: She is not in acute distress.     Appearance: Normal appearance. She is obese. She is not ill-appearing, toxic-appearing or diaphoretic.   HENT:      Head: Normocephalic and atraumatic.      Right Ear: Tympanic membrane, ear canal and external ear normal. There is no impacted cerumen.      Left Ear: Tympanic membrane, ear canal and external ear normal. There is no impacted cerumen.      Nose: Nose normal. No congestion or rhinorrhea.      Mouth/Throat:      Mouth: Mucous membranes are moist.      Pharynx: No oropharyngeal exudate or posterior oropharyngeal erythema.   Eyes:      General:         Right eye: No discharge.         Left eye: No discharge.      Extraocular Movements: Extraocular movements intact.      Pupils: Pupils are equal, round, and reactive to light.   Neck:      Vascular: No carotid bruit.   Cardiovascular:      Rate and Rhythm: Normal rate and regular rhythm.      Heart sounds: Normal heart sounds. No murmur heard.  Pulmonary:      Effort: Pulmonary effort is normal. No respiratory distress.      Breath sounds: Normal breath sounds. No " wheezing.   Abdominal:      General: There is no distension.      Palpations: There is no mass.      Tenderness: There is no abdominal tenderness. There is no right CVA tenderness, left CVA tenderness, guarding or rebound.      Hernia: No hernia is present.   Genitourinary:     Comments: Deferred   Musculoskeletal:         General: Normal range of motion.      Cervical back: Normal range of motion and neck supple. No rigidity or tenderness.      Right lower leg: No edema.      Left lower leg: No edema.   Lymphadenopathy:      Cervical: No cervical adenopathy.   Skin:     General: Skin is warm and dry.      Coloration: Skin is not jaundiced or pale.   Neurological:      General: No focal deficit present.      Mental Status: She is alert and oriented to person, place, and time.   Psychiatric:         Mood and Affect: Mood normal.         Behavior: Behavior normal.             6/30/2025   Mini Cog   Clock Draw Score 2 Normal   3 Item Recall 3 objects recalled   Mini Cog Total Score 5              Results for orders placed or performed in visit on 06/30/25   Lipid Panel     Status: None   Result Value Ref Range    Cholesterol 136 <200 mg/dL    Triglycerides 139 <150 mg/dL    Direct Measure HDL 61 >=50 mg/dL    LDL Cholesterol Calculated 47 <100 mg/dL    Non HDL Cholesterol 75 <130 mg/dL    Patient Fasting > 8hrs? No     Narrative    Cholesterol  Desirable: < 200 mg/dL  Borderline High: 200 - 239 mg/dL  High: >= 240 mg/dL    Triglycerides  Normal: < 150 mg/dL  Borderline High: 150 - 199 mg/dL  High: 200-499 mg/dL  Very High: >= 500 mg/dL    Direct Measure HDL  Female: >= 50 mg/dL   Male: >= 40 mg/dL    LDL Cholesterol  Desirable: < 100 mg/dL  Above Desirable: 100 - 129 mg/dL   Borderline High: 130 - 159 mg/dL   High:  160 - 189 mg/dL   Very High: >= 190 mg/dL    Non HDL Cholesterol  Desirable: < 130 mg/dL  Above Desirable: 130 - 159 mg/dL  Borderline High: 160 - 189 mg/dL  High: 190 - 219 mg/dL  Very High: >= 220 mg/dL    Comprehensive Metabolic Panel     Status: Abnormal   Result Value Ref Range    Sodium 141 135 - 145 mmol/L    Potassium 4.2 3.4 - 5.3 mmol/L    Carbon Dioxide (CO2) 30 (H) 22 - 29 mmol/L    Anion Gap 10 7 - 15 mmol/L    Urea Nitrogen 12.1 8.0 - 23.0 mg/dL    Creatinine 0.64 0.51 - 0.95 mg/dL    GFR Estimate >90 >60 mL/min/1.73m2    Calcium 9.3 8.8 - 10.4 mg/dL    Chloride 101 98 - 107 mmol/L    Glucose 90 70 - 99 mg/dL    Alkaline Phosphatase 126 40 - 150 U/L    AST 22 0 - 45 U/L    ALT 20 0 - 50 U/L    Protein Total 7.7 6.4 - 8.3 g/dL    Albumin 4.2 3.5 - 5.2 g/dL    Bilirubin Total 0.2 <=1.2 mg/dL    Patient Fasting > 8hrs? No    Hemoglobin A1c     Status: Abnormal   Result Value Ref Range    Estimated Average Glucose 126 (H) <117 mg/dL    Hemoglobin A1C 6.0 (H) <5.7 %   TSH Reflex GH     Status: Normal   Result Value Ref Range    TSH 2.44 0.30 - 4.20 uIU/mL   CBC with platelets and differential     Status: Abnormal   Result Value Ref Range    WBC Count 6.5 4.0 - 11.0 10e3/uL    RBC Count 5.15 3.80 - 5.20 10e6/uL    Hemoglobin 13.8 11.7 - 15.7 g/dL    Hematocrit 44.6 35.0 - 47.0 %    MCV 87 78 - 100 fL    MCH 26.8 26.5 - 33.0 pg    MCHC 30.9 (L) 31.5 - 36.5 g/dL    RDW 12.5 10.0 - 15.0 %    Platelet Count 241 150 - 450 10e3/uL    % Neutrophils 65 %    % Lymphocytes 24 %    % Monocytes 7 %    % Eosinophils 4 %    % Basophils 1 %    % Immature Granulocytes 0 %    NRBCs per 100 WBC 0 <1 /100    Absolute Neutrophils 4.2 1.6 - 8.3 10e3/uL    Absolute Lymphocytes 1.6 0.8 - 5.3 10e3/uL    Absolute Monocytes 0.4 0.0 - 1.3 10e3/uL    Absolute Eosinophils 0.2 0.0 - 0.7 10e3/uL    Absolute Basophils 0.0 0.0 - 0.2 10e3/uL    Absolute Immature Granulocytes 0.0 <=0.4 10e3/uL    Absolute NRBCs 0.0 10e3/uL   CBC and Differential     Status: Abnormal    Narrative    The following orders were created for panel order CBC and Differential.  Procedure                               Abnormality         Status                      ---------                               -----------         ------                     CBC with platelets and ...[[id number]]  Abnormal            Final result                 Please view results for these tests on the individual orders.         Signed Electronically by: Elizabeth Cortes NP

## 2025-06-30 NOTE — NURSING NOTE
"Chief Complaint   Patient presents with    Wellness Visit       Initial /70   Pulse 84   Temp 98  F (36.7  C) (Temporal)   Resp 16   Ht 1.499 m (4' 11\")   Wt 95.6 kg (210 lb 12.8 oz)   LMP  (LMP Unknown)   SpO2 92%   Breastfeeding No   BMI 42.58 kg/m   Estimated body mass index is 42.58 kg/m  as calculated from the following:    Height as of this encounter: 1.499 m (4' 11\").    Weight as of this encounter: 95.6 kg (210 lb 12.8 oz).  Medication Review: complete    The next two questions are to help us understand your food security.  If you are feeling you need any assistance in this area, we have resources available to support you today.          6/30/2025   SDOH- Food Insecurity   Within the past 12 months, did you worry that your food would run out before you got money to buy more? N   Within the past 12 months, did the food you bought just not last and you didn t have money to get more? N         Health Care Directive:  Patient has a Health Care Directive on file      Elizabeth Hill CMA      "

## 2025-07-16 DIAGNOSIS — I73.9 PVD (PERIPHERAL VASCULAR DISEASE): ICD-10-CM

## 2025-07-21 RX ORDER — ASPIRIN 81 MG/81MG
CAPSULE ORAL
Qty: 90 CAPSULE | Refills: 3 | Status: SHIPPED | OUTPATIENT
Start: 2025-07-21

## 2025-07-21 NOTE — TELEPHONE ENCOUNTER
[facility] sent Rx request for the following:      Requested Prescriptions   Pending Prescriptions Disp Refills     Aspirin 81 MG CAPS 90 capsule 3 6/30/2025 -- No   Sig - Route: Take 81 mg by mouth daily (with breakfast). - Oral   Sent to pharmacy as: Aspirin 81 MG Oral Capsule   Class: E-Prescribe   Order: [id number]   E-Prescribing Status: Receipt confirmed by pharmacy (6/30/2025 11:39 AM CDT)     Spoke with pharmacy, they did not receive this order. Will resend prescription.    Adrienne Chan RN on 7/21/2025 at 9:55 AM

## (undated) RX ORDER — ASPIRIN 81 MG/1
TABLET, CHEWABLE ORAL
Status: DISPENSED
Start: 2020-06-23

## (undated) RX ORDER — AZITHROMYCIN 250 MG/1
TABLET, FILM COATED ORAL
Status: DISPENSED
Start: 2019-04-23